# Patient Record
Sex: FEMALE | Race: WHITE | NOT HISPANIC OR LATINO | Employment: PART TIME | URBAN - METROPOLITAN AREA
[De-identification: names, ages, dates, MRNs, and addresses within clinical notes are randomized per-mention and may not be internally consistent; named-entity substitution may affect disease eponyms.]

---

## 2018-04-26 ENCOUNTER — OFFICE VISIT CONVERTED (OUTPATIENT)
Dept: UROLOGY | Facility: CLINIC | Age: 23
End: 2018-04-26
Attending: UROLOGY

## 2019-02-04 ENCOUNTER — HOSPITAL ENCOUNTER (OUTPATIENT)
Dept: OTHER | Facility: HOSPITAL | Age: 24
Discharge: HOME | End: 2019-02-04
Attending: OBSTETRICS & GYNECOLOGY

## 2019-02-04 LAB — HCG INTACT+B SERPL-ACNC: 3835 M[IU]/ML (ref 0–5)

## 2019-02-06 ENCOUNTER — HOSPITAL ENCOUNTER (OUTPATIENT)
Dept: OTHER | Facility: HOSPITAL | Age: 24
Discharge: HOME | End: 2019-02-06
Attending: OBSTETRICS & GYNECOLOGY

## 2019-02-06 LAB — HCG INTACT+B SERPL-ACNC: 4211 M[IU]/ML (ref 0–5)

## 2019-03-01 ENCOUNTER — HOSPITAL ENCOUNTER (OUTPATIENT)
Dept: PERIOP | Facility: HOSPITAL | Age: 24
Setting detail: HOSPITAL OUTPATIENT SURGERY
Discharge: HOME | End: 2019-03-01
Attending: OBSTETRICS & GYNECOLOGY

## 2019-08-28 ENCOUNTER — HOSPITAL ENCOUNTER (OUTPATIENT)
Dept: GENERAL RADIOLOGY | Facility: HOSPITAL | Age: 24
Discharge: HOME | End: 2019-08-28
Attending: OBSTETRICS & GYNECOLOGY

## 2020-02-16 ENCOUNTER — HOSPITAL ENCOUNTER (OUTPATIENT)
Dept: LABOR AND DELIVERY | Facility: HOSPITAL | Age: 25
Discharge: HOME | End: 2020-02-16
Attending: OBSTETRICS & GYNECOLOGY

## 2020-02-16 LAB
ALBUMIN SERPL-MCNC: 3.6 G/DL (ref 3.5–5)
ALBUMIN/GLOB SERPL: 1.3 {RATIO} (ref 1.4–2.6)
ALP SERPL-CCNC: 102 U/L (ref 42–98)
ALT SERPL-CCNC: 12 U/L (ref 10–40)
ANION GAP SERPL CALC-SCNC: 17 MMOL/L (ref 8–19)
AST SERPL-CCNC: 14 U/L (ref 15–50)
BASOPHILS # BLD AUTO: 0.03 10*3/UL (ref 0–0.2)
BASOPHILS NFR BLD AUTO: 0.2 % (ref 0–3)
BILIRUB SERPL-MCNC: <0.15 MG/DL (ref 0.2–1.3)
BUN SERPL-MCNC: 7 MG/DL (ref 5–25)
BUN/CREAT SERPL: 13 {RATIO} (ref 6–20)
CALCIUM SERPL-MCNC: 9.4 MG/DL (ref 8.7–10.4)
CHLORIDE SERPL-SCNC: 106 MMOL/L (ref 99–111)
CONV ABS IMM GRAN: 0.22 10*3/UL (ref 0–0.2)
CONV CO2: 20 MMOL/L (ref 22–32)
CONV CREATININE URINE, RANDOM: 26.4 MG/DL (ref 10–300)
CONV IMMATURE GRAN: 1.7 % (ref 0–1.8)
CONV PROTEIN TO CREATININE RATIO (RANDOM URINE): 0.21 {RATIO} (ref 0–0.1)
CONV TOTAL PROTEIN: 6.4 G/DL (ref 6.3–8.2)
CREAT UR-MCNC: 0.53 MG/DL (ref 0.5–0.9)
DEPRECATED RDW RBC AUTO: 45.6 FL (ref 36.4–46.3)
EOSINOPHIL # BLD AUTO: 0.07 10*3/UL (ref 0–0.7)
EOSINOPHIL # BLD AUTO: 0.5 % (ref 0–7)
ERYTHROCYTE [DISTWIDTH] IN BLOOD BY AUTOMATED COUNT: 13.7 % (ref 11.7–14.4)
GFR SERPLBLD BASED ON 1.73 SQ M-ARVRAT: >60 ML/MIN/{1.73_M2}
GLOBULIN UR ELPH-MCNC: 2.8 G/DL (ref 2–3.5)
GLUCOSE SERPL-MCNC: 75 MG/DL (ref 65–99)
HCT VFR BLD AUTO: 34.4 % (ref 37–47)
HGB BLD-MCNC: 11 G/DL (ref 12–16)
LYMPHOCYTES # BLD AUTO: 2.29 10*3/UL (ref 1–5)
LYMPHOCYTES NFR BLD AUTO: 17.6 % (ref 20–45)
MCH RBC QN AUTO: 29.4 PG (ref 27–31)
MCHC RBC AUTO-ENTMCNC: 32 G/DL (ref 33–37)
MCV RBC AUTO: 92 FL (ref 81–99)
MONOCYTES # BLD AUTO: 0.95 10*3/UL (ref 0.2–1.2)
MONOCYTES NFR BLD AUTO: 7.3 % (ref 3–10)
NEUTROPHILS # BLD AUTO: 9.45 10*3/UL (ref 2–8)
NEUTROPHILS NFR BLD AUTO: 72.7 % (ref 30–85)
NRBC CBCN: 0 % (ref 0–0.7)
OSMOLALITY SERPL CALC.SUM OF ELEC: 285 MOSM/KG (ref 273–304)
PLATELET # BLD AUTO: 286 10*3/UL (ref 130–400)
PMV BLD AUTO: 9.6 FL (ref 9.4–12.3)
POTASSIUM SERPL-SCNC: 3.9 MMOL/L (ref 3.5–5.3)
PROT UR-MCNC: 5.5 MG/DL
RBC # BLD AUTO: 3.74 10*6/UL (ref 4.2–5.4)
SODIUM SERPL-SCNC: 139 MMOL/L (ref 135–147)
WBC # BLD AUTO: 13.01 10*3/UL (ref 4.8–10.8)

## 2020-02-21 ENCOUNTER — HOSPITAL ENCOUNTER (OUTPATIENT)
Dept: LABOR AND DELIVERY | Facility: HOSPITAL | Age: 25
Discharge: HOME | End: 2020-02-21
Attending: OBSTETRICS & GYNECOLOGY

## 2020-02-24 ENCOUNTER — HOSPITAL ENCOUNTER (OUTPATIENT)
Dept: LABOR AND DELIVERY | Facility: HOSPITAL | Age: 25
Discharge: HOME | End: 2020-02-24
Attending: OBSTETRICS & GYNECOLOGY

## 2020-02-24 LAB
ALBUMIN SERPL-MCNC: 3.3 G/DL (ref 3.5–5)
ALBUMIN/GLOB SERPL: 1 {RATIO} (ref 1.4–2.6)
ALP SERPL-CCNC: 110 U/L (ref 42–98)
ALT SERPL-CCNC: 11 U/L (ref 10–40)
ANION GAP SERPL CALC-SCNC: 21 MMOL/L (ref 8–19)
AST SERPL-CCNC: 16 U/L (ref 15–50)
BASOPHILS # BLD AUTO: 0.02 10*3/UL (ref 0–0.2)
BASOPHILS NFR BLD AUTO: 0.2 % (ref 0–3)
BILIRUB SERPL-MCNC: <0.15 MG/DL (ref 0.2–1.3)
BUN SERPL-MCNC: 8 MG/DL (ref 5–25)
BUN/CREAT SERPL: 14 {RATIO} (ref 6–20)
CALCIUM SERPL-MCNC: 10 MG/DL (ref 8.7–10.4)
CHLORIDE SERPL-SCNC: 103 MMOL/L (ref 99–111)
CONV ABS IMM GRAN: 0.1 10*3/UL (ref 0–0.2)
CONV CO2: 20 MMOL/L (ref 22–32)
CONV CREATININE URINE, RANDOM: 32.3 MG/DL (ref 10–300)
CONV IMMATURE GRAN: 0.8 % (ref 0–1.8)
CONV PROTEIN TO CREATININE RATIO (RANDOM URINE): 0.16 {RATIO} (ref 0–0.1)
CONV TOTAL PROTEIN: 6.6 G/DL (ref 6.3–8.2)
CREAT UR-MCNC: 0.58 MG/DL (ref 0.5–0.9)
DEPRECATED RDW RBC AUTO: 46.6 FL (ref 36.4–46.3)
EOSINOPHIL # BLD AUTO: 0.06 10*3/UL (ref 0–0.7)
EOSINOPHIL # BLD AUTO: 0.5 % (ref 0–7)
ERYTHROCYTE [DISTWIDTH] IN BLOOD BY AUTOMATED COUNT: 13.9 % (ref 11.7–14.4)
GFR SERPLBLD BASED ON 1.73 SQ M-ARVRAT: >60 ML/MIN/{1.73_M2}
GLOBULIN UR ELPH-MCNC: 3.3 G/DL (ref 2–3.5)
GLUCOSE SERPL-MCNC: 82 MG/DL (ref 65–99)
HCT VFR BLD AUTO: 35.5 % (ref 37–47)
HGB BLD-MCNC: 11.4 G/DL (ref 12–16)
LYMPHOCYTES # BLD AUTO: 2.01 10*3/UL (ref 1–5)
LYMPHOCYTES NFR BLD AUTO: 16.8 % (ref 20–45)
MCH RBC QN AUTO: 29.7 PG (ref 27–31)
MCHC RBC AUTO-ENTMCNC: 32.1 G/DL (ref 33–37)
MCV RBC AUTO: 92.4 FL (ref 81–99)
MONOCYTES # BLD AUTO: 0.66 10*3/UL (ref 0.2–1.2)
MONOCYTES NFR BLD AUTO: 5.5 % (ref 3–10)
NEUTROPHILS # BLD AUTO: 9.12 10*3/UL (ref 2–8)
NEUTROPHILS NFR BLD AUTO: 76.2 % (ref 30–85)
NRBC CBCN: 0 % (ref 0–0.7)
OSMOLALITY SERPL CALC.SUM OF ELEC: 287 MOSM/KG (ref 273–304)
PLATELET # BLD AUTO: 301 10*3/UL (ref 130–400)
PMV BLD AUTO: 9.9 FL (ref 9.4–12.3)
POTASSIUM SERPL-SCNC: 4 MMOL/L (ref 3.5–5.3)
PROT UR-MCNC: 5.2 MG/DL
RBC # BLD AUTO: 3.84 10*6/UL (ref 4.2–5.4)
SODIUM SERPL-SCNC: 140 MMOL/L (ref 135–147)
WBC # BLD AUTO: 11.97 10*3/UL (ref 4.8–10.8)

## 2020-02-26 ENCOUNTER — HOSPITAL ENCOUNTER (OUTPATIENT)
Dept: LABOR AND DELIVERY | Facility: HOSPITAL | Age: 25
Discharge: HOME | End: 2020-02-26
Attending: OBSTETRICS & GYNECOLOGY

## 2020-03-04 ENCOUNTER — HOSPITAL ENCOUNTER (OUTPATIENT)
Dept: LABOR AND DELIVERY | Facility: HOSPITAL | Age: 25
Discharge: HOME | End: 2020-03-04
Attending: OBSTETRICS & GYNECOLOGY

## 2020-03-14 ENCOUNTER — HOSPITAL ENCOUNTER (OUTPATIENT)
Dept: LABOR AND DELIVERY | Facility: HOSPITAL | Age: 25
Discharge: HOME | End: 2020-03-14
Attending: OBSTETRICS & GYNECOLOGY

## 2020-12-02 ENCOUNTER — OFFICE VISIT CONVERTED (OUTPATIENT)
Dept: FAMILY MEDICINE CLINIC | Facility: CLINIC | Age: 25
End: 2020-12-02
Attending: NURSE PRACTITIONER

## 2020-12-02 ENCOUNTER — HOSPITAL ENCOUNTER (OUTPATIENT)
Dept: FAMILY MEDICINE CLINIC | Facility: CLINIC | Age: 25
Discharge: HOME | End: 2020-12-02
Attending: NURSE PRACTITIONER

## 2020-12-02 LAB
ALBUMIN SERPL-MCNC: 5 G/DL (ref 3.5–5)
ALBUMIN/GLOB SERPL: 1.5 {RATIO} (ref 1.4–2.6)
ALP SERPL-CCNC: 82 U/L (ref 42–98)
ALT SERPL-CCNC: 22 U/L (ref 10–40)
ANION GAP SERPL CALC-SCNC: 20 MMOL/L (ref 8–19)
AST SERPL-CCNC: 25 U/L (ref 15–50)
BASOPHILS # BLD AUTO: 0.04 10*3/UL (ref 0–0.2)
BASOPHILS NFR BLD AUTO: 0.4 % (ref 0–3)
BILIRUB SERPL-MCNC: 0.21 MG/DL (ref 0.2–1.3)
BUN SERPL-MCNC: 14 MG/DL (ref 5–25)
BUN/CREAT SERPL: 14 {RATIO} (ref 6–20)
CALCIUM SERPL-MCNC: 9.9 MG/DL (ref 8.7–10.4)
CHLORIDE SERPL-SCNC: 105 MMOL/L (ref 99–111)
CHOLEST SERPL-MCNC: 198 MG/DL (ref 107–200)
CHOLEST/HDLC SERPL: 4 {RATIO} (ref 3–6)
CONV ABS IMM GRAN: 0.06 10*3/UL (ref 0–0.2)
CONV CO2: 21 MMOL/L (ref 22–32)
CONV IMMATURE GRAN: 0.6 % (ref 0–1.8)
CONV TOTAL PROTEIN: 8.3 G/DL (ref 6.3–8.2)
CREAT UR-MCNC: 0.99 MG/DL (ref 0.5–0.9)
DEPRECATED RDW RBC AUTO: 42.3 FL (ref 36.4–46.3)
EOSINOPHIL # BLD AUTO: 0.1 10*3/UL (ref 0–0.7)
EOSINOPHIL # BLD AUTO: 1 % (ref 0–7)
ERYTHROCYTE [DISTWIDTH] IN BLOOD BY AUTOMATED COUNT: 13 % (ref 11.7–14.4)
GFR SERPLBLD BASED ON 1.73 SQ M-ARVRAT: >60 ML/MIN/{1.73_M2}
GLOBULIN UR ELPH-MCNC: 3.3 G/DL (ref 2–3.5)
GLUCOSE SERPL-MCNC: 93 MG/DL (ref 65–99)
HCT VFR BLD AUTO: 45.4 % (ref 37–47)
HDLC SERPL-MCNC: 49 MG/DL (ref 40–60)
HGB BLD-MCNC: 14.5 G/DL (ref 12–16)
LDLC SERPL CALC-MCNC: 121 MG/DL (ref 70–100)
LYMPHOCYTES # BLD AUTO: 2.79 10*3/UL (ref 1–5)
LYMPHOCYTES NFR BLD AUTO: 28.1 % (ref 20–45)
MCH RBC QN AUTO: 28.5 PG (ref 27–31)
MCHC RBC AUTO-ENTMCNC: 31.9 G/DL (ref 33–37)
MCV RBC AUTO: 89.2 FL (ref 81–99)
MONOCYTES # BLD AUTO: 0.48 10*3/UL (ref 0.2–1.2)
MONOCYTES NFR BLD AUTO: 4.8 % (ref 3–10)
NEUTROPHILS # BLD AUTO: 6.46 10*3/UL (ref 2–8)
NEUTROPHILS NFR BLD AUTO: 65.1 % (ref 30–85)
NRBC CBCN: 0 % (ref 0–0.7)
OSMOLALITY SERPL CALC.SUM OF ELEC: 292 MOSM/KG (ref 273–304)
PLATELET # BLD AUTO: 344 10*3/UL (ref 130–400)
PMV BLD AUTO: 9.3 FL (ref 9.4–12.3)
POTASSIUM SERPL-SCNC: 4.5 MMOL/L (ref 3.5–5.3)
RBC # BLD AUTO: 5.09 10*6/UL (ref 4.2–5.4)
SODIUM SERPL-SCNC: 141 MMOL/L (ref 135–147)
TRIGL SERPL-MCNC: 138 MG/DL (ref 40–150)
TSH SERPL-ACNC: 3.65 M[IU]/L (ref 0.27–4.2)
VLDLC SERPL-MCNC: 28 MG/DL (ref 5–37)
WBC # BLD AUTO: 9.93 10*3/UL (ref 4.8–10.8)

## 2020-12-02 PROCEDURE — 82607 VITAMIN B-12: CPT

## 2020-12-02 PROCEDURE — 82746 ASSAY OF FOLIC ACID SERUM: CPT

## 2020-12-03 LAB
25(OH)D3 SERPL-MCNC: 23.3 NG/ML (ref 30–100)
FERRITIN SERPL-MCNC: 96 NG/ML (ref 10–200)
IRON SATN MFR SERPL: 13 % (ref 20–55)
IRON SERPL-MCNC: 53 UG/DL (ref 60–170)
TIBC SERPL-MCNC: 418 UG/DL (ref 245–450)
TRANSFERRIN SERPL-MCNC: 292 MG/DL (ref 250–380)

## 2020-12-04 ENCOUNTER — LAB REQUISITION (OUTPATIENT)
Dept: LAB | Facility: HOSPITAL | Age: 25
End: 2020-12-04

## 2020-12-04 DIAGNOSIS — Z00.00 ROUTINE GENERAL MEDICAL EXAMINATION AT A HEALTH CARE FACILITY: ICD-10-CM

## 2020-12-04 LAB
FOLATE SERPL-MCNC: >20 NG/ML (ref 4.78–24.2)
VIT B12 BLD-MCNC: 778 PG/ML (ref 211–946)

## 2020-12-18 ENCOUNTER — OFFICE VISIT CONVERTED (OUTPATIENT)
Dept: FAMILY MEDICINE CLINIC | Facility: CLINIC | Age: 25
End: 2020-12-18
Attending: NURSE PRACTITIONER

## 2021-01-06 ENCOUNTER — TELEMEDICINE CONVERTED (OUTPATIENT)
Dept: PSYCHIATRY | Facility: CLINIC | Age: 26
End: 2021-01-06
Attending: STUDENT IN AN ORGANIZED HEALTH CARE EDUCATION/TRAINING PROGRAM

## 2021-01-08 ENCOUNTER — HOSPITAL ENCOUNTER (OUTPATIENT)
Dept: OTHER | Facility: HOSPITAL | Age: 26
Discharge: HOME | End: 2021-01-08
Attending: INTERNAL MEDICINE

## 2021-01-22 ENCOUNTER — HOSPITAL ENCOUNTER (OUTPATIENT)
Dept: OTHER | Facility: HOSPITAL | Age: 26
Discharge: HOME | End: 2021-01-22
Attending: STUDENT IN AN ORGANIZED HEALTH CARE EDUCATION/TRAINING PROGRAM

## 2021-01-23 ENCOUNTER — HOSPITAL ENCOUNTER (OUTPATIENT)
Dept: URGENT CARE | Facility: CLINIC | Age: 26
Discharge: HOME | End: 2021-01-23
Attending: PHYSICIAN ASSISTANT

## 2021-02-02 ENCOUNTER — HOSPITAL ENCOUNTER (OUTPATIENT)
Dept: VACCINE CLINIC | Facility: HOSPITAL | Age: 26
Discharge: HOME | End: 2021-02-02
Attending: INTERNAL MEDICINE

## 2021-02-10 ENCOUNTER — TELEMEDICINE CONVERTED (OUTPATIENT)
Dept: PSYCHIATRY | Facility: CLINIC | Age: 26
End: 2021-02-10
Attending: STUDENT IN AN ORGANIZED HEALTH CARE EDUCATION/TRAINING PROGRAM

## 2021-03-10 ENCOUNTER — TELEMEDICINE CONVERTED (OUTPATIENT)
Dept: PSYCHIATRY | Facility: CLINIC | Age: 26
End: 2021-03-10
Attending: STUDENT IN AN ORGANIZED HEALTH CARE EDUCATION/TRAINING PROGRAM

## 2021-05-05 ENCOUNTER — TELEMEDICINE CONVERTED (OUTPATIENT)
Dept: PSYCHIATRY | Facility: CLINIC | Age: 26
End: 2021-05-05
Attending: STUDENT IN AN ORGANIZED HEALTH CARE EDUCATION/TRAINING PROGRAM

## 2021-05-07 ENCOUNTER — HOSPITAL ENCOUNTER (OUTPATIENT)
Dept: OTHER | Facility: HOSPITAL | Age: 26
Discharge: HOME | End: 2021-05-07
Attending: UROLOGY

## 2021-05-07 LAB
AMPHETAMINES UR QL SCN: NEGATIVE
BARBITURATES UR QL SCN: NEGATIVE
BENZODIAZ UR QL SCN: NEGATIVE
CONV COCAINE, UR: NEGATIVE
METHADONE UR QL SCN: NEGATIVE
OPIATES TESTED UR SCN: NEGATIVE
OXYCODONE UR QL SCN: NEGATIVE
PCP UR QL: NEGATIVE
THC SERPLBLD CFM-MCNC: NEGATIVE NG/ML

## 2021-05-10 NOTE — H&P
History and Physical      Patient Name: Janice Padilla   Patient ID: [id number]   Sex: Female   YOB: 1995    Primary Care Provider: Lázaro Peguero MD   Referring Provider: Lázaro Peguero MD    Visit Date: December 2, 2020    Provider[place] JURGEN Colon   [place] [facility] [place] [address] [place] [address], [address]  [place], [place]  [id number]   Location Phone: [phone]          Chief Complaint  · establish care  · discuss medications      History Of Present Illness  Janice Padilla is a 25 year old /White female who presents for evaluation and treatment of:      Patient is a 25-year-old female who comes in to establish care and annual physical exam.  Her last provider Recently closed his practice.  She has a history of anxiety and depression, she is currently on Zoloft and hydroxyzine.  She also has history of ADD and is currently on Adderall as needed.  She states she has been on multiple medications for anxiety and depression.  She states medications include Prozac, Effexor, Lexapro and BuSpar.  She states she continues to feel fatigue, unmotivated.  She is a new mom to young daughter who is 9 months old.  Patient is unsure if it was post pregnancy that she feels this way or is or something underlying that is causing her to feel more fatigue.    Patient is currently on not out Adderall as needed for ADD.  She states she feels like it wears off when she does need it is not very effective.  She has been taking it for several years she states she has also been on Vyvanse, states that it worked but then for unknown reasons it stopped working.  Depression screening today of 5 points.  She is already had her flu vaccine.  She has never smoked.  Last Pap was February 19, 2020.  Otherwise she is feeling well with minimal complaints.       Past Medical History  Disease Name Date Onset Notes   Acne --  --    ADHD --  --    Anxiety --  --    Depression --  --     PCOS (polycystic ovarian syndrome) --  --    Pre-eclampsia --  --          Past Surgical History  Procedure Name Date Notes   D & C --  --    Tonsilectomy --  --          Medication List  Name Date Started Instructions   Adderall 20 mg oral tablet  take 1 tablet (20 mg) by oral route 3 times per day before breakfast, at noon, and at 4 pm   hydroxyzine HCl 50 mg oral tablet 12/02/2020 take 1 tablet by oral route once a day (at bedtime) for 90 days   minocycline 100 mg oral capsule  take 1 capsule by oral route daily   Prenatal oral  take 1 by oral route daily   spironolactone 100 mg oral tablet  take 1 tablet (100 mg) by oral route once daily   Zoloft 50 mg oral tablet 12/02/2020 take 1 tablet (50 mg) by oral route once daily for 30 days         Allergy List  Allergen Name Date Reaction Notes   NO KNOWN DRUG ALLERGIES --  --  --          Family Medical History  Disease Name Relative/Age Notes   *No Known Family History  --          Social History  Finding Status Start/Stop Quantity Notes   Alcohol Never --/-- --  --    Tobacco Never --/-- --  --          Review of Systems  · Constitutional  o Admits  o : fatigue  o Denies  o : fever, weight loss, weight gain  · Eyes  o Denies  o : double vision, impaired vision, changes in vision  · HENT  o Denies  o : headaches, vertigo, lightheadedness  · Cardiovascular  o Denies  o : lower extremity edema, claudication, chest pressure, palpitations  · Respiratory  o Denies  o : shortness of breath, wheezing, cough, hemoptysis, dyspnea on exertion  · Gastrointestinal  o Denies  o : nausea, vomiting, diarrhea, constipation, abdominal pain  · Integument  o Denies  o : rash, itching, pigmentation changes  · Musculoskeletal  o Denies  o : joint pain, joint swelling, muscle pain  · Psychiatric  o Admits  o : anxiety, depression, inattentiveness  o Denies  o : suicidal ideation, homicidal ideation      Vitals  Date Time BP Position Site L\R Cuff Size HR RR TEMP (F) WT  HT  BMI kg/m2  "BSA m2 O2 Sat FR L/min FiO2 HC       12/02/2020 08:51 /84 Sitting    107 - R   184lbs 6oz 5'  1\" 34.84 1.9 100 %            Physical Examination  · Constitutional  o Appearance  o : well-nourished, well developed, in no acute distress  · Eyes  o Conjunctivae  o : conjunctivae normal, no exudates present  o Sclerae  o : sclerae white  o Pupils and Irises  o : pupils equal and round, and reactive to light and accomodation bilaterally  o Eyelids/Ocular Adnexae  o : extra ocular movements intact  · Respiratory  o Respiratory Effort  o : breathing unlabored, no accessory muscle use  o Inspection of Chest  o : normal appearance, no retractions  o Auscultation of Lungs  o : normal breath sounds bilaterally  · Cardiovascular  o Heart  o :   § Auscultation of Heart  § : regular rate and rhythm, no murmurs, gallops or rubs  · Neurologic  o Mental Status Examination  o :   § Orientation  § : alert and oriented x3  § Speech/Language  § : normal speech pattern  o Gait and Station  o : normal gait, able to stand without difficulty  · Psychiatric  o Judgment and Insight  o : judgment and insight intact, judgement for everyday activities and social situations within normal limits, insight intact  o Thought Processes  o : rate of thoughts normal, thought content logical  o Mood and Affect  o : mood normal, affect appropriate              Assessment  · Screening for depression     V79.0/Z13.89  · Annual physical exam     V70.0/Z00.00  · Anemia     285.9/D64.9  · Anxiety disorder     300.00/F41.9  Will decrease her Zoloft and start on Wellbutrin with a 2-week follow-up. Discussed with patient to follow-up sooner if need be. Patient verbalized understanding.  · Fatigue     780.79/R53.83  Will check labs to rule out any acute underlying issue. Patient is agreeable treatment plan.  · Vitamin D deficiency     268.9/E55.9  · ADD (attention deficit disorder)     314.00/F98.8  Will refer over to " Terri      Plan  · Orders  o Psychiatric Consultation (PSYCH) - V79.0/Z13.89 - 12/02/2020   Dr. Murray  o Physical, Primary Care Panel (CBC, CMP, Lipid, TSH) [facility] (98944, 88713, 78383, 85390) - V70.0/Z00.00 - 12/02/2020  o B12 Folate levels (B12FO) - 285.9/D64.9 - 12/02/2020  o Iron panel (iron, TIBC, transferrin saturation) (98129, 79961, 50434) - 285.9/D64.9 - 12/02/2020  o Ferritin ser/plas (89328) - 285.9/D64.9 - 12/02/2020  o Vitamin D Level (60440) - 268.9/E55.9 - 12/02/2020  o ACO-39: Current medications updated and reviewed (, 1159F) - - 12/02/2020  o ACO-18: Positive screen for clinical depression using a standardized tool and a follow-up plan documented () - - 12/02/2020   5  o ACO-14: Influenza immunization administered or previously received [facility] () - - 12/02/2020   pt states she received at work 10/20  · Medications  o Wellbutrin  mg oral tablet extended release 24 hr   SIG: take 1 tablet (150 mg) by oral route once daily for 30 days   DISP: (30) Tablet with 0 refills  Prescribed on 12/02/2020     o hydroxyzine HCl 50 mg oral tablet   SIG: take 1 tablet by oral route once a day (at bedtime) for 90 days   DISP: (90) Tablet with 1 refills  Prescribed on 12/02/2020     o Zoloft 50 mg oral tablet   SIG: take 1 tablet (50 mg) by oral route once daily for 30 days   DISP: (30) Tablet with 0 refills  Prescribed on 12/02/2020     o Medications have been Reconciled  o Transition of Care or Provider Policy  · Instructions  o Depression Screen completed and scanned into the EMR under the designated folder within the patient's documents.  o Today's PHQ-9 result is _5_  o Reviewed health maintenance flowsheet and updated information. Orders were placed and/or patient's response was documented.  o Patient was given an SSRI/SSNRI medication and warned of possible side effects of the medication including potential for increased risk of suicidal thoughts and feelings. Patient was instructed that if they  begin to exhibit any of these effects they will discontinue the medication immediately and contact our office or the ER ASAP.  o Take all medications as prescribed/directed.  o Patient was educated/instructed on their diagnosis, treatment and medications prior to discharge from the clinic today.  o Call the office with any concerns or questions.  · Disposition  o Call or Return if symptoms worsen or persist.  o follow up as needed  o call the office with any questions or concerns  o Follow-up in 2 weeks            Electronically Signed by: Balbina Todd APRN -Author on December 2, 2020 10:28:19 AM

## 2021-05-10 NOTE — H&P
"   History and Physical      Patient Name: Janice Padilla   Patient ID: [id number]   Sex: Female   YOB: 1995    Primary Care Provider: Balbina SEAMAN   Referring Provider: Balbina SEAMAN    Visit Date: January 6, 2021    Provider: Eil Murray MD   Location: [facility]   Location Address: [address] [place] [address]  [address] [place] [address]  [id number]   Location Phone: [phone]          Chief Complaint     \"Well I had a baby in March of last year.\"       History Of Present Illness  Janice Padilla is a 25 year old /White female who presents to the office today referred by Balbina SEAMAN.   Chart review 1/6: Patient is on Wellbutrin, status post Zoloft. Also on Adderall. Had tachycardia at 130. Started on metoprolol 50 mg extended release daily. Labs in December: BMI 35, LDL is elevated at 121, ALT AST within normal limits, creatinine 0.99, hematocrit 45, electrolytes are essentially normal, TSH is 3.65 normal, iron is 53 low, vitamin D is 23 low, ferritin is normal at 96. No EKG or head imaging.   Virtual visit via Zoom audio and video due to the COVID-19 pandemic. Patient is accepting of and agreeable to appointment. The appointment consisted of the patient and I only. Interview: Patient reports that she had her first child in March and suffered from postpartum depression. The pandemic did not help things. It was a \"hard time,\" and the patient reports she had already had anxiety before. Patient is presently bottlefeeding. She became pregnant through an infertility specialist. Presently not on any contraception.   Endorses muscle tension, fatigue, poor concentration, restlessness, irritability, and some broken sleep, although she is sleeping for about 8 hours a night. She wakes up frequently to check on her baby girl, Alexandra. Also endorses guilt at being a bad mom. Duration has been since March of last year. Patient feels her anxiety is worse than her " "depression.   Denies SI HI AVH. Has access to weapons that are locked in a gun safe. Patient does not know the combination. She also does not know how to work the guns that are in the safe. Psychiatric review of systems is positive for anxiety and depression, negative for psychosis and maribel.   Possible ADHD. Patient was diagnosed by Dr. Joe in 2017. 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 Possible family history as well as her sister may have ADHD.   Past Psychiatric History:  Began Psychiatric Treatment: Since    Dx: Anxiety   Psychiatrist: Has not seen a psychiatrist   Therapist: Saw therapist in 2018x1, unsure if it was helpful.   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   Kids: 1 child, a baby girl, Alexandra   House: Has her own house    Hx: Denies   Family History:  Suicide Attempts: Denies   Suicide Completions: Denies   Substance Use: Likely none   Psychiatric Conditions: Mom has bipolar, sister may have ADHD    depression, psychosis, anxiety: Patient has a history of postpartum depression   Developmental History:  Born: [place]   Siblings: 1 sister   Childhood: no abuse   High School: Completed   College: Has her RN degree       Past Medical History  Disease Name Date Onset Notes   Acne --  --    ADHD --  --    Anxiety --  --    Depression --  --    PCOS " (polycystic ovarian syndrome) --  --    Pre-eclampsia --  --          Past Surgical History  Procedure Name Date Notes   D & C --  --    Tonsilectomy --  --          Medication List  Name Date Started Instructions   Adderall 20 mg oral tablet  take 1 tablet (20 mg) by oral route 3 times per day before breakfast, at noon, and at 4 pm   hydroxyzine HCl 50 mg oral tablet 12/18/2020 take 1 tablet by oral route once a day (at bedtime) for 90 days   iron 325 mg (65 mg iron) oral tablet 12/18/2020 take 1 tablet (325 mg) by oral route once daily for 90 days   metoprolol succinate 50 mg oral tablet extended release 24 hr 12/18/2020 take 1 tablet (50 mg) by oral route once daily for 90 days   minocycline 100 mg oral capsule  take 1 capsule by oral route daily   Prenatal oral  take 1 by oral route daily   spironolactone 100 mg oral tablet  take 1 tablet (100 mg) by oral route once daily   Vitamin D2 1,250 mcg (50,000 unit) oral capsule 12/03/2020 take 1 capsule by oral route weekly   Wellbutrin  mg oral tablet extended release 24 hr 12/18/2020 take 1 tablet (150 mg) by oral route once daily for 90 days   Zoloft 50 mg oral tablet 12/02/2020 take 1 tablet (50 mg) by oral route once daily for 30 days         Allergy List  Allergen Name Date Reaction Notes   NO KNOWN DRUG ALLERGIES --  --  --          Family Medical History  Disease Name Relative/Age Notes   Family history of anxiety disorder Mother/   --    Family history of attention deficit disorder Sister/   --          Social History  Finding Status Start/Stop Quantity Notes   Alcohol Light --/-- --  --    Recreational Drug Use Never --/-- --  --    Tobacco Never --/-- --  --          Immunizations  NameDate Admin Mfg Trade Name Lot Number Route Inj VIS Given VIS Publication   Ahfctmpcu59/01/2020 NE Not Entered  NE NE     Comments: pt states she received 10/20 at work         Review of Systems  · Constitutional  o Denies  o : fatigue, night sweats  · Eyes  o Denies  o :  double vision, blurred vision  · HENT  o Denies  o : vertigo, recent head injury  · Cardiovascular  o Denies  o : chest pain, irregular heart beats  · Respiratory  o Denies  o : shortness of breath, productive cough  · Gastrointestinal  o Denies  o : nausea, vomiting  · Genitourinary  o Denies  o : dysuria, urinary retention  · Integument  o Denies  o : hair growth change, new skin lesions  · Neurologic  o Denies  o : altered mental status, seizures  · Musculoskeletal  o Denies  o : joint swelling, limitation of motion  · Endocrine  o Denies  o : cold intolerance, heat intolerance  · Psychiatric  o Admits  o : anxiety  o Denies  o : depression, post traumatic stress disorder, obsessive compulsive disorder, psychosis, maribel  · Allergic-Immunologic  o Denies  o : frequent illnesses      Physical Examination  · Mental Status Exam  o Appearance  o : good eye contact, normal street clothes, groomed  o Behavior  o : pleasant and cooperative  o Motor  o : no abnormal  o Speech  o : normal rhythm, rate, volume, tone, not hyperverbal, not pressured, normal prosidy  o Mood  o : mood normal  o Affect  o : constricted, depressed, euthymic  o Thought Content  o : negative suicidal ideations, negative homicidal ideations, negative obsessions  o Perceptions  o : negative auditory hallucinations, negative visual hallucinations  o Thought Process  o : linear  o Insight/Judgement  o : fair/fair  o Cognition  o : grossly intact  o Attention  o : intact          Assessment  · Major depressive disorder in partial remission     296.25/F32.4  · Generalized anxiety disorder     300.02/F41.1       Presentation most consistent with major depressive disorder in partial remission, generalized anxiety disorder, possible (likely?)  ADHD.  Patient may have a sister with ADHD, she is distractible and interrupt me frequently during the interview, and also experienced significant benefit on a stimulant while in RN school (was valedictorian), wherein  she barely passed LPN school.  Strong suspicion.    Start escitalopram to work with Wellbutrin.  Order an EKG due to tachycardia.  See back in 1 month.    Patient does not get along well with her mom, and her father passed away.  It may be difficult to perform the diva 2.0 to diagnose ADHD.  At the next visit, I will talk to the patient about her childhood symptoms, as well as adult symptoms.       Plan  · Orders  o ACO-39: Current medications updated and reviewed (1159F, ) - - 2021  o EKG with at least 12 leads, INTERPRETATION AND REPORT ONLY [facility] ([id number]) - 296.25/F32.4, 300.02/F41.1 - 2021  · Medications  o escitalopram oxalate 10 mg oral tablet   SIG: take 1 tablet (10 mg) by oral route once daily for 60 days   DISP: (60) Tablet with 1 refills  Prescribed on 2021     o Zoloft 50 mg oral tablet   SIG: take 1 tablet (50 mg) by oral route once daily for 30 days   DISP: (30) Tablet with 0 refills  Discontinued on 2021     o Medications have been Reconciled  o Transition of Care or Provider Policy  · Instructions  o Risk Assessment: Risk of self-harm acutely is low. Risk factors include anxiety disorder, mood disorder, access to weapons, recent psychosocial stressors. Protective factors include no family history, no present SI, no history of suicide attempts or self-harm in the past, minimal AODA, healthcare seeking, future orientation, willingness to engage in care,  baby. Risk of self-harm chronically is also low, but could be further elevated in the event of treatment noncompliance and/or AODA.  o Safety: No acute safety concerns.  o Medications: CONTINUE Bupropion  mg p.o. daily to target depression and anxiety. Risks, benefits, alternatives discussed with patient including nausea, GI upset, increased energy, exacerbation of irritability, lowering of seizure threshold. After discussion of these risks and benefits, the patient voiced understanding and agreed to proceed.  CONTINUE hydroxyzine 50 mg PO QHS. START Escitalopram 10 mg p.o. daily to target depression, anxiety. Risks, benefits, alternatives discussed with patient including GI upset, nausea vomiting diarrhea, theoretical decrease of seizure threshold predisposing the patient to a slightly higher seizure risk, headaches, sexual dysfunction, serotonin syndrome. After discussion of these risks and benefits, the patient voiced understanding and agreed to proceed.  o Therapy: Consider in the future, presently not interested.  o Labs/Studies: EKG as a baseline given history of tachycardia. Patient is presently on metoprolol ER 50 mg a day.  o Follow Up: 1 month.  · Disposition  o Follow Up in 1 month.  · Correspondence  o Behavioral Health Letter to Referring MD (Balbina SEAMAN) - 01/06/2021            Electronically Signed by: Eli Murray MD -Author on January 6, 2021 10:02:18 AM

## 2021-05-14 VITALS
OXYGEN SATURATION: 100 % | HEART RATE: 107 BPM | HEIGHT: 61 IN | DIASTOLIC BLOOD PRESSURE: 84 MMHG | BODY MASS INDEX: 34.81 KG/M2 | WEIGHT: 184.37 LBS | SYSTOLIC BLOOD PRESSURE: 126 MMHG

## 2021-05-14 VITALS
BODY MASS INDEX: 35.19 KG/M2 | TEMPERATURE: 98.2 F | HEART RATE: 130 BPM | DIASTOLIC BLOOD PRESSURE: 80 MMHG | SYSTOLIC BLOOD PRESSURE: 128 MMHG | WEIGHT: 186.37 LBS | OXYGEN SATURATION: 99 % | HEIGHT: 61 IN

## 2021-05-14 NOTE — PROGRESS NOTES
Progress Note      Patient Name: Janice Padilla   Patient ID: [id number]   Sex: Female   YOB: 1995    Primary Care Provider: Balbina SEAMAN   Referring Provider: Lázaro Peguero MD    Visit Date: December 18, 2020    Provider: JURGEN Colon   [place] [facility] [place]   Location Address: [address] [place] [address], [address] [place] [address]  [id number]   Location Phone: [phone]          Chief Complaint  · 2 week follow-up      History Of Present Illness  Janice Padilla is a 25 year old /White female who presents for evaluation and treatment of:      Patient is here for 2-week follow-up after being placed on Wellbutrin.  She states she feels like the Wellbutrin is working, she just recently stopped the Zoloft.  Patient is currently on Adderall, she is waiting to see Dr. Murray 1st week in January.  Heart rate in the office today was tachycardic at 130, she states she has always had a higher heart rate, she states her baseline typically is in the low 100s.  She denies any chest pain, she states that at times she can feel that her heart rate is too high, it makes her feel more anxious.  She denies any vertigo or syncopal episodes.She is not currently on a beta-blocker.       Past Medical History  Disease Name Date Onset Notes   Acne --  --    ADHD --  --    Anxiety --  --    Depression --  --    PCOS (polycystic ovarian syndrome) --  --    Pre-eclampsia --  --          Past Surgical History  Procedure Name Date Notes   D & C --  --    Tonsilectomy --  --          Medication List  Name Date Started Instructions   Adderall 20 mg oral tablet  take 1 tablet (20 mg) by oral route 3 times per day before breakfast, at noon, and at 4 pm   hydroxyzine HCl 50 mg oral tablet 12/18/2020 take 1 tablet by oral route once a day (at bedtime) for 90 days   minocycline 100 mg oral capsule  take 1 capsule by oral route daily   Prenatal oral  take 1 by oral route daily   spironolactone  "100 mg oral tablet  take 1 tablet (100 mg) by oral route once daily   Vitamin D2 1,250 mcg (50,000 unit) oral capsule 12/03/2020 take 1 capsule by oral route weekly   Wellbutrin  mg oral tablet extended release 24 hr 12/18/2020 take 1 tablet (150 mg) by oral route once daily for 90 days   Zoloft 50 mg oral tablet 12/02/2020 take 1 tablet (50 mg) by oral route once daily for 30 days         Allergy List  Allergen Name Date Reaction Notes   NO KNOWN DRUG ALLERGIES --  --  --        Allergies Reconciled  Family Medical History  Disease Name Relative/Age Notes   *No Known Family History  --          Social History  Finding Status Start/Stop Quantity Notes   Alcohol Never --/-- --  --    Tobacco Never --/-- --  --          Immunizations  NameDate Admin Mfg Trade Name Lot Number Route Inj VIS Given VIS Publication   [id number]/01/2020 NE Not Entered  NE NE     Comments: pt states she received 10/20 at work         Review of Systems  · Constitutional  o Denies  o : fever, fatigue, weight loss, weight gain  · Cardiovascular  o Admits  o : Tachycardia  o Denies  o : lower extremity edema, claudication, chest pressure, palpitations  · Respiratory  o Denies  o : shortness of breath, wheezing, cough, hemoptysis, dyspnea on exertion  · Gastrointestinal  o Denies  o : nausea, vomiting, diarrhea, constipation, abdominal pain  · Psychiatric  o Admits  o : anxiety, depression  o Denies  o : suicidal ideation, homicidal ideation      Vitals  Date Time BP Position Site L\R Cuff Size HR RR TEMP (F) WT  HT  BMI kg/m2 BSA m2 O2 Sat FR L/min FiO2 HC       12/18/2020 02:49 /80 Sitting    130 - R  98.2 186lbs 6oz 5'  1\" 35.21 1.91 99 %            Physical Examination  · Constitutional  o Appearance  o : well-nourished, well developed, in no acute distress  · Eyes  o Conjunctivae  o : conjunctivae normal, no exudates present  o Sclerae  o : sclerae white  o Pupils and Irises  o : pupils equal and round, and reactive to light and " accomodation bilaterally  o Eyelids/Ocular Adnexae  o : extra ocular movements intact  · Respiratory  o Respiratory Effort  o : breathing unlabored, no accessory muscle use  o Inspection of Chest  o : normal appearance, no retractions  o Auscultation of Lungs  o : normal breath sounds bilaterally  · Cardiovascular  o Heart  o :   § Auscultation of Heart  § : Tachycardia, regular rhythm, no murmurs, gallops or rubs  o Peripheral Vascular System  o :   § Extremities  § : no edema  · Neurologic  o Mental Status Examination  o :   § Orientation  § : alert and oriented x3  § Speech/Language  § : normal speech pattern  o Gait and Station  o : normal gait, able to stand without difficulty  · Psychiatric  o Judgment and Insight  o : judgment and insight intact, judgement for everyday activities and social situations within normal limits, insight intact  o Thought Processes  o : rate of thoughts normal, thought content logical  o Mood and Affect  o : mood normal, affect appropriate              Assessment  · Anxiety     300.00/F41.9  Will continue on Wellbutrin, discussed with patient we can go up on dose. Patient verbalized understanding.  · Tachycardia     785.0/R00.0  Started patient on metoprolol 50 mg extended release to take daily to help bring down her heart rate.      Plan  · Orders  o ACO-39: Current medications updated and reviewed (, 1159F) - - 12/18/2020  · Medications  o iron 325 mg (65 mg iron) oral tablet   SIG: take 1 tablet (325 mg) by oral route once daily for 90 days   DISP: (90) Tablet with 1 refills  Prescribed on 12/18/2020     o metoprolol succinate 50 mg oral tablet extended release 24 hr   SIG: take 1 tablet (50 mg) by oral route once daily for 90 days   DISP: (90) Tablet with 1 refills  Prescribed on 12/18/2020     o hydroxyzine HCl 50 mg oral tablet   SIG: take 1 tablet by oral route once a day (at bedtime) for 90 days   DISP: (90) Tablet with 1 refills  Adjusted on 12/18/2020     o Wellbutrin   mg oral tablet extended release 24 hr   SIG: take 1 tablet (150 mg) by oral route once daily for 90 days   DISP: (90) Tablet with 1 refills  Adjusted on 12/18/2020     o Medications have been Reconciled  o Transition of Care or Provider Policy  · Instructions  o Take all medications as prescribed/directed.  o Patient was educated/instructed on their diagnosis, treatment and medications prior to discharge from the clinic today.  o Call the office with any concerns or questions.  · Disposition  o Call or Return if symptoms worsen or persist.  o follow up as needed  o call the office with any questions or concerns            Electronically Signed by: Balbina Todd APRN -Author on December 18, 2020 04:31:43 PM

## 2021-05-14 NOTE — PROGRESS NOTES
"   Progress Note      Patient Name: Janice Padilla   Patient ID: [id number]   Sex: Female   YOB: 1995    Primary Care Provider: Balbina SEAMAN   Referring Provider: Balbina SEAMAN    Visit Date: March 10, 2021    Provider: Eli Murray MD   Location: [facility]   Location Address: [address] [place] [address]  [id number]   Location Phone: [phone]          Chief Complaint     \"Bupropion is my favorite.\"       History Of Present Illness  Janiec Padilla is a 25 year old /White female who presents to the office today referred by Balbina SEAMAN.   Chart review 1/6: Patient is on Wellbutrin, status post Zoloft. Also on Adderall. Had tachycardia at 130. Started on metoprolol 50 mg extended release daily. Labs in December: BMI 35, LDL is elevated at 121, ALT AST within normal limits, creatinine 0.99, hematocrit 45, electrolytes are essentially normal, TSH is 3.65 normal, iron is 53 low, vitamin D is 23 low, ferritin is normal at 96. No EKG or head imaging.   \"Janice.\" Virtual visit via Zoom audio and video due to the COVID-19 pandemic. Patient is accepting of and agreeable to appointment. The appointment consisted of the patient and I only. Interview: Patient is significantly improved. Denies depression, muscle tension, fatigue, restlessness. Continues to endorse poor concentration. No SI HI AVH.   Patient was held back in the third grade. Struggled to complete high school and missed 60 days although she did graduate with good grades. Got in trouble for talking a lot in class. She was not in any special classes. She did not have an IEP.   ...   ...         Past Medical History  Disease Name Date Onset Notes   Acne --  --    ADHD --  --    Anxiety --  --    Depression --  --    Generalized anxiety disorder 01/06/2021 --    Major depressive disorder in partial remission 01/06/2021 --    PCOS (polycystic ovarian syndrome) --  --  "   Pre-eclampsia --  --          Past Surgical History  Procedure Name Date Notes   D & C --  --    Tonsilectomy --  --          Medication List  Name Date Started Instructions   hydroxyzine HCl 50 mg oral tablet 12/18/2020 take 1 tablet by oral route once a day (at bedtime) for 90 days   iron 325 mg (65 mg iron) oral tablet 12/18/2020 take 1 tablet (325 mg) by oral route once daily for 90 days   metoprolol succinate 50 mg oral tablet extended release 24 hr 12/18/2020 take 1 tablet (50 mg) by oral route once daily for 90 days   phentermine 30 mg oral capsule  take 1 capsule (30 mg) by oral route once daily before breakfast   Prenatal oral  take 1 by oral route daily   spironolactone 100 mg oral tablet  take 1 tablet (100 mg) by oral route once daily   topiramate 50 mg oral tablet  take 1 tablet (50 mg) by oral route 2 times per day   Vitamin D2 1,250 mcg (50,000 unit) oral capsule 12/03/2020 take 1 capsule by oral route weekly   Wellbutrin  mg oral tablet extended release 24 hr 02/10/2021 take 1 tablet (300 mg) by oral route once daily for 90 days         Allergy List  Allergen Name Date Reaction Notes   NO KNOWN DRUG ALLERGIES --  --  --          Family Medical History  Disease Name Relative/Age Notes   Family history of anxiety disorder Mother/   --    Family history of attention deficit disorder Sister/   --          Social History  Finding Status Start/Stop Quantity Notes   Alcohol Light --/-- --  02/10/2021 - 01/06/2021 -    Recreational Drug Use Never --/-- --  02/10/2021 - 01/06/2021 -    Tobacco Never --/-- --  02/10/2021 - 01/06/2021 -          Immunizations  NameDate Admin Mfg Trade Name Lot Number Route Inj VIS Given VIS Publication   [id number]/01/2020 NE Not Entered  NE NE     Comments: pt states she received 10/20 at work         Review of Systems  · Constitutional  o Denies  o : fatigue, night sweats  · Eyes  o Denies  o : double vision, blurred vision  · HENT  o Denies  o : vertigo, recent head  "injury  · Breasts  o Denies  o : abnormal changes in breast size, additional breast symptoms except as noted in the HPI  · Cardiovascular  o Denies  o : chest pain, irregular heart beats  · Respiratory  o Denies  o : shortness of breath, productive cough  · Gastrointestinal  o Denies  o : nausea, vomiting  · Genitourinary  o Denies  o : dysuria, urinary retention  · Integument  o Denies  o : hair growth change, new skin lesions  · Neurologic  o Denies  o : altered mental status, seizures  · Musculoskeletal  o Denies  o : joint swelling, limitation of motion  · Endocrine  o Denies  o : cold intolerance, heat intolerance  · Heme-Lymph  o Denies  o : petechiae, lymph node enlargement or tenderness  · Allergic-Immunologic  o Denies  o : frequent illnesses      Physical Examination  · Mental Status Exam  o Appearance  o : good eye contact, normal street clothes, groomed, sitting at a desk in her house  o Behavior  o : pleasant and cooperative  o Motor  o : no abnormal  o Speech  o : normal rhythm, rate, volume, tone, not hyperverbal, not pressured, normal prosidy  o Mood  o : \"Bupropion is my favorite\"  o Affect  o : Almost euthymic, slight constriction  o Thought Content  o : negative suicidal ideations, negative homicidal ideations, negative obsessions  o Perceptions  o : negative auditory hallucinations, negative visual hallucinations  o Thought Process  o : linear  o Insight/Judgement  o : fair/fair  o Cognition  o : grossly intact  o Attention  o : intact          Assessment  · Major depressive disorder in partial remission     296.25/F32.4  · Generalized anxiety disorder     300.02/F41.1       Presentation most consistent with major depressive disorder in partial remission, generalized anxiety disorder, and ADHD.  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  Also endorses a childhood history that is compelling.    Significant improvement.  Increase Wellbutrin to target attentional issues.  EKG is reassuring.  Once the patient is off phentermine we will start a stimulant to tackle ADHD.         Plan  · Orders  o ACO-39: Current medications updated and reviewed (1159F, ) - - 03/10/2021  o EKG with at least 12 leads, INTERPRETATION AND REPORT ONLY [facility] ([id number]) - 296.25/F32.4, 300.02/F41.1 - 03/10/2021  · Medications  o bupropion HCl 450 mg oral tablet extended release 24 hr   SIG: take 1 tablet (450 mg) by oral route once daily swallowing whole. Do not crush, chew and/or divide. for 90 days   DISP: (90) Tablet with 1 refills  Prescribed on 03/10/2021     o Wellbutrin  mg oral tablet extended release 24 hr   SIG: take 1 tablet (300 mg) by oral route once daily for 90 days   DISP: (90) Tablet with 1 refills  Discontinued on 03/10/2021     o Medications have been Reconciled  o Transition of Care or Provider Policy  · Instructions  o Risk Assessment: Risk of self-harm acutely is low. Risk factors include anxiety disorder, mood disorder, access to weapons, recent psychosocial stressors. Protective factors include no family history, no present SI, no history of suicide attempts or self-harm in the past, minimal AODA, healthcare seeking, future orientation, willingness to engage in care,  baby. Risk of self-harm chronically is also low, but could be further elevated in the event of treatment noncompliance and/or AODA.  o Safety: No acute safety concerns.  o Medications: INCREASE Bupropion XL from 300 to 450 mg p.o. daily to target attentional issues. Risks, benefits, alternatives discussed with patient including nausea, GI upset, increased energy, exacerbation of irritability, lowering of seizure threshold. After discussion of these risks and benefits, the patient voiced understanding and agreed to proceed.  CONTINUE hydroxyzine 50 mg PO QHS. *** S/P escitalopram.  o Therapy: Consider in the future, presently not interested.  o Labs/Studies: EKG reassuring.  o Follow Up: 2 month.  · Disposition  o Follow Up in 2 months.            Electronically Signed by: Eli Murray MD -Author on March 10, 2021 09:24:49 AM

## 2021-05-16 VITALS — HEIGHT: 61 IN | WEIGHT: 150 LBS | BODY MASS INDEX: 28.32 KG/M2 | RESPIRATION RATE: 14 BRPM

## 2021-06-05 NOTE — PROGRESS NOTES
"   Progress Note      Patient Name: Janice Padilla   Patient ID: [id number]   Sex: Female   YOB: 1995    Primary Care Provider: Balbina SEAMAN   Referring Provider: Balbina SEAMAN    Visit Date: May 5, 2021    Provider: Eli Murray MD   Location: [facility]   Location Address: [address] [place] [address]  [id number]   Location Phone: [phone]          Chief Complaint     \"I do not know what happened with the Wellbutrin.\"  The increased dose made her more depressed.  Still having attentional issues.       History Of Present Illness  Janice Padilla is a 25 year old /White female who presents to the office today referred by Balbina SEAMAN.   Chart review 1/6: Patient is on Wellbutrin, status post Zoloft. Also on Adderall. Had tachycardia at 130. Started on metoprolol 50 mg extended release daily. Labs in December: BMI 35, LDL is elevated at 121, ALT AST within normal limits, creatinine 0.99, hematocrit 45, electrolytes are essentially normal, TSH is 3.65 normal, iron is 53 low, vitamin D is 23 low, ferritin is normal at 96. No EKG or head imaging.   \"Janice.\" Virtual visit via Zoom audio and video due to the COVID-19 pandemic. Patient is accepting of and agreeable to appointment. The appointment consisted of the patient and I only. Interview: Patient became more depressed on the higher dose of bupropion. Went back down to 300 mg a day. Denies depression, muscle tension, fatigue, restlessness. Continues to endorse poor concentration. Reduced Topamax dose to 25 mg a day. No SI HI AVH.   From previous note, patient was held back in the third grade. Struggled to complete high school and missed 60 days although she did graduate with good grades. Got in trouble for talking a lot in class. She was not in any special classes. She did not have an IEP.   ...   ...         Past Medical History  Disease Name Date Onset Notes   Acne --  --  "   ADHD --  --    Anxiety --  --    Depression --  --    Generalized anxiety disorder 01/06/2021 --    Major depressive disorder in partial remission 01/06/2021 --    PCOS (polycystic ovarian syndrome) --  --    Pre-eclampsia --  --          Past Surgical History  Procedure Name Date Notes   D & C --  --    Tonsilectomy --  --          Medication List  Name Date Started Instructions   bupropion HCl 450 mg oral tablet extended release 24 hr 03/10/2021 take 1 tablet (450 mg) by oral route once daily swallowing whole. Do not crush, chew and/or divide. for 90 days   ergocalciferol (vitamin D2) 1,250 mcg (50,000 unit) oral capsule 04/30/2021 TAKE 1 CAPSULE BY MOUTH ONCE WEEKLY   hydroxyzine HCl 50 mg oral tablet 12/18/2020 take 1 tablet by oral route once a day (at bedtime) for 90 days   metoprolol succinate 50 mg oral tablet extended release 24 hr 12/18/2020 take 1 tablet (50 mg) by oral route once daily for 90 days   phentermine 15 mg oral capsule  take 1 capsule by oral route 2 times a day   Prenatal oral  take 1 by oral route daily   spironolactone 100 mg oral tablet  take 1 tablet (100 mg) by oral route once daily   topiramate 25 mg oral tablet  take 1 tablet (25 mg) by oral route once daily   Xyzal 5 mg oral tablet  take 1 tablet (5 mg) by oral route once daily in the evening         Allergy List  Allergen Name Date Reaction Notes   NO KNOWN DRUG ALLERGIES --  --  --          Family Medical History  Disease Name Relative/Age Notes   Family history of anxiety disorder Mother/   --    Family history of attention deficit disorder Sister/   --          Social History  Finding Status Start/Stop Quantity Notes   Alcohol Light --/-- --  02/10/2021 - 01/06/2021 -    Recreational Drug Use Never --/-- --  02/10/2021 - 01/06/2021 -    Tobacco Never --/-- --  02/10/2021 - 01/06/2021 -          Immunizations  NameDate Admin Mfg Trade Name Lot Number Route Inj VIS Given VIS Publication   [id number]/01/2020 NE Not Entered  NE NE   "   Comments: pt states she received 10/20 at work         Review of Systems  · Constitutional  o Denies  o : fatigue, night sweats  · Eyes  o Denies  o : double vision, blurred vision  · HENT  o Denies  o : vertigo, recent head injury  · Breasts  o Denies  o : abnormal changes in breast size, additional breast symptoms except as noted in the HPI  · Cardiovascular  o Denies  o : chest pain, irregular heart beats  · Respiratory  o Denies  o : shortness of breath, productive cough  · Gastrointestinal  o Denies  o : nausea, vomiting  · Genitourinary  o Denies  o : dysuria, urinary retention  · Integument  o Denies  o : hair growth change, new skin lesions  · Neurologic  o Denies  o : altered mental status, seizures  · Musculoskeletal  o Denies  o : joint swelling, limitation of motion  · Endocrine  o Denies  o : cold intolerance, heat intolerance  · Heme-Lymph  o Denies  o : petechiae, lymph node enlargement or tenderness  · Allergic-Immunologic  o Denies  o : frequent illnesses      Physical Examination  · Mental Status Exam  o Appearance  o : good eye contact, normal street clothes, groomed, sitting at a desk in her house  o Behavior  o : pleasant and cooperative  o Motor  o : no abnormal  o Speech  o : normal rhythm, rate, volume, tone, not hyperverbal, not pressured, normal prosidy  o Mood  o : \"I am doing okay now\"  o Affect  o : Euthymic, but tearful when discussing continued problems with concentration  o Thought Content  o : negative suicidal ideations, negative homicidal ideations, negative obsessions  o Perceptions  o : negative auditory hallucinations, negative visual hallucinations  o Thought Process  o : linear  o Insight/Judgement  o : fair/fair  o Cognition  o : grossly intact  o Attention  o : intact          Assessment  · Major depressive disorder in partial remission     296.25/F32.4  · Generalized anxiety disorder     300.02/F41.1       Presentation most consistent with major depressive disorder in " partial remission, generalized anxiety disorder, and ADHD.  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  Also endorses a childhood history that is compelling.    Significant distress today related to ADHD symptoms.  Patient counseled to talk to Dr. Mortensen at the weight loss center to discontinue phentermine.  Once it is discontinued I will start Adderall 20 mg twice daily.  This is what the patient was on in the past with very good results.  Continue Topamax.      See back in 6 weeks.  If persistent attentional issues, then consider discontinuing Topamax as well.  Patient feels like managing her ADHD is more important now than managing her weight loss; she has lost over 20 pounds.     Follow for callback from patient.  When she confirms she is off the phentermine, start Adderall.       Plan  · Orders  o ACO-39: Current medications updated and reviewed (1159F, ) - - 2021  o EKG with at least 12 leads, INTERPRETATION AND REPORT ONLY [facility] ([id number]) - 296.25/F32.4, 300.02/F41.1 - 2021  · Medications  o bupropion HCl 300 mg oral tablet extended release 24 hr   SIG: take 1 tablet (300 mg) by oral route once daily for 90 days   DISP: (90) Tablet with 1 refills  Adjusted on 2021     o Medications have been Reconciled  o Transition of Care or Provider Policy  · Instructions  o Risk Assessment: Risk of self-harm acutely is low. Risk factors include anxiety disorder, mood disorder, access to weapons, recent psychosocial stressors. Protective factors include no family history, no present SI, no history of suicide attempts or self-harm in the past, minimal AODA, healthcare seeking, future orientation, willingness to engage in care,  baby. Risk of self-harm chronically is also low, but could be further  elevated in the event of treatment noncompliance and/or AODA.  o Safety: No acute safety concerns.  o Medications: DECREASE Bupropion XL from 450 mg to 300 mg p.o. daily to target attentional issues. Risks, benefits, alternatives discussed with patient including nausea, GI upset, increased energy, exacerbation of irritability, lowering of seizure threshold. After discussion of these risks and benefits, the patient voiced understanding and agreed to proceed. CONTINUE hydroxyzine 50 mg PO QHS. *** S/P escitalopram.  o Therapy: Consider in the future, presently not interested.  o Labs/Studies: EKG reassuring.  o Follow Up: 6 wks.  · Disposition  o Follow Up in 2 months.            Electronically Signed by: Eli Murray MD -Author on May 5, 2021 09:01:03 AM

## 2021-06-18 ENCOUNTER — OFFICE VISIT (OUTPATIENT)
Dept: PSYCHIATRY | Facility: CLINIC | Age: 26
End: 2021-06-18

## 2021-06-18 VITALS
BODY MASS INDEX: 30 KG/M2 | DIASTOLIC BLOOD PRESSURE: 63 MMHG | SYSTOLIC BLOOD PRESSURE: 122 MMHG | HEIGHT: 62 IN | WEIGHT: 163 LBS

## 2021-06-18 DIAGNOSIS — F90.0 ADHD (ATTENTION DEFICIT HYPERACTIVITY DISORDER), INATTENTIVE TYPE: Primary | ICD-10-CM

## 2021-06-18 DIAGNOSIS — F32.4 MAJOR DEPRESSIVE DISORDER IN PARTIAL REMISSION, UNSPECIFIED WHETHER RECURRENT (HCC): ICD-10-CM

## 2021-06-18 DIAGNOSIS — F41.1 GENERALIZED ANXIETY DISORDER: ICD-10-CM

## 2021-06-18 PROCEDURE — 99214 OFFICE O/P EST MOD 30 MIN: CPT | Performed by: STUDENT IN AN ORGANIZED HEALTH CARE EDUCATION/TRAINING PROGRAM

## 2021-06-18 RX ORDER — DEXTROAMPHETAMINE SACCHARATE, AMPHETAMINE ASPARTATE MONOHYDRATE, DEXTROAMPHETAMINE SULFATE AND AMPHETAMINE SULFATE 2.5; 2.5; 2.5; 2.5 MG/1; MG/1; MG/1; MG/1
CAPSULE, EXTENDED RELEASE ORAL
COMMUNITY
Start: 2021-05-07 | End: 2021-06-18

## 2021-06-18 RX ORDER — DEXTROAMPHETAMINE SACCHARATE, AMPHETAMINE ASPARTATE, DEXTROAMPHETAMINE SULFATE AND AMPHETAMINE SULFATE 5; 5; 5; 5 MG/1; MG/1; MG/1; MG/1
20 TABLET ORAL 2 TIMES DAILY
Qty: 60 TABLET | Refills: 0 | Status: SHIPPED | OUTPATIENT
Start: 2021-06-18 | End: 2021-07-18

## 2021-06-18 RX ORDER — LEVOCETIRIZINE DIHYDROCHLORIDE 5 MG/1
TABLET, FILM COATED ORAL
COMMUNITY
End: 2021-06-23 | Stop reason: SDUPTHER

## 2021-06-18 RX ORDER — METOPROLOL SUCCINATE 50 MG/1
50 TABLET, EXTENDED RELEASE ORAL DAILY
COMMUNITY
Start: 2021-05-29 | End: 2021-06-23 | Stop reason: SDUPTHER

## 2021-06-18 RX ORDER — IVERMECTIN 10 MG/G
CREAM TOPICAL
COMMUNITY
Start: 2021-04-21 | End: 2021-11-17

## 2021-06-18 RX ORDER — SPIRONOLACTONE 100 MG/1
TABLET, FILM COATED ORAL
COMMUNITY

## 2021-06-18 RX ORDER — BUPROPION HYDROCHLORIDE 300 MG/1
300 TABLET ORAL DAILY
COMMUNITY
Start: 2021-05-12 | End: 2021-06-23 | Stop reason: SDUPTHER

## 2021-06-18 RX ORDER — HYDROXYZINE 50 MG/1
TABLET, FILM COATED ORAL
COMMUNITY
Start: 2021-06-01 | End: 2021-06-23 | Stop reason: SDUPTHER

## 2021-06-18 NOTE — PATIENT INSTRUCTIONS
1.  Please return to clinic at your next scheduled visit.  Contact the clinic ([phone]) at least 24 hours prior in the event you need to cancel.  2.  Do no harm to yourself or others.    3.  Avoid alcohol and drugs.    4.  Take all medications as prescribed.  Please contact the clinic with any concerns. If you are in need of medication refills, please call the clinic at [phone].    5. Should you want to get in touch with your provider, Dr. Eli Murray, please utilize [employer] or contact the office ([phone]), and staff will be able to page Dr. Murray directly.  6.  In the event you have personal crisis, contact the following crisis numbers: Suicide Prevention Hotline 1-910.367.5297; JOSE Helpline 5-584-339-WTTD; [facility] Emergency Room [phone]; text HELLO to 308355; or 128.     SPECIFIC RECOMMENDATIONS:     1.      Medications discussed at this encounter:                   - stop adderall xr and start adderall ir 20 mg twice a day     2.      Psychotherapy recommendations: Continue     3.     Return to clinic: 8 weeks

## 2021-06-18 NOTE — PROGRESS NOTES
"Subjective   Janice Padilla is a 25 y.o. female who presents today for follow up    Referring Provider:  No referring provider defined for this encounter.    Chief Complaint:  \"Well, maybe I'm a little bit better.\"    History of Present Illness:     Janice Padilla is a 25 year old /White female referred by Balbina SEAMAN.     Chart review 1/6: Patient is on Wellbutrin, status post Zoloft. Also on Adderall. Had tachycardia at 130. Started on metoprolol 50 mg extended release daily. Labs in December: BMI 35, LDL is elevated at 121, ALT AST within normal limits, creatinine 0.99, hematocrit 45, electrolytes are essentially normal, TSH is 3.65 normal, iron is 53 low, vitamin D is 23 low, ferritin is normal at 96. No EKG or head imaging.     \"Janice.\"     In-person Interview: Patient reports some improvement on Adderall XR.  Discontinued Topamax.  No longer going to the weight loss clinic.  Notes residual depression and anxiety symptoms, but they are mild.  They are well controlled on the bupropion.  No SI HI AVH.      Denies depression, muscle tension, fatigue, restlessness. Continues to endorse poor concentration. See scores below.    From previous note, patient was held back in the third grade. Struggled to complete high school and missed 60 days although she did graduate with good grades. Got in trouble for talking a lot in class. She was not in any special classes. She did not have an IEP.         PHQ-9 Depression Screening  PHQ-9 Total Score: 6    Little interest or pleasure in doing things? 0   Feeling down, depressed, or hopeless? 1   Trouble falling or staying asleep, or sleeping too much? 1   Feeling tired or having little energy? 1   Poor appetite or overeating? 0   Feeling bad about yourself - or that you are a failure or have let yourself or your family down? 0   Trouble concentrating on things, such as reading the newspaper or watching television? 3   Moving or speaking so slowly that " other people could have noticed? Or the opposite - being so fidgety or restless that you have been moving around a lot more than usual? 0   Thoughts that you would be better off dead, or of hurting yourself in some way? 0   PHQ-9 Total Score 6     MATTHEW-7  Feeling nervous, anxious or on edge: Several days  Not being able to stop or control worrying: More than half the days  Worrying too much about different things: More than half the days  Trouble Relaxing: Several days  Being so restless that it is hard to sit still: Several days  Feeling afraid as if something awful might happen: More than half the days  Becoming easily annoyed or irritable: Not at all  MATTHEW 7 Total Score: 9  If you checked any problems, how difficult have these problems made it for you to do your work, take care of things at home, or get along with other people: Not difficult at all    Past Surgical History:  Past Surgical History:   Procedure Laterality Date   • DILATATION AND CURETTAGE     • TONSILLECTOMY         Problem List:  There is no problem list on file for this patient.      Allergy:   No Known Allergies     Discontinued Medications:  Medications Discontinued During This Encounter   Medication Reason   • amphetamine-dextroamphetamine XR (Adderall XR) 10 MG 24 hr capsule Not Efficacious       Current Medications:   Current Outpatient Medications   Medication Sig Dispense Refill   • hydrOXYzine (ATARAX) 50 MG tablet hydroxyzine HCl 50 mg oral tablet TAKE 1 TABLET BY MOUTH EVERY NIGHT AT BEDTIME 6/1/2021  Active     • amphetamine-dextroamphetamine (Adderall) 20 MG tablet Take 1 tablet by mouth 2 (Two) Times a Day for 30 days. 60 tablet 0   • buPROPion XL (WELLBUTRIN XL) 300 MG 24 hr tablet Take 300 mg by mouth Daily.     • levocetirizine (Xyzal) 5 MG tablet Xyzal 5 mg oral tablet take 1 tablet (5 mg) by oral route once daily in the evening   Active     • metoprolol succinate XL (TOPROL-XL) 50 MG 24 hr tablet Take 50 mg by mouth Daily.     •  Prenatal MV-Min-Fe Fum-FA-DHA (PRENATAL+DHA PO)      • Soolantra 1 % cream      • spironolactone (ALDACTONE) 100 MG tablet spironolactone 100 mg oral tablet take 1 tablet (100 mg) by oral route once daily   Active       No current facility-administered medications for this visit.       Past Medical History:  Past Medical History:   Diagnosis Date   • Acne    • ADHD    • Anxiety    • Depression    • Generalized anxiety disorder 01/06/2021   • Major depressive disorder in partial remission (CMS/HCC) 01/06/2021   • PCOS (polycystic ovarian syndrome)    • Pre-eclampsia        Social History     Socioeconomic History   • Marital status:      Spouse name: Not on file   • Number of children: Not on file   • Years of education: Not on file   • Highest education level: Not on file   Tobacco Use   • Smoking status: Never Smoker   • Smokeless tobacco: Never Used   • Tobacco comment: 2/10/2021- 1/6/2021   Vaping Use   • Vaping Use: Never used   Substance and Sexual Activity   • Alcohol use: Not Currently     Comment: light; 2/10/2021- 1/6/2021   • Drug use: Never     Comment: 2/10/2021- 1/6/2021       Family History   Problem Relation Age of Onset   • Anxiety disorder Mother    • Bipolar disorder Mother    • Depression Mother    • ADD / ADHD Sister        Mental Status Exam:   Hygiene:   good, groomed, scrubs  Cooperation:  Cooperative  Eye Contact:  Good  Psychomotor Behavior:  Appropriate  Affect:  Full range  Mood: pretty good  Hopelessness: Denies  Speech:  Normal  Thought Process:  Goal directed  Thought Content:  Normal  Suicidal:  None  Homicidal:  None  Hallucinations:  None  Delusion:  None  Memory:  Intact  Orientation:  Person, Place, Time and Situation  Reliability:  good  Insight:  Fair  Judgement:  Fair  Impulse Control:  Fair  Physical/Medical Issues:  No      Review of Systems:  Review of Systems   Eyes: Negative for visual disturbance.   Respiratory: Negative for cough and shortness of breath.   "  Cardiovascular: Negative for chest pain, palpitations and leg swelling.   Gastrointestinal: Negative for nausea and vomiting.   Endocrine: Negative for cold intolerance and heat intolerance.   Genitourinary: Negative for difficulty urinating.   Musculoskeletal: Negative for joint swelling.   Allergic/Immunologic: Negative for immunocompromised state.   Neurological: Negative for dizziness, seizures, speech difficulty and numbness.         Physical Exam:  Physical Exam    Vital Signs:   /63   Ht 157.5 cm (62\")   Wt 73.9 kg (163 lb)   BMI 29.81 kg/m²      Lab Results:   Hospital Outpatient Visit on 05/07/2021   Component Date Value Ref Range Status   • Amphetamine, Urine Qual 05/07/2021 NEGATIVE  NA Final    Comment: Amphetamine Minimal Detection level for this method is  500 ng/ml.     • Barbiturates Screen, Urine 05/07/2021 NEGATIVE  NA Final    Comment: Barbiturate Minimal Detection level for this method is  200 ng/ml.     • Benzodiazepine Screen, Urine 05/07/2021 NEGATIVE  NA Final    Comment: Benzodiazepine Minimal Detection level for this method is  100 ng/ml.     • Cocaine Screen, Urine 05/07/2021 NEGATIVE  NA Final    Cocaine Minimal detection level for this method is 300 ng/ml.   • Methadone Screen, Urine 05/07/2021 NEGATIVE  NA Final    Methadone Minimal detection level for this method is 300 ng/ml.   • Opiate Screen, Urine 05/07/2021 NEGATIVE  NA Final    Opiate Minimal detection level for this method is 300 ng/ml.   • Phencyclidine (PCP), Urine 05/07/2021 NEGATIVE  NA Final    PCP Minimal detection level for this method is 25 ng/ml.   • THC, Screen 05/07/2021 NEGATIVE  NA Final    THC Minimal detection level for this method is 50 ng/ml.   • Oxycodone Screen, Urine 05/07/2021 NEGATIVE  NA Final    Comment: Oxycodone Minimal Detection level for this method is  100 ng/ml.         EKG Results:  No orders to display       Imaging Results:  No Images in the past 120 days found..      Assessment/Plan "   Diagnoses and all orders for this visit:    1. ADHD (attention deficit hyperactivity disorder), inattentive type (Primary)  -     amphetamine-dextroamphetamine (Adderall) 20 MG tablet; Take 1 tablet by mouth 2 (Two) Times a Day for 30 days.  Dispense: 60 tablet; Refill: 0    2. Generalized anxiety disorder    3. Major depressive disorder in partial remission, unspecified whether recurrent (CMS/HCC)        Presentation most consistent with major depressive disorder in partial remission, generalized anxiety disorder, and ADHD.  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  Also endorses a childhood history that is compelling.    Doing a little better, however patient was doing much better on Adderall 20 mg twice a day, which was her previous dose.  Discontinue Adderall XR and start Adderall immediate release 20 mg twice a day.  Some residual depressive and anxiety symptoms that are well controlled on bupropion.  Treating ADHD will also treat residual depressive and anxiety symptoms as well.    See back in 2 months.    Declines psychotherapy.    Visit Diagnoses:    ICD-10-CM ICD-9-CM   1. ADHD (attention deficit hyperactivity disorder), inattentive type  F90.0 314.00   2. Generalized anxiety disorder  F41.1 300.02   3. Major depressive disorder in partial remission, unspecified whether recurrent (CMS/HCC)  F32.4 296.25       PLAN:  1. Risk Assessment: Risk of self-harm acutely is low. Risk factors include anxiety disorder, mood disorder, access to weapons, recent psychosocial stressors. Protective factors include no family history, no present SI, no history of suicide attempts or self-harm in the past, minimal AODA, healthcare seeking, future orientation, willingness to engage in care,  baby. Risk of self-harm  chronically is also low, but could be further elevated in the event of treatment noncompliance and/or AODA.  2. Safety: No acute safety concerns.  3. Medications:   a. CONTINUE Bupropion XL from 450 mg to 300 mg p.o. daily to target attentional issues. Risks, benefits, alternatives discussed with patient including nausea, GI upset, increased energy, exacerbation of irritability, lowering of seizure threshold. After discussion of these risks and benefits, the patient voiced understanding and agreed to proceed.   b. CONTINUE hydroxyzine 50 mg PO QHS.   c. S/P escitalopram.  d. DISCONTINUE Adderall XR 10 mg and start Adderall 20 mg PO BID. Risks, benefits, side effects discussed with patient including elevated heart rate, elevated blood pressure, irritability, insomnia, sexual dysfunction, appetite suppressing properties, psychosis.  After discussion of these risks and benefits, the patient voiced understanding and agreed to proceed. Controlled substances consent verbally signed. UDS and Tom ordered.  e.   4. Therapy: Consider in the future, presently not interested.  5. Labs/Studies: EKG reassuring.  6. Follow Up: 6 wks.      TREATMENT PLAN/GOALS: Continue supportive psychotherapy efforts and medications as indicated. Treatment and medication options discussed during today's visit. Patient acknowledged and verbally consented to continue with current treatment plan and was educated on the importance of compliance with treatment and follow-up appointments.    MEDICATION ISSUES:  TOM reviewed as expected.  Discussed medication options and treatment plan of prescribed medication as well as the risks, benefits, and side effects including potential falls, possible impaired driving and metabolic adversities among others. Patient is agreeable to call the office with any worsening of symptoms or onset of side effects. Patient is agreeable to call 911 or go to the nearest ER should he/she begin having SI/HI. No medication  side effects or related complaints today.     MEDS ORDERED DURING VISIT:  New Medications Ordered This Visit   Medications   • amphetamine-dextroamphetamine (Adderall) 20 MG tablet     Sig: Take 1 tablet by mouth 2 (Two) Times a Day for 30 days.     Dispense:  60 tablet     Refill:  0       Return in about 2 months (around 8/18/2021).         This document has been electronically signed by Eli Murray MD  June 18, 2021 15:21 EDT      Part of this note may be an electronic transcription/translation of spoken language to printed text using the Dragon Dictation System.

## 2021-06-23 ENCOUNTER — OFFICE VISIT (OUTPATIENT)
Dept: FAMILY MEDICINE CLINIC | Facility: CLINIC | Age: 26
End: 2021-06-23

## 2021-06-23 VITALS
HEART RATE: 88 BPM | TEMPERATURE: 98.5 F | WEIGHT: 162 LBS | SYSTOLIC BLOOD PRESSURE: 128 MMHG | DIASTOLIC BLOOD PRESSURE: 74 MMHG | BODY MASS INDEX: 30.58 KG/M2 | OXYGEN SATURATION: 100 % | HEIGHT: 61 IN

## 2021-06-23 DIAGNOSIS — Z00.00 ANNUAL PHYSICAL EXAM: Primary | ICD-10-CM

## 2021-06-23 DIAGNOSIS — Z76.0 MEDICATION REFILL: ICD-10-CM

## 2021-06-23 LAB
25(OH)D3 SERPL-MCNC: 40.7 NG/ML
ALBUMIN SERPL-MCNC: 4.9 G/DL (ref 3.5–5.2)
ALBUMIN/GLOB SERPL: 1.8 G/DL
ALP SERPL-CCNC: 71 U/L (ref 39–117)
ALT SERPL W P-5'-P-CCNC: 22 U/L (ref 1–33)
ANION GAP SERPL CALCULATED.3IONS-SCNC: 9.9 MMOL/L (ref 5–15)
AST SERPL-CCNC: 38 U/L (ref 1–32)
BASOPHILS # BLD AUTO: 0.04 10*3/MM3 (ref 0–0.2)
BASOPHILS NFR BLD AUTO: 0.4 % (ref 0–1.5)
BILIRUB SERPL-MCNC: 0.3 MG/DL (ref 0–1.2)
BUN SERPL-MCNC: 10 MG/DL (ref 6–20)
BUN/CREAT SERPL: 11.8 (ref 7–25)
CALCIUM SPEC-SCNC: 9.7 MG/DL (ref 8.6–10.5)
CHLORIDE SERPL-SCNC: 103 MMOL/L (ref 98–107)
CHOLEST SERPL-MCNC: 161 MG/DL (ref 0–200)
CO2 SERPL-SCNC: 25.1 MMOL/L (ref 22–29)
CREAT SERPL-MCNC: 0.85 MG/DL (ref 0.57–1)
DEPRECATED RDW RBC AUTO: 42.9 FL (ref 37–54)
EOSINOPHIL # BLD AUTO: 0.11 10*3/MM3 (ref 0–0.4)
EOSINOPHIL NFR BLD AUTO: 1.2 % (ref 0.3–6.2)
ERYTHROCYTE [DISTWIDTH] IN BLOOD BY AUTOMATED COUNT: 12.8 % (ref 12.3–15.4)
GFR SERPL CREATININE-BSD FRML MDRD: 81 ML/MIN/1.73
GLOBULIN UR ELPH-MCNC: 2.7 GM/DL
GLUCOSE SERPL-MCNC: 80 MG/DL (ref 65–99)
HCT VFR BLD AUTO: 43.6 % (ref 34–46.6)
HDLC SERPL-MCNC: 38 MG/DL (ref 40–60)
HGB BLD-MCNC: 14.5 G/DL (ref 12–15.9)
IMM GRANULOCYTES # BLD AUTO: 0.04 10*3/MM3 (ref 0–0.05)
IMM GRANULOCYTES NFR BLD AUTO: 0.4 % (ref 0–0.5)
LDLC SERPL CALC-MCNC: 111 MG/DL (ref 0–100)
LDLC/HDLC SERPL: 2.91 {RATIO}
LYMPHOCYTES # BLD AUTO: 2.78 10*3/MM3 (ref 0.7–3.1)
LYMPHOCYTES NFR BLD AUTO: 31.1 % (ref 19.6–45.3)
MCH RBC QN AUTO: 30 PG (ref 26.6–33)
MCHC RBC AUTO-ENTMCNC: 33.3 G/DL (ref 31.5–35.7)
MCV RBC AUTO: 90.3 FL (ref 79–97)
MONOCYTES # BLD AUTO: 0.44 10*3/MM3 (ref 0.1–0.9)
MONOCYTES NFR BLD AUTO: 4.9 % (ref 5–12)
NEUTROPHILS NFR BLD AUTO: 5.54 10*3/MM3 (ref 1.7–7)
NEUTROPHILS NFR BLD AUTO: 62 % (ref 42.7–76)
NRBC BLD AUTO-RTO: 0 /100 WBC (ref 0–0.2)
PLATELET # BLD AUTO: 357 10*3/MM3 (ref 140–450)
PMV BLD AUTO: 9.7 FL (ref 6–12)
POTASSIUM SERPL-SCNC: 4.4 MMOL/L (ref 3.5–5.2)
PROT SERPL-MCNC: 7.6 G/DL (ref 6–8.5)
RBC # BLD AUTO: 4.83 10*6/MM3 (ref 3.77–5.28)
SODIUM SERPL-SCNC: 138 MMOL/L (ref 136–145)
TRIGL SERPL-MCNC: 63 MG/DL (ref 0–150)
TSH SERPL DL<=0.05 MIU/L-ACNC: 2.04 UIU/ML (ref 0.27–4.2)
VLDLC SERPL-MCNC: 12 MG/DL (ref 5–40)
WBC # BLD AUTO: 8.95 10*3/MM3 (ref 3.4–10.8)

## 2021-06-23 PROCEDURE — 82306 VITAMIN D 25 HYDROXY: CPT | Performed by: NURSE PRACTITIONER

## 2021-06-23 PROCEDURE — 84443 ASSAY THYROID STIM HORMONE: CPT | Performed by: NURSE PRACTITIONER

## 2021-06-23 PROCEDURE — 99385 PREV VISIT NEW AGE 18-39: CPT | Performed by: NURSE PRACTITIONER

## 2021-06-23 PROCEDURE — 80053 COMPREHEN METABOLIC PANEL: CPT | Performed by: NURSE PRACTITIONER

## 2021-06-23 PROCEDURE — 85025 COMPLETE CBC W/AUTO DIFF WBC: CPT | Performed by: NURSE PRACTITIONER

## 2021-06-23 PROCEDURE — 80061 LIPID PANEL: CPT | Performed by: NURSE PRACTITIONER

## 2021-06-23 PROCEDURE — 36415 COLL VENOUS BLD VENIPUNCTURE: CPT | Performed by: NURSE PRACTITIONER

## 2021-06-23 RX ORDER — METOPROLOL SUCCINATE 50 MG/1
1 TABLET, EXTENDED RELEASE ORAL DAILY
COMMUNITY
Start: 2021-06-01 | End: 2021-06-23 | Stop reason: CLARIF

## 2021-06-23 RX ORDER — ERGOCALCIFEROL 1.25 MG/1
1 CAPSULE ORAL
COMMUNITY
Start: 2021-04-30 | End: 2021-11-08

## 2021-06-23 RX ORDER — HYDROXYZINE HYDROCHLORIDE 25 MG/1
25 TABLET, FILM COATED ORAL EVERY 6 HOURS PRN
Qty: 270 TABLET | Refills: 1 | Status: SHIPPED | OUTPATIENT
Start: 2021-06-23 | End: 2021-12-20

## 2021-06-23 RX ORDER — BUPROPION HYDROCHLORIDE 150 MG/1
TABLET ORAL
COMMUNITY
Start: 2021-03-19 | End: 2021-08-20

## 2021-06-23 RX ORDER — METOPROLOL SUCCINATE 25 MG/1
25 TABLET, EXTENDED RELEASE ORAL DAILY
Qty: 90 TABLET | Refills: 1 | Status: SHIPPED | OUTPATIENT
Start: 2021-06-23 | End: 2022-02-16 | Stop reason: SDUPTHER

## 2021-06-23 NOTE — PROGRESS NOTES
"Chief Complaint  Follow-up (6 month check up)    Subjective          Janice Padilla presents to [facility]  Patient is a 25-year-old female comes in for annual physical exam.  She has never smoked.  She has 1 daughter only child who is 15 months.  She does have a history of anxiety and depression. She sees Dr. Terri magana who is prescribing and managing her symptoms and medication.  She states overall she is feeling well.  Blood pressure is stable at 128/74.  Heart rate is 88.  Her last Pap smear was in September 2020 it was normal at that time.  She is needing medication refills for her metoprolol 25 mg and hydroxyzine as needed for anxiety.  She has a history of vitamin D deficiency we will recheck labs today.      Objective   Vital Signs:   /74   Pulse 88   Temp 98.5 °F (36.9 °C)   Ht 154.9 cm (61\")   Wt 73.5 kg (162 lb)   SpO2 100%   BMI 30.61 kg/m²     Physical Exam  Vitals reviewed.   Constitutional:       General: She is not in acute distress.     Appearance: Normal appearance. She is well-developed. She is not diaphoretic.   HENT:      Head: Normocephalic and atraumatic.      Right Ear: Hearing normal. No decreased hearing noted. No drainage.      Left Ear: Hearing normal. No decreased hearing noted.      Nose: Nose normal. No nasal deformity.      Mouth/Throat:      Mouth: Mucous membranes are moist.   Eyes:      General: Lids are normal.         Right eye: No discharge.         Left eye: No discharge.      Extraocular Movements: Extraocular movements intact.      Conjunctiva/sclera: Conjunctivae normal.      Pupils: Pupils are equal, round, and reactive to light.   Cardiovascular:      Rate and Rhythm: Normal rate and regular rhythm.      Heart sounds: Normal heart sounds. No murmur heard.   No friction rub. No gallop.    Pulmonary:      Effort: Pulmonary effort is normal. No respiratory distress.      Breath sounds: Normal breath sounds. No wheezing or " rales.   Musculoskeletal:         General: No tenderness or deformity. Normal range of motion.      Cervical back: Normal range of motion and neck supple.   Lymphadenopathy:      Cervical: No cervical adenopathy.   Skin:     General: Skin is warm and dry.      Findings: No erythema or rash.   Neurological:      Mental Status: She is alert and oriented to person, place, and time.      Motor: No abnormal muscle tone.      Deep Tendon Reflexes: Reflexes are normal and symmetric.   Psychiatric:         Mood and Affect: Mood normal.         Behavior: Behavior normal.         Thought Content: Thought content normal.         Judgment: Judgment normal.        Result Review :   The following data was reviewed by: JURGEN Faustin on 06/23/2021:  Common labs    Common Labsle 12/2/20 6/23/21 6/23/21 6/23/21     0818 0818 0818   Glucose   80    Glucose 93      BUN 14  10    Creatinine 0.99 (A)  0.85    eGFR Non African Am   81    Sodium 141  138    Potassium 4.5  4.4    Chloride 105  103    Calcium 9.9  9.7    Albumin 5.0  4.90    Total Bilirubin 0.21  0.3    Alkaline Phosphatase 82  71    AST (SGOT) 25  38 (A)    ALT (SGPT) 22  22    WBC 9.93 8.95     Hemoglobin 14.5 14.5     Hematocrit 45.4 43.6     Platelets 344 357     Total Cholesterol    161   Total Cholesterol 198      Triglycerides 138   63   HDL Cholesterol 49   38 (A)   LDL Cholesterol  121 (A)   111 (A)   (A) Abnormal value       Comments are available for some flowsheets but are not being displayed.           Lipid Panel    Lipid Panel 12/2/20 6/23/21   Total Cholesterol  161   Total Cholesterol 198    Triglycerides 138 63   HDL Cholesterol 49 38 (A)   VLDL Cholesterol 28 12   LDL Cholesterol  121 (A) 111 (A)   LDL/HDL Ratio  2.91   (A) Abnormal value       Comments are available for some flowsheets but are not being displayed.                     Assessment and Plan    Diagnoses and all orders for this visit:    1. Annual physical exam (Primary)  -      CBC & Differential  -     Comprehensive Metabolic Panel  -     Lipid Panel  -     TSH  -     Vitamin D 25 Hydroxy    2. Medication refill  -     CBC & Differential  -     Comprehensive Metabolic Panel  -     Lipid Panel  -     TSH  -     Vitamin D 25 Hydroxy    Other orders  -     metoprolol succinate XL (Toprol XL) 25 MG 24 hr tablet; Take 1 tablet by mouth Daily for 90 days.  Dispense: 90 tablet; Refill: 1  -     hydrOXYzine (ATARAX) 25 MG tablet; Take 1 tablet by mouth Every 6 (Six) Hours As Needed for Anxiety.  Dispense: 270 tablet; Refill: 1        Follow Up   Return in about 6 months (around 12/23/2021).  Patient was given instructions and counseling regarding her condition or for health maintenance advice. Please see specific information pulled into the AVS if appropriate.

## 2021-06-23 NOTE — PATIENT INSTRUCTIONS

## 2021-06-25 ENCOUNTER — TELEPHONE (OUTPATIENT)
Dept: FAMILY MEDICINE CLINIC | Facility: CLINIC | Age: 26
End: 2021-06-25

## 2021-07-26 ENCOUNTER — TELEPHONE (OUTPATIENT)
Dept: PSYCHIATRY | Facility: CLINIC | Age: 26
End: 2021-07-26

## 2021-07-26 DIAGNOSIS — F90.0 ADHD (ATTENTION DEFICIT HYPERACTIVITY DISORDER), INATTENTIVE TYPE: Primary | ICD-10-CM

## 2021-07-26 PROBLEM — L70.9 ACNE: Status: ACTIVE | Noted: 2021-07-26

## 2021-07-26 PROBLEM — O13.9 GESTATIONAL HYPERTENSION: Status: ACTIVE | Noted: 2021-07-26

## 2021-07-26 PROBLEM — E28.2 PCOS (POLYCYSTIC OVARIAN SYNDROME): Status: ACTIVE | Noted: 2021-07-26

## 2021-07-26 PROBLEM — F41.9 ANXIETY: Status: ACTIVE | Noted: 2021-07-26

## 2021-07-26 PROBLEM — F90.9 ATTENTION DEFICIT HYPERACTIVITY DISORDER (ADHD): Status: ACTIVE | Noted: 2021-07-26

## 2021-07-26 PROBLEM — F41.1 GENERALIZED ANXIETY DISORDER: Status: ACTIVE | Noted: 2021-01-06

## 2021-07-26 PROBLEM — O14.90 PRE-ECLAMPSIA: Status: ACTIVE | Noted: 2021-07-26

## 2021-07-26 PROBLEM — F32.4 MAJOR DEPRESSIVE DISORDER IN PARTIAL REMISSION: Status: ACTIVE | Noted: 2021-01-06

## 2021-07-26 PROBLEM — F32.A DEPRESSION: Status: ACTIVE | Noted: 2021-07-26

## 2021-07-26 RX ORDER — DEXTROAMPHETAMINE SACCHARATE, AMPHETAMINE ASPARTATE, DEXTROAMPHETAMINE SULFATE AND AMPHETAMINE SULFATE 5; 5; 5; 5 MG/1; MG/1; MG/1; MG/1
20 TABLET ORAL 2 TIMES DAILY
Qty: 60 TABLET | Refills: 0 | Status: SHIPPED | OUTPATIENT
Start: 2021-07-26 | End: 2021-08-26

## 2021-07-26 NOTE — TELEPHONE ENCOUNTER
Patient needs a refill on her 20mg Adderall.  Says You told her to call when she needed a refill, but we don't have it in her chart as of her last appt

## 2021-08-20 ENCOUNTER — TELEMEDICINE (OUTPATIENT)
Dept: PSYCHIATRY | Facility: CLINIC | Age: 26
End: 2021-08-20

## 2021-08-20 DIAGNOSIS — F32.4 MAJOR DEPRESSIVE DISORDER IN PARTIAL REMISSION, UNSPECIFIED WHETHER RECURRENT (HCC): ICD-10-CM

## 2021-08-20 DIAGNOSIS — F90.0 ADHD (ATTENTION DEFICIT HYPERACTIVITY DISORDER), INATTENTIVE TYPE: Primary | ICD-10-CM

## 2021-08-20 DIAGNOSIS — F41.1 GENERALIZED ANXIETY DISORDER: ICD-10-CM

## 2021-08-20 PROCEDURE — 99214 OFFICE O/P EST MOD 30 MIN: CPT | Performed by: STUDENT IN AN ORGANIZED HEALTH CARE EDUCATION/TRAINING PROGRAM

## 2021-08-20 RX ORDER — KETOCONAZOLE 20 MG/ML
SHAMPOO TOPICAL
COMMUNITY
Start: 2021-07-14 | End: 2021-11-17

## 2021-08-20 RX ORDER — BUPROPION HYDROCHLORIDE 300 MG/1
300 TABLET ORAL DAILY
COMMUNITY
Start: 2021-08-11 | End: 2021-10-15

## 2021-08-20 RX ORDER — LEVOCETIRIZINE DIHYDROCHLORIDE 5 MG/1
5 TABLET, FILM COATED ORAL EVERY EVENING
COMMUNITY
End: 2022-12-16

## 2021-08-20 NOTE — PATIENT INSTRUCTIONS
1.  Please return to clinic at your next scheduled visit.  Contact the clinic ([phone]) at least 24 hours prior in the event you need to cancel.  2.  Do no harm to yourself or others.    3.  Avoid alcohol and drugs.    4.  Take all medications as prescribed.  Please contact the clinic with any concerns. If you are in need of medication refills, please call the clinic at [phone].    5. Should you want to get in touch with your provider, Dr. Eli Murray, please utilize [employer] or contact the office ([phone]), and staff will be able to page Dr. Murray directly.  6.  In the event you have personal crisis, contact the following crisis numbers: Suicide Prevention Hotline 1-302.873.9094; JOSE Helpline 8-561-230-DOWJ; [facility] Emergency Room [phone]; text HELLO to 139312; or 188.     SPECIFIC RECOMMENDATIONS:     1.      Medications discussed at this encounter:                   - no changes     2.      Psychotherapy recommendations: Continue     3.     Return to clinic: 8 weeks

## 2021-08-20 NOTE — PROGRESS NOTES
"Subjective   Janice Padilla is a 25 y.o. female who presents today for follow up    Referring Provider:  No referring provider defined for this encounter.    Chief Complaint:  adhd cornel mdd    History of Present Illness:     Janice Padilla is a 25 year old /White female referred by Balbina SEAMAN.     Chart review 1/6: Patient is on Wellbutrin, status post Zoloft. Also on Adderall. Had tachycardia at 130. Started on metoprolol 50 mg extended release daily. Labs in December: BMI 35, LDL is elevated at 121, ALT AST within normal limits, creatinine 0.99, hematocrit 45, electrolytes are essentially normal, TSH is 3.65 normal, iron is 53 low, vitamin D is 23 low, ferritin is normal at 96. No EKG or head imaging.     \"Janice.\"     8/20: Virtual visit via Zoom audio and video due to the COVID-19 pandemic.  Patient is accepting of and agreeable to visit.  The visit consisted of the patient and I.  Interview:  1. Seen by PCP June 23 and was doing well overall.  2. \"I'm good.\"  3. ADHD controlled.  4. Denies depression and anxiety. Some situational anxiety due to COVID-19 and her job.  5. Finally able to get around to doing projects at home.  6. Denies SI HI AVH.      6/18: In-person Interview: Patient reports some improvement on Adderall XR.  Discontinued Topamax.  No longer going to the weight loss clinic.  Notes residual depression and anxiety symptoms, but they are mild.  They are well controlled on the bupropion.  No SI HI AVH.  Denies depression, muscle tension, fatigue, restlessness. Continues to endorse poor concentration. See scores below.      IMPORTANT:  From previous note, patient was held back in the third grade. Struggled to complete high school and missed 60 days although she did graduate with good grades. Got in trouble for talking a lot in class. She was not in any special classes. She did not have an IEP.         PHQ-9 Depression Screening  PHQ-9 Total Score:      Little interest or " pleasure in doing things?     Feeling down, depressed, or hopeless?     Trouble falling or staying asleep, or sleeping too much?     Feeling tired or having little energy?     Poor appetite or overeating?     Feeling bad about yourself - or that you are a failure or have let yourself or your family down?     Trouble concentrating on things, such as reading the newspaper or watching television?     Moving or speaking so slowly that other people could have noticed? Or the opposite - being so fidgety or restless that you have been moving around a lot more than usual?     Thoughts that you would be better off dead, or of hurting yourself in some way?     PHQ-9 Total Score       MATTHEW-7       Past Surgical History:  Past Surgical History:   Procedure Laterality Date   • DILATATION AND CURETTAGE     • TONSILLECTOMY         Problem List:  Patient Active Problem List   Diagnosis   • Acne   • Anxiety   • Attention deficit hyperactivity disorder (ADHD)   • Depression   • Generalized anxiety disorder   • Gestational hypertension   • Major depressive disorder in partial remission (CMS/HCC)   • PCOS (polycystic ovarian syndrome)   • Pre-eclampsia       Allergy:   No Known Allergies     Discontinued Medications:  Medications Discontinued During This Encounter   Medication Reason   • buPROPion XL (WELLBUTRIN XL) 150 MG 24 hr tablet        Current Medications:   Current Outpatient Medications   Medication Sig Dispense Refill   • amphetamine-dextroamphetamine (Adderall) 20 MG tablet Take 1 tablet by mouth 2 (Two) Times a Day for 30 days. 60 tablet 0   • buPROPion XL (WELLBUTRIN XL) 300 MG 24 hr tablet Take 300 mg by mouth Daily.     • ergocalciferol (ERGOCALCIFEROL) 1.25 MG (50681 UT) capsule Take 1 capsule by mouth Every 7 (Seven) Days.     • hydrOXYzine (ATARAX) 25 MG tablet Take 1 tablet by mouth Every 6 (Six) Hours As Needed for Anxiety. 270 tablet 1   • ketoconazole (NIZORAL) 2 % shampoo apply to SCALP THREE TIMES WEEKLY     •  levocetirizine (XYZAL) 5 MG tablet Take 5 mg by mouth Every Evening.     • metoprolol succinate XL (Toprol XL) 25 MG 24 hr tablet Take 1 tablet by mouth Daily for 90 days. 90 tablet 1   • Prenatal MV-Min-Fe Fum-FA-DHA (PRENATAL+DHA PO)      • Soolantra 1 % cream      • spironolactone (ALDACTONE) 100 MG tablet spironolactone 100 mg oral tablet take 1 tablet (100 mg) by oral route once daily   Active       No current facility-administered medications for this visit.       Past Medical History:  Past Medical History:   Diagnosis Date   • Acne    • ADHD    • Anxiety    • Depression    • Generalized anxiety disorder 01/06/2021   • Major depressive disorder in partial remission (CMS/HCC) 01/06/2021   • PCOS (polycystic ovarian syndrome)    • Pre-eclampsia        Social History     Socioeconomic History   • Marital status:      Spouse name: Not on file   • Number of children: Not on file   • Years of education: Not on file   • Highest education level: Not on file   Tobacco Use   • Smoking status: Never Smoker   • Smokeless tobacco: Never Used   • Tobacco comment: 2/10/2021- 1/6/2021   Vaping Use   • Vaping Use: Never used   Substance and Sexual Activity   • Alcohol use: Not Currently     Comment: light; 2/10/2021- 1/6/2021   • Drug use: Never     Comment: 2/10/2021- 1/6/2021   • Sexual activity: Defer       Family History   Problem Relation Age of Onset   • Anxiety disorder Mother    • Bipolar disorder Mother    • Depression Mother    • ADD / ADHD Sister        Mental Status Exam:   Hygiene:   good, groomed, scrubs  Cooperation:  Cooperative  Eye Contact:  Good  Psychomotor Behavior:  Appropriate  Affect:  Full range  Mood: good  Hopelessness: Denies  Speech:  Normal  Thought Process:  Goal directed  Thought Content:  Normal  Suicidal:  None  Homicidal:  None  Hallucinations:  None  Delusion:  None  Memory:  Intact  Orientation:  Person, Place, Time and Situation  Reliability:  good  Insight:  Fair  Judgement:   Fair  Impulse Control:  Fair  Physical/Medical Issues:  No      Review of Systems:  Review of Systems   Constitutional: Negative for diaphoresis and fatigue.   HENT: Negative for drooling.    Eyes: Negative for visual disturbance.   Respiratory: Negative for cough and shortness of breath.    Cardiovascular: Negative for chest pain, palpitations and leg swelling.   Gastrointestinal: Negative for nausea and vomiting.   Endocrine: Negative for cold intolerance and heat intolerance.   Genitourinary: Negative for difficulty urinating.   Musculoskeletal: Negative for joint swelling.   Allergic/Immunologic: Negative for immunocompromised state.   Neurological: Negative for dizziness, seizures, speech difficulty and numbness.         Physical Exam:  Physical Exam    Vital Signs:   There were no vitals taken for this visit.     Lab Results:   Office Visit on 06/23/2021   Component Date Value Ref Range Status   • Glucose 06/23/2021 80  65 - 99 mg/dL Final   • BUN 06/23/2021 10  6 - 20 mg/dL Final   • Creatinine 06/23/2021 0.85  0.57 - 1.00 mg/dL Final   • Sodium 06/23/2021 138  136 - 145 mmol/L Final   • Potassium 06/23/2021 4.4  3.5 - 5.2 mmol/L Final   • Chloride 06/23/2021 103  98 - 107 mmol/L Final   • CO2 06/23/2021 25.1  22.0 - 29.0 mmol/L Final   • Calcium 06/23/2021 9.7  8.6 - 10.5 mg/dL Final   • Total Protein 06/23/2021 7.6  6.0 - 8.5 g/dL Final   • Albumin 06/23/2021 4.90  3.50 - 5.20 g/dL Final   • ALT (SGPT) 06/23/2021 22  1 - 33 U/L Final   • AST (SGOT) 06/23/2021 38* 1 - 32 U/L Final   • Alkaline Phosphatase 06/23/2021 71  39 - 117 U/L Final   • Total Bilirubin 06/23/2021 0.3  0.0 - 1.2 mg/dL Final   • eGFR Non  Amer 06/23/2021 81  >60 mL/min/1.73 Final   • Globulin 06/23/2021 2.7  gm/dL Final   • A/G Ratio 06/23/2021 1.8  g/dL Final   • BUN/Creatinine Ratio 06/23/2021 11.8  7.0 - 25.0 Final   • Anion Gap 06/23/2021 9.9  5.0 - 15.0 mmol/L Final   • Total Cholesterol 06/23/2021 161  0 - 200 mg/dL Final    • Triglycerides 06/23/2021 63  0 - 150 mg/dL Final   • HDL Cholesterol 06/23/2021 38* 40 - 60 mg/dL Final   • LDL Cholesterol  06/23/2021 111* 0 - 100 mg/dL Final   • VLDL Cholesterol 06/23/2021 12  5 - 40 mg/dL Final   • LDL/HDL Ratio 06/23/2021 2.91   Final   • TSH 06/23/2021 2.040  0.270 - 4.200 uIU/mL Final   • 25 Hydroxy, Vitamin D 06/23/2021 40.7  ng/ml Final   • WBC 06/23/2021 8.95  3.40 - 10.80 10*3/mm3 Final   • RBC 06/23/2021 4.83  3.77 - 5.28 10*6/mm3 Final   • Hemoglobin 06/23/2021 14.5  12.0 - 15.9 g/dL Final   • Hematocrit 06/23/2021 43.6  34.0 - 46.6 % Final   • MCV 06/23/2021 90.3  79.0 - 97.0 fL Final   • MCH 06/23/2021 30.0  26.6 - 33.0 pg Final   • MCHC 06/23/2021 33.3  31.5 - 35.7 g/dL Final   • RDW 06/23/2021 12.8  12.3 - 15.4 % Final   • RDW-SD 06/23/2021 42.9  37.0 - 54.0 fl Final   • MPV 06/23/2021 9.7  6.0 - 12.0 fL Final   • Platelets 06/23/2021 357  140 - 450 10*3/mm3 Final   • Neutrophil % 06/23/2021 62.0  42.7 - 76.0 % Final   • Lymphocyte % 06/23/2021 31.1  19.6 - 45.3 % Final   • Monocyte % 06/23/2021 4.9* 5.0 - 12.0 % Final   • Eosinophil % 06/23/2021 1.2  0.3 - 6.2 % Final   • Basophil % 06/23/2021 0.4  0.0 - 1.5 % Final   • Immature Grans % 06/23/2021 0.4  0.0 - 0.5 % Final   • Neutrophils, Absolute 06/23/2021 5.54  1.70 - 7.00 10*3/mm3 Final   • Lymphocytes, Absolute 06/23/2021 2.78  0.70 - 3.10 10*3/mm3 Final   • Monocytes, Absolute 06/23/2021 0.44  0.10 - 0.90 10*3/mm3 Final   • Eosinophils, Absolute 06/23/2021 0.11  0.00 - 0.40 10*3/mm3 Final   • Basophils, Absolute 06/23/2021 0.04  0.00 - 0.20 10*3/mm3 Final   • Immature Grans, Absolute 06/23/2021 0.04  0.00 - 0.05 10*3/mm3 Final   • nRBC 06/23/2021 0.0  0.0 - 0.2 /100 WBC Final   Hospital Outpatient Visit on 05/07/2021   Component Date Value Ref Range Status   • Amphetamine, Urine Qual 05/07/2021 NEGATIVE  NA Final    Comment: Amphetamine Minimal Detection level for this method is  500 ng/ml.     • Barbiturates Screen, Urine  05/07/2021 NEGATIVE  NA Final    Comment: Barbiturate Minimal Detection level for this method is  200 ng/ml.     • Benzodiazepine Screen, Urine 05/07/2021 NEGATIVE  NA Final    Comment: Benzodiazepine Minimal Detection level for this method is  100 ng/ml.     • Cocaine Screen, Urine 05/07/2021 NEGATIVE  NA Final    Cocaine Minimal detection level for this method is 300 ng/ml.   • Methadone Screen, Urine 05/07/2021 NEGATIVE  NA Final    Methadone Minimal detection level for this method is 300 ng/ml.   • Opiate Screen, Urine 05/07/2021 NEGATIVE  NA Final    Opiate Minimal detection level for this method is 300 ng/ml.   • Phencyclidine (PCP), Urine 05/07/2021 NEGATIVE  NA Final    PCP Minimal detection level for this method is 25 ng/ml.   • THC, Screen 05/07/2021 NEGATIVE  NA Final    THC Minimal detection level for this method is 50 ng/ml.   • Oxycodone Screen, Urine 05/07/2021 NEGATIVE  NA Final    Comment: Oxycodone Minimal Detection level for this method is  100 ng/ml.         EKG Results:  No orders to display       Imaging Results:  No Images in the past 120 days found..      Assessment/Plan   Diagnoses and all orders for this visit:    1. ADHD (attention deficit hyperactivity disorder), inattentive type (Primary)    2. Generalized anxiety disorder    3. Major depressive disorder in partial remission, unspecified whether recurrent (CMS/HCC)        Presentation most consistent with major depressive disorder in partial remission, generalized anxiety disorder, and ADHD.  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  Also endorses a childhood history that is compelling.    8/20: Patient is doing extremely well.  No change to medications.  See back in 8 weeks.    6/18: Doing a little better, however patient was doing  much better on Adderall 20 mg twice a day, which was her previous dose.  Discontinue Adderall XR and start Adderall immediate release 20 mg twice a day.  Some residual depressive and anxiety symptoms that are well controlled on bupropion.  Treating ADHD will also treat residual depressive and anxiety symptoms as well.    See back in 2 months.    Declines psychotherapy.    Visit Diagnoses:    ICD-10-CM ICD-9-CM   1. ADHD (attention deficit hyperactivity disorder), inattentive type  F90.0 314.00   2. Generalized anxiety disorder  F41.1 300.02   3. Major depressive disorder in partial remission, unspecified whether recurrent (CMS/[facility])  F32.4 296.25       PLAN:  7. Risk Assessment: Risk of self-harm acutely is low. Risk factors include anxiety disorder, mood disorder, access to weapons, recent psychosocial stressors. Protective factors include no family history, no present SI, no history of suicide attempts or self-harm in the past, minimal AODA, healthcare seeking, future orientation, willingness to engage in care,  baby. Risk of self-harm chronically is also low, but could be further elevated in the event of treatment noncompliance and/or AODA.  8. Safety: No acute safety concerns.  9. Medications:   a. CONTINUE Bupropion  mg p.o. daily to target attentional issues. Risks, benefits, alternatives discussed with patient including nausea, GI upset, increased energy, exacerbation of irritability, lowering of seizure threshold. After discussion of these risks and benefits, the patient voiced understanding and agreed to proceed.   b. CONTINUE hydroxyzine 50 mg PO QHS.   c. CONTINUE Adderall 20 mg PO BID. Risks, benefits, side effects discussed with patient including elevated heart rate, elevated blood pressure, irritability, insomnia, sexual dysfunction, appetite suppressing properties, psychosis.  After discussion of these risks and benefits, the patient voiced understanding and agreed to proceed. Controlled  substances consent verbally signed. UDS and Tom ordered.  d. S/P escitalopram, Adderall XR 10 mg daily.  10. Therapy: Consider in the future, presently not interested.  11. Labs/Studies: EKG reassuring.  12. Follow Up: 8 wks.      TREATMENT PLAN/GOALS: Continue supportive psychotherapy efforts and medications as indicated. Treatment and medication options discussed during today's visit. Patient acknowledged and verbally consented to continue with current treatment plan and was educated on the importance of compliance with treatment and follow-up appointments.    MEDICATION ISSUES:  TOM reviewed as expected.  Discussed medication options and treatment plan of prescribed medication as well as the risks, benefits, and side effects including potential falls, possible impaired driving and metabolic adversities among others. Patient is agreeable to call the office with any worsening of symptoms or onset of side effects. Patient is agreeable to call 911 or go to the nearest ER should he/she begin having SI/HI. No medication side effects or related complaints today.     MEDS ORDERED DURING VISIT:  No orders of the defined types were placed in this encounter.      Return in about 8 weeks (around 10/15/2021).         This document has been electronically signed by Eli Murray MD  August 20, 2021 14:28 EDT      Part of this note may be an electronic transcription/translation of spoken language to printed text using the Dragon Dictation System.  Answers for HPI/ROS submitted by the patient on 8/19/2021  Please describe your symptoms.: Med check  Have you had these symptoms before?: No  How long have you been having these symptoms?: Greater than 2 weeks  What is the primary reason for your visit?: Other

## 2021-08-26 DIAGNOSIS — F90.0 ADHD (ATTENTION DEFICIT HYPERACTIVITY DISORDER), INATTENTIVE TYPE: ICD-10-CM

## 2021-08-26 RX ORDER — DEXTROAMPHETAMINE SACCHARATE, AMPHETAMINE ASPARTATE, DEXTROAMPHETAMINE SULFATE AND AMPHETAMINE SULFATE 5; 5; 5; 5 MG/1; MG/1; MG/1; MG/1
1 TABLET ORAL 2 TIMES DAILY
Qty: 60 TABLET | Refills: 0 | Status: SHIPPED | OUTPATIENT
Start: 2021-08-26 | End: 2021-09-29

## 2021-09-29 DIAGNOSIS — F90.0 ADHD (ATTENTION DEFICIT HYPERACTIVITY DISORDER), INATTENTIVE TYPE: ICD-10-CM

## 2021-09-29 RX ORDER — DEXTROAMPHETAMINE SACCHARATE, AMPHETAMINE ASPARTATE, DEXTROAMPHETAMINE SULFATE AND AMPHETAMINE SULFATE 5; 5; 5; 5 MG/1; MG/1; MG/1; MG/1
20 TABLET ORAL 2 TIMES DAILY
Qty: 60 TABLET | Refills: 0 | Status: SHIPPED | OUTPATIENT
Start: 2021-09-29 | End: 2021-11-04

## 2021-10-11 ENCOUNTER — OFFICE VISIT (OUTPATIENT)
Dept: FAMILY MEDICINE CLINIC | Facility: CLINIC | Age: 26
End: 2021-10-11

## 2021-10-11 VITALS
HEART RATE: 87 BPM | BODY MASS INDEX: 30.81 KG/M2 | TEMPERATURE: 97.5 F | SYSTOLIC BLOOD PRESSURE: 112 MMHG | DIASTOLIC BLOOD PRESSURE: 78 MMHG | HEIGHT: 61 IN | OXYGEN SATURATION: 98 % | WEIGHT: 163.2 LBS

## 2021-10-11 DIAGNOSIS — H60.331 ACUTE SWIMMER'S EAR OF RIGHT SIDE: Primary | ICD-10-CM

## 2021-10-11 PROCEDURE — 99213 OFFICE O/P EST LOW 20 MIN: CPT | Performed by: NURSE PRACTITIONER

## 2021-10-11 RX ORDER — FLUCONAZOLE 150 MG/1
150 TABLET ORAL ONCE
Qty: 1 TABLET | Refills: 0 | Status: SHIPPED | OUTPATIENT
Start: 2021-10-11 | End: 2021-10-11

## 2021-10-11 RX ORDER — AMOXICILLIN 875 MG/1
875 TABLET, COATED ORAL 2 TIMES DAILY
Qty: 20 TABLET | Refills: 1 | Status: SHIPPED | OUTPATIENT
Start: 2021-10-11 | End: 2021-11-17

## 2021-10-11 RX ORDER — METHYLPREDNISOLONE 4 MG/1
TABLET ORAL
Qty: 1 EACH | Refills: 1 | Status: SHIPPED | OUTPATIENT
Start: 2021-10-11 | End: 2021-11-17

## 2021-10-11 RX ORDER — CIPROFLOXACIN AND DEXAMETHASONE 3; 1 MG/ML; MG/ML
4 SUSPENSION/ DROPS AURICULAR (OTIC) 2 TIMES DAILY
Qty: 7.5 ML | Refills: 0 | Status: SHIPPED | OUTPATIENT
Start: 2021-10-11 | End: 2021-11-17

## 2021-10-11 NOTE — PROGRESS NOTES
"Chief Complaint  Earache (right ear, x2 days)    Subjective        Social History     Socioeconomic History   • Marital status:    Tobacco Use   • Smoking status: Never Smoker   • Smokeless tobacco: Never Used   • Tobacco comment: 2/10/2021- 1/6/2021   Vaping Use   • Vaping Use: Never used   Substance and Sexual Activity   • Alcohol use: Not Currently     Comment: light; 2/10/2021- 1/6/2021   • Drug use: Never     Comment: 2/10/2021- 1/6/2021   • Sexual activity: Defer     Past Medical History:   Diagnosis Date   • Acne    • ADHD    • Anxiety    • Depression    • Generalized anxiety disorder 01/06/2021   • Major depressive disorder in partial remission (HCC) 01/06/2021   • PCOS (polycystic ovarian syndrome)    • Pre-eclampsia      .  Past Surgical History:   Procedure Laterality Date   • DILATATION AND CURETTAGE     • TONSILLECTOMY         Janice Padilla presents to [facility]  Earache   There is pain in the right ear. This is a new problem. The current episode started in the past 7 days. The problem occurs constantly. The problem has been waxing and waning. There has been no fever. The pain is at a severity of 5/10. The pain is moderate. Associated symptoms include hearing loss. Pertinent negatives include no abdominal pain. She has tried nothing for the symptoms. The treatment provided no relief.       Objective   Vital Signs:   /78   Pulse 87   Temp 97.5 °F (36.4 °C)   Ht 154.9 cm (61\")   Wt 74 kg (163 lb 3.2 oz)   SpO2 98%   BMI 30.84 kg/m²     Physical Exam  Vitals reviewed.   Constitutional:       Appearance: Normal appearance. She is well-developed.   HENT:      Head: Normocephalic and atraumatic.      Right Ear: Decreased hearing noted. Drainage, swelling and tenderness present. A middle ear effusion is present.      Left Ear: Hearing, tympanic membrane and external ear normal.      Ears:      Comments: Unable to to completely visualize TM due to right " ear canal swelling and drainage  Eyes:      Conjunctiva/sclera: Conjunctivae normal.      Pupils: Pupils are equal, round, and reactive to light.   Cardiovascular:      Rate and Rhythm: Normal rate and regular rhythm.      Heart sounds: No murmur heard.      Pulmonary:      Effort: Pulmonary effort is normal.      Breath sounds: Normal breath sounds. No wheezing or rhonchi.   Skin:     General: Skin is warm and dry.   Neurological:      Mental Status: She is alert and oriented to person, place, and time.   Psychiatric:         Mood and Affect: Mood and affect normal.         Behavior: Behavior normal.         Thought Content: Thought content normal.         Judgment: Judgment normal.        Result Review :   The following data was reviewed by: JURGEN Faustin on 10/11/2021:  Common labs    Common Labsle 12/2/20 6/23/21 6/23/21 6/23/21     0818 0818 0818   Glucose   80    Glucose 93      BUN 14  10    Creatinine 0.99 (A)  0.85    eGFR Non African Am   81    Sodium 141  138    Potassium 4.5  4.4    Chloride 105  103    Calcium 9.9  9.7    Albumin 5.0  4.90    Total Bilirubin 0.21  0.3    Alkaline Phosphatase 82  71    AST (SGOT) 25  38 (A)    ALT (SGPT) 22  22    WBC 9.93 8.95     Hemoglobin 14.5 14.5     Hematocrit 45.4 43.6     Platelets 344 357     Total Cholesterol    161   Total Cholesterol 198      Triglycerides 138   63   HDL Cholesterol 49   38 (A)   LDL Cholesterol  121 (A)   111 (A)   (A) Abnormal value       Comments are available for some flowsheets but are not being displayed.                     Assessment and Plan    Diagnoses and all orders for this visit:    1. Acute swimmer's ear of right side (Primary)  Comments:  Will start on amoxicillin, some Ciprodex and a steroid pack to decrease swelling.  Will give Diflucan if needed for yeast infection discussed return precautions    Other orders  -     methylPREDNISolone (MEDROL) 4 MG dose pack; Take as directed on package instructions.   Dispense: 1 each; Refill: 1  -     amoxicillin (AMOXIL) 875 MG tablet; Take 1 tablet by mouth 2 (Two) Times a Day.  Dispense: 20 tablet; Refill: 1  -     fluconazole (Diflucan) 150 MG tablet; Take 1 tablet by mouth 1 (One) Time for 1 dose.  Dispense: 1 tablet; Refill: 0  -     ciprofloxacin-dexamethasone (CIPRODEX) 0.3-0.1 % otic suspension; Administer 4 drops into the left ear 2 (Two) Times a Day.  Dispense: 7.5 mL; Refill: 0        Follow Up   Return if symptoms worsen or fail to improve.  Patient was given instructions and counseling regarding her condition or for health maintenance advice. Please see specific information pulled into the AVS if appropriate.

## 2021-10-15 ENCOUNTER — TELEMEDICINE (OUTPATIENT)
Dept: PSYCHIATRY | Facility: CLINIC | Age: 26
End: 2021-10-15

## 2021-10-15 DIAGNOSIS — F90.0 ADHD (ATTENTION DEFICIT HYPERACTIVITY DISORDER), INATTENTIVE TYPE: Primary | ICD-10-CM

## 2021-10-15 DIAGNOSIS — F41.1 GENERALIZED ANXIETY DISORDER: ICD-10-CM

## 2021-10-15 DIAGNOSIS — F32.4 MAJOR DEPRESSIVE DISORDER IN PARTIAL REMISSION, UNSPECIFIED WHETHER RECURRENT (HCC): ICD-10-CM

## 2021-10-15 DIAGNOSIS — F32.4 MAJOR DEPRESSIVE DISORDER IN PARTIAL REMISSION, UNSPECIFIED WHETHER RECURRENT (HCC): Primary | ICD-10-CM

## 2021-10-15 PROCEDURE — 99214 OFFICE O/P EST MOD 30 MIN: CPT | Performed by: STUDENT IN AN ORGANIZED HEALTH CARE EDUCATION/TRAINING PROGRAM

## 2021-10-15 RX ORDER — FLUOXETINE 10 MG/1
10 CAPSULE ORAL DAILY
Qty: 30 CAPSULE | Refills: 2 | Status: SHIPPED | OUTPATIENT
Start: 2021-10-15 | End: 2021-12-22

## 2021-10-15 RX ORDER — MINOXIDIL 2.5 MG/1
TABLET ORAL
COMMUNITY
Start: 2021-09-17 | End: 2022-09-09

## 2021-10-15 RX ORDER — BUPROPION HYDROCHLORIDE 300 MG/1
TABLET ORAL
Qty: 90 TABLET | Refills: 1 | Status: SHIPPED | OUTPATIENT
Start: 2021-10-15 | End: 2022-05-04 | Stop reason: SDUPTHER

## 2021-10-15 NOTE — PROGRESS NOTES
"Subjective   Janice Padilla is a 26 y.o. female who presents today for follow up    Referring Provider:  No referring provider defined for this encounter.    Chief Complaint:  adhd cornel mdd    History of Present Illness:     Janice Padilla is a 25 year old /White female referred by Balbina SEAMAN.     Chart review 1/6: Patient is on Wellbutrin, status post Zoloft. Also on Adderall. Had tachycardia at 130. Started on metoprolol 50 mg extended release daily. Labs in December: BMI 35, LDL is elevated at 121, ALT AST within normal limits, creatinine 0.99, hematocrit 45, electrolytes are essentially normal, TSH is 3.65 normal, iron is 53 low, vitamin D is 23 low, ferritin is normal at 96. No EKG or head imaging.     \"Janice.\"     10/15: Virtual visit via Zoom audio and video due to the COVID-19 pandemic.  Patient is accepting of and agreeable to visit.  The visit consisted of the patient and I.  Interview:  1. Chart review: Seen recently by PCP and diagnosed with acute swimmer's ear on the right side.  2. \"I am ok.\"  3. Depression/Mood: denies  4. ADHD: under control  5. Stressors:  a. Sold her house  b. Now building a house; not sure where they are going to live  c. Taking care of her 19 mo old  d. Pandemic cases that were going up  6. Anxiety:  a. Irritable  b. Worrying  c. On edge  d. Obsessing about her daughter as well; constantly worried something bad is going to happen  e. Sometimes has trouble thinking about anything else but her  f. No panic attacks  7. Sleeping: yes  8. Eating: stable  9. Substances: denies  10. Therapy: not interested  11. Medication compliant: yes  12. No SI HI AVH.      8/20: Virtual visit via Zoom audio and video due to the COVID-19 pandemic.  Patient is accepting of and agreeable to visit.  The visit consisted of the patient and I.  Interview:  13. Seen by PCP June 23 and was doing well overall.  14. \"I'm good.\"  15. ADHD controlled.  16. Denies depression and anxiety. " "Some situational anxiety due to COVID-19 and her job.  17. Finally able to get around to doing projects at home.  18. Denies SI HI AVH.      6/18: In-person Interview: Patient reports some improvement on Adderall XR.  Discontinued Topamax.  No longer going to the weight loss clinic.  Notes residual depression and anxiety symptoms, but they are mild.  They are well controlled on the bupropion.  No SI HI AVH.  Denies depression, muscle tension, fatigue, restlessness. Continues to endorse poor concentration. See scores below.      IMPORTANT:  From previous note, patient was held back in the third grade. Struggled to complete high school and missed 60 days although she did graduate with good grades. Got in trouble for talking a lot in class. She was not in any special classes. She did not have an IEP.     1/6 H&P: Virtual visit via Zoom audio and video due to the COVID-19 pandemic. Patient is accepting of and agreeable to appointment. The appointment consisted of the patient and I only. Interview: Patient reports that she had her first child in March and suffered from postpartum depression. The pandemic did not help things. It was a \"hard time,\" and the patient reports she had already had anxiety before. Patient is presently bottlefeeding. She became pregnant through an infertility specialist. Presently not on any contraception.   Endorses muscle tension, fatigue, poor concentration, restlessness, irritability, and some broken sleep, although she is sleeping for about 8 hours a night. She wakes up frequently to check on her baby girl, Alexandra. Also endorses guilt at being a bad mom. Duration has been since March of last year. Patient feels her anxiety is worse than her depression.   Denies SI HI AVH. Has access to weapons that are locked in a gun safe. Patient does not know the combination. She also does not know how to work the guns that are in the safe. Psychiatric review of systems is positive for anxiety and " "depression, negative for psychosis and maribel.   Possible ADHD. Patient was diagnosed by Dr. Joe in 2017. 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 Possible family history as well as her sister may have ADHD.   Past Psychiatric History:  Began Psychiatric Treatment: Since    Dx: Anxiety   Psychiatrist: Has not seen a psychiatrist   Therapist: Saw therapist in 2018x1, unsure if it was helpful.   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     Feeling down, depressed, or hopeless?     Trouble falling or staying asleep, or sleeping too much?     Feeling tired or having little energy?     Poor appetite or overeating?     Feeling bad about yourself - or that you are a failure or have let yourself or your family down?  "    Trouble concentrating on things, such as reading the newspaper or watching television?     Moving or speaking so slowly that other people could have noticed? Or the opposite - being so fidgety or restless that you have been moving around a lot more than usual?     Thoughts that you would be better off dead, or of hurting yourself in some way?     PHQ-9 Total Score       MATTHEW-7       Past Surgical History:  Past Surgical History:   Procedure Laterality Date   • DILATATION AND CURETTAGE     • TONSILLECTOMY         Problem List:  Patient Active Problem List   Diagnosis   • Acne   • Anxiety   • Attention deficit hyperactivity disorder (ADHD)   • Depression   • Generalized anxiety disorder   • Gestational hypertension   • Major depressive disorder in partial remission (HCC)   • PCOS (polycystic ovarian syndrome)   • Pre-eclampsia       Allergy:   No Known Allergies     Discontinued Medications:  Medications Discontinued During This Encounter   Medication Reason   • MINOXIDIL PO        Current Medications:   Current Outpatient Medications   Medication Sig Dispense Refill   • amoxicillin (AMOXIL) 875 MG tablet Take 1 tablet by mouth 2 (Two) Times a Day. 20 tablet 1   • amphetamine-dextroamphetamine (ADDERALL) 20 MG tablet Take 1 tablet by mouth 2 (Two) Times a Day. 60 tablet 0   • buPROPion XL (WELLBUTRIN XL) 300 MG 24 hr tablet Take 300 mg by mouth Daily.     • econazole nitrate (SPECTAZOLE) 1 % cream apply to FEET EVERY DAY UNTIL CLEAR THEN ONCE A WEEK FOR maintenance     • ergocalciferol (ERGOCALCIFEROL) 1.25 MG (26446 UT) capsule Take 1 capsule by mouth Every 7 (Seven) Days.     • hydrOXYzine (ATARAX) 25 MG tablet Take 1 tablet by mouth Every 6 (Six) Hours As Needed for Anxiety. 270 tablet 1   • ketoconazole (NIZORAL) 2 % shampoo apply to SCALP THREE TIMES WEEKLY     • levocetirizine (XYZAL) 5 MG tablet Take 5 mg by mouth Every Evening.     • minoxidil (LONITEN) 2.5 MG tablet TAKE 1/4 TABLET BY MOUTH EVERY DAY     •  Prenatal MV-Min-Fe Fum-FA-DHA (PRENATAL+DHA PO)      • Soolantra 1 % cream      • spironolactone (ALDACTONE) 100 MG tablet spironolactone 100 mg oral tablet take 1 tablet (100 mg) by oral route once daily   Active     • triamcinolone (KENALOG) 0.1 % ointment apply to trunk TWICE DAILY AS NEEDED FOR itching     • ciprofloxacin-dexamethasone (CIPRODEX) 0.3-0.1 % otic suspension Administer 4 drops into the left ear 2 (Two) Times a Day. 7.5 mL 0   • FLUoxetine (PROzac) 10 MG capsule Take 1 capsule by mouth Daily. 30 capsule 2   • methylPREDNISolone (MEDROL) 4 MG dose pack Take as directed on package instructions. 1 each 1   • metoprolol succinate XL (Toprol XL) 25 MG 24 hr tablet Take 1 tablet by mouth Daily for 90 days. 90 tablet 1     No current facility-administered medications for this visit.       Past Medical History:  Past Medical History:   Diagnosis Date   • Acne    • ADHD    • Anxiety    • Depression    • Generalized anxiety disorder 01/06/2021   • Major depressive disorder in partial remission (HCC) 01/06/2021   • PCOS (polycystic ovarian syndrome)    • Pre-eclampsia        Social History     Socioeconomic History   • Marital status:    Tobacco Use   • Smoking status: Never Smoker   • Smokeless tobacco: Never Used   • Tobacco comment: 2/10/2021- 1/6/2021   Vaping Use   • Vaping Use: Never used   Substance and Sexual Activity   • Alcohol use: Not Currently     Comment: light; 2/10/2021- 1/6/2021   • Drug use: Never     Comment: 2/10/2021- 1/6/2021   • Sexual activity: Defer       Family History   Problem Relation Age of Onset   • Anxiety disorder Mother    • Bipolar disorder Mother    • Depression Mother    • ADD / ADHD Sister        Mental Status Exam:   Hygiene:   good, groomed, scrubs  Cooperation:  Cooperative  Eye Contact:  Good  Psychomotor Behavior:  Appropriate  Affect:  Full range  Mood: good  Hopelessness: Denies  Speech:  Normal  Thought Process:  Goal directed  Thought Content:   Normal  Suicidal:  None  Homicidal:  None  Hallucinations:  None  Delusion:  None  Memory:  Intact  Orientation:  Person, Place, Time and Situation  Reliability:  good  Insight:  Fair  Judgement:  Fair  Impulse Control:  Fair  Physical/Medical Issues:  No      Review of Systems:  Review of Systems   Constitutional: Negative for diaphoresis and fatigue.   HENT: Negative for drooling.    Eyes: Negative for visual disturbance.   Respiratory: Negative for cough and shortness of breath.    Cardiovascular: Negative for chest pain, palpitations and leg swelling.   Gastrointestinal: Negative for nausea and vomiting.   Endocrine: Negative for cold intolerance and heat intolerance.   Genitourinary: Negative for difficulty urinating.   Musculoskeletal: Negative for joint swelling.   Allergic/Immunologic: Negative for immunocompromised state.   Neurological: Negative for dizziness, seizures, speech difficulty and numbness.         Physical Exam:  Physical Exam    Vital Signs:   There were no vitals taken for this visit.     Lab Results:   Office Visit on 06/23/2021   Component Date Value Ref Range Status   • Glucose 06/23/2021 80  65 - 99 mg/dL Final   • BUN 06/23/2021 10  6 - 20 mg/dL Final   • Creatinine 06/23/2021 0.85  0.57 - 1.00 mg/dL Final   • Sodium 06/23/2021 138  136 - 145 mmol/L Final   • Potassium 06/23/2021 4.4  3.5 - 5.2 mmol/L Final   • Chloride 06/23/2021 103  98 - 107 mmol/L Final   • CO2 06/23/2021 25.1  22.0 - 29.0 mmol/L Final   • Calcium 06/23/2021 9.7  8.6 - 10.5 mg/dL Final   • Total Protein 06/23/2021 7.6  6.0 - 8.5 g/dL Final   • Albumin 06/23/2021 4.90  3.50 - 5.20 g/dL Final   • ALT (SGPT) 06/23/2021 22  1 - 33 U/L Final   • AST (SGOT) 06/23/2021 38* 1 - 32 U/L Final   • Alkaline Phosphatase 06/23/2021 71  39 - 117 U/L Final   • Total Bilirubin 06/23/2021 0.3  0.0 - 1.2 mg/dL Final   • eGFR Non  Amer 06/23/2021 81  >60 mL/min/1.73 Final   • Globulin 06/23/2021 2.7  gm/dL Final   • A/G Ratio  06/23/2021 1.8  g/dL Final   • BUN/Creatinine Ratio 06/23/2021 11.8  7.0 - 25.0 Final   • Anion Gap 06/23/2021 9.9  5.0 - 15.0 mmol/L Final   • Total Cholesterol 06/23/2021 161  0 - 200 mg/dL Final   • Triglycerides 06/23/2021 63  0 - 150 mg/dL Final   • HDL Cholesterol 06/23/2021 38* 40 - 60 mg/dL Final   • LDL Cholesterol  06/23/2021 111* 0 - 100 mg/dL Final   • VLDL Cholesterol 06/23/2021 12  5 - 40 mg/dL Final   • LDL/HDL Ratio 06/23/2021 2.91   Final   • TSH 06/23/2021 2.040  0.270 - 4.200 uIU/mL Final   • 25 Hydroxy, Vitamin D 06/23/2021 40.7  ng/ml Final   • WBC 06/23/2021 8.95  3.40 - 10.80 10*3/mm3 Final   • RBC 06/23/2021 4.83  3.77 - 5.28 10*6/mm3 Final   • Hemoglobin 06/23/2021 14.5  12.0 - 15.9 g/dL Final   • Hematocrit 06/23/2021 43.6  34.0 - 46.6 % Final   • MCV 06/23/2021 90.3  79.0 - 97.0 fL Final   • MCH 06/23/2021 30.0  26.6 - 33.0 pg Final   • MCHC 06/23/2021 33.3  31.5 - 35.7 g/dL Final   • RDW 06/23/2021 12.8  12.3 - 15.4 % Final   • RDW-SD 06/23/2021 42.9  37.0 - 54.0 fl Final   • MPV 06/23/2021 9.7  6.0 - 12.0 fL Final   • Platelets 06/23/2021 357  140 - 450 10*3/mm3 Final   • Neutrophil % 06/23/2021 62.0  42.7 - 76.0 % Final   • Lymphocyte % 06/23/2021 31.1  19.6 - 45.3 % Final   • Monocyte % 06/23/2021 4.9* 5.0 - 12.0 % Final   • Eosinophil % 06/23/2021 1.2  0.3 - 6.2 % Final   • Basophil % 06/23/2021 0.4  0.0 - 1.5 % Final   • Immature Grans % 06/23/2021 0.4  0.0 - 0.5 % Final   • Neutrophils, Absolute 06/23/2021 5.54  1.70 - 7.00 10*3/mm3 Final   • Lymphocytes, Absolute 06/23/2021 2.78  0.70 - 3.10 10*3/mm3 Final   • Monocytes, Absolute 06/23/2021 0.44  0.10 - 0.90 10*3/mm3 Final   • Eosinophils, Absolute 06/23/2021 0.11  0.00 - 0.40 10*3/mm3 Final   • Basophils, Absolute 06/23/2021 0.04  0.00 - 0.20 10*3/mm3 Final   • Immature Grans, Absolute 06/23/2021 0.04  0.00 - 0.05 10*3/mm3 Final   • nRBC 06/23/2021 0.0  0.0 - 0.2 /100 WBC Final   Hospital Outpatient Visit on 05/07/2021   Component  Date Value Ref Range Status   • Amphetamine, Urine Qual 05/07/2021 NEGATIVE  NA Final    Comment: Amphetamine Minimal Detection level for this method is  500 ng/ml.     • Barbiturates Screen, Urine 05/07/2021 NEGATIVE  NA Final    Comment: Barbiturate Minimal Detection level for this method is  200 ng/ml.     • Benzodiazepine Screen, Urine 05/07/2021 NEGATIVE  NA Final    Comment: Benzodiazepine Minimal Detection level for this method is  100 ng/ml.     • Cocaine Screen, Urine 05/07/2021 NEGATIVE  NA Final    Cocaine Minimal detection level for this method is 300 ng/ml.   • Methadone Screen, Urine 05/07/2021 NEGATIVE  NA Final    Methadone Minimal detection level for this method is 300 ng/ml.   • Opiate Screen, Urine 05/07/2021 NEGATIVE  NA Final    Opiate Minimal detection level for this method is 300 ng/ml.   • Phencyclidine (PCP), Urine 05/07/2021 NEGATIVE  NA Final    PCP Minimal detection level for this method is 25 ng/ml.   • THC, Screen 05/07/2021 NEGATIVE  NA Final    THC Minimal detection level for this method is 50 ng/ml.   • Oxycodone Screen, Urine 05/07/2021 NEGATIVE  NA Final    Comment: Oxycodone Minimal Detection level for this method is  100 ng/ml.         EKG Results:  No orders to display       Imaging Results:  No Images in the past 120 days found..      Assessment/Plan   Diagnoses and all orders for this visit:    1. ADHD (attention deficit hyperactivity disorder), inattentive type (Primary)    2. Generalized anxiety disorder  -     FLUoxetine (PROzac) 10 MG capsule; Take 1 capsule by mouth Daily.  Dispense: 30 capsule; Refill: 2    3. Major depressive disorder in partial remission, unspecified whether recurrent (HCC)  -     FLUoxetine (PROzac) 10 MG capsule; Take 1 capsule by mouth Daily.  Dispense: 30 capsule; Refill: 2        Presentation most consistent with major depressive disorder in partial remission, generalized anxiety disorder, and ADHD.  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  Also endorses a childhood history that is compelling.    10/15: Start prozac. Hydroxyzine at night calms her; consider adding am dose. 4 wks    : Patient is doing extremely well.  No change to medications.  See back in 8 weeks.    : Doing a little better, however patient was doing much better on Adderall 20 mg twice a day, which was her previous dose.  Discontinue Adderall XR and start Adderall immediate release 20 mg twice a day.  Some residual depressive and anxiety symptoms that are well controlled on bupropion.  Treating ADHD will also treat residual depressive and anxiety symptoms as well.  See back in 2 months.  Declines psychotherapy.    Visit Diagnoses:    ICD-10-CM ICD-9-CM   1. ADHD (attention deficit hyperactivity disorder), inattentive type  F90.0 314.00   2. Generalized anxiety disorder  F41.1 300.02   3. Major depressive disorder in partial remission, unspecified whether recurrent (HCC)  F32.4 296.25       PLAN:  19. Risk Assessment: Risk of self-harm acutely is low. Risk factors include anxiety disorder, mood disorder, access to weapons, recent psychosocial stressors. Protective factors include no family history, no present SI, no history of suicide attempts or self-harm in the past, minimal AODA, healthcare seeking, future orientation, willingness to engage in care,  baby. Risk of self-harm chronically is also low, but could be further elevated in the event of treatment noncompliance and/or AODA.  20. Safety: No acute safety concerns.  21. Medications:   a. START prozac 10 mg PO QDAY. Risks, benefits, alternatives discussed with patient including GI upset, nausea vomiting diarrhea, theoretical decrease of seizure threshold predisposing the patient to a slightly higher seizure risk,  headaches, sexual dysfunction, serotonin syndrome, bleeding risk, increased suicidality in patients 24 years and younger.  After discussion of these risks and benefits, the patient voiced understanding and agreed to proceed.  b. CONTINUE Bupropion  mg p.o. daily to target attentional issues. Risks, benefits, alternatives discussed with patient including nausea, GI upset, increased energy, exacerbation of irritability, lowering of seizure threshold. After discussion of these risks and benefits, the patient voiced understanding and agreed to proceed.   c. CONTINUE hydroxyzine 50 mg PO QHS. Risks, benefits, alternatives discussed with patient including sedation, dizziness, fall risk, GI upset.  After discussion of these risks and benefits, the patient voiced understanding and agreed to proceed.  d. CONTINUE Adderall 20 mg PO BID. Risks, benefits, side effects discussed with patient including elevated heart rate, elevated blood pressure, irritability, insomnia, sexual dysfunction, appetite suppressing properties, psychosis.  After discussion of these risks and benefits, the patient voiced understanding and agreed to proceed. Controlled substances consent verbally signed. UDS and Tom ordered.  e. S/P   i. Escitalopram: sexual dysfunction  ii. Adderall XR 10 mg daily: not effective  22. Therapy: Consider in the future, presently not interested.  23. Labs/Studies: EKG reassuring.  24. Follow Up: 4 wks.      TREATMENT PLAN/GOALS: Continue supportive psychotherapy efforts and medications as indicated. Treatment and medication options discussed during today's visit. Patient acknowledged and verbally consented to continue with current treatment plan and was educated on the importance of compliance with treatment and follow-up appointments.    MEDICATION ISSUES:  TOM reviewed as expected.  Discussed medication options and treatment plan of prescribed medication as well as the risks, benefits, and side effects  including potential falls, possible impaired driving and metabolic adversities among others. Patient is agreeable to call the office with any worsening of symptoms or onset of side effects. Patient is agreeable to call 911 or go to the nearest ER should he/she begin having SI/HI. No medication side effects or related complaints today.     MEDS ORDERED DURING VISIT:  New Medications Ordered This Visit   Medications   • FLUoxetine (PROzac) 10 MG capsule     Sig: Take 1 capsule by mouth Daily.     Dispense:  30 capsule     Refill:  2       Return in about 4 weeks (around 11/12/2021).         This document has been electronically signed by Eli Murray MD  October 15, 2021 15:11 EDT      Part of this note may be an electronic transcription/translation of spoken language to printed text using the Dragon Dictation System.  Answers for HPI/ROS submitted by the patient on 8/19/2021  Please describe your symptoms.: Med check  Have you had these symptoms before?: No  How long have you been having these symptoms?: Greater than 2 weeks  What is the primary reason for your visit?: Other

## 2021-10-15 NOTE — PATIENT INSTRUCTIONS
1.  Please return to clinic at your next scheduled visit.  Contact the clinic ([phone]) at least 24 hours prior in the event you need to cancel.  2.  Do no harm to yourself or others.    3.  Avoid alcohol and drugs.    4.  Take all medications as prescribed.  Please contact the clinic with any concerns. If you are in need of medication refills, please call the clinic at [phone].    5. Should you want to get in touch with your provider, Dr. Eli Murray, please utilize [employer] or contact the office ([phone]), and staff will be able to page Dr. Murray directly.  6.  In the event you have personal crisis, contact the following crisis numbers: Suicide Prevention Hotline 1-763.528.9856; JOSE Helpline 0-486-471-BMST; [facility] Emergency Room [phone]; text HELLO to 739218; or 582.     SPECIFIC RECOMMENDATIONS:     1.      Medications discussed at this encounter:                   - start prozac     2.      Psychotherapy recommendations:      3.     Return to clinic: 4 weeks

## 2021-11-04 DIAGNOSIS — F90.0 ADHD (ATTENTION DEFICIT HYPERACTIVITY DISORDER), INATTENTIVE TYPE: ICD-10-CM

## 2021-11-04 RX ORDER — DEXTROAMPHETAMINE SACCHARATE, AMPHETAMINE ASPARTATE, DEXTROAMPHETAMINE SULFATE AND AMPHETAMINE SULFATE 5; 5; 5; 5 MG/1; MG/1; MG/1; MG/1
TABLET ORAL
Qty: 60 TABLET | Refills: 0 | Status: SHIPPED | OUTPATIENT
Start: 2021-11-04 | End: 2021-12-08

## 2021-11-08 RX ORDER — ERGOCALCIFEROL 1.25 MG/1
CAPSULE ORAL
Qty: 13 CAPSULE | Refills: 1 | Status: SHIPPED | OUTPATIENT
Start: 2021-11-08 | End: 2022-02-16 | Stop reason: SDUPTHER

## 2021-11-17 ENCOUNTER — TELEMEDICINE (OUTPATIENT)
Dept: PSYCHIATRY | Facility: CLINIC | Age: 26
End: 2021-11-17

## 2021-11-17 DIAGNOSIS — F32.4 MAJOR DEPRESSIVE DISORDER IN PARTIAL REMISSION, UNSPECIFIED WHETHER RECURRENT (HCC): ICD-10-CM

## 2021-11-17 DIAGNOSIS — F90.0 ADHD (ATTENTION DEFICIT HYPERACTIVITY DISORDER), INATTENTIVE TYPE: Primary | ICD-10-CM

## 2021-11-17 DIAGNOSIS — F41.1 GENERALIZED ANXIETY DISORDER: ICD-10-CM

## 2021-11-17 PROCEDURE — 99214 OFFICE O/P EST MOD 30 MIN: CPT | Performed by: STUDENT IN AN ORGANIZED HEALTH CARE EDUCATION/TRAINING PROGRAM

## 2021-11-17 PROCEDURE — 90833 PSYTX W PT W E/M 30 MIN: CPT | Performed by: STUDENT IN AN ORGANIZED HEALTH CARE EDUCATION/TRAINING PROGRAM

## 2021-11-17 RX ORDER — DEXTROAMPHETAMINE SACCHARATE, AMPHETAMINE ASPARTATE, DEXTROAMPHETAMINE SULFATE AND AMPHETAMINE SULFATE 2.5; 2.5; 2.5; 2.5 MG/1; MG/1; MG/1; MG/1
10 TABLET ORAL DAILY
Qty: 17 TABLET | Refills: 0 | Status: SHIPPED | OUTPATIENT
Start: 2021-11-17 | End: 2021-12-08

## 2021-11-17 NOTE — PROGRESS NOTES
"Subjective   Janice Padilla is a 26 y.o. female who presents today for follow up    Referring Provider:  No referring provider defined for this encounter.    Chief Complaint:  adhd cornel mdd    History of Present Illness:     Janice Padilla is a 25 year old /White female referred by Balbina SEAMAN.     Chart review 1/6: Patient is on Wellbutrin, status post Zoloft. Also on Adderall. Had tachycardia at 130. Started on metoprolol 50 mg extended release daily. Labs in December: BMI 35, LDL is elevated at 121, ALT AST within normal limits, creatinine 0.99, hematocrit 45, electrolytes are essentially normal, TSH is 3.65 normal, iron is 53 low, vitamin D is 23 low, ferritin is normal at 96. No EKG or head imaging.     \"Janice.\"     11/17: Virtual visit via Zoom audio and video due to the COVID-19 pandemic.  Patient is accepting of and agreeable to visit.  The visit consisted of the patient and I.  Interview:  1. Chart review: No new developments.  2. \"Doing okay.\"  a. Prozac has helped so far.  i. Anxiety and mood  b. Also done moving and sold their house.  c. More stuff at work.  3. ADHD:  a. Starting to procrastinate more.  b. Feels like she needs more at 12:30 - 1pm.   c. Takes adderall 7:30 am and 12:30 pm. Wears off around when she leaves work.   d. Feels like she is \"slipping back.\"  e. Weekend breaks  4. Depression/Mood: better mood, more energy  5. Anxiety: less worrying, less irritability, less on edge  6. Sleeping: \"fine\"  7. Eating: lost weight  8. Substances: denies  9. Therapy: interested  a. Feels females might be judging her  b. Thinks that might be due to her relp with her mom; they didn't get along growing up.  c. Mom treated her like she was never good enough.  10. Medication compliant: y  11. No SI HI AVH.      10/15: Virtual visit via Zoom audio and video due to the COVID-19 pandemic.  Patient is accepting of and agreeable to visit.  The visit consisted of the patient and " "I.  Interview:  12. Chart review: Seen recently by PCP and diagnosed with acute swimmer's ear on the right side.  13. \"I am ok.\"  14. Depression/Mood: denies  15. ADHD: under control  16. Stressors:  a. Sold her house  b. Now building a house; not sure where they are going to live  c. Taking care of her 19 mo old  d. Pandemic cases that were going up  17. Anxiety:  a. Irritable  b. Worrying  c. On edge  d. Obsessing about her daughter as well; constantly worried something bad is going to happen  e. Sometimes has trouble thinking about anything else but her  f. No panic attacks  18. Sleeping: yes  19. Eating: stable  20. Substances: denies  21. Therapy: not interested  22. Medication compliant: yes  23. No SI HI AVH.      8/20: Virtual visit via Zoom audio and video due to the COVID-19 pandemic.  Patient is accepting of and agreeable to visit.  The visit consisted of the patient and I.  Interview:  24. Seen by PCP June 23 and was doing well overall.  25. \"I'm good.\"  26. ADHD controlled.  27. Denies depression and anxiety. Some situational anxiety due to COVID-19 and her job.  28. Finally able to get around to doing projects at home.  29. Denies SI HI AVH.      6/18: In-person Interview: Patient reports some improvement on Adderall XR.  Discontinued Topamax.  No longer going to the weight loss clinic.  Notes residual depression and anxiety symptoms, but they are mild.  They are well controlled on the bupropion.  No SI HI AVH.  Denies depression, muscle tension, fatigue, restlessness. Continues to endorse poor concentration. See scores below.      IMPORTANT:  From previous note, patient was held back in the third grade. Struggled to complete high school and missed 60 days although she did graduate with good grades. Got in trouble for talking a lot in class. She was not in any special classes. She did not have an IEP.     1/6 H&P: Virtual visit via Zoom audio and video due to the COVID-19 pandemic. Patient is accepting " "of and agreeable to appointment. The appointment consisted of the patient and I only. Interview: Patient reports that she had her first child in March and suffered from postpartum depression. The pandemic did not help things. It was a \"hard time,\" and the patient reports she had already had anxiety before. Patient is presently bottlefeeding. She became pregnant through an infertility specialist. Presently not on any contraception.   Endorses muscle tension, fatigue, poor concentration, restlessness, irritability, and some broken sleep, although she is sleeping for about 8 hours a night. She wakes up frequently to check on her baby girl, Alexandra. Also endorses guilt at being a bad mom. Duration has been since March of last year. Patient feels her anxiety is worse than her depression.   Denies SI HI AVH. Has access to weapons that are locked in a gun safe. Patient does not know the combination. She also does not know how to work the guns that are in the safe. Psychiatric review of systems is positive for anxiety and depression, negative for psychosis and maribel.   Possible ADHD. Patient was diagnosed by Dr. Joe in 2017. 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 Possible family history as well as her sister may have ADHD.   Past Psychiatric History:  Began Psychiatric Treatment: Since 2011   Dx: Anxiety   Psychiatrist: Has not seen a psychiatrist   Therapist: Saw therapist in 2018x1, unsure if it was helpful.   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     Feeling down, depressed, or hopeless?     Trouble falling or staying asleep, or sleeping too much?     Feeling tired or having little energy?     Poor appetite or overeating?     Feeling bad about yourself - or that you are a failure or have let yourself or your family down?     Trouble concentrating on things, such as reading the newspaper or watching television?     Moving or speaking so slowly that other people could have noticed? Or the opposite - being so fidgety or restless that you have been moving around a lot more than usual?     Thoughts that you would be better off dead, or of hurting yourself in some way?     PHQ-9 Total Score       MATTHEW-7       Past Surgical History:  Past Surgical History:   Procedure Laterality Date   • DILATATION AND CURETTAGE     • TONSILLECTOMY         Problem List:  Patient Active Problem List   Diagnosis   • Acne   • Anxiety   • Attention deficit hyperactivity disorder (ADHD)   • Depression   • Generalized anxiety disorder   • Gestational hypertension   • Major depressive disorder in partial remission (HCC)   • PCOS (polycystic ovarian syndrome)   • Pre-eclampsia       Allergy:   No Known Allergies     Discontinued Medications:  Medications  Discontinued During This Encounter   Medication Reason   • amoxicillin (AMOXIL) 875 MG tablet    • ciprofloxacin-dexamethasone (CIPRODEX) 0.3-0.1 % otic suspension    • econazole nitrate (SPECTAZOLE) 1 % cream    • Soolantra 1 % cream    • ketoconazole (NIZORAL) 2 % shampoo    • methylPREDNISolone (MEDROL) 4 MG dose pack    • triamcinolone (KENALOG) 0.1 % ointment        Current Medications:   Current Outpatient Medications   Medication Sig Dispense Refill   • amphetamine-dextroamphetamine (ADDERALL) 20 MG tablet TAKE 1 TABLET BY MOUTH TWICE DAILY 60 tablet 0   • buPROPion XL (WELLBUTRIN XL) 300 MG 24 hr tablet TAKE 1 TABLET BY MOUTH ONCE DAILY 90 tablet 1   • FLUoxetine (PROzac) 10 MG capsule Take 1 capsule by mouth Daily. 30 capsule 2   • hydrOXYzine (ATARAX) 25 MG tablet Take 1 tablet by mouth Every 6 (Six) Hours As Needed for Anxiety. 270 tablet 1   • levocetirizine (XYZAL) 5 MG tablet Take 5 mg by mouth Every Evening.     • minoxidil (LONITEN) 2.5 MG tablet TAKE 1/4 TABLET BY MOUTH EVERY DAY     • Prenatal MV-Min-Fe Fum-FA-DHA (PRENATAL+DHA PO)      • spironolactone (ALDACTONE) 100 MG tablet spironolactone 100 mg oral tablet take 1 tablet (100 mg) by oral route once daily   Active     • vitamin D (ERGOCALCIFEROL) 1.25 MG (97226 UT) capsule capsule TAKE 1 CAPSULE BY MOUTH ONCE WEEKLY 13 capsule 1   • amphetamine-dextroamphetamine (Adderall) 10 MG tablet Take 1 tablet by mouth Daily for 17 days. 17 tablet 0   • metoprolol succinate XL (Toprol XL) 25 MG 24 hr tablet Take 1 tablet by mouth Daily for 90 days. 90 tablet 1     No current facility-administered medications for this visit.       Past Medical History:  Past Medical History:   Diagnosis Date   • Acne    • ADHD    • Anxiety    • Depression    • Generalized anxiety disorder 01/06/2021   • Major depressive disorder in partial remission (HCC) 01/06/2021   • PCOS (polycystic ovarian syndrome)    • Pre-eclampsia        Social History     Socioeconomic History    • Marital status:    Tobacco Use   • Smoking status: Never Smoker   • Smokeless tobacco: Never Used   • Tobacco comment: 2/10/2021- 1/6/2021   Vaping Use   • Vaping Use: Never used   Substance and Sexual Activity   • Alcohol use: Not Currently     Comment: light; 2/10/2021- 1/6/2021   • Drug use: Never     Comment: 2/10/2021- 1/6/2021   • Sexual activity: Defer       Family History   Problem Relation Age of Onset   • Anxiety disorder Mother    • Bipolar disorder Mother    • Depression Mother    • ADD / ADHD Sister        Mental Status Exam:   Hygiene:   good, groomed, streetclothes at home  Cooperation:  Cooperative  Eye Contact:  Good  Psychomotor Behavior:  Appropriate  Affect:  Full range  Mood: better  Hopelessness: Denies  Speech:  Normal  Thought Process:  Goal directed  Thought Content:  Normal  Suicidal:  None  Homicidal:  None  Hallucinations:  None  Delusion:  None  Memory:  Intact  Orientation:  Person, Place, Time and Situation  Reliability:  good  Insight:  Fair  Judgement:  Fair  Impulse Control:  Fair  Physical/Medical Issues:  No      Review of Systems:  Review of Systems   Constitutional: Negative for diaphoresis and fatigue.   HENT: Negative for drooling.    Eyes: Negative for visual disturbance.   Respiratory: Negative for cough and shortness of breath.    Cardiovascular: Negative for chest pain, palpitations and leg swelling.   Gastrointestinal: Negative for nausea and vomiting.   Endocrine: Negative for cold intolerance and heat intolerance.   Genitourinary: Negative for difficulty urinating.   Musculoskeletal: Negative for joint swelling.   Allergic/Immunologic: Negative for immunocompromised state.   Neurological: Negative for dizziness, seizures, speech difficulty and numbness.         Physical Exam:  Physical Exam    Vital Signs:   There were no vitals taken for this visit.     Lab Results:   Office Visit on 06/23/2021   Component Date Value Ref Range Status   • Glucose 06/23/2021  80  65 - 99 mg/dL Final   • BUN 06/23/2021 10  6 - 20 mg/dL Final   • Creatinine 06/23/2021 0.85  0.57 - 1.00 mg/dL Final   • Sodium 06/23/2021 138  136 - 145 mmol/L Final   • Potassium 06/23/2021 4.4  3.5 - 5.2 mmol/L Final   • Chloride 06/23/2021 103  98 - 107 mmol/L Final   • CO2 06/23/2021 25.1  22.0 - 29.0 mmol/L Final   • Calcium 06/23/2021 9.7  8.6 - 10.5 mg/dL Final   • Total Protein 06/23/2021 7.6  6.0 - 8.5 g/dL Final   • Albumin 06/23/2021 4.90  3.50 - 5.20 g/dL Final   • ALT (SGPT) 06/23/2021 22  1 - 33 U/L Final   • AST (SGOT) 06/23/2021 38* 1 - 32 U/L Final   • Alkaline Phosphatase 06/23/2021 71  39 - 117 U/L Final   • Total Bilirubin 06/23/2021 0.3  0.0 - 1.2 mg/dL Final   • eGFR Non  Amer 06/23/2021 81  >60 mL/min/1.73 Final   • Globulin 06/23/2021 2.7  gm/dL Final   • A/G Ratio 06/23/2021 1.8  g/dL Final   • BUN/Creatinine Ratio 06/23/2021 11.8  7.0 - 25.0 Final   • Anion Gap 06/23/2021 9.9  5.0 - 15.0 mmol/L Final   • Total Cholesterol 06/23/2021 161  0 - 200 mg/dL Final   • Triglycerides 06/23/2021 63  0 - 150 mg/dL Final   • HDL Cholesterol 06/23/2021 38* 40 - 60 mg/dL Final   • LDL Cholesterol  06/23/2021 111* 0 - 100 mg/dL Final   • VLDL Cholesterol 06/23/2021 12  5 - 40 mg/dL Final   • LDL/HDL Ratio 06/23/2021 2.91   Final   • TSH 06/23/2021 2.040  0.270 - 4.200 uIU/mL Final   • 25 Hydroxy, Vitamin D 06/23/2021 40.7  ng/ml Final   • WBC 06/23/2021 8.95  3.40 - 10.80 10*3/mm3 Final   • RBC 06/23/2021 4.83  3.77 - 5.28 10*6/mm3 Final   • Hemoglobin 06/23/2021 14.5  12.0 - 15.9 g/dL Final   • Hematocrit 06/23/2021 43.6  34.0 - 46.6 % Final   • MCV 06/23/2021 90.3  79.0 - 97.0 fL Final   • MCH 06/23/2021 30.0  26.6 - 33.0 pg Final   • MCHC 06/23/2021 33.3  31.5 - 35.7 g/dL Final   • RDW 06/23/2021 12.8  12.3 - 15.4 % Final   • RDW-SD 06/23/2021 42.9  37.0 - 54.0 fl Final   • MPV 06/23/2021 9.7  6.0 - 12.0 fL Final   • Platelets 06/23/2021 357  140 - 450 10*3/mm3 Final   • Neutrophil %  06/23/2021 62.0  42.7 - 76.0 % Final   • Lymphocyte % 06/23/2021 31.1  19.6 - 45.3 % Final   • Monocyte % 06/23/2021 4.9* 5.0 - 12.0 % Final   • Eosinophil % 06/23/2021 1.2  0.3 - 6.2 % Final   • Basophil % 06/23/2021 0.4  0.0 - 1.5 % Final   • Immature Grans % 06/23/2021 0.4  0.0 - 0.5 % Final   • Neutrophils, Absolute 06/23/2021 5.54  1.70 - 7.00 10*3/mm3 Final   • Lymphocytes, Absolute 06/23/2021 2.78  0.70 - 3.10 10*3/mm3 Final   • Monocytes, Absolute 06/23/2021 0.44  0.10 - 0.90 10*3/mm3 Final   • Eosinophils, Absolute 06/23/2021 0.11  0.00 - 0.40 10*3/mm3 Final   • Basophils, Absolute 06/23/2021 0.04  0.00 - 0.20 10*3/mm3 Final   • Immature Grans, Absolute 06/23/2021 0.04  0.00 - 0.05 10*3/mm3 Final   • nRBC 06/23/2021 0.0  0.0 - 0.2 /100 WBC Final       EKG Results:  No orders to display       Imaging Results:  No Images in the past 120 days found..      Assessment/Plan   Diagnoses and all orders for this visit:    1. ADHD (attention deficit hyperactivity disorder), inattentive type (Primary)  -     amphetamine-dextroamphetamine (Adderall) 10 MG tablet; Take 1 tablet by mouth Daily for 17 days.  Dispense: 17 tablet; Refill: 0  -     Ambulatory Referral to Behavioral Health    2. Major depressive disorder in partial remission, unspecified whether recurrent (HCC)  -     Ambulatory Referral to Behavioral Health    3. Generalized anxiety disorder  -     Ambulatory Referral to Behavioral Health        Presentation most consistent with major depressive disorder in partial remission, generalized anxiety disorder, and ADHD.  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  Also endorses a childhood history that is compelling.    11/17: Some ADHD symptoms re-emerging. Add 10 mg adderall at 11 am. Schedule will be 7:30  first dose, 11 second higher dose of 30 mg. Pt also interested in therapy. 17 minutes of supportive psychotherapy with goal to strengthen defenses, promote problems solving, restore adaptive functioning and provide symptom relief. The therapeutic alliance was strengthened to encourage the patient to express their thoughts and feelings. Esteem building was enhanced through praise, reassurance, normalizing and encouragement. Coping skills were enhanced to build distress tolerance skills and emotional regulation. Patient given education on medication side effects, diagnosis/illness and relapse symptoms. Plan to continue supportive psychotherapy in next appointment to provide symptom relief.  4 wks.    10/15: Start prozac. Hydroxyzine at night calms her; consider adding am dose. 4 wks    8/20: Patient is doing extremely well.  No change to medications.  See back in 8 weeks.    6/18: Doing a little better, however patient was doing much better on Adderall 20 mg twice a day, which was her previous dose.  Discontinue Adderall XR and start Adderall immediate release 20 mg twice a day.  Some residual depressive and anxiety symptoms that are well controlled on bupropion.  Treating ADHD will also treat residual depressive and anxiety symptoms as well.  See back in 2 months.  Declines psychotherapy.    Visit Diagnoses:    ICD-10-CM ICD-9-CM   1. ADHD (attention deficit hyperactivity disorder), inattentive type  F90.0 314.00   2. Major depressive disorder in partial remission, unspecified whether recurrent (HCC)  F32.4 296.25   3. Generalized anxiety disorder  F41.1 300.02       PLAN:  30. Risk Assessment: Risk of self-harm acutely is low. Risk factors include anxiety disorder, mood disorder, access to weapons, recent psychosocial stressors. Protective factors include no family history, no present SI, no history of suicide attempts or self-harm in the past, minimal AODA, healthcare seeking, future orientation, willingness to  engage in care,  baby. Risk of self-harm chronically is also low, but could be further elevated in the event of treatment noncompliance and/or AODA.  31. Safety: No acute safety concerns.  32. Medications:   a. CONTINUE prozac 10 mg PO QDAY. Risks, benefits, alternatives discussed with patient including GI upset, nausea vomiting diarrhea, theoretical decrease of seizure threshold predisposing the patient to a slightly higher seizure risk, headaches, sexual dysfunction, serotonin syndrome, bleeding risk, increased suicidality in patients 24 years and younger.  After discussion of these risks and benefits, the patient voiced understanding and agreed to proceed.  b. CONTINUE Bupropion  mg p.o. daily to target attentional issues. Risks, benefits, alternatives discussed with patient including nausea, GI upset, increased energy, exacerbation of irritability, lowering of seizure threshold. After discussion of these risks and benefits, the patient voiced understanding and agreed to proceed.   c. CONTINUE hydroxyzine 50 mg PO QHS. Risks, benefits, alternatives discussed with patient including sedation, dizziness, fall risk, GI upset.  After discussion of these risks and benefits, the patient voiced understanding and agreed to proceed.  d. INCREASE Adderall 20 mg PO BID by adding 10 mg to second dose. Risks, benefits, side effects discussed with patient including elevated heart rate, elevated blood pressure, irritability, insomnia, sexual dysfunction, appetite suppressing properties, psychosis.  After discussion of these risks and benefits, the patient voiced understanding and agreed to proceed. Controlled substances consent verbally signed. UDS and Brian ordered.  e. S/P   i. Escitalopram: sexual dysfunction  ii. Adderall XR 10 mg daily: not effective  33. Therapy: Consider in the future, presently not interested.  34. Labs/Studies: EKG reassuring.  35. Follow Up: 4 wks.      TREATMENT PLAN/GOALS: Continue  supportive psychotherapy efforts and medications as indicated. Treatment and medication options discussed during today's visit. Patient acknowledged and verbally consented to continue with current treatment plan and was educated on the importance of compliance with treatment and follow-up appointments.    MEDICATION ISSUES:  TOM reviewed as expected.  Discussed medication options and treatment plan of prescribed medication as well as the risks, benefits, and side effects including potential falls, possible impaired driving and metabolic adversities among others. Patient is agreeable to call the office with any worsening of symptoms or onset of side effects. Patient is agreeable to call 911 or go to the nearest ER should he/she begin having SI/HI. No medication side effects or related complaints today.     MEDS ORDERED DURING VISIT:  New Medications Ordered This Visit   Medications   • amphetamine-dextroamphetamine (Adderall) 10 MG tablet     Sig: Take 1 tablet by mouth Daily for 17 days.     Dispense:  17 tablet     Refill:  0     To be added to patient's existing script of 20 mg bid. New script is 20 mg qam, 30 mg qpm. Will cover until 12/4.       Return in about 4 weeks (around 12/15/2021).         This document has been electronically signed by Eli Murray MD  November 17, 2021 10:21 EST

## 2021-11-17 NOTE — PATIENT INSTRUCTIONS
1.  Please return to clinic at your next scheduled visit.  Contact the clinic ([phone]) at least 24 hours prior in the event you need to cancel.  2.  Do no harm to yourself or others.    3.  Avoid alcohol and drugs.    4.  Take all medications as prescribed.  Please contact the clinic with any concerns. If you are in need of medication refills, please call the clinic at [phone].    5. Should you want to get in touch with your provider, Dr. Eli Murray, please utilize [facility] or contact the office ([phone]), and staff will be able to page Dr. Murray directly.  6.  In the event you have personal crisis, contact the following crisis numbers: Suicide Prevention Hotline 1-847.471.3407; [facility] Helpline 5-199-489-CFMS; [facility] Emergency Room [phone]; text HELLO to 485562; or 661.     SPECIFIC RECOMMENDATIONS:     1.      Medications discussed at this encounter:                   - increase adderall     2.      Psychotherapy recommendations:      3.     Return to clinic: 4 weeks

## 2021-12-08 DIAGNOSIS — F90.0 ADHD (ATTENTION DEFICIT HYPERACTIVITY DISORDER), INATTENTIVE TYPE: ICD-10-CM

## 2021-12-08 RX ORDER — DEXTROAMPHETAMINE SACCHARATE, AMPHETAMINE ASPARTATE, DEXTROAMPHETAMINE SULFATE AND AMPHETAMINE SULFATE 5; 5; 5; 5 MG/1; MG/1; MG/1; MG/1
TABLET ORAL
Qty: 60 TABLET | Refills: 0 | Status: SHIPPED | OUTPATIENT
Start: 2021-12-08 | End: 2022-01-11

## 2021-12-08 RX ORDER — DEXTROAMPHETAMINE SACCHARATE, AMPHETAMINE ASPARTATE, DEXTROAMPHETAMINE SULFATE AND AMPHETAMINE SULFATE 2.5; 2.5; 2.5; 2.5 MG/1; MG/1; MG/1; MG/1
TABLET ORAL
Qty: 17 TABLET | Refills: 0 | Status: SHIPPED | OUTPATIENT
Start: 2021-12-08 | End: 2021-12-22 | Stop reason: SDUPTHER

## 2021-12-09 DIAGNOSIS — F32.4 MAJOR DEPRESSIVE DISORDER IN PARTIAL REMISSION, UNSPECIFIED WHETHER RECURRENT (HCC): ICD-10-CM

## 2021-12-09 DIAGNOSIS — F41.1 GENERALIZED ANXIETY DISORDER: ICD-10-CM

## 2021-12-20 RX ORDER — HYDROXYZINE HYDROCHLORIDE 25 MG/1
TABLET, FILM COATED ORAL
Qty: 120 TABLET | Refills: 1 | Status: SHIPPED | OUTPATIENT
Start: 2021-12-20 | End: 2022-02-02 | Stop reason: SDUPTHER

## 2021-12-22 ENCOUNTER — TELEMEDICINE (OUTPATIENT)
Dept: PSYCHIATRY | Facility: CLINIC | Age: 26
End: 2021-12-22

## 2021-12-22 DIAGNOSIS — F32.4 MAJOR DEPRESSIVE DISORDER IN PARTIAL REMISSION, UNSPECIFIED WHETHER RECURRENT (HCC): ICD-10-CM

## 2021-12-22 DIAGNOSIS — F90.0 ADHD (ATTENTION DEFICIT HYPERACTIVITY DISORDER), INATTENTIVE TYPE: ICD-10-CM

## 2021-12-22 DIAGNOSIS — F41.1 GENERALIZED ANXIETY DISORDER: Primary | ICD-10-CM

## 2021-12-22 PROCEDURE — 99214 OFFICE O/P EST MOD 30 MIN: CPT | Performed by: STUDENT IN AN ORGANIZED HEALTH CARE EDUCATION/TRAINING PROGRAM

## 2021-12-22 PROCEDURE — 90833 PSYTX W PT W E/M 30 MIN: CPT | Performed by: STUDENT IN AN ORGANIZED HEALTH CARE EDUCATION/TRAINING PROGRAM

## 2021-12-22 RX ORDER — FLUOXETINE HYDROCHLORIDE 20 MG/1
20 CAPSULE ORAL DAILY
Qty: 30 CAPSULE | Refills: 2 | Status: SHIPPED | OUTPATIENT
Start: 2021-12-22 | End: 2022-02-23

## 2021-12-22 RX ORDER — DEXTROAMPHETAMINE SACCHARATE, AMPHETAMINE ASPARTATE, DEXTROAMPHETAMINE SULFATE AND AMPHETAMINE SULFATE 2.5; 2.5; 2.5; 2.5 MG/1; MG/1; MG/1; MG/1
10 TABLET ORAL DAILY
Qty: 30 TABLET | Refills: 0 | Status: SHIPPED | OUTPATIENT
Start: 2021-12-25 | End: 2022-01-11 | Stop reason: SDUPTHER

## 2021-12-22 RX ORDER — KETOCONAZOLE 20 MG/ML
SHAMPOO TOPICAL
COMMUNITY
Start: 2021-11-30 | End: 2023-03-17

## 2021-12-22 RX ORDER — AZITHROMYCIN 250 MG/1
TABLET, FILM COATED ORAL
COMMUNITY
Start: 2021-11-09 | End: 2021-12-22

## 2021-12-22 RX ORDER — TRIFAROTENE 50 UG/G
CREAM TOPICAL
COMMUNITY
Start: 2021-12-20

## 2021-12-22 NOTE — PROGRESS NOTES
"Subjective   Janice Padilla is a 26 y.o. female who presents today for follow up    Referring Provider:  No referring provider defined for this encounter.    Chief Complaint:  adhd cornel mdd    History of Present Illness:     Janice Padilla is a 25 year old /White female referred by Balbina SEAMAN.     Chart review 1/6: Patient is on Wellbutrin, status post Zoloft. Also on Adderall. Had tachycardia at 130. Started on metoprolol 50 mg extended release daily. Labs in December: BMI 35, LDL is elevated at 121, ALT AST within normal limits, creatinine 0.99, hematocrit 45, electrolytes are essentially normal, TSH is 3.65 normal, iron is 53 low, vitamin D is 23 low, ferritin is normal at 96. No EKG or head imaging.     \"Janice.\"     12/22: Virtual visit via Zoom audio and video due to the COVID-19 pandemic.  Patient is accepting of and agreeable to visit.  The visit consisted of the patient and I.  Interview:  1. Chart review: No new chart developments since November 17.  2. \"Good.\"  a. Going thru some life challenges.   i.  closed due to COVID.  ii. Daughter is not sick.  iii. Switched adderall so that higher dose is in the morning. Sometimes doesn't even need the afternoon dose.  b. \"I'm handling things better than I used to.\"  3. Depression/Mood: denies  4. Anxiety: \"I feel like it's better\" \"I feel like it's under control\"  1. Uncontrolled worrying: much better  2. Severity: mild to moderate situational anxiety  3. Muscle tension  4. Fatigue has improved  5. Concentration better  6. Restlessness/feeling on edge: sometimes, situational  7. Irritability: denies  8. Insomnia:  a. Initial insomnia some nights, chronic \"my whole life\"  b. Started melatonin 10 mg nightly  9. Duration: months  10. Panic attacks: denies  5. Refills: y  6. Sleeping: n  7. Eating: stable  8. Substances: n  9. Therapy: started therapy as well.  10. Medication compliant: y  11. No SI HI AVH.      11/17: Virtual visit via " "Zoom audio and video due to the COVID-19 pandemic.  Patient is accepting of and agreeable to visit.  The visit consisted of the patient and I.  Interview:  12. Chart review: No new developments.  13. \"Doing okay.\"  a. Prozac has helped so far.  i. Anxiety and mood  b. Also done moving and sold their house.  c. More stuff at work.  14. ADHD:  a. Starting to procrastinate more.  b. Feels like she needs more at 12:30 - 1pm.   c. Takes adderall 7:30 am and 12:30 pm. Wears off around when she leaves work.   d. Feels like she is \"slipping back.\"  e. Weekend breaks  15. Depression/Mood: better mood, more energy  16. Anxiety: less worrying, less irritability, less on edge  17. Sleeping: \"fine\"  18. Eating: lost weight  19. Substances: denies  20. Therapy: interested  a. Feels females might be judging her  b. Thinks that might be due to her relp with her mom; they didn't get along growing up.  c. Mom treated her like she was never good enough.  21. Medication compliant: y  22. No SI HI AVH.      10/15: Virtual visit via Zoom audio and video due to the COVID-19 pandemic.  Patient is accepting of and agreeable to visit.  The visit consisted of the patient and I.  Interview:  23. Chart review: Seen recently by PCP and diagnosed with acute swimmer's ear on the right side.  24. \"I am ok.\"  25. Depression/Mood: denies  26. ADHD: under control  27. Stressors:  a. Sold her house  b. Now building a house; not sure where they are going to live  c. Taking care of her 19 mo old  d. Pandemic cases that were going up  28. Anxiety:  a. Irritable  b. Worrying  c. On edge  d. Obsessing about her daughter as well; constantly worried something bad is going to happen  e. Sometimes has trouble thinking about anything else but her  f. No panic attacks  29. Sleeping: yes  30. Eating: stable  31. Substances: denies  32. Therapy: not interested  33. Medication compliant: yes  34. No SI HI AVH.      8/20: Virtual visit via Zoom audio and video due to " "the COVID-19 pandemic.  Patient is accepting of and agreeable to visit.  The visit consisted of the patient and I.  Interview:  35. Seen by PCP June 23 and was doing well overall.  36. \"I'm good.\"  37. ADHD controlled.  38. Denies depression and anxiety. Some situational anxiety due to COVID-19 and her job.  39. Finally able to get around to doing projects at home.  40. Denies SI HI AVH.      6/18: In-person Interview: Patient reports some improvement on Adderall XR.  Discontinued Topamax.  No longer going to the weight loss clinic.  Notes residual depression and anxiety symptoms, but they are mild.  They are well controlled on the bupropion.  No SI HI AVH.  Denies depression, muscle tension, fatigue, restlessness. Continues to endorse poor concentration. See scores below.      IMPORTANT:  From previous note, patient was held back in the third grade. Struggled to complete high school and missed 60 days although she did graduate with good grades. Got in trouble for talking a lot in class. She was not in any special classes. She did not have an IEP.     1/6 H&P: Virtual visit via Zoom audio and video due to the COVID-19 pandemic. Patient is accepting of and agreeable to appointment. The appointment consisted of the patient and I only. Interview: Patient reports that she had her first child in March and suffered from postpartum depression. The pandemic did not help things. It was a \"hard time,\" and the patient reports she had already had anxiety before. Patient is presently bottlefeeding. She became pregnant through an infertility specialist. Presently not on any contraception.   Endorses muscle tension, fatigue, poor concentration, restlessness, irritability, and some broken sleep, although she is sleeping for about 8 hours a night. She wakes up frequently to check on her baby girl, Alexandra. Also endorses guilt at being a bad mom. Duration has been since March of last year. Patient feels her anxiety is worse than her " "depression.   Denies SI HI AVH. Has access to weapons that are locked in a gun safe. Patient does not know the combination. She also does not know how to work the guns that are in the safe. Psychiatric review of systems is positive for anxiety and depression, negative for psychosis and maribel.   Possible ADHD. Patient was diagnosed by Dr. Joe in 2017. 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 Possible family history as well as her sister may have ADHD.   Past Psychiatric History:  Began Psychiatric Treatment: Since    Dx: Anxiety   Psychiatrist: Has not seen a psychiatrist   Therapist: Saw therapist in 2018x1, unsure if it was helpful.   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     Feeling down, depressed, or " hopeless?     Trouble falling or staying asleep, or sleeping too much?     Feeling tired or having little energy?     Poor appetite or overeating?     Feeling bad about yourself - or that you are a failure or have let yourself or your family down?     Trouble concentrating on things, such as reading the newspaper or watching television?     Moving or speaking so slowly that other people could have noticed? Or the opposite - being so fidgety or restless that you have been moving around a lot more than usual?     Thoughts that you would be better off dead, or of hurting yourself in some way?     PHQ-9 Total Score       MATTHEW-7       Past Surgical History:  Past Surgical History:   Procedure Laterality Date   • DILATATION AND CURETTAGE     • TONSILLECTOMY         Problem List:  Patient Active Problem List   Diagnosis   • Acne   • Anxiety   • Attention deficit hyperactivity disorder (ADHD)   • Depression   • Generalized anxiety disorder   • Gestational hypertension   • Major depressive disorder in partial remission (HCC)   • PCOS (polycystic ovarian syndrome)   • Pre-eclampsia       Allergy:   No Known Allergies     Discontinued Medications:  Medications Discontinued During This Encounter   Medication Reason   • azithromycin (ZITHROMAX) 250 MG tablet *Therapy completed   • amphetamine-dextroamphetamine (ADDERALL) 10 MG tablet Reorder       Current Medications:   Current Outpatient Medications   Medication Sig Dispense Refill   • Aklief 0.005 % cream      • [START ON 12/25/2021] amphetamine-dextroamphetamine (ADDERALL) 10 MG tablet Take 1 tablet by mouth Daily. 30 tablet 0   • amphetamine-dextroamphetamine (ADDERALL) 20 MG tablet TAKE 1 TABLET BY MOUTH TWICE DAILY 60 tablet 0   • buPROPion XL (WELLBUTRIN XL) 300 MG 24 hr tablet TAKE 1 TABLET BY MOUTH ONCE DAILY 90 tablet 1   • hydrOXYzine (ATARAX) 25 MG tablet TAKE 1 TABLET BY MOUTH EVERY 6 HOURS AS NEEDED FOR ANXIETY 120 tablet 1   • ketoconazole (NIZORAL) 2 % shampoo  apply to SCALP THREE TIMES WEEKLY     • levocetirizine (XYZAL) 5 MG tablet Take 5 mg by mouth Every Evening.     • metoprolol succinate XL (Toprol XL) 25 MG 24 hr tablet Take 1 tablet by mouth Daily for 90 days. 90 tablet 1   • minoxidil (LONITEN) 2.5 MG tablet TAKE 1/4 TABLET BY MOUTH EVERY DAY     • Prenatal MV-Min-Fe Fum-FA-DHA (PRENATAL+DHA PO)      • spironolactone (ALDACTONE) 100 MG tablet spironolactone 100 mg oral tablet take 1 tablet (100 mg) by oral route once daily   Active     • vitamin D (ERGOCALCIFEROL) 1.25 MG (49077 UT) capsule capsule TAKE 1 CAPSULE BY MOUTH ONCE WEEKLY 13 capsule 1   • FLUoxetine (PROzac) 20 MG capsule Take 1 capsule by mouth Daily. 30 capsule 2     No current facility-administered medications for this visit.       Past Medical History:  Past Medical History:   Diagnosis Date   • Acne    • ADHD    • Anxiety    • Depression    • Generalized anxiety disorder 01/06/2021   • Major depressive disorder in partial remission (HCC) 01/06/2021   • PCOS (polycystic ovarian syndrome)    • Pre-eclampsia        Social History     Socioeconomic History   • Marital status:    Tobacco Use   • Smoking status: Never Smoker   • Smokeless tobacco: Never Used   • Tobacco comment: 2/10/2021- 1/6/2021   Vaping Use   • Vaping Use: Never used   Substance and Sexual Activity   • Alcohol use: Not Currently     Comment: light; 2/10/2021- 1/6/2021   • Drug use: Never     Comment: 2/10/2021- 1/6/2021   • Sexual activity: Defer       Family History   Problem Relation Age of Onset   • Anxiety disorder Mother    • Bipolar disorder Mother    • Depression Mother    • ADD / ADHD Sister        Mental Status Exam:   Hygiene:   good, groomed, workclothes at work  Cooperation:  Cooperative  Eye Contact:  Good  Psychomotor Behavior:  Appropriate  Affect:  Slightly anxious, congruent  Mood: some anxiety  Hopelessness: Denies  Speech:  Normal  Thought Process:  Goal directed  Thought Content:  Normal  Suicidal:   None  Homicidal:  None  Hallucinations:  None  Delusion:  None  Memory:  Intact  Orientation:  Person, Place, Time and Situation  Reliability:  good  Insight:  Fair  Judgement:  Fair  Impulse Control:  Fair  Physical/Medical Issues:  No      Review of Systems:  Review of Systems   Constitutional: Negative for diaphoresis and fatigue.   HENT: Negative for drooling.    Eyes: Negative for visual disturbance.   Respiratory: Negative for cough and shortness of breath.    Cardiovascular: Negative for chest pain, palpitations and leg swelling.   Gastrointestinal: Negative for nausea and vomiting.   Endocrine: Negative for cold intolerance and heat intolerance.   Genitourinary: Negative for difficulty urinating.   Musculoskeletal: Negative for joint swelling.   Allergic/Immunologic: Negative for immunocompromised state.   Neurological: Negative for dizziness, seizures, speech difficulty and numbness.         Physical Exam:  Physical Exam    Vital Signs:   There were no vitals taken for this visit.     Lab Results:   Office Visit on 06/23/2021   Component Date Value Ref Range Status   • Glucose 06/23/2021 80  65 - 99 mg/dL Final   • BUN 06/23/2021 10  6 - 20 mg/dL Final   • Creatinine 06/23/2021 0.85  0.57 - 1.00 mg/dL Final   • Sodium 06/23/2021 138  136 - 145 mmol/L Final   • Potassium 06/23/2021 4.4  3.5 - 5.2 mmol/L Final   • Chloride 06/23/2021 103  98 - 107 mmol/L Final   • CO2 06/23/2021 25.1  22.0 - 29.0 mmol/L Final   • Calcium 06/23/2021 9.7  8.6 - 10.5 mg/dL Final   • Total Protein 06/23/2021 7.6  6.0 - 8.5 g/dL Final   • Albumin 06/23/2021 4.90  3.50 - 5.20 g/dL Final   • ALT (SGPT) 06/23/2021 22  1 - 33 U/L Final   • AST (SGOT) 06/23/2021 38* 1 - 32 U/L Final   • Alkaline Phosphatase 06/23/2021 71  39 - 117 U/L Final   • Total Bilirubin 06/23/2021 0.3  0.0 - 1.2 mg/dL Final   • eGFR Non  Amer 06/23/2021 81  >60 mL/min/1.73 Final   • Globulin 06/23/2021 2.7  gm/dL Final   • A/G Ratio 06/23/2021 1.8  g/dL  Final   • BUN/Creatinine Ratio 06/23/2021 11.8  7.0 - 25.0 Final   • Anion Gap 06/23/2021 9.9  5.0 - 15.0 mmol/L Final   • Total Cholesterol 06/23/2021 161  0 - 200 mg/dL Final   • Triglycerides 06/23/2021 63  0 - 150 mg/dL Final   • HDL Cholesterol 06/23/2021 38* 40 - 60 mg/dL Final   • LDL Cholesterol  06/23/2021 111* 0 - 100 mg/dL Final   • VLDL Cholesterol 06/23/2021 12  5 - 40 mg/dL Final   • LDL/HDL Ratio 06/23/2021 2.91   Final   • TSH 06/23/2021 2.040  0.270 - 4.200 uIU/mL Final   • 25 Hydroxy, Vitamin D 06/23/2021 40.7  ng/ml Final   • WBC 06/23/2021 8.95  3.40 - 10.80 10*3/mm3 Final   • RBC 06/23/2021 4.83  3.77 - 5.28 10*6/mm3 Final   • Hemoglobin 06/23/2021 14.5  12.0 - 15.9 g/dL Final   • Hematocrit 06/23/2021 43.6  34.0 - 46.6 % Final   • MCV 06/23/2021 90.3  79.0 - 97.0 fL Final   • MCH 06/23/2021 30.0  26.6 - 33.0 pg Final   • MCHC 06/23/2021 33.3  31.5 - 35.7 g/dL Final   • RDW 06/23/2021 12.8  12.3 - 15.4 % Final   • RDW-SD 06/23/2021 42.9  37.0 - 54.0 fl Final   • MPV 06/23/2021 9.7  6.0 - 12.0 fL Final   • Platelets 06/23/2021 357  140 - 450 10*3/mm3 Final   • Neutrophil % 06/23/2021 62.0  42.7 - 76.0 % Final   • Lymphocyte % 06/23/2021 31.1  19.6 - 45.3 % Final   • Monocyte % 06/23/2021 4.9* 5.0 - 12.0 % Final   • Eosinophil % 06/23/2021 1.2  0.3 - 6.2 % Final   • Basophil % 06/23/2021 0.4  0.0 - 1.5 % Final   • Immature Grans % 06/23/2021 0.4  0.0 - 0.5 % Final   • Neutrophils, Absolute 06/23/2021 5.54  1.70 - 7.00 10*3/mm3 Final   • Lymphocytes, Absolute 06/23/2021 2.78  0.70 - 3.10 10*3/mm3 Final   • Monocytes, Absolute 06/23/2021 0.44  0.10 - 0.90 10*3/mm3 Final   • Eosinophils, Absolute 06/23/2021 0.11  0.00 - 0.40 10*3/mm3 Final   • Basophils, Absolute 06/23/2021 0.04  0.00 - 0.20 10*3/mm3 Final   • Immature Grans, Absolute 06/23/2021 0.04  0.00 - 0.05 10*3/mm3 Final   • nRBC 06/23/2021 0.0  0.0 - 0.2 /100 WBC Final       EKG Results:  No orders to display       Imaging Results:  No Images  in the past 120 days found..      Assessment/Plan   Diagnoses and all orders for this visit:    1. Generalized anxiety disorder (Primary)    2. Major depressive disorder in partial remission, unspecified whether recurrent (HCC)    3. ADHD (attention deficit hyperactivity disorder), inattentive type  -     amphetamine-dextroamphetamine (ADDERALL) 10 MG tablet; Take 1 tablet by mouth Daily.  Dispense: 30 tablet; Refill: 0        Presentation most consistent with major depressive disorder in partial remission, generalized anxiety disorder, and ADHD.  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  Also endorses a childhood history that is compelling.    12/22: Increase Prozac to target anxiety, increase hydroxyzine to target insomnia. 17 minutes of supportive psychotherapy with goal to strengthen defenses, promote problems solving, restore adaptive functioning and provide symptom relief. The therapeutic alliance was strengthened to encourage the patient to express their thoughts and feelings. Esteem building was enhanced through praise, reassurance, normalizing and encouragement. Coping skills were enhanced to build distress tolerance skills and emotional regulation. Patient given education on medication side effects, diagnosis/illness and relapse symptoms. Plan to continue supportive psychotherapy in next appointment to provide symptom relief.  6 weeks    11/17: Some ADHD symptoms re-emerging. Add 10 mg adderall at 11 am. Schedule will be 7:30 first dose, 11 second higher dose of 30 mg. Pt also interested in therapy. 17 minutes of supportive psychotherapy with goal to strengthen defenses, promote problems solving, restore adaptive functioning and provide symptom relief. The therapeutic alliance was strengthened to  encourage the patient to express their thoughts and feelings. Esteem building was enhanced through praise, reassurance, normalizing and encouragement. Coping skills were enhanced to build distress tolerance skills and emotional regulation. Patient given education on medication side effects, diagnosis/illness and relapse symptoms. Plan to continue supportive psychotherapy in next appointment to provide symptom relief.  4 wks.    10/15: Start prozac. Hydroxyzine at night calms her; consider adding am dose. 4 wks    : Patient is doing extremely well.  No change to medications.  See back in 8 weeks.    : Doing a little better, however patient was doing much better on Adderall 20 mg twice a day, which was her previous dose.  Discontinue Adderall XR and start Adderall immediate release 20 mg twice a day.  Some residual depressive and anxiety symptoms that are well controlled on bupropion.  Treating ADHD will also treat residual depressive and anxiety symptoms as well.  See back in 2 months.  Declines psychotherapy.    Visit Diagnoses:    ICD-10-CM ICD-9-CM   1. Generalized anxiety disorder  F41.1 300.02   2. Major depressive disorder in partial remission, unspecified whether recurrent (HCC)  F32.4 296.25   3. ADHD (attention deficit hyperactivity disorder), inattentive type  F90.0 314.00       PLAN:  41. Risk Assessment: Risk of self-harm acutely is low. Risk factors include anxiety disorder, mood disorder, access to weapons, recent psychosocial stressors. Protective factors include no family history, no present SI, no history of suicide attempts or self-harm in the past, minimal AODA, healthcare seeking, future orientation, willingness to engage in care,  baby. Risk of self-harm chronically is also low, but could be further elevated in the event of treatment noncompliance and/or AODA.  42. Safety: No acute safety concerns.  43. Medications:   a. INCREASE prozac 10 to 20 mg PO QDAY. Risks, benefits,  alternatives discussed with patient including GI upset, nausea vomiting diarrhea, theoretical decrease of seizure threshold predisposing the patient to a slightly higher seizure risk, headaches, sexual dysfunction, serotonin syndrome, bleeding risk, increased suicidality in patients 24 years and younger.  After discussion of these risks and benefits, the patient voiced understanding and agreed to proceed.  b. CONTINUE Bupropion  mg p.o. daily to target attentional issues. Risks, benefits, alternatives discussed with patient including nausea, GI upset, increased energy, exacerbation of irritability, lowering of seizure threshold. After discussion of these risks and benefits, the patient voiced understanding and agreed to proceed.   c. INCREASE hydroxyzine 50 to 100 mg PO QHS. Risks, benefits, alternatives discussed with patient including sedation, dizziness, fall risk, GI upset.  After discussion of these risks and benefits, the patient voiced understanding and agreed to proceed.  d. CONTINUE Adderall 30 mg PO QAM, 20 mg PO QPM. Risks, benefits, side effects discussed with patient including elevated heart rate, elevated blood pressure, irritability, insomnia, sexual dysfunction, appetite suppressing properties, psychosis.  After discussion of these risks and benefits, the patient voiced understanding and agreed to proceed. Controlled substances consent verbally signed. UDS and Brian ordered.  e. S/P   i. Escitalopram: sexual dysfunction  ii. Adderall XR 10 mg daily: not effective  44. Therapy: Consider in the future, presently not interested.  45. Labs/Studies: EKG reassuring.  46. Follow Up: 6 wks.      TREATMENT PLAN/GOALS: Continue supportive psychotherapy efforts and medications as indicated. Treatment and medication options discussed during today's visit. Patient acknowledged and verbally consented to continue with current treatment plan and was educated on the importance of compliance with treatment and  follow-up appointments.    MEDICATION ISSUES:  TOM reviewed as expected.  Discussed medication options and treatment plan of prescribed medication as well as the risks, benefits, and side effects including potential falls, possible impaired driving and metabolic adversities among others. Patient is agreeable to call the office with any worsening of symptoms or onset of side effects. Patient is agreeable to call 911 or go to the nearest ER should he/she begin having SI/HI. No medication side effects or related complaints today.     MEDS ORDERED DURING VISIT:  New Medications Ordered This Visit   Medications   • amphetamine-dextroamphetamine (ADDERALL) 10 MG tablet     Sig: Take 1 tablet by mouth Daily.     Dispense:  30 tablet     Refill:  0       Return in about 6 weeks (around 2/2/2022).         This document has been electronically signed by Eli Murray MD  December 22, 2021 11:43 EST

## 2021-12-22 NOTE — PATIENT INSTRUCTIONS
1.  Please return to clinic at your next scheduled visit.  Contact the clinic ([phone]) at least 24 hours prior in the event you need to cancel.  2.  Do no harm to yourself or others.    3.  Avoid alcohol and drugs.    4.  Take all medications as prescribed.  Please contact the clinic with any concerns. If you are in need of medication refills, please call the clinic at [phone].    5. Should you want to get in touch with your provider, Dr. Eli Murray, please utilize [facility] or contact the office ([phone]), and staff will be able to page Dr. Murray directly.  6.  In the event you have personal crisis, contact the following crisis numbers: Suicide Prevention Hotline 1-938.387.3479; JOSE Helpline 4-931-078-OFDT; [facility] Emergency Room [phone]; text HELLO to 428040; or 436.     SPECIFIC RECOMMENDATIONS:     1.      Medications discussed at this encounter:                   - increase prozac and hydroxyzine     2.      Psychotherapy recommendations:      3.     Return to clinic: 6 weeks

## 2022-01-11 DIAGNOSIS — F90.0 ADHD (ATTENTION DEFICIT HYPERACTIVITY DISORDER), INATTENTIVE TYPE: ICD-10-CM

## 2022-01-11 RX ORDER — DEXTROAMPHETAMINE SACCHARATE, AMPHETAMINE ASPARTATE, DEXTROAMPHETAMINE SULFATE AND AMPHETAMINE SULFATE 5; 5; 5; 5 MG/1; MG/1; MG/1; MG/1
TABLET ORAL
Qty: 60 TABLET | Refills: 0 | Status: SHIPPED | OUTPATIENT
Start: 2022-01-11 | End: 2022-02-18

## 2022-01-11 RX ORDER — DEXTROAMPHETAMINE SACCHARATE, AMPHETAMINE ASPARTATE, DEXTROAMPHETAMINE SULFATE AND AMPHETAMINE SULFATE 2.5; 2.5; 2.5; 2.5 MG/1; MG/1; MG/1; MG/1
10 TABLET ORAL DAILY
Qty: 30 TABLET | Refills: 0 | Status: SHIPPED | OUTPATIENT
Start: 2022-01-23 | End: 2022-02-23

## 2022-01-25 ENCOUNTER — TELEPHONE (OUTPATIENT)
Dept: FAMILY MEDICINE CLINIC | Facility: CLINIC | Age: 27
End: 2022-01-25

## 2022-01-25 NOTE — TELEPHONE ENCOUNTER
Attempted to reach out to pt to reschedule appt with aminta christensen because she will not be in the office after 11 am 1/26/2021 left pt a detailed voicemail explaining situation.

## 2022-02-02 ENCOUNTER — TELEMEDICINE (OUTPATIENT)
Dept: PSYCHIATRY | Facility: CLINIC | Age: 27
End: 2022-02-02

## 2022-02-02 DIAGNOSIS — F32.4 MAJOR DEPRESSIVE DISORDER IN PARTIAL REMISSION, UNSPECIFIED WHETHER RECURRENT: ICD-10-CM

## 2022-02-02 DIAGNOSIS — F51.05 INSOMNIA DUE TO MENTAL CONDITION: ICD-10-CM

## 2022-02-02 DIAGNOSIS — F41.1 GENERALIZED ANXIETY DISORDER: Primary | ICD-10-CM

## 2022-02-02 DIAGNOSIS — F90.0 ADHD (ATTENTION DEFICIT HYPERACTIVITY DISORDER), INATTENTIVE TYPE: ICD-10-CM

## 2022-02-02 PROCEDURE — 99214 OFFICE O/P EST MOD 30 MIN: CPT | Performed by: STUDENT IN AN ORGANIZED HEALTH CARE EDUCATION/TRAINING PROGRAM

## 2022-02-02 PROCEDURE — 90833 PSYTX W PT W E/M 30 MIN: CPT | Performed by: STUDENT IN AN ORGANIZED HEALTH CARE EDUCATION/TRAINING PROGRAM

## 2022-02-02 RX ORDER — HYDROXYZINE 50 MG/1
100 TABLET, FILM COATED ORAL
Qty: 120 TABLET | Refills: 1 | Status: SHIPPED | OUTPATIENT
Start: 2022-02-02 | End: 2022-04-11

## 2022-02-02 NOTE — PROGRESS NOTES
"Subjective   Janice Padilla is a 26 y.o. female who presents today for follow up    Referring Provider:  No referring provider defined for this encounter.    Chief Complaint:  adhd cornel mdd    History of Present Illness:     Janice Padilla is a 25 year old /White female referred by Balbina SEAMAN.     Chart review 1/6: Patient is on Wellbutrin, status post Zoloft. Also on Adderall. Had tachycardia at 130. Started on metoprolol 50 mg extended release daily. Labs in December: BMI 35, LDL is elevated at 121, ALT AST within normal limits, creatinine 0.99, hematocrit 45, electrolytes are essentially normal, TSH is 3.65 normal, iron is 53 low, vitamin D is 23 low, ferritin is normal at 96. No EKG or head imaging.     \"Janice.\"     2/2: Virtual visit via Zoom audio and video due to the COVID-19 pandemic.  Patient is accepting of and agreeable to visit.  The visit consisted of the patient and I. Patient is at home, I'm at work.  1. Chart review: No new developments in chart review since December 22.  2. \"I'm good mentally.\"  a. Well, depression and anxiety are well controlled. So is ADHD.  b. Sore throat 2 wks ago, now cough.  3. P0, G7.  4. Energy: stable  5. Concentration: stable  6. Sleeping: well  7. Therapy: continuing, doing some couples therapy as well.  8. Medication compliant: y  9. No SI HI AVH.      12/22: Virtual visit via Zoom audio and video due to the COVID-19 pandemic.  Patient is accepting of and agreeable to visit.  The visit consisted of the patient and I.  Interview:  10. Chart review: No new chart developments since November 17.  11. \"Good.\"  a. Going thru some life challenges.   i.  closed due to COVID.  ii. Daughter is not sick.  iii. Switched adderall so that higher dose is in the morning. Sometimes doesn't even need the afternoon dose.  b. \"I'm handling things better than I used to.\"  12. Depression/Mood: denies  13. Anxiety: \"I feel like it's better\" \"I feel like it's " "under control\"  1. Uncontrolled worrying: much better  2. Severity: mild to moderate situational anxiety  3. Muscle tension  4. Fatigue has improved  5. Concentration better  6. Restlessness/feeling on edge: sometimes, situational  7. Irritability: denies  8. Insomnia:  a. Initial insomnia some nights, chronic \"my whole life\"  b. Started melatonin 10 mg nightly  9. Duration: months  10. Panic attacks: denies  14. Refills: y  15. Sleeping: n  16. Eating: stable  17. Substances: n  18. Therapy: started therapy as well.  19. Medication compliant: y  20. No SI HI AVH.      11/17: Virtual visit via Zoom audio and video due to the COVID-19 pandemic.  Patient is accepting of and agreeable to visit.  The visit consisted of the patient and I.  Interview:  21. Chart review: No new developments.  22. \"Doing okay.\"  a. Prozac has helped so far.  i. Anxiety and mood  b. Also done moving and sold their house.  c. More stuff at work.  23. ADHD:  a. Starting to procrastinate more.  b. Feels like she needs more at 12:30 - 1pm.   c. Takes adderall 7:30 am and 12:30 pm. Wears off around when she leaves work.   d. Feels like she is \"slipping back.\"  e. Weekend breaks  24. Depression/Mood: better mood, more energy  25. Anxiety: less worrying, less irritability, less on edge  26. Sleeping: \"fine\"  27. Eating: lost weight  28. Substances: denies  29. Therapy: interested  a. Feels females might be judging her  b. Thinks that might be due to her relp with her mom; they didn't get along growing up.  c. Mom treated her like she was never good enough.  30. Medication compliant: y  31. No SI HI AVH.      10/15: Virtual visit via Zoom audio and video due to the COVID-19 pandemic.  Patient is accepting of and agreeable to visit.  The visit consisted of the patient and I.  Interview:  32. Chart review: Seen recently by PCP and diagnosed with acute swimmer's ear on the right side.  33. \"I am ok.\"  34. Depression/Mood: denies  35. ADHD: under " "control  36. Stressors:  a. Sold her house  b. Now building a house; not sure where they are going to live  c. Taking care of her 19 mo old  d. Pandemic cases that were going up  37. Anxiety:  a. Irritable  b. Worrying  c. On edge  d. Obsessing about her daughter as well; constantly worried something bad is going to happen  e. Sometimes has trouble thinking about anything else but her  f. No panic attacks  38. Sleeping: yes  39. Eating: stable  40. Substances: denies  41. Therapy: not interested  42. Medication compliant: yes  43. No SI HI AVH.      8/20: Virtual visit via Zoom audio and video due to the COVID-19 pandemic.  Patient is accepting of and agreeable to visit.  The visit consisted of the patient and I.  Interview:  44. Seen by PCP June 23 and was doing well overall.  45. \"I'm good.\"  46. ADHD controlled.  47. Denies depression and anxiety. Some situational anxiety due to COVID-19 and her job.  48. Finally able to get around to doing projects at home.  49. Denies SI HI AVH.      6/18: In-person Interview: Patient reports some improvement on Adderall XR.  Discontinued Topamax.  No longer going to the weight loss clinic.  Notes residual depression and anxiety symptoms, but they are mild.  They are well controlled on the bupropion.  No SI HI AVH.  Denies depression, muscle tension, fatigue, restlessness. Continues to endorse poor concentration. See scores below.      IMPORTANT:  From previous note, patient was held back in the third grade. Struggled to complete high school and missed 60 days although she did graduate with good grades. Got in trouble for talking a lot in class. She was not in any special classes. She did not have an IEP.     1/6 H&P: Virtual visit via Zoom audio and video due to the COVID-19 pandemic. Patient is accepting of and agreeable to appointment. The appointment consisted of the patient and I only. Interview: Patient reports that she had her first child in March and suffered from " "postpartum depression. The pandemic did not help things. It was a \"hard time,\" and the patient reports she had already had anxiety before. Patient is presently bottlefeeding. She became pregnant through an infertility specialist. Presently not on any contraception.   Endorses muscle tension, fatigue, poor concentration, restlessness, irritability, and some broken sleep, although she is sleeping for about 8 hours a night. She wakes up frequently to check on her baby girl, Alexandra. Also endorses guilt at being a bad mom. Duration has been since March of last year. Patient feels her anxiety is worse than her depression.   Denies SI HI AVH. Has access to weapons that are locked in a gun safe. Patient does not know the combination. She also does not know how to work the guns that are in the safe. Psychiatric review of systems is positive for anxiety and depression, negative for psychosis and maribel.   Possible ADHD. Patient was diagnosed by Dr. Joe in 2017. 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 Possible family history as well as her sister may have ADHD.   Past Psychiatric History:  Began Psychiatric Treatment: Since 2011   Dx: Anxiety   Psychiatrist: Has not seen a psychiatrist   Therapist: Saw therapist in 2018x1, unsure if it was helpful.   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 0   Feeling down, depressed, or hopeless? 0   Trouble falling or staying asleep, or sleeping too much?     Feeling tired or having little energy?     Poor appetite or overeating?     Feeling bad about yourself - or that you are a failure or have let yourself or your family down?     Trouble concentrating on things, such as reading the newspaper or watching television?     Moving or speaking so slowly that other people could have noticed? Or the opposite - being so fidgety or restless that you have been moving around a lot more than usual?     Thoughts that you would be better off dead, or of hurting yourself in some way?     PHQ-9 Total Score 0     MATTHEW-7  Feeling nervous, anxious or on edge: More than half the days  Not being able to stop or control worrying: Several days  Worrying too much about different things: Several days  Trouble Relaxing: More than half the days  Being so restless that it is hard to sit still: Not at all  Feeling afraid as if something awful might happen: Several days  Becoming easily annoyed or irritable: Not at all  MATTHEW 7 Total Score: 7  If you checked any problems, how difficult have these problems made it for you to do your work, take care of things at home, or get along with other people: Not difficult at all    Past Surgical History:  Past Surgical History:   Procedure Laterality Date   • DILATATION AND CURETTAGE     •  TONSILLECTOMY         Problem List:  Patient Active Problem List   Diagnosis   • Acne   • Anxiety   • Attention deficit hyperactivity disorder (ADHD)   • Depression   • Generalized anxiety disorder   • Gestational hypertension   • Major depressive disorder in partial remission (HCC)   • PCOS (polycystic ovarian syndrome)   • Pre-eclampsia       Allergy:   No Known Allergies     Discontinued Medications:  Medications Discontinued During This Encounter   Medication Reason   • hydrOXYzine (ATARAX) 25 MG tablet Reorder       Current Medications:   Current Outpatient Medications   Medication Sig Dispense Refill   • Aklief 0.005 % cream      • amphetamine-dextroamphetamine (ADDERALL) 10 MG tablet Take 1 tablet by mouth Daily. 30 tablet 0   • amphetamine-dextroamphetamine (ADDERALL) 20 MG tablet TAKE 1 TABLET BY MOUTH TWICE DAILY 60 tablet 0   • buPROPion XL (WELLBUTRIN XL) 300 MG 24 hr tablet TAKE 1 TABLET BY MOUTH ONCE DAILY 90 tablet 1   • FLUoxetine (PROzac) 20 MG capsule Take 1 capsule by mouth Daily. 30 capsule 2   • hydrOXYzine (ATARAX) 50 MG tablet Take 2 tablets by mouth every night at bedtime. for anxiety 120 tablet 1   • ketoconazole (NIZORAL) 2 % shampoo apply to SCALP THREE TIMES WEEKLY     • levocetirizine (XYZAL) 5 MG tablet Take 5 mg by mouth Every Evening.     • metoprolol succinate XL (Toprol XL) 25 MG 24 hr tablet Take 1 tablet by mouth Daily for 90 days. 90 tablet 1   • minoxidil (LONITEN) 2.5 MG tablet TAKE 1/4 TABLET BY MOUTH EVERY DAY     • Prenatal MV-Min-Fe Fum-FA-DHA (PRENATAL+DHA PO)      • spironolactone (ALDACTONE) 100 MG tablet spironolactone 100 mg oral tablet take 1 tablet (100 mg) by oral route once daily   Active     • vitamin D (ERGOCALCIFEROL) 1.25 MG (58204 UT) capsule capsule TAKE 1 CAPSULE BY MOUTH ONCE WEEKLY 13 capsule 1     No current facility-administered medications for this visit.       Past Medical History:  Past Medical History:   Diagnosis Date   • Acne    • ADHD    • Anxiety     • Depression    • Generalized anxiety disorder 01/06/2021   • Major depressive disorder in partial remission (HCC) 01/06/2021   • PCOS (polycystic ovarian syndrome)    • Pre-eclampsia        Social History     Socioeconomic History   • Marital status:    Tobacco Use   • Smoking status: Never Smoker   • Smokeless tobacco: Never Used   • Tobacco comment: 2/10/2021- 1/6/2021   Vaping Use   • Vaping Use: Never used   Substance and Sexual Activity   • Alcohol use: Not Currently     Comment: light; 2/10/2021- 1/6/2021   • Drug use: Never     Comment: 2/10/2021- 1/6/2021   • Sexual activity: Defer       Family History   Problem Relation Age of Onset   • Anxiety disorder Mother    • Bipolar disorder Mother    • Depression Mother    • ADD / ADHD Sister        Mental Status Exam:   Hygiene:   good, groomed, at home  Cooperation:  Cooperative  Eye Contact:  Good  Psychomotor Behavior:  Appropriate  Affect:  euthymic, congruent  Mood: some anxiety, mild  Hopelessness: Denies  Speech:  Normal  Thought Process:  Goal directed  Thought Content:  Normal  Suicidal:  None  Homicidal:  None  Hallucinations:  None  Delusion:  None  Memory:  Intact  Orientation:  Person, Place, Time and Situation  Reliability:  good  Insight:  Fair  Judgement:  Fair  Impulse Control:  Fair  Physical/Medical Issues:  No      Review of Systems:  Review of Systems   Constitutional: Negative for diaphoresis and fatigue.   HENT: Negative for drooling.    Eyes: Negative for visual disturbance.   Respiratory: Positive for cough. Negative for shortness of breath.    Cardiovascular: Negative for chest pain, palpitations and leg swelling.   Gastrointestinal: Negative for nausea and vomiting.   Endocrine: Negative for cold intolerance and heat intolerance.   Genitourinary: Negative for difficulty urinating.   Musculoskeletal: Negative for joint swelling.   Allergic/Immunologic: Negative for immunocompromised state.   Neurological: Negative for dizziness,  seizures, speech difficulty and numbness.         Physical Exam:  Physical Exam    Vital Signs:   There were no vitals taken for this visit.     Lab Results:   No visits with results within 6 Month(s) from this visit.   Latest known visit with results is:   Office Visit on 06/23/2021   Component Date Value Ref Range Status   • Glucose 06/23/2021 80  65 - 99 mg/dL Final   • BUN 06/23/2021 10  6 - 20 mg/dL Final   • Creatinine 06/23/2021 0.85  0.57 - 1.00 mg/dL Final   • Sodium 06/23/2021 138  136 - 145 mmol/L Final   • Potassium 06/23/2021 4.4  3.5 - 5.2 mmol/L Final   • Chloride 06/23/2021 103  98 - 107 mmol/L Final   • CO2 06/23/2021 25.1  22.0 - 29.0 mmol/L Final   • Calcium 06/23/2021 9.7  8.6 - 10.5 mg/dL Final   • Total Protein 06/23/2021 7.6  6.0 - 8.5 g/dL Final   • Albumin 06/23/2021 4.90  3.50 - 5.20 g/dL Final   • ALT (SGPT) 06/23/2021 22  1 - 33 U/L Final   • AST (SGOT) 06/23/2021 38* 1 - 32 U/L Final   • Alkaline Phosphatase 06/23/2021 71  39 - 117 U/L Final   • Total Bilirubin 06/23/2021 0.3  0.0 - 1.2 mg/dL Final   • eGFR Non  Amer 06/23/2021 81  >60 mL/min/1.73 Final   • Globulin 06/23/2021 2.7  gm/dL Final   • A/G Ratio 06/23/2021 1.8  g/dL Final   • BUN/Creatinine Ratio 06/23/2021 11.8  7.0 - 25.0 Final   • Anion Gap 06/23/2021 9.9  5.0 - 15.0 mmol/L Final   • Total Cholesterol 06/23/2021 161  0 - 200 mg/dL Final   • Triglycerides 06/23/2021 63  0 - 150 mg/dL Final   • HDL Cholesterol 06/23/2021 38* 40 - 60 mg/dL Final   • LDL Cholesterol  06/23/2021 111* 0 - 100 mg/dL Final   • VLDL Cholesterol 06/23/2021 12  5 - 40 mg/dL Final   • LDL/HDL Ratio 06/23/2021 2.91   Final   • TSH 06/23/2021 2.040  0.270 - 4.200 uIU/mL Final   • 25 Hydroxy, Vitamin D 06/23/2021 40.7  ng/ml Final   • WBC 06/23/2021 8.95  3.40 - 10.80 10*3/mm3 Final   • RBC 06/23/2021 4.83  3.77 - 5.28 10*6/mm3 Final   • Hemoglobin 06/23/2021 14.5  12.0 - 15.9 g/dL Final   • Hematocrit 06/23/2021 43.6  34.0 - 46.6 % Final   • MCV  06/23/2021 90.3  79.0 - 97.0 fL Final   • MCH 06/23/2021 30.0  26.6 - 33.0 pg Final   • MCHC 06/23/2021 33.3  31.5 - 35.7 g/dL Final   • RDW 06/23/2021 12.8  12.3 - 15.4 % Final   • RDW-SD 06/23/2021 42.9  37.0 - 54.0 fl Final   • MPV 06/23/2021 9.7  6.0 - 12.0 fL Final   • Platelets 06/23/2021 357  140 - 450 10*3/mm3 Final   • Neutrophil % 06/23/2021 62.0  42.7 - 76.0 % Final   • Lymphocyte % 06/23/2021 31.1  19.6 - 45.3 % Final   • Monocyte % 06/23/2021 4.9* 5.0 - 12.0 % Final   • Eosinophil % 06/23/2021 1.2  0.3 - 6.2 % Final   • Basophil % 06/23/2021 0.4  0.0 - 1.5 % Final   • Immature Grans % 06/23/2021 0.4  0.0 - 0.5 % Final   • Neutrophils, Absolute 06/23/2021 5.54  1.70 - 7.00 10*3/mm3 Final   • Lymphocytes, Absolute 06/23/2021 2.78  0.70 - 3.10 10*3/mm3 Final   • Monocytes, Absolute 06/23/2021 0.44  0.10 - 0.90 10*3/mm3 Final   • Eosinophils, Absolute 06/23/2021 0.11  0.00 - 0.40 10*3/mm3 Final   • Basophils, Absolute 06/23/2021 0.04  0.00 - 0.20 10*3/mm3 Final   • Immature Grans, Absolute 06/23/2021 0.04  0.00 - 0.05 10*3/mm3 Final   • nRBC 06/23/2021 0.0  0.0 - 0.2 /100 WBC Final       EKG Results:  No orders to display       Imaging Results:  No Images in the past 120 days found..      Assessment/Plan   Diagnoses and all orders for this visit:    1. Generalized anxiety disorder (Primary)  -     hydrOXYzine (ATARAX) 50 MG tablet; Take 2 tablets by mouth every night at bedtime. for anxiety  Dispense: 120 tablet; Refill: 1    2. ADHD (attention deficit hyperactivity disorder), inattentive type  -     hydrOXYzine (ATARAX) 50 MG tablet; Take 2 tablets by mouth every night at bedtime. for anxiety  Dispense: 120 tablet; Refill: 1    3. Major depressive disorder in partial remission, unspecified whether recurrent (HCC)    4. Insomnia due to mental condition        Presentation most consistent with major depressive disorder in partial remission, generalized anxiety disorder, and ADHD.  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  Also endorses a childhood history that is compelling.    2/2: Anxious about the weather, and its consequences (closed daycares, etc.). 18 minutes of supportive psychotherapy with goal to strengthen defenses, promote problems solving, restore adaptive functioning and provide symptom relief. The therapeutic alliance was strengthened to encourage the patient to express their thoughts and feelings. Esteem building was enhanced through praise, reassurance, normalizing and encouragement. Coping skills were enhanced to build distress tolerance skills and emotional regulation. Patient given education on medication side effects, diagnosis/illness and relapse symptoms. Plan to continue supportive psychotherapy in next appointment to provide symptom relief.  6 wks    12/22: Increase Prozac to target anxiety, increase hydroxyzine to target insomnia. 17 minutes of supportive psychotherapy with goal to strengthen defenses, promote problems solving, restore adaptive functioning and provide symptom relief. The therapeutic alliance was strengthened to encourage the patient to express their thoughts and feelings. Esteem building was enhanced through praise, reassurance, normalizing and encouragement. Coping skills were enhanced to build distress tolerance skills and emotional regulation. Patient given education on medication side effects, diagnosis/illness and relapse symptoms. Plan to continue supportive psychotherapy in next appointment to provide symptom relief.  6 weeks    11/17: Some ADHD symptoms re-emerging. Add 10 mg adderall at 11 am. Schedule will be 7:30 first dose, 11 second higher dose of 30 mg. Pt also interested in therapy. 17 minutes of supportive psychotherapy with goal to strengthen defenses,  promote problems solving, restore adaptive functioning and provide symptom relief. The therapeutic alliance was strengthened to encourage the patient to express their thoughts and feelings. Esteem building was enhanced through praise, reassurance, normalizing and encouragement. Coping skills were enhanced to build distress tolerance skills and emotional regulation. Patient given education on medication side effects, diagnosis/illness and relapse symptoms. Plan to continue supportive psychotherapy in next appointment to provide symptom relief.  4 wks.    10/15: Start prozac. Hydroxyzine at night calms her; consider adding am dose. 4 wks    : Patient is doing extremely well.  No change to medications.  See back in 8 weeks.    : Doing a little better, however patient was doing much better on Adderall 20 mg twice a day, which was her previous dose.  Discontinue Adderall XR and start Adderall immediate release 20 mg twice a day.  Some residual depressive and anxiety symptoms that are well controlled on bupropion.  Treating ADHD will also treat residual depressive and anxiety symptoms as well.  See back in 2 months.  Declines psychotherapy.    Visit Diagnoses:    ICD-10-CM ICD-9-CM   1. Generalized anxiety disorder  F41.1 300.02   2. ADHD (attention deficit hyperactivity disorder), inattentive type  F90.0 314.00   3. Major depressive disorder in partial remission, unspecified whether recurrent (HCC)  F32.4 296.25   4. Insomnia due to mental condition  F51.05 300.9     327.02       PLAN:  50. Risk Assessment: Risk of self-harm acutely is low. Risk factors include anxiety disorder, mood disorder, access to weapons, recent psychosocial stressors. Protective factors include no family history, no present SI, no history of suicide attempts or self-harm in the past, minimal AODA, healthcare seeking, future orientation, willingness to engage in care,  baby. Risk of self-harm chronically is also low, but could be  further elevated in the event of treatment noncompliance and/or AODA.  51. Safety: No acute safety concerns.  52. Medications:   a. CONTINUE prozac 10 to 20 mg PO QDAY. Risks, benefits, alternatives discussed with patient including GI upset, nausea vomiting diarrhea, theoretical decrease of seizure threshold predisposing the patient to a slightly higher seizure risk, headaches, sexual dysfunction, serotonin syndrome, bleeding risk, increased suicidality in patients 24 years and younger.  After discussion of these risks and benefits, the patient voiced understanding and agreed to proceed.  b. CONTINUE Bupropion  mg p.o. daily to target attentional issues. Risks, benefits, alternatives discussed with patient including nausea, GI upset, increased energy, exacerbation of irritability, lowering of seizure threshold. After discussion of these risks and benefits, the patient voiced understanding and agreed to proceed.   c. CONTINUE hydroxyzine 100 mg PO QHS. Risks, benefits, alternatives discussed with patient including sedation, dizziness, fall risk, GI upset.  After discussion of these risks and benefits, the patient voiced understanding and agreed to proceed.  d. CONTINUE Adderall 30 mg PO QAM, 20 mg PO QPM. Risks, benefits, side effects discussed with patient including elevated heart rate, elevated blood pressure, irritability, insomnia, sexual dysfunction, appetite suppressing properties, psychosis.  After discussion of these risks and benefits, the patient voiced understanding and agreed to proceed. Controlled substances consent verbally signed. UDS and Brian ordered.  e. S/P   i. Escitalopram: sexual dysfunction  ii. Adderall XR 10 mg daily: not effective  53. Therapy: Consider in the future, presently not interested.  54. Labs/Studies: EKG reassuring.  55. Follow Up: 6 wks.      TREATMENT PLAN/GOALS: Continue supportive psychotherapy efforts and medications as indicated. Treatment and medication options  discussed during today's visit. Patient acknowledged and verbally consented to continue with current treatment plan and was educated on the importance of compliance with treatment and follow-up appointments.    MEDICATION ISSUES:  TOM reviewed as expected.  Discussed medication options and treatment plan of prescribed medication as well as the risks, benefits, and side effects including potential falls, possible impaired driving and metabolic adversities among others. Patient is agreeable to call the office with any worsening of symptoms or onset of side effects. Patient is agreeable to call 911 or go to the nearest ER should he/she begin having SI/HI. No medication side effects or related complaints today.     MEDS ORDERED DURING VISIT:  New Medications Ordered This Visit   Medications   • hydrOXYzine (ATARAX) 50 MG tablet     Sig: Take 2 tablets by mouth every night at bedtime. for anxiety     Dispense:  120 tablet     Refill:  1     This prescription was filled on 12/17/2021. Any refills authorized will be placed on file.       Return in about 6 weeks (around 3/16/2022).         This document has been electronically signed by Eli Murray MD  February 2, 2022 11:19 EST

## 2022-02-02 NOTE — PATIENT INSTRUCTIONS
1.  Please return to clinic at your next scheduled visit.  Contact the clinic ([phone]) at least 24 hours prior in the event you need to cancel.  2.  Do no harm to yourself or others.    3.  Avoid alcohol and drugs.    4.  Take all medications as prescribed.  Please contact the clinic with any concerns. If you are in need of medication refills, please call the clinic at [phone].    5. Should you want to get in touch with your provider, Dr. Eli Murray, please utilize [employer] or contact the office ([phone]), and staff will be able to page Dr. Murray directly.  6.  In the event you have personal crisis, contact the following crisis numbers: Suicide Prevention Hotline 1-958.974.1950; [facility] Helpline 9-090-987-LEGC; [facility] Emergency Room [phone]; text HELLO to 452589; or 482.     SPECIFIC RECOMMENDATIONS:     1.      Medications discussed at this encounter:                   - no changes     2.      Psychotherapy recommendations:      3.     Return to clinic: 6 weeks

## 2022-02-09 DIAGNOSIS — F32.4 MAJOR DEPRESSIVE DISORDER IN PARTIAL REMISSION, UNSPECIFIED WHETHER RECURRENT: ICD-10-CM

## 2022-02-09 DIAGNOSIS — F41.1 GENERALIZED ANXIETY DISORDER: ICD-10-CM

## 2022-02-09 RX ORDER — BUPROPION HYDROCHLORIDE 300 MG/1
TABLET ORAL
Qty: 90 TABLET | Refills: 1 | OUTPATIENT
Start: 2022-02-09

## 2022-02-16 ENCOUNTER — OFFICE VISIT (OUTPATIENT)
Dept: FAMILY MEDICINE CLINIC | Facility: CLINIC | Age: 27
End: 2022-02-16

## 2022-02-16 VITALS
TEMPERATURE: 97.8 F | OXYGEN SATURATION: 98 % | DIASTOLIC BLOOD PRESSURE: 74 MMHG | BODY MASS INDEX: 32.47 KG/M2 | WEIGHT: 172 LBS | HEIGHT: 61 IN | SYSTOLIC BLOOD PRESSURE: 118 MMHG | HEART RATE: 101 BPM

## 2022-02-16 DIAGNOSIS — E55.9 VITAMIN D DEFICIENCY: ICD-10-CM

## 2022-02-16 DIAGNOSIS — M79.642 BILATERAL HAND PAIN: ICD-10-CM

## 2022-02-16 DIAGNOSIS — Z09 FOLLOW-UP EXAM, 7 MONTHS TO 1 YEAR SINCE PREVIOUS EXAM: Primary | ICD-10-CM

## 2022-02-16 DIAGNOSIS — D50.9 IRON DEFICIENCY ANEMIA, UNSPECIFIED IRON DEFICIENCY ANEMIA TYPE: ICD-10-CM

## 2022-02-16 DIAGNOSIS — R25.2 HAND CRAMP: ICD-10-CM

## 2022-02-16 DIAGNOSIS — M79.641 BILATERAL HAND PAIN: ICD-10-CM

## 2022-02-16 LAB
25(OH)D3 SERPL-MCNC: 35.9 NG/ML
BASOPHILS # BLD AUTO: 0.03 10*3/MM3 (ref 0–0.2)
BASOPHILS NFR BLD AUTO: 0.3 % (ref 0–1.5)
DEPRECATED RDW RBC AUTO: 37.3 FL (ref 37–54)
EOSINOPHIL # BLD AUTO: 0.1 10*3/MM3 (ref 0–0.4)
EOSINOPHIL NFR BLD AUTO: 1.1 % (ref 0.3–6.2)
ERYTHROCYTE [DISTWIDTH] IN BLOOD BY AUTOMATED COUNT: 12.1 % (ref 12.3–15.4)
FOLATE SERPL-MCNC: >20 NG/ML (ref 4.78–24.2)
HCT VFR BLD AUTO: 45.1 % (ref 34–46.6)
HGB BLD-MCNC: 15.4 G/DL (ref 12–15.9)
IMM GRANULOCYTES # BLD AUTO: 0.04 10*3/MM3 (ref 0–0.05)
IMM GRANULOCYTES NFR BLD AUTO: 0.4 % (ref 0–0.5)
LYMPHOCYTES # BLD AUTO: 3.41 10*3/MM3 (ref 0.7–3.1)
LYMPHOCYTES NFR BLD AUTO: 36.2 % (ref 19.6–45.3)
MCH RBC QN AUTO: 29.5 PG (ref 26.6–33)
MCHC RBC AUTO-ENTMCNC: 34.1 G/DL (ref 31.5–35.7)
MCV RBC AUTO: 86.4 FL (ref 79–97)
MONOCYTES # BLD AUTO: 0.49 10*3/MM3 (ref 0.1–0.9)
MONOCYTES NFR BLD AUTO: 5.2 % (ref 5–12)
NEUTROPHILS NFR BLD AUTO: 5.36 10*3/MM3 (ref 1.7–7)
NEUTROPHILS NFR BLD AUTO: 56.8 % (ref 42.7–76)
NRBC BLD AUTO-RTO: 0 /100 WBC (ref 0–0.2)
PLATELET # BLD AUTO: 329 10*3/MM3 (ref 140–450)
PMV BLD AUTO: 8.8 FL (ref 6–12)
RBC # BLD AUTO: 5.22 10*6/MM3 (ref 3.77–5.28)
VIT B12 BLD-MCNC: 1225 PG/ML (ref 211–946)
WBC NRBC COR # BLD: 9.43 10*3/MM3 (ref 3.4–10.8)

## 2022-02-16 PROCEDURE — 80050 GENERAL HEALTH PANEL: CPT | Performed by: NURSE PRACTITIONER

## 2022-02-16 PROCEDURE — 82607 VITAMIN B-12: CPT | Performed by: NURSE PRACTITIONER

## 2022-02-16 PROCEDURE — 82306 VITAMIN D 25 HYDROXY: CPT | Performed by: NURSE PRACTITIONER

## 2022-02-16 PROCEDURE — 83735 ASSAY OF MAGNESIUM: CPT | Performed by: NURSE PRACTITIONER

## 2022-02-16 PROCEDURE — 82746 ASSAY OF FOLIC ACID SERUM: CPT | Performed by: NURSE PRACTITIONER

## 2022-02-16 PROCEDURE — 80061 LIPID PANEL: CPT | Performed by: NURSE PRACTITIONER

## 2022-02-16 PROCEDURE — 82728 ASSAY OF FERRITIN: CPT | Performed by: NURSE PRACTITIONER

## 2022-02-16 PROCEDURE — 83540 ASSAY OF IRON: CPT | Performed by: NURSE PRACTITIONER

## 2022-02-16 PROCEDURE — 99214 OFFICE O/P EST MOD 30 MIN: CPT | Performed by: NURSE PRACTITIONER

## 2022-02-16 PROCEDURE — 84466 ASSAY OF TRANSFERRIN: CPT | Performed by: NURSE PRACTITIONER

## 2022-02-16 RX ORDER — ERGOCALCIFEROL 1.25 MG/1
50000 CAPSULE ORAL WEEKLY
Qty: 13 CAPSULE | Refills: 1 | Status: SHIPPED | OUTPATIENT
Start: 2022-02-16 | End: 2022-09-19

## 2022-02-16 RX ORDER — METOPROLOL SUCCINATE 25 MG/1
25 TABLET, EXTENDED RELEASE ORAL DAILY
Qty: 90 TABLET | Refills: 1 | Status: SHIPPED | OUTPATIENT
Start: 2022-02-16 | End: 2023-03-17

## 2022-02-16 NOTE — PROGRESS NOTES
"Chief Complaint  Follow-up and Vitamin D Deficiency    Subjective          Medical History: has a past medical history of Acne, ADHD, Anxiety, Depression, Generalized anxiety disorder (01/06/2021), History of medical problems (02/28/2019), Major depressive disorder in partial remission (HCC) (01/06/2021), PCOS (polycystic ovarian syndrome), and Pre-eclampsia.     Surgical History: has a past surgical history that includes Tonsillectomy; Dilation and curettage of uterus; and Adenoidectomy.     Family History: family history includes ADD / ADHD in her sister; Anxiety disorder in her mother; Bipolar disorder in her mother; Depression in her mother and sister.     Social History: reports that she has never smoked. She has never used smokeless tobacco. She reports current alcohol use. She reports that she does not use drugs.    Janice Padilla presents to [facility]  This is a chronic problem.  Current episode has been for longer than 6 months.  Problem has gradually been improving since she has been on vitamin D supplements.  She denies any excessive fatigue, hair loss, skin changes due to the vitamin D deficiency.  She tries to eat a well-balanced diet.  She has been on the vitamin D weekly, no compliance problems.  Condition is stable.      Hand Pain   There was no injury mechanism. The pain is present in the left hand and right hand. The quality of the pain is described as shooting and stabbing. The pain is severe. The pain has been intermittent since the incident. Associated symptoms include muscle weakness. Pertinent negatives include no numbness or tingling. Exacerbated by: Hand washing dishes. She has tried nothing for the symptoms. The treatment provided no relief.       Objective   Vital Signs:   /74   Pulse 101   Temp 97.8 °F (36.6 °C)   Ht 154.9 cm (61\")   Wt 78 kg (172 lb)   SpO2 98%   BMI 32.50 kg/m²     Physical Exam  Vitals reviewed.   Constitutional:       " Appearance: Normal appearance. She is well-developed.   HENT:      Head: Normocephalic and atraumatic.   Eyes:      Conjunctiva/sclera: Conjunctivae normal.      Pupils: Pupils are equal, round, and reactive to light.   Cardiovascular:      Rate and Rhythm: Normal rate and regular rhythm.      Heart sounds: No murmur heard.  No friction rub.   Pulmonary:      Effort: Pulmonary effort is normal.      Breath sounds: Normal breath sounds. No wheezing or rhonchi.   Musculoskeletal:      Right hand: Tenderness present. No bony tenderness. Normal range of motion.      Left hand: Tenderness present. No bony tenderness. Normal range of motion.   Skin:     General: Skin is warm and dry.   Neurological:      Mental Status: She is alert and oriented to person, place, and time.   Psychiatric:         Mood and Affect: Mood and affect normal.         Behavior: Behavior normal.         Thought Content: Thought content normal.         Judgment: Judgment normal.        Result Review :   The following data was reviewed by: JURGEN Faustin on 02/16/2022:  Common labs    Common Labsle 6/23/21 6/23/21 6/23/21    0818 0818 0818   Glucose  80    BUN  10    Creatinine  0.85    eGFR Non African Am  81    Sodium  138    Potassium  4.4    Chloride  103    Calcium  9.7    Albumin  4.90    Total Bilirubin  0.3    Alkaline Phosphatase  71    AST (SGOT)  38 (A)    ALT (SGPT)  22    WBC 8.95     Hemoglobin 14.5     Hematocrit 43.6     Platelets 357     Total Cholesterol   161   Triglycerides   63   HDL Cholesterol   38 (A)   LDL Cholesterol    111 (A)   (A) Abnormal value            Data reviewed: previous labs            Current Outpatient Medications on File Prior to Visit   Medication Sig Dispense Refill   • Aklief 0.005 % cream      • amphetamine-dextroamphetamine (ADDERALL) 10 MG tablet Take 1 tablet by mouth Daily. 30 tablet 0   • amphetamine-dextroamphetamine (ADDERALL) 20 MG tablet TAKE 1 TABLET BY MOUTH TWICE DAILY 60 tablet 0    • buPROPion XL (WELLBUTRIN XL) 300 MG 24 hr tablet TAKE 1 TABLET BY MOUTH ONCE DAILY 90 tablet 1   • FLUoxetine (PROzac) 20 MG capsule Take 1 capsule by mouth Daily. 30 capsule 2   • hydrOXYzine (ATARAX) 50 MG tablet Take 2 tablets by mouth every night at bedtime. for anxiety 120 tablet 1   • ketoconazole (NIZORAL) 2 % shampoo apply to SCALP THREE TIMES WEEKLY     • levocetirizine (XYZAL) 5 MG tablet Take 5 mg by mouth Every Evening.     • minoxidil (LONITEN) 2.5 MG tablet TAKE 1/4 TABLET BY MOUTH EVERY DAY     • Prenatal MV-Min-Fe Fum-FA-DHA (PRENATAL+DHA PO)      • spironolactone (ALDACTONE) 100 MG tablet spironolactone 100 mg oral tablet take 1 tablet (100 mg) by oral route once daily   Active     • [DISCONTINUED] vitamin D (ERGOCALCIFEROL) 1.25 MG (78530 UT) capsule capsule TAKE 1 CAPSULE BY MOUTH ONCE WEEKLY 13 capsule 1   • [DISCONTINUED] metoprolol succinate XL (Toprol XL) 25 MG 24 hr tablet Take 1 tablet by mouth Daily for 90 days. 90 tablet 1     No current facility-administered medications on file prior to visit.        Assessment and Plan    Diagnoses and all orders for this visit:    1. Follow-up exam, 7 months to 1 year since previous exam (Primary)  -     CBC & Differential  -     Comprehensive Metabolic Panel  -     Lipid Panel  -     TSH    2. Vitamin D deficiency    3. Iron deficiency anemia, unspecified iron deficiency anemia type  Comments:  We will recheck labs, adjust medication if needed  Orders:  -     Vitamin D 25 Hydroxy  -     Iron Profile  -     Ferritin  -     Vitamin B12 & Folate    4. Bilateral hand pain  Comments:  Could be related to cramps discussed NSAIDs prior to washing dishes, magnesium supplement at bedtime and will check labs.  Patient is agreeable.    5. Hand cramp  -     Magnesium    Other orders  -     vitamin D (ERGOCALCIFEROL) 1.25 MG (53150 UT) capsule capsule; Take 1 capsule by mouth 1 (One) Time Per Week.  Dispense: 13 capsule; Refill: 1  -     metoprolol succinate XL  (Toprol XL) 25 MG 24 hr tablet; Take 1 tablet by mouth Daily for 90 days.  Dispense: 90 tablet; Refill: 1        Follow Up   Return in about 6 months (around 8/16/2022) for Recheck.  Patient was given instructions and counseling regarding her condition or for health maintenance advice. Please see specific information pulled into the AVS if appropriate.

## 2022-02-17 LAB
ALBUMIN SERPL-MCNC: 5.3 G/DL (ref 3.5–5.2)
ALBUMIN/GLOB SERPL: 1.8 G/DL
ALP SERPL-CCNC: 60 U/L (ref 39–117)
ALT SERPL W P-5'-P-CCNC: 26 U/L (ref 1–33)
ANION GAP SERPL CALCULATED.3IONS-SCNC: 11 MMOL/L (ref 5–15)
AST SERPL-CCNC: 25 U/L (ref 1–32)
BILIRUB SERPL-MCNC: 0.5 MG/DL (ref 0–1.2)
BUN SERPL-MCNC: 12 MG/DL (ref 6–20)
BUN/CREAT SERPL: 13 (ref 7–25)
CALCIUM SPEC-SCNC: 9.9 MG/DL (ref 8.6–10.5)
CHLORIDE SERPL-SCNC: 100 MMOL/L (ref 98–107)
CHOLEST SERPL-MCNC: 207 MG/DL (ref 0–200)
CO2 SERPL-SCNC: 25 MMOL/L (ref 22–29)
CREAT SERPL-MCNC: 0.92 MG/DL (ref 0.57–1)
FERRITIN SERPL-MCNC: 169 NG/ML (ref 13–150)
GFR SERPL CREATININE-BSD FRML MDRD: 73 ML/MIN/1.73
GLOBULIN UR ELPH-MCNC: 2.9 GM/DL
GLUCOSE SERPL-MCNC: 85 MG/DL (ref 65–99)
HDLC SERPL-MCNC: 55 MG/DL (ref 40–60)
IRON 24H UR-MRATE: 167 MCG/DL (ref 37–145)
IRON SATN MFR SERPL: 39 % (ref 20–50)
LDLC SERPL CALC-MCNC: 123 MG/DL (ref 0–100)
LDLC/HDLC SERPL: 2.17 {RATIO}
MAGNESIUM SERPL-MCNC: 2.3 MG/DL (ref 1.6–2.6)
POTASSIUM SERPL-SCNC: 4 MMOL/L (ref 3.5–5.2)
PROT SERPL-MCNC: 8.2 G/DL (ref 6–8.5)
SODIUM SERPL-SCNC: 136 MMOL/L (ref 136–145)
TIBC SERPL-MCNC: 426 MCG/DL (ref 298–536)
TRANSFERRIN SERPL-MCNC: 286 MG/DL (ref 200–360)
TRIGL SERPL-MCNC: 163 MG/DL (ref 0–150)
TSH SERPL DL<=0.05 MIU/L-ACNC: 2.52 UIU/ML (ref 0.27–4.2)
VLDLC SERPL-MCNC: 29 MG/DL (ref 5–40)

## 2022-02-18 DIAGNOSIS — F90.0 ADHD (ATTENTION DEFICIT HYPERACTIVITY DISORDER), INATTENTIVE TYPE: ICD-10-CM

## 2022-02-18 RX ORDER — DEXTROAMPHETAMINE SACCHARATE, AMPHETAMINE ASPARTATE, DEXTROAMPHETAMINE SULFATE AND AMPHETAMINE SULFATE 5; 5; 5; 5 MG/1; MG/1; MG/1; MG/1
1 TABLET ORAL 2 TIMES DAILY
Qty: 60 TABLET | Refills: 0 | Status: SHIPPED | OUTPATIENT
Start: 2022-02-23 | End: 2022-03-16 | Stop reason: SDUPTHER

## 2022-02-18 RX ORDER — DEXTROAMPHETAMINE SACCHARATE, AMPHETAMINE ASPARTATE, DEXTROAMPHETAMINE SULFATE AND AMPHETAMINE SULFATE 5; 5; 5; 5 MG/1; MG/1; MG/1; MG/1
1 TABLET ORAL 2 TIMES DAILY
Qty: 60 TABLET | Refills: 0 | Status: SHIPPED | OUTPATIENT
Start: 2022-02-23 | End: 2022-02-18 | Stop reason: SDUPTHER

## 2022-02-23 DIAGNOSIS — F90.0 ADHD (ATTENTION DEFICIT HYPERACTIVITY DISORDER), INATTENTIVE TYPE: ICD-10-CM

## 2022-02-23 DIAGNOSIS — F41.1 GENERALIZED ANXIETY DISORDER: ICD-10-CM

## 2022-02-23 DIAGNOSIS — F32.4 MAJOR DEPRESSIVE DISORDER IN PARTIAL REMISSION, UNSPECIFIED WHETHER RECURRENT: ICD-10-CM

## 2022-02-23 RX ORDER — FLUOXETINE HYDROCHLORIDE 20 MG/1
CAPSULE ORAL
Qty: 30 CAPSULE | Refills: 2 | Status: SHIPPED | OUTPATIENT
Start: 2022-02-23 | End: 2022-05-04 | Stop reason: SDUPTHER

## 2022-02-23 RX ORDER — DEXTROAMPHETAMINE SACCHARATE, AMPHETAMINE ASPARTATE, DEXTROAMPHETAMINE SULFATE AND AMPHETAMINE SULFATE 2.5; 2.5; 2.5; 2.5 MG/1; MG/1; MG/1; MG/1
TABLET ORAL
Qty: 30 TABLET | Refills: 0 | Status: SHIPPED | OUTPATIENT
Start: 2022-02-23 | End: 2022-03-16 | Stop reason: SDUPTHER

## 2022-03-16 ENCOUNTER — TELEMEDICINE (OUTPATIENT)
Dept: PSYCHIATRY | Facility: CLINIC | Age: 27
End: 2022-03-16

## 2022-03-16 DIAGNOSIS — F32.4 MAJOR DEPRESSIVE DISORDER IN PARTIAL REMISSION, UNSPECIFIED WHETHER RECURRENT: ICD-10-CM

## 2022-03-16 DIAGNOSIS — F41.1 GENERALIZED ANXIETY DISORDER: ICD-10-CM

## 2022-03-16 DIAGNOSIS — F51.05 INSOMNIA DUE TO MENTAL CONDITION: ICD-10-CM

## 2022-03-16 DIAGNOSIS — F90.0 ADHD (ATTENTION DEFICIT HYPERACTIVITY DISORDER), INATTENTIVE TYPE: Primary | ICD-10-CM

## 2022-03-16 PROCEDURE — 99214 OFFICE O/P EST MOD 30 MIN: CPT | Performed by: STUDENT IN AN ORGANIZED HEALTH CARE EDUCATION/TRAINING PROGRAM

## 2022-03-16 PROCEDURE — 90833 PSYTX W PT W E/M 30 MIN: CPT | Performed by: STUDENT IN AN ORGANIZED HEALTH CARE EDUCATION/TRAINING PROGRAM

## 2022-03-16 RX ORDER — IVERMECTIN 10 MG/G
CREAM TOPICAL
COMMUNITY
Start: 2022-03-04 | End: 2023-03-17

## 2022-03-16 RX ORDER — DEXTROAMPHETAMINE SACCHARATE, AMPHETAMINE ASPARTATE, DEXTROAMPHETAMINE SULFATE AND AMPHETAMINE SULFATE 5; 5; 5; 5 MG/1; MG/1; MG/1; MG/1
1 TABLET ORAL 2 TIMES DAILY
Qty: 60 TABLET | Refills: 0 | Status: SHIPPED | OUTPATIENT
Start: 2022-03-25 | End: 2022-04-27

## 2022-03-16 RX ORDER — DEXTROAMPHETAMINE SACCHARATE, AMPHETAMINE ASPARTATE, DEXTROAMPHETAMINE SULFATE AND AMPHETAMINE SULFATE 2.5; 2.5; 2.5; 2.5 MG/1; MG/1; MG/1; MG/1
1 TABLET ORAL DAILY
Qty: 30 TABLET | Refills: 0 | Status: SHIPPED | OUTPATIENT
Start: 2022-03-25 | End: 2022-04-27

## 2022-03-16 NOTE — PATIENT INSTRUCTIONS
1.  Please return to clinic at your next scheduled visit.  Contact the clinic ([phone]) at least 24 hours prior in the event you need to cancel.  2.  Do no harm to yourself or others.    3.  Avoid alcohol and drugs.    4.  Take all medications as prescribed.  Please contact the clinic with any concerns. If you are in need of medication refills, please call the clinic at [phone].    5. Should you want to get in touch with your provider, Dr. Eli Murray, please utilize [employer] or contact the office ([phone]), and staff will be able to page Dr. Murray directly.  6.  In the event you have personal crisis, contact the following crisis numbers: Suicide Prevention Hotline 1-186.864.1921; [facility] Helpline 2-358-968-WMZU; [facility] Emergency Room [phone]; text HELLO to 001244; or 324.     SPECIFIC RECOMMENDATIONS:     1.      Medications discussed at this encounter:                   -No changes     2.      Psychotherapy recommendations:      3.     Return to clinic: 6  weeks       
No

## 2022-03-16 NOTE — PROGRESS NOTES
"Subjective   Janice Padilla is a 26 y.o. female who presents today for follow up    Referring Provider:  No referring provider defined for this encounter.    Chief Complaint:  adhd cornel mdd    History of Present Illness:     Janice Padilla is a 25 year old /White female referred by Balbina SEAMAN.     Chart review 1/6: Patient is on Wellbutrin, status post Zoloft. Also on Adderall. Had tachycardia at 130. Started on metoprolol 50 mg extended release daily. Labs in December: BMI 35, LDL is elevated at 121, ALT AST within normal limits, creatinine 0.99, hematocrit 45, electrolytes are essentially normal, TSH is 3.65 normal, iron is 53 low, vitamin D is 23 low, ferritin is normal at 96. No EKG or head imaging.     \"Janice.\"     3/16: Virtual visit via Zoom audio and video due to the COVID-19 pandemic.  Patient is accepting of and agreeable to visit.  The visit consisted of the patient and I. The patient is at home, and I am at the office.  Interview:  1. Chart review: Seen by primary care February 16 for vitamin D deficiency.  Vitamin D is normal now at 35.9.  CMP just shows elevated albumin 5.3, iron studies show  iron is high at 167 and ferritin is high at 169.  Lipids are abnormal, CBC is reassuring.  UDS is negative in May of last year.  2. \" I think I am doing well.\"  a. Some issues with anxiety related to living with her in-laws.  b. Still waiting on a bed to be accepted to build to their house.  c. Overprotective of her daughter.  Has a checkup today.  d. Anxiety, depression, ADHD are essentially under control, however.  3. Medication compliant: Yes  4. No SI HI AVH.      2/2: Virtual visit via Zoom audio and video due to the COVID-19 pandemic.  Patient is accepting of and agreeable to visit.  The visit consisted of the patient and I. Patient is at home, I'm at work.  5. Chart review: No new developments in chart review since December 22.  6. \"I'm good mentally.\"  a. Well, depression and " "anxiety are well controlled. So is ADHD.  b. Sore throat 2 wks ago, now cough.  7. P0, G7.  8. Energy: stable  9. Concentration: stable  10. Sleeping: well  11. Therapy: continuing, doing some couples therapy as well.  12. Medication compliant: y  13. No SI HI AVH.      12/22: Virtual visit via Zoom audio and video due to the COVID-19 pandemic.  Patient is accepting of and agreeable to visit.  The visit consisted of the patient and I.  Interview:  14. Chart review: No new chart developments since November 17.  15. \"Good.\"  a. Going thru some life challenges.   i.  closed due to COVID.  ii. Daughter is not sick.  iii. Switched adderall so that higher dose is in the morning. Sometimes doesn't even need the afternoon dose.  b. \"I'm handling things better than I used to.\"  16. Depression/Mood: denies  17. Anxiety: \"I feel like it's better\" \"I feel like it's under control\"  1. Uncontrolled worrying: much better  2. Severity: mild to moderate situational anxiety  3. Muscle tension  4. Fatigue has improved  5. Concentration better  6. Restlessness/feeling on edge: sometimes, situational  7. Irritability: denies  8. Insomnia:  a. Initial insomnia some nights, chronic \"my whole life\"  b. Started melatonin 10 mg nightly  9. Duration: months  10. Panic attacks: denies  18. Refills: y  19. Sleeping: n  20. Eating: stable  21. Substances: n  22. Therapy: started therapy as well.  23. Medication compliant: y  24. No SI HI AVH.      11/17: Virtual visit via Zoom audio and video due to the COVID-19 pandemic.  Patient is accepting of and agreeable to visit.  The visit consisted of the patient and I.  Interview:  25. Chart review: No new developments.  26. \"Doing okay.\"  a. Prozac has helped so far.  i. Anxiety and mood  b. Also done moving and sold their house.  c. More stuff at work.  27. ADHD:  a. Starting to procrastinate more.  b. Feels like she needs more at 12:30 - 1pm.   c. Takes adderall 7:30 am and 12:30 pm. Wears " "off around when she leaves work.   d. Feels like she is \"slipping back.\"  e. Weekend breaks  28. Depression/Mood: better mood, more energy  29. Anxiety: less worrying, less irritability, less on edge  30. Sleeping: \"fine\"  31. Eating: lost weight  32. Substances: denies  33. Therapy: interested  a. Feels females might be judging her  b. Thinks that might be due to her relp with her mom; they didn't get along growing up.  c. Mom treated her like she was never good enough.  34. Medication compliant: y  35. No SI HI AVH.      10/15: Virtual visit via Zoom audio and video due to the COVID-19 pandemic.  Patient is accepting of and agreeable to visit.  The visit consisted of the patient and I.  Interview:  36. Chart review: Seen recently by PCP and diagnosed with acute swimmer's ear on the right side.  37. \"I am ok.\"  38. Depression/Mood: denies  39. ADHD: under control  40. Stressors:  a. Sold her house  b. Now building a house; not sure where they are going to live  c. Taking care of her 19 mo old  d. Pandemic cases that were going up  41. Anxiety:  a. Irritable  b. Worrying  c. On edge  d. Obsessing about her daughter as well; constantly worried something bad is going to happen  e. Sometimes has trouble thinking about anything else but her  f. No panic attacks  42. Sleeping: yes  43. Eating: stable  44. Substances: denies  45. Therapy: not interested  46. Medication compliant: yes  47. No SI HI AVH.      8/20: Virtual visit via Zoom audio and video due to the COVID-19 pandemic.  Patient is accepting of and agreeable to visit.  The visit consisted of the patient and I.  Interview:  48. Seen by PCP June 23 and was doing well overall.  49. \"I'm good.\"  50. ADHD controlled.  51. Denies depression and anxiety. Some situational anxiety due to COVID-19 and her job.  52. Finally able to get around to doing projects at home.  53. Denies SI HI AVH.      6/18: In-person Interview: Patient reports some improvement on Adderall XR.  " "Discontinued Topamax.  No longer going to the weight loss clinic.  Notes residual depression and anxiety symptoms, but they are mild.  They are well controlled on the bupropion.  No SI HI AVH.  Denies depression, muscle tension, fatigue, restlessness. Continues to endorse poor concentration. See scores below.      IMPORTANT:  From previous note, patient was held back in the third grade. Struggled to complete high school and missed 60 days although she did graduate with good grades. Got in trouble for talking a lot in class. She was not in any special classes. She did not have an IEP.     1/6 H&P: Virtual visit via Zoom audio and video due to the COVID-19 pandemic. Patient is accepting of and agreeable to appointment. The appointment consisted of the patient and I only. Interview: Patient reports that she had her first child in March and suffered from postpartum depression. The pandemic did not help things. It was a \"hard time,\" and the patient reports she had already had anxiety before. Patient is presently bottlefeeding. She became pregnant through an infertility specialist. Presently not on any contraception.   Endorses muscle tension, fatigue, poor concentration, restlessness, irritability, and some broken sleep, although she is sleeping for about 8 hours a night. She wakes up frequently to check on her baby girl, Alexandra. Also endorses guilt at being a bad mom. Duration has been since March of last year. Patient feels her anxiety is worse than her depression.   Denies SI HI AVH. Has access to weapons that are locked in a gun safe. Patient does not know the combination. She also does not know how to work the guns that are in the safe. Psychiatric review of systems is positive for anxiety and depression, negative for psychosis and maribel.   Possible ADHD. Patient was diagnosed by Dr. Joe in 2017. 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 Possible family history as well as her sister may have ADHD.   Past Psychiatric History:  Began Psychiatric Treatment: Since    Dx: Anxiety   Psychiatrist: Has not seen a psychiatrist   Therapist: Saw therapist in 2018x1, unsure if it was helpful.   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     Feeling down, depressed, or hopeless?     Trouble falling or staying asleep, or sleeping too much?     Feeling tired or having little energy?     Poor appetite or overeating?     Feeling bad about yourself - or that you are a failure or have let yourself or your family down?     Trouble concentrating on things, such as reading the newspaper or watching television?     Moving or speaking so slowly that other people could have noticed? Or the opposite - being so fidgety or restless that you have been " moving around a lot more than usual?     Thoughts that you would be better off dead, or of hurting yourself in some way?     PHQ-9 Total Score       MATTHEW-7       Past Surgical History:  Past Surgical History:   Procedure Laterality Date   • ADENOIDECTOMY     • DILATATION AND CURETTAGE     • TONSILLECTOMY         Problem List:  Patient Active Problem List   Diagnosis   • Acne   • Anxiety   • Attention deficit hyperactivity disorder (ADHD)   • Depression   • Generalized anxiety disorder   • Gestational hypertension   • Major depressive disorder in partial remission (HCC)   • PCOS (polycystic ovarian syndrome)   • Pre-eclampsia       Allergy:   No Known Allergies     Discontinued Medications:  There are no discontinued medications.    Current Medications:   Current Outpatient Medications   Medication Sig Dispense Refill   • Aklief 0.005 % cream      • amphetamine-dextroamphetamine (ADDERALL) 10 MG tablet TAKE 1 TABLET BY MOUTH ONCE DAILY 30 tablet 0   • amphetamine-dextroamphetamine (ADDERALL) 20 MG tablet Take 1 tablet by mouth 2 (Two) Times a Day. 60 tablet 0   • buPROPion XL (WELLBUTRIN XL) 300 MG 24 hr tablet TAKE 1 TABLET BY MOUTH ONCE DAILY 90 tablet 1   • FLUoxetine (PROzac) 20 MG capsule TAKE 1 CAPSULE BY MOUTH ONCE DAILY 30 capsule 2   • hydrOXYzine (ATARAX) 50 MG tablet Take 2 tablets by mouth every night at bedtime. for anxiety 120 tablet 1   • ketoconazole (NIZORAL) 2 % shampoo apply to SCALP THREE TIMES WEEKLY     • levocetirizine (XYZAL) 5 MG tablet Take 5 mg by mouth Every Evening.     • metoprolol succinate XL (Toprol XL) 25 MG 24 hr tablet Take 1 tablet by mouth Daily for 90 days. 90 tablet 1   • minoxidil (LONITEN) 2.5 MG tablet TAKE 1/4 TABLET BY MOUTH EVERY DAY     • Prenatal MV-Min-Fe Fum-FA-DHA (PRENATAL+DHA PO)      • Soolantra 1 % cream      • spironolactone (ALDACTONE) 100 MG tablet spironolactone 100 mg oral tablet take 1 tablet (100 mg) by oral route once daily   Active     • vitamin D  (ERGOCALCIFEROL) 1.25 MG (39678 UT) capsule capsule Take 1 capsule by mouth 1 (One) Time Per Week. 13 capsule 1     No current facility-administered medications for this visit.       Past Medical History:  Past Medical History:   Diagnosis Date   • Acne    • ADHD    • Anxiety    • Depression    • Generalized anxiety disorder 01/06/2021   • History of medical problems 02/28/2019    Miscarriage   • Major depressive disorder in partial remission (HCC) 01/06/2021   • PCOS (polycystic ovarian syndrome)    • Pre-eclampsia        Social History     Socioeconomic History   • Marital status:    Tobacco Use   • Smoking status: Never Smoker   • Smokeless tobacco: Never Used   • Tobacco comment: 2/10/2021- 1/6/2021   Vaping Use   • Vaping Use: Never used   Substance and Sexual Activity   • Alcohol use: Yes     Alcohol/week: 0.0 standard drinks     Comment: light; 2/10/2021- 1/6/2021   • Drug use: Never     Comment: 2/10/2021- 1/6/2021   • Sexual activity: Yes     Partners: Male     Birth control/protection: None       Family History   Problem Relation Age of Onset   • Anxiety disorder Mother    • Bipolar disorder Mother    • Depression Mother    • ADD / ADHD Sister    • Depression Sister        Mental Status Exam:   Hygiene:   good, groomed, at home, pacing today  Cooperation:  Cooperative  Eye Contact:  Good  Psychomotor Behavior:  Appropriate  Affect:  euthymic, congruent  Mood: some anxiety, mild  Hopelessness: Denies  Speech:  Normal  Thought Process:  Goal directed  Thought Content:  Normal  Suicidal:  None  Homicidal:  None  Hallucinations:  None  Delusion:  None  Memory:  Intact  Orientation:  Person, Place, Time and Situation  Reliability:  good  Insight:  Fair  Judgement:  Fair  Impulse Control:  Fair  Physical/Medical Issues:  No      Review of Systems:  Review of Systems   Constitutional: Negative for diaphoresis and fatigue.   HENT: Negative for drooling.    Eyes: Negative for visual disturbance.    Respiratory: Negative for cough and shortness of breath.    Cardiovascular: Negative for chest pain, palpitations and leg swelling.   Gastrointestinal: Negative for nausea and vomiting.   Endocrine: Negative for cold intolerance and heat intolerance.   Genitourinary: Negative for difficulty urinating.   Musculoskeletal: Negative for joint swelling.   Allergic/Immunologic: Negative for immunocompromised state.   Neurological: Negative for dizziness, seizures, speech difficulty and numbness.         Physical Exam:  Physical Exam    Vital Signs:   There were no vitals taken for this visit.     Lab Results:   Office Visit on 02/16/2022   Component Date Value Ref Range Status   • Glucose 02/16/2022 85  65 - 99 mg/dL Final   • BUN 02/16/2022 12  6 - 20 mg/dL Final   • Creatinine 02/16/2022 0.92  0.57 - 1.00 mg/dL Final   • Sodium 02/16/2022 136  136 - 145 mmol/L Final   • Potassium 02/16/2022 4.0  3.5 - 5.2 mmol/L Final   • Chloride 02/16/2022 100  98 - 107 mmol/L Final   • CO2 02/16/2022 25.0  22.0 - 29.0 mmol/L Final   • Calcium 02/16/2022 9.9  8.6 - 10.5 mg/dL Final   • Total Protein 02/16/2022 8.2  6.0 - 8.5 g/dL Final   • Albumin 02/16/2022 5.30 (A) 3.50 - 5.20 g/dL Final   • ALT (SGPT) 02/16/2022 26  1 - 33 U/L Final   • AST (SGOT) 02/16/2022 25  1 - 32 U/L Final   • Alkaline Phosphatase 02/16/2022 60  39 - 117 U/L Final   • Total Bilirubin 02/16/2022 0.5  0.0 - 1.2 mg/dL Final   • eGFR Non  Amer 02/16/2022 73  >60 mL/min/1.73 Final   • Globulin 02/16/2022 2.9  gm/dL Final   • A/G Ratio 02/16/2022 1.8  g/dL Final   • BUN/Creatinine Ratio 02/16/2022 13.0  7.0 - 25.0 Final   • Anion Gap 02/16/2022 11.0  5.0 - 15.0 mmol/L Final   • Total Cholesterol 02/16/2022 207 (A) 0 - 200 mg/dL Final   • Triglycerides 02/16/2022 163 (A) 0 - 150 mg/dL Final   • HDL Cholesterol 02/16/2022 55  40 - 60 mg/dL Final   • LDL Cholesterol  02/16/2022 123 (A) 0 - 100 mg/dL Final   • VLDL Cholesterol 02/16/2022 29  5 - 40 mg/dL Final    • LDL/HDL Ratio 02/16/2022 2.17   Final   • TSH 02/16/2022 2.520  0.270 - 4.200 uIU/mL Final   • 25 Hydroxy, Vitamin D 02/16/2022 35.9  ng/ml Final   • Iron 02/16/2022 167 (A) 37 - 145 mcg/dL Final   • Iron Saturation 02/16/2022 39  20 - 50 % Final   • Transferrin 02/16/2022 286  200 - 360 mg/dL Final   • TIBC 02/16/2022 426  298 - 536 mcg/dL Final   • Ferritin 02/16/2022 169.00 (A) 13.00 - 150.00 ng/mL Final   • Folate 02/16/2022 >20.00  4.78 - 24.20 ng/mL Final   • Vitamin B-12 02/16/2022 1,225 (A) 211 - 946 pg/mL Final   • Magnesium 02/16/2022 2.3  1.6 - 2.6 mg/dL Final   • WBC 02/16/2022 9.43  3.40 - 10.80 10*3/mm3 Final   • RBC 02/16/2022 5.22  3.77 - 5.28 10*6/mm3 Final   • Hemoglobin 02/16/2022 15.4  12.0 - 15.9 g/dL Final   • Hematocrit 02/16/2022 45.1  34.0 - 46.6 % Final   • MCV 02/16/2022 86.4  79.0 - 97.0 fL Final   • MCH 02/16/2022 29.5  26.6 - 33.0 pg Final   • MCHC 02/16/2022 34.1  31.5 - 35.7 g/dL Final   • RDW 02/16/2022 12.1 (A) 12.3 - 15.4 % Final   • RDW-SD 02/16/2022 37.3  37.0 - 54.0 fl Final   • MPV 02/16/2022 8.8  6.0 - 12.0 fL Final   • Platelets 02/16/2022 329  140 - 450 10*3/mm3 Final   • Neutrophil % 02/16/2022 56.8  42.7 - 76.0 % Final   • Lymphocyte % 02/16/2022 36.2  19.6 - 45.3 % Final   • Monocyte % 02/16/2022 5.2  5.0 - 12.0 % Final   • Eosinophil % 02/16/2022 1.1  0.3 - 6.2 % Final   • Basophil % 02/16/2022 0.3  0.0 - 1.5 % Final   • Immature Grans % 02/16/2022 0.4  0.0 - 0.5 % Final   • Neutrophils, Absolute 02/16/2022 5.36  1.70 - 7.00 10*3/mm3 Final   • Lymphocytes, Absolute 02/16/2022 3.41 (A) 0.70 - 3.10 10*3/mm3 Final   • Monocytes, Absolute 02/16/2022 0.49  0.10 - 0.90 10*3/mm3 Final   • Eosinophils, Absolute 02/16/2022 0.10  0.00 - 0.40 10*3/mm3 Final   • Basophils, Absolute 02/16/2022 0.03  0.00 - 0.20 10*3/mm3 Final   • Immature Grans, Absolute 02/16/2022 0.04  0.00 - 0.05 10*3/mm3 Final   • nRBC 02/16/2022 0.0  0.0 - 0.2 /100 WBC Final       EKG Results:  No orders to  display       Imaging Results:  No Images in the past 120 days found..      Assessment/Plan   Diagnoses and all orders for this visit:    1. ADHD (attention deficit hyperactivity disorder), inattentive type (Primary)    2. Generalized anxiety disorder    3. Major depressive disorder in partial remission, unspecified whether recurrent (HCC)    4. Insomnia due to mental condition        Presentation most consistent with major depressive disorder in partial remission, generalized anxiety disorder, and ADHD.  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  Also endorses a childhood history that is compelling.    3/16: No change to medications, no side effects.  Living with in-laws at their house has been a source of anxiety for the patient.  Also taking care of her daughter, who she is very protective of.  Some issues with keeping restraint during therapy sessions as well.  17 minutes of supportive psychotherapy with goal to strengthen defenses, promote problems solving, restore adaptive functioning and provide symptom relief. The therapeutic alliance was strengthened to encourage the patient to express their thoughts and feelings. Esteem building was enhanced through praise, reassurance, normalizing and encouragement. Coping skills were enhanced to build distress tolerance skills and emotional regulation. Patient given education on medication side effects, diagnosis/illness and relapse symptoms. Plan to continue supportive psychotherapy in next appointment to provide symptom relief.  6 weeks    2/2: Anxious about the weather, and its consequences (closed daycares, etc.). 18 minutes of supportive psychotherapy with goal to strengthen defenses, promote problems solving, restore adaptive functioning and provide symptom  relief. The therapeutic alliance was strengthened to encourage the patient to express their thoughts and feelings. Esteem building was enhanced through praise, reassurance, normalizing and encouragement. Coping skills were enhanced to build distress tolerance skills and emotional regulation. Patient given education on medication side effects, diagnosis/illness and relapse symptoms. Plan to continue supportive psychotherapy in next appointment to provide symptom relief.  6 wks    12/22: Increase Prozac to target anxiety, increase hydroxyzine to target insomnia. 17 minutes of supportive psychotherapy with goal to strengthen defenses, promote problems solving, restore adaptive functioning and provide symptom relief. The therapeutic alliance was strengthened to encourage the patient to express their thoughts and feelings. Esteem building was enhanced through praise, reassurance, normalizing and encouragement. Coping skills were enhanced to build distress tolerance skills and emotional regulation. Patient given education on medication side effects, diagnosis/illness and relapse symptoms. Plan to continue supportive psychotherapy in next appointment to provide symptom relief.  6 weeks    11/17: Some ADHD symptoms re-emerging. Add 10 mg adderall at 11 am. Schedule will be 7:30 first dose, 11 second higher dose of 30 mg. Pt also interested in therapy. 17 minutes of supportive psychotherapy with goal to strengthen defenses, promote problems solving, restore adaptive functioning and provide symptom relief. The therapeutic alliance was strengthened to encourage the patient to express their thoughts and feelings. Esteem building was enhanced through praise, reassurance, normalizing and encouragement. Coping skills were enhanced to build distress tolerance skills and emotional regulation. Patient given education on medication side effects, diagnosis/illness and relapse symptoms. Plan to continue supportive psychotherapy in next  appointment to provide symptom relief.  4 wks.    10/15: Start prozac. Hydroxyzine at night calms her; consider adding am dose. 4 wks    : Patient is doing extremely well.  No change to medications.  See back in 8 weeks.    : Doing a little better, however patient was doing much better on Adderall 20 mg twice a day, which was her previous dose.  Discontinue Adderall XR and start Adderall immediate release 20 mg twice a day.  Some residual depressive and anxiety symptoms that are well controlled on bupropion.  Treating ADHD will also treat residual depressive and anxiety symptoms as well.  See back in 2 months.  Declines psychotherapy.    Visit Diagnoses:    ICD-10-CM ICD-9-CM   1. ADHD (attention deficit hyperactivity disorder), inattentive type  F90.0 314.00   2. Generalized anxiety disorder  F41.1 300.02   3. Major depressive disorder in partial remission, unspecified whether recurrent (HCC)  F32.4 296.25   4. Insomnia due to mental condition  F51.05 300.9     327.02       PLAN:  54. Risk Assessment: Risk of self-harm acutely is low. Risk factors include anxiety disorder, mood disorder, access to weapons, recent psychosocial stressors. Protective factors include no family history, no present SI, no history of suicide attempts or self-harm in the past, minimal AODA, healthcare seeking, future orientation, willingness to engage in care,  baby. Risk of self-harm chronically is also low, but could be further elevated in the event of treatment noncompliance and/or AODA.  55. Safety: No acute safety concerns.  56. Medications:   a. CONTINUE prozac 20 mg PO QDAY. Risks, benefits, alternatives discussed with patient including GI upset, nausea vomiting diarrhea, theoretical decrease of seizure threshold predisposing the patient to a slightly higher seizure risk, headaches, sexual dysfunction, serotonin syndrome, bleeding risk, increased suicidality in patients 24 years and younger.  After discussion of these  risks and benefits, the patient voiced understanding and agreed to proceed.  b. CONTINUE Bupropion  mg p.o. daily to target attentional issues. Risks, benefits, alternatives discussed with patient including nausea, GI upset, increased energy, exacerbation of irritability, lowering of seizure threshold. After discussion of these risks and benefits, the patient voiced understanding and agreed to proceed.   c. CONTINUE hydroxyzine 100 mg PO QHS. Risks, benefits, alternatives discussed with patient including sedation, dizziness, fall risk, GI upset.  After discussion of these risks and benefits, the patient voiced understanding and agreed to proceed.  d. CONTINUE Adderall 30 mg PO QAM, 20 mg PO QPM. Risks, benefits, side effects discussed with patient including elevated heart rate, elevated blood pressure, irritability, insomnia, sexual dysfunction, appetite suppressing properties, psychosis.  After discussion of these risks and benefits, the patient voiced understanding and agreed to proceed. Controlled substances consent verbally signed. UDS and Tom ordered.  e. S/P   i. Escitalopram: sexual dysfunction  ii. Adderall XR 10 mg daily: not effective  57. Therapy: Consider in the future, presently not interested.  58. Labs/Studies: EKG reassuring.  59. Follow Up: 6 wks.      TREATMENT PLAN/GOALS: Continue supportive psychotherapy efforts and medications as indicated. Treatment and medication options discussed during today's visit. Patient acknowledged and verbally consented to continue with current treatment plan and was educated on the importance of compliance with treatment and follow-up appointments.    MEDICATION ISSUES:  TOM reviewed as expected.  Discussed medication options and treatment plan of prescribed medication as well as the risks, benefits, and side effects including potential falls, possible impaired driving and metabolic adversities among others. Patient is agreeable to call the office with  any worsening of symptoms or onset of side effects. Patient is agreeable to call 911 or go to the nearest ER should he/she begin having SI/HI. No medication side effects or related complaints today.     MEDS ORDERED DURING VISIT:  No orders of the defined types were placed in this encounter.      Return in about 6 weeks (around 4/27/2022).         This document has been electronically signed by Eli Murray MD  March 16, 2022 11:03 EDT

## 2022-03-17 NOTE — PROGRESS NOTES
"   Progress Note      Patient Name: Janice Padilla   Patient ID: [id number]   Sex: Female   YOB: 1995    Primary Care Provider: Balbina SEAMAN   Referring Provider: Balbina SEAMAN    Visit Date: February 10, 2021    Provider: Eli Murray MD   Location: [facility]   Location Address: [address] [place] [address]  [id number]   Location Phone: [phone]          Chief Complaint     \"I feel a little better.\"       History Of Present Illness  Janice Padilla is a 25 year old /White female who presents to the office today referred by Balbina SEAMAN.   Chart review 1/6: Patient is on Wellbutrin, status post Zoloft. Also on Adderall. Had tachycardia at 130. Started on metoprolol 50 mg extended release daily. Labs in December: BMI 35, LDL is elevated at 121, ALT AST within normal limits, creatinine 0.99, hematocrit 45, electrolytes are essentially normal, TSH is 3.65 normal, iron is 53 low, vitamin D is 23 low, ferritin is normal at 96. No EKG or head imaging.   \"Janice.\" Virtual visit via Zoom audio and video due to the COVID-19 pandemic. Patient is accepting of and agreeable to appointment. The appointment consisted of the patient and I only. Interview: Patient reports she is not as angry on the Escitalopram but she also feels \"blah.\" Continues to endorse many of the same symptoms, such as muscle tension, fatigue, poor concentration, restlessness. Would like to minimize the amount of medication she is on. She recently started a weight loss program that involves taking phentermine and Topamax. No SI HI AVH.   ...   ...   ...         Past Medical History  Disease Name Date Onset Notes   Acne --  --    ADHD --  --    Anxiety --  --    Depression --  --    Generalized anxiety disorder 01/06/2021 --    Major depressive disorder in partial remission 01/06/2021 --    PCOS (polycystic ovarian syndrome) --  --    Pre-eclampsia --  --  "         Past Surgical History  Procedure Name Date Notes   D & C --  --    Tonsilectomy --  --          Medication List  Name Date Started Instructions   escitalopram oxalate 10 mg oral tablet 01/06/2021 take 1 tablet (10 mg) by oral route once daily for 60 days   hydroxyzine HCl 50 mg oral tablet 12/18/2020 take 1 tablet by oral route once a day (at bedtime) for 90 days   iron 325 mg (65 mg iron) oral tablet 12/18/2020 take 1 tablet (325 mg) by oral route once daily for 90 days   metoprolol succinate 50 mg oral tablet extended release 24 hr 12/18/2020 take 1 tablet (50 mg) by oral route once daily for 90 days   minocycline 100 mg oral capsule  take 1 capsule by oral route daily   phentermine 15 mg oral capsule  --    Prenatal oral  take 1 by oral route daily   spironolactone 100 mg oral tablet  take 1 tablet (100 mg) by oral route once daily   topiramate 25 mg oral tablet  take 1 tablet (25 mg) by oral route once daily   Vitamin D2 1,250 mcg (50,000 unit) oral capsule 12/03/2020 take 1 capsule by oral route weekly   Wellbutrin  mg oral tablet extended release 24 hr 12/18/2020 take 1 tablet (150 mg) by oral route once daily for 90 days         Allergy List  Allergen Name Date Reaction Notes   NO KNOWN DRUG ALLERGIES --  --  --          Family Medical History  Disease Name Relative/Age Notes   Family history of anxiety disorder Mother/   --    Family history of attention deficit disorder Sister/   --          Social History  Finding Status Start/Stop Quantity Notes   Alcohol Light --/-- --  02/10/2021 - 01/06/2021 -    Recreational Drug Use Never --/-- --  02/10/2021 - 01/06/2021 -    Tobacco Never --/-- --  02/10/2021 - 01/06/2021 -          Immunizations  NameDate Admin Mfg Trade Name Lot Number Route Inj VIS Given VIS Publication   [id number]/2020 NE Not Entered  NE NE     Comments: pt states she received 10/20 at work         Review of Systems  · Constitutional  o Denies  o : fatigue, night  "sweats  · Eyes  o Denies  o : double vision, blurred vision  · HENT  o Denies  o : vertigo, recent head injury  · Breasts  o Denies  o : abnormal changes in breast size, additional breast symptoms except as noted in the HPI  · Cardiovascular  o Denies  o : chest pain, irregular heart beats  · Respiratory  o Admits  o : productive cough  o Denies  o : shortness of breath  · Gastrointestinal  o Denies  o : nausea, vomiting  · Genitourinary  o Denies  o : dysuria, urinary retention  · Integument  o Denies  o : hair growth change, new skin lesions  · Neurologic  o Denies  o : altered mental status, seizures  · Musculoskeletal  o Denies  o : joint swelling, limitation of motion  · Endocrine  o Denies  o : cold intolerance, heat intolerance  · Heme-Lymph  o Denies  o : petechiae, lymph node enlargement or tenderness  · Allergic-Immunologic  o Denies  o : frequent illnesses      Physical Examination  · Mental Status Exam  o Appearance  o : good eye contact, normal street clothes, groomed, sitting at a desk in her house  o Behavior  o : pleasant and cooperative  o Motor  o : no abnormal  o Speech  o : normal rhythm, rate, volume, tone, not hyperverbal, not pressured, normal prosidy  o Mood  o : \"Blah\"  o Affect  o : Almost euthymic slight constriction  o Thought Content  o : negative suicidal ideations, negative homicidal ideations, negative obsessions  o Perceptions  o : negative auditory hallucinations, negative visual hallucinations  o Thought Process  o : linear  o Insight/Judgement  o : fair/fair  o Cognition  o : grossly intact  o Attention  o : intact          Assessment  · Major depressive disorder in partial remission     296.25/F32.4  · Generalized anxiety disorder     300.02/F41.1       Presentation most consistent with major depressive disorder in partial remission, generalized anxiety disorder, possible (likely?)  ADHD.  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  Strong suspicion.    Minimal improvement.  Taper off escitalopram.  Increase Wellbutrin.  EKG is reassuring.  See back in 1 month.  Patient would like to tackle ADHD after she loses some weight.    Patient does not get along well with her mom, and her father passed away.  It may be difficult to perform the diva 2.0 to diagnose ADHD.  At the next visit, I will talk to the patient about her childhood symptoms, as well as adult symptoms.       Plan  · Orders  o ACO-39: Current medications updated and reviewed (1159F, ) - - 02/10/2021  o EKG with at least 12 leads, INTERPRETATION AND REPORT ONLY [facility] ([id number]) - 296.25/F32.4, 300.02/F41.1 - 02/10/2021  · Medications  o Wellbutrin  mg oral tablet extended release 24 hr   SIG: take 1 tablet (300 mg) by oral route once daily for 90 days   DISP: (90) Tablet with 1 refills  Adjusted on 02/10/2021     o escitalopram oxalate 10 mg oral tablet   SIG: take 1 tablet (10 mg) by oral route once daily for 60 days   DISP: (60) Tablet with 1 refills  Discontinued on 02/10/2021     o Medications have been Reconciled  o Transition of Care or Provider Policy  · Instructions  o Risk Assessment: Risk of self-harm acutely is low. Risk factors include anxiety disorder, mood disorder, access to weapons, recent psychosocial stressors. Protective factors include no family history, no present SI, no history of suicide attempts or self-harm in the past, minimal AODA, healthcare seeking, future orientation, willingness to engage in care,  baby. Risk of self-harm chronically is also low, but could be further elevated in the event of treatment noncompliance and/or AODA.  o Safety: No acute safety concerns.  o Medications: INCREASE Bupropion XL from 150 mg to 300 mg p.o. daily to target depression and anxiety. Risks, benefits,  alternatives discussed with patient including nausea, GI upset, increased energy, exacerbation of irritability, lowering of seizure threshold. After discussion of these risks and benefits, the patient voiced understanding and agreed to proceed. CONTINUE hydroxyzine 50 mg PO QHS. REDUCE Escitalopram from 10 mg to 5 mg p.o. daily for 5 days, then DISCONTINUE. Risks, benefits, alternatives discussed with patient including GI upset, nausea vomiting diarrhea, theoretical decrease of seizure threshold predisposing the patient to a slightly higher seizure risk, headaches, sexual dysfunction, serotonin syndrome. After discussion of these risks and benefits, the patient voiced understanding and agreed to proceed.  o Therapy: Consider in the future, presently not interested.  o Labs/Studies: EKG as a baseline given history of tachycardia. Patient is presently on metoprolol ER 50 mg a day.  o Follow Up: 1 month.  · Disposition  o Follow Up in 1 month.            Electronically Signed by: Eli Murray MD -Author on February 10, 2021 08:57:49 AM   Detail Level: Detailed Detail Level: Zone Patient Specific Counseling (Will Not Stick From Patient To Patient): Scalp - removed via Mohs > 2 years ago by Damian Agee MD

## 2022-03-22 PROCEDURE — 87086 URINE CULTURE/COLONY COUNT: CPT | Performed by: FAMILY MEDICINE

## 2022-04-09 DIAGNOSIS — F90.0 ADHD (ATTENTION DEFICIT HYPERACTIVITY DISORDER), INATTENTIVE TYPE: ICD-10-CM

## 2022-04-09 DIAGNOSIS — F41.1 GENERALIZED ANXIETY DISORDER: ICD-10-CM

## 2022-04-11 RX ORDER — HYDROXYZINE 50 MG/1
100 TABLET, FILM COATED ORAL
Qty: 120 TABLET | Refills: 1 | Status: SHIPPED | OUTPATIENT
Start: 2022-04-11 | End: 2022-06-15 | Stop reason: SDUPTHER

## 2022-04-26 ENCOUNTER — LAB (OUTPATIENT)
Dept: LAB | Facility: HOSPITAL | Age: 27
End: 2022-04-26

## 2022-04-26 ENCOUNTER — TRANSCRIBE ORDERS (OUTPATIENT)
Dept: LAB | Facility: HOSPITAL | Age: 27
End: 2022-04-26

## 2022-04-26 DIAGNOSIS — Z11.59 SCREENING EXAMINATION FOR POLIOMYELITIS: ICD-10-CM

## 2022-04-26 DIAGNOSIS — F90.0 ADHD (ATTENTION DEFICIT HYPERACTIVITY DISORDER), INATTENTIVE TYPE: ICD-10-CM

## 2022-04-26 DIAGNOSIS — Z11.59 SCREENING EXAMINATION FOR POLIOMYELITIS: Primary | ICD-10-CM

## 2022-04-26 PROCEDURE — 86706 HEP B SURFACE ANTIBODY: CPT

## 2022-04-26 PROCEDURE — 86705 HEP B CORE ANTIBODY IGM: CPT

## 2022-04-26 PROCEDURE — 86704 HEP B CORE ANTIBODY TOTAL: CPT

## 2022-04-26 PROCEDURE — 86707 HEPATITIS BE ANTIBODY: CPT

## 2022-04-26 PROCEDURE — 87340 HEPATITIS B SURFACE AG IA: CPT

## 2022-04-26 PROCEDURE — 87350 HEPATITIS BE AG IA: CPT

## 2022-04-26 PROCEDURE — 36415 COLL VENOUS BLD VENIPUNCTURE: CPT

## 2022-04-27 RX ORDER — DEXTROAMPHETAMINE SACCHARATE, AMPHETAMINE ASPARTATE, DEXTROAMPHETAMINE SULFATE AND AMPHETAMINE SULFATE 5; 5; 5; 5 MG/1; MG/1; MG/1; MG/1
TABLET ORAL
Qty: 60 TABLET | Refills: 0 | Status: SHIPPED | OUTPATIENT
Start: 2022-04-27 | End: 2022-05-04 | Stop reason: SDUPTHER

## 2022-04-27 RX ORDER — DEXTROAMPHETAMINE SACCHARATE, AMPHETAMINE ASPARTATE, DEXTROAMPHETAMINE SULFATE AND AMPHETAMINE SULFATE 2.5; 2.5; 2.5; 2.5 MG/1; MG/1; MG/1; MG/1
TABLET ORAL
Qty: 30 TABLET | Refills: 0 | Status: SHIPPED | OUTPATIENT
Start: 2022-04-27 | End: 2022-05-04 | Stop reason: SDUPTHER

## 2022-04-28 LAB
HBV CORE AB SERPL QL IA: NEGATIVE
HBV CORE IGM SERPL QL IA: NEGATIVE
HBV E AB SERPL QL IA: NEGATIVE
HBV E AG SERPL QL IA: NEGATIVE
HBV SURFACE AB SER QL: NON REACTIVE
HBV SURFACE AG SERPL QL IA: NEGATIVE

## 2022-05-04 ENCOUNTER — OFFICE VISIT (OUTPATIENT)
Dept: PSYCHIATRY | Facility: CLINIC | Age: 27
End: 2022-05-04

## 2022-05-04 VITALS
WEIGHT: 174.6 LBS | BODY MASS INDEX: 32.97 KG/M2 | SYSTOLIC BLOOD PRESSURE: 124 MMHG | DIASTOLIC BLOOD PRESSURE: 83 MMHG | HEIGHT: 61 IN

## 2022-05-04 DIAGNOSIS — F90.0 ADHD (ATTENTION DEFICIT HYPERACTIVITY DISORDER), INATTENTIVE TYPE: Primary | ICD-10-CM

## 2022-05-04 DIAGNOSIS — F32.4 MAJOR DEPRESSIVE DISORDER IN PARTIAL REMISSION, UNSPECIFIED WHETHER RECURRENT: ICD-10-CM

## 2022-05-04 DIAGNOSIS — F41.1 GENERALIZED ANXIETY DISORDER: ICD-10-CM

## 2022-05-04 DIAGNOSIS — F51.05 INSOMNIA DUE TO MENTAL CONDITION: ICD-10-CM

## 2022-05-04 PROCEDURE — 99214 OFFICE O/P EST MOD 30 MIN: CPT | Performed by: STUDENT IN AN ORGANIZED HEALTH CARE EDUCATION/TRAINING PROGRAM

## 2022-05-04 PROCEDURE — 90833 PSYTX W PT W E/M 30 MIN: CPT | Performed by: STUDENT IN AN ORGANIZED HEALTH CARE EDUCATION/TRAINING PROGRAM

## 2022-05-04 RX ORDER — FLUCONAZOLE 150 MG/1
TABLET ORAL
COMMUNITY
Start: 2022-03-23 | End: 2022-05-04

## 2022-05-04 RX ORDER — BUPROPION HYDROCHLORIDE 300 MG/1
300 TABLET ORAL DAILY
Qty: 90 TABLET | Refills: 1 | Status: SHIPPED | OUTPATIENT
Start: 2022-06-15 | End: 2022-08-19

## 2022-05-04 RX ORDER — DEXTROAMPHETAMINE SACCHARATE, AMPHETAMINE ASPARTATE, DEXTROAMPHETAMINE SULFATE AND AMPHETAMINE SULFATE 5; 5; 5; 5 MG/1; MG/1; MG/1; MG/1
1 TABLET ORAL 2 TIMES DAILY
Qty: 60 TABLET | Refills: 0 | Status: SHIPPED | OUTPATIENT
Start: 2022-05-27 | End: 2022-06-15 | Stop reason: SDUPTHER

## 2022-05-04 RX ORDER — DEXTROAMPHETAMINE SACCHARATE, AMPHETAMINE ASPARTATE, DEXTROAMPHETAMINE SULFATE AND AMPHETAMINE SULFATE 2.5; 2.5; 2.5; 2.5 MG/1; MG/1; MG/1; MG/1
1 TABLET ORAL DAILY
Qty: 30 TABLET | Refills: 0 | Status: SHIPPED | OUTPATIENT
Start: 2022-05-27 | End: 2022-06-15 | Stop reason: SDUPTHER

## 2022-05-04 RX ORDER — FLUOXETINE HYDROCHLORIDE 20 MG/1
40 CAPSULE ORAL DAILY
Qty: 60 CAPSULE | Refills: 2 | Status: SHIPPED | OUTPATIENT
Start: 2022-05-04 | End: 2022-06-15 | Stop reason: SDUPTHER

## 2022-05-04 NOTE — PROGRESS NOTES
"Subjective   Janice Padilla is a 26 y.o. female who presents today for follow up    Referring Provider:  No referring provider defined for this encounter.    Chief Complaint:  adhd cornel mdd    History of Present Illness:     Janice Padilla is a 25 year old /White female referred by Balbina SEAMAN.     Chart review 1/6: Patient is on Wellbutrin, status post Zoloft. Also on Adderall. Had tachycardia at 130. Started on metoprolol 50 mg extended release daily. Labs in December: BMI 35, LDL is elevated at 121, ALT AST within normal limits, creatinine 0.99, hematocrit 45, electrolytes are essentially normal, TSH is 3.65 normal, iron is 53 low, vitamin D is 23 low, ferritin is normal at 96. No EKG or head imaging.     \"Janice.\"     5/4: in person.  Interview:  1. Chart review: Seen by urgent care in March for nausea vomiting diarrhea.  CMP is reassuring in February with the exception of albumin 5.3.  2. \"I need a new therapist.\"  a. Interested in our office.  b. Worsening depression: poor energy, depressed mood, anhedonia developing.  c. ADHD under control  d. Some stress re: inlaws. Looking to move soon by building a house.  3. Refills: Yes  4. Substances: No  5. Therapy: Referral to our office  6. Medication compliant: Yes  7. No SI HI AVH.      3/16: Virtual visit via Zoom audio and video due to the COVID-19 pandemic.  Patient is accepting of and agreeable to visit.  The visit consisted of the patient and I. The patient is at home, and I am at the office.  Interview:  8. Chart review: Seen by primary care February 16 for vitamin D deficiency.  Vitamin D is normal now at 35.9.  CMP just shows elevated albumin 5.3, iron studies show  iron is high at 167 and ferritin is high at 169.  Lipids are abnormal, CBC is reassuring.  UDS is negative in May of last year.  9. \" I think I am doing well.\"  a. Some issues with anxiety related to living with her in-laws.  b. Still waiting on a bed to be accepted to " "build to their house.  c. Overprotective of her daughter.  Has a checkup today.  d. Anxiety, depression, ADHD are essentially under control, however.  10. Medication compliant: Yes  11. No SI HI AVH.      2/2: Virtual visit via Zoom audio and video due to the COVID-19 pandemic.  Patient is accepting of and agreeable to visit.  The visit consisted of the patient and I. Patient is at home, I'm at work.  12. Chart review: No new developments in chart review since December 22.  13. \"I'm good mentally.\"  a. Well, depression and anxiety are well controlled. So is ADHD.  b. Sore throat 2 wks ago, now cough.  14. P0, G7.  15. Energy: stable  16. Concentration: stable  17. Sleeping: well  18. Therapy: continuing, doing some couples therapy as well.  19. Medication compliant: y  20. No SI HI AVH.      12/22: Virtual visit via Zoom audio and video due to the COVID-19 pandemic.  Patient is accepting of and agreeable to visit.  The visit consisted of the patient and I.  Interview:  21. Chart review: No new chart developments since November 17. 22. \"Good.\"  a. Going thru some life challenges.   i.  closed due to COVID.  ii. Daughter is not sick.  iii. Switched adderall so that higher dose is in the morning. Sometimes doesn't even need the afternoon dose.  b. \"I'm handling things better than I used to.\"  23. Depression/Mood: denies  24. Anxiety: \"I feel like it's better\" \"I feel like it's under control\"  1. Uncontrolled worrying: much better  2. Severity: mild to moderate situational anxiety  3. Muscle tension  4. Fatigue has improved  5. Concentration better  6. Restlessness/feeling on edge: sometimes, situational  7. Irritability: denies  8. Insomnia:  a. Initial insomnia some nights, chronic \"my whole life\"  b. Started melatonin 10 mg nightly  9. Duration: months  10. Panic attacks: denies  25. Refills: y  26. Sleeping: n  27. Eating: stable  28. Substances: n  29. Therapy: started therapy as well.  30. Medication " "compliant: y  31. No SI HI AVH.      11/17: Virtual visit via Zoom audio and video due to the COVID-19 pandemic.  Patient is accepting of and agreeable to visit.  The visit consisted of the patient and I.  Interview:  32. Chart review: No new developments.  33. \"Doing okay.\"  a. Prozac has helped so far.  i. Anxiety and mood  b. Also done moving and sold their house.  c. More stuff at work.  34. ADHD:  a. Starting to procrastinate more.  b. Feels like she needs more at 12:30 - 1pm.   c. Takes adderall 7:30 am and 12:30 pm. Wears off around when she leaves work.   d. Feels like she is \"slipping back.\"  e. Weekend breaks  35. Depression/Mood: better mood, more energy  36. Anxiety: less worrying, less irritability, less on edge  37. Sleeping: \"fine\"  38. Eating: lost weight  39. Substances: denies  40. Therapy: interested  a. Feels females might be judging her  b. Thinks that might be due to her relp with her mom; they didn't get along growing up.  c. Mom treated her like she was never good enough.  41. Medication compliant: y  42. No SI HI AVH.      10/15: Virtual visit via Zoom audio and video due to the COVID-19 pandemic.  Patient is accepting of and agreeable to visit.  The visit consisted of the patient and I.  Interview:  43. Chart review: Seen recently by PCP and diagnosed with acute swimmer's ear on the right side.  44. \"I am ok.\"  45. Depression/Mood: denies  46. ADHD: under control  47. Stressors:  a. Sold her house  b. Now building a house; not sure where they are going to live  c. Taking care of her 19 mo old  d. Pandemic cases that were going up  48. Anxiety:  a. Irritable  b. Worrying  c. On edge  d. Obsessing about her daughter as well; constantly worried something bad is going to happen  e. Sometimes has trouble thinking about anything else but her  f. No panic attacks  49. Sleeping: yes  50. Eating: stable  51. Substances: denies  52. Therapy: not interested  53. Medication compliant: yes  54. No SI HI " "AVH.      8/20: Virtual visit via Zoom audio and video due to the COVID-19 pandemic.  Patient is accepting of and agreeable to visit.  The visit consisted of the patient and I.  Interview:  55. Seen by PCP June 23 and was doing well overall.  56. \"I'm good.\"  57. ADHD controlled.  58. Denies depression and anxiety. Some situational anxiety due to COVID-19 and her job.  59. Finally able to get around to doing projects at home.  60. Denies SI HI AVH.      6/18: In-person Interview: Patient reports some improvement on Adderall XR.  Discontinued Topamax.  No longer going to the weight loss clinic.  Notes residual depression and anxiety symptoms, but they are mild.  They are well controlled on the bupropion.  No SI HI AVH.  Denies depression, muscle tension, fatigue, restlessness. Continues to endorse poor concentration. See scores below.      IMPORTANT:  From previous note, patient was held back in the third grade. Struggled to complete high school and missed 60 days although she did graduate with good grades. Got in trouble for talking a lot in class. She was not in any special classes. She did not have an IEP.     1/6 H&P: Virtual visit via Zoom audio and video due to the COVID-19 pandemic. Patient is accepting of and agreeable to appointment. The appointment consisted of the patient and I only. Interview: Patient reports that she had her first child in March and suffered from postpartum depression. The pandemic did not help things. It was a \"hard time,\" and the patient reports she had already had anxiety before. Patient is presently bottlefeeding. She became pregnant through an infertility specialist. Presently not on any contraception.   Endorses muscle tension, fatigue, poor concentration, restlessness, irritability, and some broken sleep, although she is sleeping for about 8 hours a night. She wakes up frequently to check on her baby girl, Alexandra. Also endorses guilt at being a bad mom. Duration has been since March " "of last year. Patient feels her anxiety is worse than her depression.   Denies SI HI AVH. Has access to weapons that are locked in a gun safe. Patient does not know the combination. She also does not know how to work the guns that are in the safe. Psychiatric review of systems is positive for anxiety and depression, negative for psychosis and maribel.   Possible ADHD. Patient was diagnosed by Dr. Joe in 2017. 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 Possible family history as well as her sister may have ADHD.   Past Psychiatric History:  Began Psychiatric Treatment: Since    Dx: Anxiety   Psychiatrist: Has not seen a psychiatrist   Therapist: Saw therapist in 2018x1, unsure if it was helpful.   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     Feeling down, depressed, or hopeless?     Trouble falling or staying asleep, or sleeping too much?     Feeling tired or having little energy?     Poor appetite or overeating?     Feeling bad about yourself - or that you are a failure or have let yourself or your family down?     Trouble concentrating on things, such as reading the newspaper or watching television?     Moving or speaking so slowly that other people could have noticed? Or the opposite - being so fidgety or restless that you have been moving around a lot more than usual?     Thoughts that you would be better off dead, or of hurting yourself in some way?     PHQ-9 Total Score       MATTHEW-7       Past Surgical History:  Past Surgical History:   Procedure Laterality Date   • ADENOIDECTOMY     • DILATATION AND CURETTAGE     • SKIN BIOPSY     • TONSILLECTOMY         Problem List:  Patient Active Problem List   Diagnosis   • Acne   • Anxiety   • Attention deficit hyperactivity disorder (ADHD)   • Depression   • Generalized anxiety disorder   • Gestational hypertension   • Major depressive disorder in partial remission (HCC)   • PCOS (polycystic ovarian syndrome)   • Pre-eclampsia       Allergy:   No Known Allergies     Discontinued Medications:  Medications Discontinued During This Encounter   Medication Reason   • fluconazole (DIFLUCAN) 150 MG tablet *Therapy completed   • buPROPion XL (WELLBUTRIN XL) 300 MG 24 hr tablet Reorder   • FLUoxetine (PROzac) 20 MG capsule Reorder   • amphetamine-dextroamphetamine (ADDERALL) 10 MG tablet Reorder   • amphetamine-dextroamphetamine (ADDERALL) 20 MG tablet Reorder       Current Medications:   Current Outpatient Medications   Medication Sig Dispense Refill   • Aklief 0.005 % cream      • [START ON 5/27/2022] amphetamine-dextroamphetamine (ADDERALL) 10 MG tablet Take 1 tablet by mouth Daily. 30 tablet 0   • [START ON 5/27/2022] amphetamine-dextroamphetamine (ADDERALL) 20 MG tablet Take 1  tablet by mouth 2 (Two) Times a Day. 60 tablet 0   • [START ON 6/15/2022] buPROPion XL (WELLBUTRIN XL) 300 MG 24 hr tablet Take 1 tablet by mouth Daily. 90 tablet 1   • FLUoxetine (PROzac) 20 MG capsule Take 2 capsules by mouth Daily. 60 capsule 2   • hydrOXYzine (ATARAX) 50 MG tablet TAKE 2 TABLETS BY MOUTH EVERY NIGHT AT BEDTIME FOR ANXIETY 120 tablet 1   • ketoconazole (NIZORAL) 2 % shampoo apply to SCALP THREE TIMES WEEKLY     • levocetirizine (XYZAL) 5 MG tablet Take 5 mg by mouth Every Evening.     • metoprolol succinate XL (Toprol XL) 25 MG 24 hr tablet Take 1 tablet by mouth Daily for 90 days. 90 tablet 1   • minoxidil (LONITEN) 2.5 MG tablet TAKE 1/4 TABLET BY MOUTH EVERY DAY     • Prenatal MV-Min-Fe Fum-FA-DHA (PRENATAL+DHA PO)      • Soolantra 1 % cream      • spironolactone (ALDACTONE) 100 MG tablet spironolactone 100 mg oral tablet take 1 tablet (100 mg) by oral route once daily   Active     • vitamin D (ERGOCALCIFEROL) 1.25 MG (41308 UT) capsule capsule Take 1 capsule by mouth 1 (One) Time Per Week. 13 capsule 1     No current facility-administered medications for this visit.       Past Medical History:  Past Medical History:   Diagnosis Date   • Acne    • ADHD    • Anxiety    • Depression    • Generalized anxiety disorder 01/06/2021   • History of medical problems 02/28/2019    Miscarriage   • Major depressive disorder in partial remission (HCC) 01/06/2021   • PCOS (polycystic ovarian syndrome)    • Pre-eclampsia        Social History     Socioeconomic History   • Marital status:    Tobacco Use   • Smoking status: Never Smoker   • Smokeless tobacco: Never Used   • Tobacco comment: 2/10/2021- 1/6/2021   Vaping Use   • Vaping Use: Never used   Substance and Sexual Activity   • Alcohol use: Yes     Comment: light; 2/10/2021- 1/6/2021   • Drug use: Never     Comment: 2/10/2021- 1/6/2021   • Sexual activity: Yes     Partners: Male     Birth control/protection: None       Family History   Problem  "Relation Age of Onset   • Anxiety disorder Mother    • Bipolar disorder Mother    • Depression Mother    • ADD / ADHD Sister    • Depression Sister    • ADD / ADHD Sister        Mental Status Exam:   Hygiene:   good, groomed  Cooperation:  Cooperative  Eye Contact:  Good  Psychomotor Behavior:  Appropriate  Affect:  euthymic, congruent  Mood: some anxiety, some mild depression, congruent  Hopelessness: Denies  Speech:  Normal  Thought Process:  Goal directed  Thought Content:  Normal  Suicidal:  None  Homicidal:  None  Hallucinations:  None  Delusion:  None  Memory:  Intact  Orientation:  Person, Place, Time and Situation  Reliability:  good  Insight:  Fair  Judgement:  Fair  Impulse Control:  Fair  Physical/Medical Issues:  No      Review of Systems:  Review of Systems   Constitutional: Negative for diaphoresis and fatigue.   HENT: Negative for drooling.    Eyes: Negative for visual disturbance.   Respiratory: Negative for cough and shortness of breath.    Cardiovascular: Negative for chest pain, palpitations and leg swelling.   Gastrointestinal: Negative for nausea and vomiting.   Endocrine: Negative for cold intolerance and heat intolerance.   Genitourinary: Negative for difficulty urinating.   Musculoskeletal: Negative for joint swelling.   Allergic/Immunologic: Negative for immunocompromised state.   Neurological: Negative for dizziness, seizures, speech difficulty and numbness.         Physical Exam:  Physical Exam    Vital Signs:   /83   Ht 154.9 cm (61\")   Wt 79.2 kg (174 lb 9.6 oz)   BMI 32.99 kg/m²      Lab Results:   Lab on 04/26/2022   Component Date Value Ref Range Status   • Hepatitis B Surface Ag 04/26/2022 Negative  Negative Final   • Hep B E Ag 04/26/2022 Negative  Negative Final   • Hep B Core IgM 04/26/2022 Negative  Negative Final   • Hep B Core Total Ab 04/26/2022 Negative  Negative Final   • Hep B E Ab 04/26/2022 Negative  Negative Final   • Hep B S Ab 04/26/2022 Non Reactive   Final "                  Non Reactive: Inconsistent with immunity,                              less than 10 mIU/mL                Reactive:     Consistent with immunity,                              greater than 9.9 mIU/mL  **Effective May 2, 2022 Hepatitis B Virus (Profile VI) will  **    be made non-orderable.    [facility] offers order code [id number] HBV Screening and Diagnosis.   Admission on 03/22/2022, Discharged on 03/22/2022   Component Date Value Ref Range Status   • Color 03/22/2022 Yellow  Yellow, Straw, Dark Yellow, Roxana Final   • Clarity, UA 03/22/2022 Clear  Clear Final   • Glucose, UA 03/22/2022 Negative  Negative, 1000 mg/dL (3+) mg/dL Final   • Bilirubin 03/22/2022 Negative  Negative Final   • Ketones, UA 03/22/2022 Trace (A) Negative Final   • Specific Gravity  03/22/2022 1.020  1.005 - 1.030 Final   • Blood, UA 03/22/2022 Moderate (A) Negative Final   • pH, Urine 03/22/2022 5.5  5.0 - 8.0 Final   • Protein, POC 03/22/2022 30 mg/dL (A) Negative mg/dL Final   • Urobilinogen, UA 03/22/2022 Normal  Normal Final   • Nitrite, UA 03/22/2022 Positive (A) Negative Final   • Leukocytes 03/22/2022 Trace (A) Negative Final   • Rapid Strep A Screen 03/22/2022 Negative  Negative, VALID, INVALID, Not Performed Final   • Internal Control 03/22/2022 Passed  Passed Final   • Lot Number 03/22/2022 [id number]   Final   • Expiration Date 03/22/2022 05/31/2023   Final   • Rapid Influenza A Ag 03/22/2022 Negative  Negative Final   • Rapid Influenza B Ag 03/22/2022 Negative  Negative Final   • Internal Control 03/22/2022 Passed  Passed Final   • Lot Number 03/22/2022 [id number]   Final   • Expiration Date 03/22/2022 [id number]   Final   • Urine Culture 03/22/2022 Yeast isolated (A)  Final    No further workup   Office Visit on 02/16/2022   Component Date Value Ref Range Status   • Glucose 02/16/2022 85  65 - 99 mg/dL Final   • BUN 02/16/2022 12  6 - 20 mg/dL Final   • Creatinine 02/16/2022 0.92  0.57 - 1.00 mg/dL Final   • Sodium  02/16/2022 136  136 - 145 mmol/L Final   • Potassium 02/16/2022 4.0  3.5 - 5.2 mmol/L Final   • Chloride 02/16/2022 100  98 - 107 mmol/L Final   • CO2 02/16/2022 25.0  22.0 - 29.0 mmol/L Final   • Calcium 02/16/2022 9.9  8.6 - 10.5 mg/dL Final   • Total Protein 02/16/2022 8.2  6.0 - 8.5 g/dL Final   • Albumin 02/16/2022 5.30 (A) 3.50 - 5.20 g/dL Final   • ALT (SGPT) 02/16/2022 26  1 - 33 U/L Final   • AST (SGOT) 02/16/2022 25  1 - 32 U/L Final   • Alkaline Phosphatase 02/16/2022 60  39 - 117 U/L Final   • Total Bilirubin 02/16/2022 0.5  0.0 - 1.2 mg/dL Final   • eGFR Non  Amer 02/16/2022 73  >60 mL/min/1.73 Final   • Globulin 02/16/2022 2.9  gm/dL Final   • A/G Ratio 02/16/2022 1.8  g/dL Final   • BUN/Creatinine Ratio 02/16/2022 13.0  7.0 - 25.0 Final   • Anion Gap 02/16/2022 11.0  5.0 - 15.0 mmol/L Final   • Total Cholesterol 02/16/2022 207 (A) 0 - 200 mg/dL Final   • Triglycerides 02/16/2022 163 (A) 0 - 150 mg/dL Final   • HDL Cholesterol 02/16/2022 55  40 - 60 mg/dL Final   • LDL Cholesterol  02/16/2022 123 (A) 0 - 100 mg/dL Final   • VLDL Cholesterol 02/16/2022 29  5 - 40 mg/dL Final   • LDL/HDL Ratio 02/16/2022 2.17   Final   • TSH 02/16/2022 2.520  0.270 - 4.200 uIU/mL Final   • 25 Hydroxy, Vitamin D 02/16/2022 35.9  ng/ml Final   • Iron 02/16/2022 167 (A) 37 - 145 mcg/dL Final   • Iron Saturation 02/16/2022 39  20 - 50 % Final   • Transferrin 02/16/2022 286  200 - 360 mg/dL Final   • TIBC 02/16/2022 426  298 - 536 mcg/dL Final   • Ferritin 02/16/2022 169.00 (A) 13.00 - 150.00 ng/mL Final   • Folate 02/16/2022 >20.00  4.78 - 24.20 ng/mL Final   • Vitamin B-12 02/16/2022 1,225 (A) 211 - 946 pg/mL Final   • Magnesium 02/16/2022 2.3  1.6 - 2.6 mg/dL Final   • WBC 02/16/2022 9.43  3.40 - 10.80 10*3/mm3 Final   • RBC 02/16/2022 5.22  3.77 - 5.28 10*6/mm3 Final   • Hemoglobin 02/16/2022 15.4  12.0 - 15.9 g/dL Final   • Hematocrit 02/16/2022 45.1  34.0 - 46.6 % Final   • MCV 02/16/2022 86.4  79.0 - 97.0 fL  Final   • MCH 02/16/2022 29.5  26.6 - 33.0 pg Final   • MCHC 02/16/2022 34.1  31.5 - 35.7 g/dL Final   • RDW 02/16/2022 12.1 (A) 12.3 - 15.4 % Final   • RDW-SD 02/16/2022 37.3  37.0 - 54.0 fl Final   • MPV 02/16/2022 8.8  6.0 - 12.0 fL Final   • Platelets 02/16/2022 329  140 - 450 10*3/mm3 Final   • Neutrophil % 02/16/2022 56.8  42.7 - 76.0 % Final   • Lymphocyte % 02/16/2022 36.2  19.6 - 45.3 % Final   • Monocyte % 02/16/2022 5.2  5.0 - 12.0 % Final   • Eosinophil % 02/16/2022 1.1  0.3 - 6.2 % Final   • Basophil % 02/16/2022 0.3  0.0 - 1.5 % Final   • Immature Grans % 02/16/2022 0.4  0.0 - 0.5 % Final   • Neutrophils, Absolute 02/16/2022 5.36  1.70 - 7.00 10*3/mm3 Final   • Lymphocytes, Absolute 02/16/2022 3.41 (A) 0.70 - 3.10 10*3/mm3 Final   • Monocytes, Absolute 02/16/2022 0.49  0.10 - 0.90 10*3/mm3 Final   • Eosinophils, Absolute 02/16/2022 0.10  0.00 - 0.40 10*3/mm3 Final   • Basophils, Absolute 02/16/2022 0.03  0.00 - 0.20 10*3/mm3 Final   • Immature Grans, Absolute 02/16/2022 0.04  0.00 - 0.05 10*3/mm3 Final   • nRBC 02/16/2022 0.0  0.0 - 0.2 /100 WBC Final       EKG Results:  No orders to display       Imaging Results:  No Images in the past 120 days found..      Assessment/Plan   Diagnoses and all orders for this visit:    1. ADHD (attention deficit hyperactivity disorder), inattentive type (Primary)  -     amphetamine-dextroamphetamine (ADDERALL) 10 MG tablet; Take 1 tablet by mouth Daily.  Dispense: 30 tablet; Refill: 0  -     amphetamine-dextroamphetamine (ADDERALL) 20 MG tablet; Take 1 tablet by mouth 2 (Two) Times a Day.  Dispense: 60 tablet; Refill: 0    2. Generalized anxiety disorder  -     buPROPion XL (WELLBUTRIN XL) 300 MG 24 hr tablet; Take 1 tablet by mouth Daily.  Dispense: 90 tablet; Refill: 1  -     FLUoxetine (PROzac) 20 MG capsule; Take 2 capsules by mouth Daily.  Dispense: 60 capsule; Refill: 2    3. Major depressive disorder in partial remission, unspecified whether recurrent (HCC)  -      buPROPion XL (WELLBUTRIN XL) 300 MG 24 hr tablet; Take 1 tablet by mouth Daily.  Dispense: 90 tablet; Refill: 1  -     FLUoxetine (PROzac) 20 MG capsule; Take 2 capsules by mouth Daily.  Dispense: 60 capsule; Refill: 2    4. Insomnia due to mental condition        Presentation most consistent with major depressive disorder in partial remission, generalized anxiety disorder, and ADHD.  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  Also endorses a childhood history that is compelling.    5/4: Therapy referral.  Increase Prozac.  May consider switching from bupropion to Abilify in the future.  Patient was not able to tolerate bupropion 450 mg daily.  17 minutes of supportive psychotherapy with goal to strengthen defenses, promote problems solving, restore adaptive functioning and provide symptom relief. The therapeutic alliance was strengthened to encourage the patient to express their thoughts and feelings. Esteem building was enhanced through praise, reassurance, normalizing and encouragement. Coping skills were enhanced to build distress tolerance skills and emotional regulation. Patient given education on medication side effects, diagnosis/illness and relapse symptoms. Plan to continue supportive psychotherapy in next appointment to provide symptom relief.  Diagnoses: as above  Symptoms: as above  Functional status: Excellent  Mental Status Exam: as above    Treatment plan: Medication management and supportive psychotherapy  Prognosis: Very good  Progress: Significant since our first visit.  Now developing reemerging depression  6 weeks    3/16: No change to medications, no side effects.  Living with in-laws at their house has been a source of anxiety for the patient.  Also taking care of her daughter, who she is  very protective of.  Some issues with keeping restraint during therapy sessions as well.  17 minutes of supportive psychotherapy with goal to strengthen defenses, promote problems solving, restore adaptive functioning and provide symptom relief. The therapeutic alliance was strengthened to encourage the patient to express their thoughts and feelings. Esteem building was enhanced through praise, reassurance, normalizing and encouragement. Coping skills were enhanced to build distress tolerance skills and emotional regulation. Patient given education on medication side effects, diagnosis/illness and relapse symptoms. Plan to continue supportive psychotherapy in next appointment to provide symptom relief.  6 weeks    2/2: Anxious about the weather, and its consequences (closed daycares, etc.). 18 minutes of supportive psychotherapy with goal to strengthen defenses, promote problems solving, restore adaptive functioning and provide symptom relief. The therapeutic alliance was strengthened to encourage the patient to express their thoughts and feelings. Esteem building was enhanced through praise, reassurance, normalizing and encouragement. Coping skills were enhanced to build distress tolerance skills and emotional regulation. Patient given education on medication side effects, diagnosis/illness and relapse symptoms. Plan to continue supportive psychotherapy in next appointment to provide symptom relief.  6 wks    12/22: Increase Prozac to target anxiety, increase hydroxyzine to target insomnia. 17 minutes of supportive psychotherapy with goal to strengthen defenses, promote problems solving, restore adaptive functioning and provide symptom relief. The therapeutic alliance was strengthened to encourage the patient to express their thoughts and feelings. Esteem building was enhanced through praise, reassurance, normalizing and encouragement. Coping skills were enhanced to build distress tolerance skills and emotional  regulation. Patient given education on medication side effects, diagnosis/illness and relapse symptoms. Plan to continue supportive psychotherapy in next appointment to provide symptom relief.  6 weeks    11/17: Some ADHD symptoms re-emerging. Add 10 mg adderall at 11 am. Schedule will be 7:30 first dose, 11 second higher dose of 30 mg. Pt also interested in therapy. 17 minutes of supportive psychotherapy with goal to strengthen defenses, promote problems solving, restore adaptive functioning and provide symptom relief. The therapeutic alliance was strengthened to encourage the patient to express their thoughts and feelings. Esteem building was enhanced through praise, reassurance, normalizing and encouragement. Coping skills were enhanced to build distress tolerance skills and emotional regulation. Patient given education on medication side effects, diagnosis/illness and relapse symptoms. Plan to continue supportive psychotherapy in next appointment to provide symptom relief.  4 wks.    10/15: Start prozac. Hydroxyzine at night calms her; consider adding am dose. 4 wks    8/20: Patient is doing extremely well.  No change to medications.  See back in 8 weeks.    6/18: Doing a little better, however patient was doing much better on Adderall 20 mg twice a day, which was her previous dose.  Discontinue Adderall XR and start Adderall immediate release 20 mg twice a day.  Some residual depressive and anxiety symptoms that are well controlled on bupropion.  Treating ADHD will also treat residual depressive and anxiety symptoms as well.  See back in 2 months.  Declines psychotherapy.    Visit Diagnoses:    ICD-10-CM ICD-9-CM   1. ADHD (attention deficit hyperactivity disorder), inattentive type  F90.0 314.00   2. Generalized anxiety disorder  F41.1 300.02   3. Major depressive disorder in partial remission, unspecified whether recurrent (HCC)  F32.4 296.25   4. Insomnia due to mental condition  F51.05 300.9     327.02        PLAN:  61. Risk Assessment: Risk of self-harm acutely is low. Risk factors include anxiety disorder, mood disorder, access to weapons, recent psychosocial stressors. Protective factors include no family history, no present SI, no history of suicide attempts or self-harm in the past, minimal AODA, healthcare seeking, future orientation, willingness to engage in care,  baby. Risk of self-harm chronically is also low, but could be further elevated in the event of treatment noncompliance and/or AODA.  62. Safety: No acute safety concerns.  63. Medications:   a. INCREASE prozac 20 to 40 mg PO QDAY. Risks, benefits, alternatives discussed with patient including GI upset, nausea vomiting diarrhea, theoretical decrease of seizure threshold predisposing the patient to a slightly higher seizure risk, headaches, sexual dysfunction, serotonin syndrome, bleeding risk, increased suicidality in patients 24 years and younger.  After discussion of these risks and benefits, the patient voiced understanding and agreed to proceed.  b. CONTINUE Bupropion  mg p.o. daily to target attentional issues. Risks, benefits, alternatives discussed with patient including nausea, GI upset, increased energy, exacerbation of irritability, lowering of seizure threshold. After discussion of these risks and benefits, the patient voiced understanding and agreed to proceed.   c. CONTINUE hydroxyzine 100 mg PO QHS. Risks, benefits, alternatives discussed with patient including sedation, dizziness, fall risk, GI upset.  After discussion of these risks and benefits, the patient voiced understanding and agreed to proceed.  d. CONTINUE Adderall 30 mg PO QAM, 20 mg PO QPM. Risks, benefits, side effects discussed with patient including elevated heart rate, elevated blood pressure, irritability, insomnia, sexual dysfunction, appetite suppressing properties, psychosis.  After discussion of these risks and benefits, the patient voiced  understanding and agreed to proceed. Controlled substances consent verbally signed. UDS and Tom ordered.  e. S/P   i. Escitalopram: sexual dysfunction  ii. Adderall XR 10 mg daily: not effective  64. Therapy: Consider in the future, presently not interested.  65. Labs/Studies: EKG reassuring.  66. Follow Up: 6 wks.      TREATMENT PLAN/GOALS: Continue supportive psychotherapy efforts and medications as indicated. Treatment and medication options discussed during today's visit. Patient acknowledged and verbally consented to continue with current treatment plan and was educated on the importance of compliance with treatment and follow-up appointments.    MEDICATION ISSUES:  TOM reviewed as expected.  Discussed medication options and treatment plan of prescribed medication as well as the risks, benefits, and side effects including potential falls, possible impaired driving and metabolic adversities among others. Patient is agreeable to call the office with any worsening of symptoms or onset of side effects. Patient is agreeable to call 911 or go to the nearest ER should he/she begin having SI/HI. No medication side effects or related complaints today.     MEDS ORDERED DURING VISIT:  New Medications Ordered This Visit   Medications   • amphetamine-dextroamphetamine (ADDERALL) 10 MG tablet     Sig: Take 1 tablet by mouth Daily.     Dispense:  30 tablet     Refill:  0   • amphetamine-dextroamphetamine (ADDERALL) 20 MG tablet     Sig: Take 1 tablet by mouth 2 (Two) Times a Day.     Dispense:  60 tablet     Refill:  0   • buPROPion XL (WELLBUTRIN XL) 300 MG 24 hr tablet     Sig: Take 1 tablet by mouth Daily.     Dispense:  90 tablet     Refill:  1     This prescription was filled on 10/15/2021. Any refills authorized will be placed on file.   • FLUoxetine (PROzac) 20 MG capsule     Sig: Take 2 capsules by mouth Daily.     Dispense:  60 capsule     Refill:  2     This prescription was filled on 2/23/2022. Any refills  authorized will be placed on file.       Return in about 6 weeks (around 6/15/2022).         This document has been electronically signed by Eli Murray MD  May 4, 2022 10:19 EDT

## 2022-05-04 NOTE — PATIENT INSTRUCTIONS
1.  Please return to clinic at your next scheduled visit.  Contact the clinic ([phone]) at least 24 hours prior in the event you need to cancel.  2.  Do no harm to yourself or others.    3.  Avoid alcohol and drugs.    4.  Take all medications as prescribed.  Please contact the clinic with any concerns. If you are in need of medication refills, please call the clinic at [phone].    5. Should you want to get in touch with your provider, Dr. Eli Murray, please utilize [employer] or contact the office ([phone]), and staff will be able to page Dr. Murray directly.  6.  In the event you have personal crisis, contact the following crisis numbers: Suicide Prevention Hotline 1-524.419.8291; [facility] Helpline 3-149-154-UYIG; [facility] [phone]; text HELLO to 254050; or 372.     SPECIFIC RECOMMENDATIONS:     1.      Medications discussed at this encounter:                   - increase prozac     2.      Psychotherapy recommendations:      3.     Return to clinic: 6 weeks

## 2022-05-24 DIAGNOSIS — F90.0 ADHD (ATTENTION DEFICIT HYPERACTIVITY DISORDER), INATTENTIVE TYPE: ICD-10-CM

## 2022-05-24 DIAGNOSIS — F32.4 MAJOR DEPRESSIVE DISORDER IN PARTIAL REMISSION, UNSPECIFIED WHETHER RECURRENT: Primary | ICD-10-CM

## 2022-05-24 DIAGNOSIS — F51.05 INSOMNIA DUE TO MENTAL CONDITION: ICD-10-CM

## 2022-05-24 DIAGNOSIS — F41.1 GENERALIZED ANXIETY DISORDER: ICD-10-CM

## 2022-05-25 ENCOUNTER — OFFICE VISIT (OUTPATIENT)
Dept: OBSTETRICS AND GYNECOLOGY | Facility: CLINIC | Age: 27
End: 2022-05-25

## 2022-05-25 VITALS
WEIGHT: 176 LBS | SYSTOLIC BLOOD PRESSURE: 135 MMHG | BODY MASS INDEX: 33.25 KG/M2 | HEART RATE: 93 BPM | DIASTOLIC BLOOD PRESSURE: 88 MMHG

## 2022-05-25 DIAGNOSIS — Z01.419 ENCOUNTER FOR GYNECOLOGICAL EXAMINATION WITHOUT ABNORMAL FINDING: Primary | ICD-10-CM

## 2022-05-25 PROCEDURE — G0123 SCREEN CERV/VAG THIN LAYER: HCPCS | Performed by: OBSTETRICS & GYNECOLOGY

## 2022-05-25 PROCEDURE — 99395 PREV VISIT EST AGE 18-39: CPT | Performed by: OBSTETRICS & GYNECOLOGY

## 2022-05-25 NOTE — PROGRESS NOTES
Well Woman Visit    CC: Annual well woman exam       HPI:   26 y.o.No obstetric history on file. Contraception or HRT: Contraception:  None  Menses:   q mon, lasts 5 days, no heavy days  Pain:  Mild, OTC meds control discomfort  Incontinence concerns: No  Hx of abnormal pap:  No  Pt has no complaints today.      History: PMHx, Meds, Allergies, PSHx, Social Hx, and POBHx all reviewed and updated.      PHYSICAL EXAM:  /88   Pulse 93   Wt 79.8 kg (176 lb)   LMP 04/25/2022 (Approximate)   BMI 33.25 kg/m²   General- NAD, alert and oriented, appropriate  Psych- Normal mood, good memory  Neck- No masses, no thyroid enlargement  CV- Regular rhythm, no murnurs  Resp- CTA to bases, no wheezes  Abdomen- Soft, non distended, non tender, no masses    Breast left-  Bilaterally symmetrical, no masses, non tender, no nipple discharge  Breast right- Bilaterally symmetrical, no masses, non tender, no nipple discharge    External genitalia- Normal female, no lesions  Urethra/meatus- Normal, no masses, non tender, no prolapse  Bladder- Normal, no masses, non tender, no prolapse  Vagina- Normal, no atrophy, no lesions, no discharge, no prolapse  Cvx- Normal, no lesions, no discharge, No cervical motion tenderness  Uterus- Normal size, shape & consistency.  Non tender, mobile, & no prolapse  Adnexa- No mass, non tender  Anus/Rectum/Perineum- Not performed    Lymphatic- No palpable neck, axillary, or groin nodes  Ext- No edema, no cyanosis    Skin- No lesions, no rashes, no acanthosis nigricans        ASSESSMENT and PLAN:  WWE    Diagnoses and all orders for this visit:    1. Encounter for gynecological examination without abnormal finding (Primary)  -     IGP,rfx Aptima HPV All Pth    declines bc.    Counseling:     Track menses, RTO IF <q21d, >7d long, or heavy    Domestic violence/abuse screen: negative    Depression screen: no SI    Preventative:   BREAST HEALTH- Monthly self breast exam importance and how to reviewed. MMG  and/or MRI (prn) reviewed per society guidelines and her individual history. Mammo/MRI screen: Not medically needed.  CERVICAL CANCER Screening- Reviewed current ASCCP guidelines for screening w and wo cotest HPV, age specific.  Screen: Updated today.  COLON CANCER Screening- Reviewed current medical society guidelines and options.  Colonoscopy screen:  Not medically needed.  SEXUAL HEALTH: Declines STD screening.  VACCINATIONS Recommended: Flu annually, Gardisil/HPV vaccine (up to 44yo).  Importance discussed, risk being unvaccinated reviewed.  Questions answered  Smoking status- NON SMOKER.  Importance of avoiding second hand smoke.  Myriad: Does not qualify.  Gardasil status: completed.      She understands the importance of having any ordered tests to be performed in a timely fashion.  She is encouraged to review her results online and/or contact or office if she has questions.     Follow Up:  Return if symptoms worsen or fail to improve.      Ana Damian, APRN  05/25/2022

## 2022-05-31 LAB
CONV .: NORMAL
CYTOLOGIST CVX/VAG CYTO: NORMAL
CYTOLOGY CVX/VAG DOC CYTO: NORMAL
CYTOLOGY CVX/VAG DOC THIN PREP: NORMAL
DX ICD CODE: NORMAL
HIV 1 & 2 AB SER-IMP: NORMAL
Lab: NORMAL
OTHER STN SPEC: NORMAL
STAT OF ADQ CVX/VAG CYTO-IMP: NORMAL

## 2022-06-15 ENCOUNTER — OFFICE VISIT (OUTPATIENT)
Dept: PSYCHIATRY | Facility: CLINIC | Age: 27
End: 2022-06-15

## 2022-06-15 VITALS
HEIGHT: 61 IN | DIASTOLIC BLOOD PRESSURE: 75 MMHG | BODY MASS INDEX: 33.27 KG/M2 | WEIGHT: 176.2 LBS | SYSTOLIC BLOOD PRESSURE: 117 MMHG

## 2022-06-15 DIAGNOSIS — F32.4 MAJOR DEPRESSIVE DISORDER IN PARTIAL REMISSION, UNSPECIFIED WHETHER RECURRENT: ICD-10-CM

## 2022-06-15 DIAGNOSIS — F51.05 INSOMNIA DUE TO MENTAL CONDITION: ICD-10-CM

## 2022-06-15 DIAGNOSIS — F41.1 GENERALIZED ANXIETY DISORDER: ICD-10-CM

## 2022-06-15 DIAGNOSIS — F90.0 ADHD (ATTENTION DEFICIT HYPERACTIVITY DISORDER), INATTENTIVE TYPE: Primary | ICD-10-CM

## 2022-06-15 PROCEDURE — 99214 OFFICE O/P EST MOD 30 MIN: CPT | Performed by: STUDENT IN AN ORGANIZED HEALTH CARE EDUCATION/TRAINING PROGRAM

## 2022-06-15 PROCEDURE — 90833 PSYTX W PT W E/M 30 MIN: CPT | Performed by: STUDENT IN AN ORGANIZED HEALTH CARE EDUCATION/TRAINING PROGRAM

## 2022-06-15 RX ORDER — DEXTROAMPHETAMINE SACCHARATE, AMPHETAMINE ASPARTATE, DEXTROAMPHETAMINE SULFATE AND AMPHETAMINE SULFATE 2.5; 2.5; 2.5; 2.5 MG/1; MG/1; MG/1; MG/1
1 TABLET ORAL DAILY
Qty: 30 TABLET | Refills: 0 | Status: SHIPPED | OUTPATIENT
Start: 2022-06-26 | End: 2022-07-27 | Stop reason: SDUPTHER

## 2022-06-15 RX ORDER — FLUOXETINE HYDROCHLORIDE 20 MG/1
60 CAPSULE ORAL DAILY
Qty: 90 CAPSULE | Refills: 2 | Status: SHIPPED | OUTPATIENT
Start: 2022-06-15 | End: 2022-07-27

## 2022-06-15 RX ORDER — HYDROXYZINE 50 MG/1
100 TABLET, FILM COATED ORAL
Qty: 60 TABLET | Refills: 2 | Status: SHIPPED | OUTPATIENT
Start: 2022-06-15 | End: 2022-08-19

## 2022-06-15 RX ORDER — TRETINOIN AND BENZOYL PEROXIDE 30; 1 MG/G; MG/G
CREAM TOPICAL
COMMUNITY
Start: 2022-05-24 | End: 2022-07-27

## 2022-06-15 RX ORDER — DEXTROAMPHETAMINE SACCHARATE, AMPHETAMINE ASPARTATE, DEXTROAMPHETAMINE SULFATE AND AMPHETAMINE SULFATE 5; 5; 5; 5 MG/1; MG/1; MG/1; MG/1
1 TABLET ORAL 2 TIMES DAILY
Qty: 60 TABLET | Refills: 0 | Status: SHIPPED | OUTPATIENT
Start: 2022-06-26 | End: 2022-07-27 | Stop reason: SDUPTHER

## 2022-06-15 NOTE — PATIENT INSTRUCTIONS
1.  Please return to clinic at your next scheduled visit.  Contact the clinic ([phone]) at least 24 hours prior in the event you need to cancel.  2.  Do no harm to yourself or others.    3.  Avoid alcohol and drugs.    4.  Take all medications as prescribed.  Please contact the clinic with any concerns. If you are in need of medication refills, please call the clinic at [phone].    5. Should you want to get in touch with your provider, Dr. Eli Murray, please utilize [employer] or contact the office ([phone]), and staff will be able to page Dr. Murray directly.  6.  In the event you have personal crisis, contact the following crisis numbers: Suicide Prevention Hotline 1-745.918.5489; [facility] Helpline 7-637-077-WEON; [facility] [phone]; text HELLO to 216113; or [phone].     SPECIFIC RECOMMENDATIONS:     1.      Medications discussed at this encounter:                   - increase prozac     2.      Psychotherapy recommendations:      3.     Return to clinic: 6 weeks

## 2022-06-15 NOTE — PROGRESS NOTES
"Subjective   Janice Padilla is a 26 y.o. female who presents today for follow up    Referring Provider:  No referring provider defined for this encounter.    Chief Complaint:  adhd cornel mdd    History of Present Illness:     Janice Padilla is a 25 year old /White female referred by Balbina SEAMAN.     Chart review 1/6: Patient is on Wellbutrin, status post Zoloft. Also on Adderall. Had tachycardia at 130. Started on metoprolol 50 mg extended release daily. Labs in December: BMI 35, LDL is elevated at 121, ALT AST within normal limits, creatinine 0.99, hematocrit 45, electrolytes are essentially normal, TSH is 3.65 normal, iron is 53 low, vitamin D is 23 low, ferritin is normal at 96. No EKG or head imaging.     \"Janice.\"     6/15: In person interview:  1. Chart review: Seen by OB/GYN May 25 for well woman exam.  2. Planning: Increased Prozac at last visit.  Has she set up therapy with our office?  3. \"I'm better, but still depressed.\"  a. They got a camper so she will have some space. Also broke ground on her new home.  b. Anxiety: uncontrolled worrying, on edge, irritable a little, muscle tension  c. Still have days where she's just blah. \"What does tomorrow hold.\"  d. Dread \"every now and then.\"  e. Not really having depressive symptoms: Energy better, no hopelessness (\"I feel aleksey\"), no anhedonia  f. ADHD: fairly stable  4. Panic attacks: n  5. Energy: good, no psychomotor retardation  6. Concentration: stable  7. Sleeping: yes  8. Eating: less, stable weight however  9. Refills: y  10. Substances: n  11. Therapy: 6/22  12. Medication compliant: y  13. No SI HI AVH.      5/4: in person.  Interview:  14. Chart review: Seen by urgent care in March for nausea vomiting diarrhea.  CMP is reassuring in February with the exception of albumin 5.3.  15. \"I need a new therapist.\"  a. Interested in our office.  b. Worsening depression: poor energy, depressed mood, anhedonia developing.  c. ADHD under " "control  d. Some stress re: inlaws. Looking to move soon by building a house.  16. Refills: Yes  17. Substances: No  18. Therapy: Referral to our office  19. Medication compliant: Yes  20. No SI HI AVH.      3/16: Virtual visit via Zoom audio and video due to the COVID-19 pandemic.  Patient is accepting of and agreeable to visit.  The visit consisted of the patient and I. The patient is at home, and I am at the office.  Interview:  21. Chart review: Seen by primary care February 16 for vitamin D deficiency.  Vitamin D is normal now at 35.9.  CMP just shows elevated albumin 5.3, iron studies show  iron is high at 167 and ferritin is high at 169.  Lipids are abnormal, CBC is reassuring.  UDS is negative in May of last year.  22. \" I think I am doing well.\"  a. Some issues with anxiety related to living with her in-laws.  b. Still waiting on a bed to be accepted to build to their house.  c. Overprotective of her daughter.  Has a checkup today.  d. Anxiety, depression, ADHD are essentially under control, however.  23. Medication compliant: Yes  24. No SI HI AVH.      2/2: Virtual visit via Zoom audio and video due to the COVID-19 pandemic.  Patient is accepting of and agreeable to visit.  The visit consisted of the patient and I. Patient is at home, I'm at work.  25. Chart review: No new developments in chart review since December 22.  26. \"I'm good mentally.\"  a. Well, depression and anxiety are well controlled. So is ADHD.  b. Sore throat 2 wks ago, now cough.  27. P0, G7.  28. Energy: stable  29. Concentration: stable  30. Sleeping: well  31. Therapy: continuing, doing some couples therapy as well.  32. Medication compliant: y  33. No SI HI AVH.      12/22: Virtual visit via Zoom audio and video due to the COVID-19 pandemic.  Patient is accepting of and agreeable to visit.  The visit consisted of the patient and I.  Interview:  34. Chart review: No new chart developments since November 17.  35. \"Good.\"  a. Going thru " "some life challenges.   i.  closed due to COVID.  ii. Daughter is not sick.  iii. Switched adderall so that higher dose is in the morning. Sometimes doesn't even need the afternoon dose.  b. \"I'm handling things better than I used to.\"  36. Depression/Mood: denies  37. Anxiety: \"I feel like it's better\" \"I feel like it's under control\"  1. Uncontrolled worrying: much better  2. Severity: mild to moderate situational anxiety  3. Muscle tension  4. Fatigue has improved  5. Concentration better  6. Restlessness/feeling on edge: sometimes, situational  7. Irritability: denies  8. Insomnia:  a. Initial insomnia some nights, chronic \"my whole life\"  b. Started melatonin 10 mg nightly  9. Duration: months  10. Panic attacks: denies  38. Refills: y  39. Sleeping: n  40. Eating: stable  41. Substances: n  42. Therapy: started therapy as well.  43. Medication compliant: y  44. No SI HI AVH.      11/17: Virtual visit via Zoom audio and video due to the COVID-19 pandemic.  Patient is accepting of and agreeable to visit.  The visit consisted of the patient and I.  Interview:  45. Chart review: No new developments.  46. \"Doing okay.\"  a. Prozac has helped so far.  i. Anxiety and mood  b. Also done moving and sold their house.  c. More stuff at work.  47. ADHD:  a. Starting to procrastinate more.  b. Feels like she needs more at 12:30 - 1pm.   c. Takes adderall 7:30 am and 12:30 pm. Wears off around when she leaves work.   d. Feels like she is \"slipping back.\"  e. Weekend breaks  48. Depression/Mood: better mood, more energy  49. Anxiety: less worrying, less irritability, less on edge  50. Sleeping: \"fine\"  51. Eating: lost weight  52. Substances: denies  53. Therapy: interested  a. Feels females might be judging her  b. Thinks that might be due to her relp with her mom; they didn't get along growing up.  c. Mom treated her like she was never good enough.  54. Medication compliant: y  55. No SI HI AVH.      10/15: Virtual " "visit via Zoom audio and video due to the COVID-19 pandemic.  Patient is accepting of and agreeable to visit.  The visit consisted of the patient and I.  Interview:  56. Chart review: Seen recently by PCP and diagnosed with acute swimmer's ear on the right side.  57. \"I am ok.\"  58. Depression/Mood: denies  59. ADHD: under control  60. Stressors:  a. Sold her house  b. Now building a house; not sure where they are going to live  c. Taking care of her 19 mo old  d. Pandemic cases that were going up  61. Anxiety:  a. Irritable  b. Worrying  c. On edge  d. Obsessing about her daughter as well; constantly worried something bad is going to happen  e. Sometimes has trouble thinking about anything else but her  f. No panic attacks  62. Sleeping: yes  63. Eating: stable  64. Substances: denies  65. Therapy: not interested  66. Medication compliant: yes  67. No SI HI AVH.      8/20: Virtual visit via Zoom audio and video due to the COVID-19 pandemic.  Patient is accepting of and agreeable to visit.  The visit consisted of the patient and I.  Interview:  68. Seen by PCP June 23 and was doing well overall.  69. \"I'm good.\"  70. ADHD controlled.  71. Denies depression and anxiety. Some situational anxiety due to COVID-19 and her job.  72. Finally able to get around to doing projects at home.  73. Denies SI HI AVH.      6/18: In-person Interview: Patient reports some improvement on Adderall XR.  Discontinued Topamax.  No longer going to the weight loss clinic.  Notes residual depression and anxiety symptoms, but they are mild.  They are well controlled on the bupropion.  No SI HI AVH.  Denies depression, muscle tension, fatigue, restlessness. Continues to endorse poor concentration. See scores below.      IMPORTANT:  From previous note, patient was held back in the third grade. Struggled to complete high school and missed 60 days although she did graduate with good grades. Got in trouble for talking a lot in class. She was not " "in any special classes. She did not have an IEP.     1/6 H&P: Virtual visit via Zoom audio and video due to the COVID-19 pandemic. Patient is accepting of and agreeable to appointment. The appointment consisted of the patient and I only. Interview: Patient reports that she had her first child in March and suffered from postpartum depression. The pandemic did not help things. It was a \"hard time,\" and the patient reports she had already had anxiety before. Patient is presently bottlefeeding. She became pregnant through an infertility specialist. Presently not on any contraception.   Endorses muscle tension, fatigue, poor concentration, restlessness, irritability, and some broken sleep, although she is sleeping for about 8 hours a night. She wakes up frequently to check on her baby girl, Alexandra. Also endorses guilt at being a bad mom. Duration has been since March of last year. Patient feels her anxiety is worse than her depression.   Denies SI HI AVH. Has access to weapons that are locked in a gun safe. Patient does not know the combination. She also does not know how to work the guns that are in the safe. Psychiatric review of systems is positive for anxiety and depression, negative for psychosis and maribel.   Possible ADHD. Patient was diagnosed by Dr. Joe in 2017. 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 Possible family history as well as her sister may have ADHD.   Past Psychiatric History:  Began Psychiatric Treatment: Since 2011   Dx: Anxiety   Psychiatrist: Has not seen a psychiatrist   Therapist: Saw therapist in 2018x1, unsure if it was helpful.   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     Feeling down, depressed, or hopeless?     Trouble falling or staying asleep, or sleeping too much?     Feeling tired or having little energy?     Poor appetite or overeating?     Feeling bad about yourself - or that you are a failure or have let yourself or your family down?     Trouble concentrating on things, such as reading the newspaper or watching television?     Moving or speaking so slowly that other people could have noticed? Or the opposite - being so fidgety or restless that you have been moving around a lot more than usual?     Thoughts that you would be better off dead, or of hurting yourself in some way?     PHQ-9 Total Score       MATTHEW-7       Past Surgical History:  Past Surgical History:   Procedure Laterality Date   • ADENOIDECTOMY     • DILATATION AND CURETTAGE     • SKIN BIOPSY     • TONSILLECTOMY         Problem List:  Patient Active Problem List   Diagnosis   • Acne   • Anxiety   • Attention deficit hyperactivity disorder (ADHD)   • Depression   • Generalized anxiety disorder   • Gestational hypertension   •  Major depressive disorder in partial remission (HCC)   • PCOS (polycystic ovarian syndrome)   • Pre-eclampsia       Allergy:   No Known Allergies     Discontinued Medications:  Medications Discontinued During This Encounter   Medication Reason   • hydrOXYzine (ATARAX) 50 MG tablet Reorder   • amphetamine-dextroamphetamine (ADDERALL) 10 MG tablet Reorder   • amphetamine-dextroamphetamine (ADDERALL) 20 MG tablet Reorder   • FLUoxetine (PROzac) 20 MG capsule Reorder       Current Medications:   Current Outpatient Medications   Medication Sig Dispense Refill   • Aklief 0.005 % cream      • buPROPion XL (WELLBUTRIN XL) 300 MG 24 hr tablet Take 1 tablet by mouth Daily. 90 tablet 1   • ketoconazole (NIZORAL) 2 % shampoo apply to SCALP THREE TIMES WEEKLY     • levocetirizine (XYZAL) 5 MG tablet Take 5 mg by mouth Every Evening.     • minoxidil (LONITEN) 2.5 MG tablet TAKE 1/4 TABLET BY MOUTH EVERY DAY     • Prenatal MV-Min-Fe Fum-FA-DHA (PRENATAL+DHA PO)      • Soolantra 1 % cream      • spironolactone (ALDACTONE) 100 MG tablet spironolactone 100 mg oral tablet take 1 tablet (100 mg) by oral route once daily   Active     • Twyneo 0.1-3 % cream      • vitamin D (ERGOCALCIFEROL) 1.25 MG (36679 UT) capsule capsule Take 1 capsule by mouth 1 (One) Time Per Week. 13 capsule 1   • [START ON 6/26/2022] amphetamine-dextroamphetamine (ADDERALL) 10 MG tablet Take 1 tablet by mouth Daily. 30 tablet 0   • [START ON 6/26/2022] amphetamine-dextroamphetamine (ADDERALL) 20 MG tablet Take 1 tablet by mouth 2 (Two) Times a Day. 60 tablet 0   • FLUoxetine (PROzac) 20 MG capsule Take 3 capsules by mouth Daily. 90 capsule 2   • hydrOXYzine (ATARAX) 50 MG tablet Take 2 tablets by mouth every night at bedtime. for anxiety 60 tablet 2   • metoprolol succinate XL (Toprol XL) 25 MG 24 hr tablet Take 1 tablet by mouth Daily for 90 days. 90 tablet 1     No current facility-administered medications for this visit.       Past Medical History:  Past  Medical History:   Diagnosis Date   • Acne    • ADHD    • Anxiety    • Depression    • Generalized anxiety disorder 01/06/2021   • History of medical problems 02/28/2019    Miscarriage   • Major depressive disorder in partial remission (HCC) 01/06/2021   • PCOS (polycystic ovarian syndrome)    • Pre-eclampsia        Social History     Socioeconomic History   • Marital status:    Tobacco Use   • Smoking status: Never Smoker   • Smokeless tobacco: Never Used   • Tobacco comment: 2/10/2021- 1/6/2021   Vaping Use   • Vaping Use: Never used   Substance and Sexual Activity   • Alcohol use: Yes     Comment: light; 2/10/2021- 1/6/2021   • Drug use: Never     Comment: 2/10/2021- 1/6/2021   • Sexual activity: Yes     Partners: Male     Birth control/protection: None       Family History   Problem Relation Age of Onset   • Anxiety disorder Mother    • Bipolar disorder Mother    • Depression Mother    • ADD / ADHD Sister    • Depression Sister    • ADD / ADHD Sister        Mental Status Exam:   Hygiene:   good, groomed  Cooperation:  Cooperative  Eye Contact:  Good  Psychomotor Behavior:  Appropriate  Affect:  A little anxiety, congruent  Mood: some anxiety, congruent  Hopelessness: Denies  Speech:  Normal  Thought Process:  Goal directed  Thought Content:  Normal  Suicidal:  None  Homicidal:  None  Hallucinations:  None  Delusion:  None  Memory:  Intact  Orientation:  Person, Place, Time and Situation  Reliability:  good  Insight:  Fair  Judgement:  Fair  Impulse Control:  Fair  Physical/Medical Issues:  No      Review of Systems:  Review of Systems   Constitutional: Positive for diaphoresis. Negative for fatigue.   HENT: Negative for drooling.    Eyes: Negative for visual disturbance.   Respiratory: Negative for cough and shortness of breath.    Cardiovascular: Negative for chest pain, palpitations and leg swelling.   Gastrointestinal: Negative for nausea and vomiting.   Endocrine: Negative for cold intolerance and  "heat intolerance.   Genitourinary: Negative for difficulty urinating.   Musculoskeletal: Negative for joint swelling.   Allergic/Immunologic: Negative for immunocompromised state.   Neurological: Negative for dizziness, seizures, speech difficulty and numbness.         Physical Exam:  Physical Exam    Vital Signs:   /75   Ht 154.9 cm (61\")   Wt 79.9 kg (176 lb 3.2 oz)   BMI 33.29 kg/m²      Lab Results:   Office Visit on 05/25/2022   Component Date Value Ref Range Status   • Diagnosis 05/25/2022 Comment   Final    NEGATIVE FOR INTRAEPITHELIAL LESION OR MALIGNANCY.  THIS SPECIMEN WAS RESCREENED AS PART OF OUR  PROGRAM.   • Specimen adequacy: 05/25/2022 Comment   Final    Satisfactory for evaluation.  Endocervical and/or squamous metaplastic  cells (endocervical component) are present.   • Clinician Provided ICD-10: 05/25/2022 Comment   Final    Z01.419   • Performed by: 05/25/2022 Comment   Final    Aby Moya, Cytotechnologist (ASCP)   • QC reviewed by: 05/25/2022 Comment   Final    She Burt, Supervisory Cytotechnologist (ASCP)   • . 05/25/2022 .   Final   • Note: 05/25/2022 Comment   Final    The Pap smear is a screening test designed to aid in the detection of  premalignant and malignant conditions of the uterine cervix.  It is not a  diagnostic procedure and should not be used as the sole means of detecting  cervical cancer.  Both false-positive and false-negative reports do occur.   • Method: 05/25/2022 Comment   Final    This liquid based ThinPrep(R) pap test was screened with the  use of an image guided system.   • Conv .conv 05/25/2022 Comment   Final    The HPV DNA reflex criteria were not met with this specimen result  therefore, no HPV testing was performed.   Lab on 04/26/2022   Component Date Value Ref Range Status   • Hepatitis B Surface Ag 04/26/2022 Negative  Negative Final   • Hep B E Ag 04/26/2022 Negative  Negative Final   • Hep B Core IgM 04/26/2022 Negative  " Negative Final   • Hep B Core Total Ab 04/26/2022 Negative  Negative Final   • Hep B E Ab 04/26/2022 Negative  Negative Final   • Hep B S Ab 04/26/2022 Non Reactive   Final                  Non Reactive: Inconsistent with immunity,                              less than 10 mIU/mL                Reactive:     Consistent with immunity,                              greater than 9.9 mIU/mL  **Effective May 2, 2022 Hepatitis B Virus (Profile VI) will  **    be made non-orderable.    [facility] offers order code [id number] HBV Screening and Diagnosis.   Admission on 03/22/2022, Discharged on 03/22/2022   Component Date Value Ref Range Status   • Color 03/22/2022 Yellow  Yellow, Straw, Dark Yellow, Roxana Final   • Clarity, UA 03/22/2022 Clear  Clear Final   • Glucose, UA 03/22/2022 Negative  Negative, 1000 mg/dL (3+) mg/dL Final   • Bilirubin 03/22/2022 Negative  Negative Final   • Ketones, UA 03/22/2022 Trace (A) Negative Final   • Specific Gravity  03/22/2022 1.020  1.005 - 1.030 Final   • Blood, UA 03/22/2022 Moderate (A) Negative Final   • pH, Urine 03/22/2022 5.5  5.0 - 8.0 Final   • Protein, POC 03/22/2022 30 mg/dL (A) Negative mg/dL Final   • Urobilinogen, UA 03/22/2022 Normal  Normal Final   • Nitrite, UA 03/22/2022 Positive (A) Negative Final   • Leukocytes 03/22/2022 Trace (A) Negative Final   • Rapid Strep A Screen 03/22/2022 Negative  Negative, VALID, INVALID, Not Performed Final   • Internal Control 03/22/2022 Passed  Passed Final   • Lot Number 03/22/2022 [id number]   Final   • Expiration Date 03/22/2022 05/31/2023   Final   • Rapid Influenza A Ag 03/22/2022 Negative  Negative Final   • Rapid Influenza B Ag 03/22/2022 Negative  Negative Final   • Internal Control 03/22/2022 Passed  Passed Final   • Lot Number 03/22/2022 [id number]   Final   • Expiration Date 03/22/2022 [id number]   Final   • Urine Culture 03/22/2022 Yeast isolated (A)  Final    No further workup   Office Visit on 02/16/2022   Component Date Value Ref Range  Status   • Glucose 02/16/2022 85  65 - 99 mg/dL Final   • BUN 02/16/2022 12  6 - 20 mg/dL Final   • Creatinine 02/16/2022 0.92  0.57 - 1.00 mg/dL Final   • Sodium 02/16/2022 136  136 - 145 mmol/L Final   • Potassium 02/16/2022 4.0  3.5 - 5.2 mmol/L Final   • Chloride 02/16/2022 100  98 - 107 mmol/L Final   • CO2 02/16/2022 25.0  22.0 - 29.0 mmol/L Final   • Calcium 02/16/2022 9.9  8.6 - 10.5 mg/dL Final   • Total Protein 02/16/2022 8.2  6.0 - 8.5 g/dL Final   • Albumin 02/16/2022 5.30 (A) 3.50 - 5.20 g/dL Final   • ALT (SGPT) 02/16/2022 26  1 - 33 U/L Final   • AST (SGOT) 02/16/2022 25  1 - 32 U/L Final   • Alkaline Phosphatase 02/16/2022 60  39 - 117 U/L Final   • Total Bilirubin 02/16/2022 0.5  0.0 - 1.2 mg/dL Final   • eGFR Non  Amer 02/16/2022 73  >60 mL/min/1.73 Final   • Globulin 02/16/2022 2.9  gm/dL Final   • A/G Ratio 02/16/2022 1.8  g/dL Final   • BUN/Creatinine Ratio 02/16/2022 13.0  7.0 - 25.0 Final   • Anion Gap 02/16/2022 11.0  5.0 - 15.0 mmol/L Final   • Total Cholesterol 02/16/2022 207 (A) 0 - 200 mg/dL Final   • Triglycerides 02/16/2022 163 (A) 0 - 150 mg/dL Final   • HDL Cholesterol 02/16/2022 55  40 - 60 mg/dL Final   • LDL Cholesterol  02/16/2022 123 (A) 0 - 100 mg/dL Final   • VLDL Cholesterol 02/16/2022 29  5 - 40 mg/dL Final   • LDL/HDL Ratio 02/16/2022 2.17   Final   • TSH 02/16/2022 2.520  0.270 - 4.200 uIU/mL Final   • 25 Hydroxy, Vitamin D 02/16/2022 35.9  ng/ml Final   • Iron 02/16/2022 167 (A) 37 - 145 mcg/dL Final   • Iron Saturation 02/16/2022 39  20 - 50 % Final   • Transferrin 02/16/2022 286  200 - 360 mg/dL Final   • TIBC 02/16/2022 426  298 - 536 mcg/dL Final   • Ferritin 02/16/2022 169.00 (A) 13.00 - 150.00 ng/mL Final   • Folate 02/16/2022 >20.00  4.78 - 24.20 ng/mL Final   • Vitamin B-12 02/16/2022 1,225 (A) 211 - 946 pg/mL Final   • Magnesium 02/16/2022 2.3  1.6 - 2.6 mg/dL Final   • WBC 02/16/2022 9.43  3.40 - 10.80 10*3/mm3 Final   • RBC 02/16/2022 5.22  3.77 - 5.28  10*6/mm3 Final   • Hemoglobin 02/16/2022 15.4  12.0 - 15.9 g/dL Final   • Hematocrit 02/16/2022 45.1  34.0 - 46.6 % Final   • MCV 02/16/2022 86.4  79.0 - 97.0 fL Final   • MCH 02/16/2022 29.5  26.6 - 33.0 pg Final   • MCHC 02/16/2022 34.1  31.5 - 35.7 g/dL Final   • RDW 02/16/2022 12.1 (A) 12.3 - 15.4 % Final   • RDW-SD 02/16/2022 37.3  37.0 - 54.0 fl Final   • MPV 02/16/2022 8.8  6.0 - 12.0 fL Final   • Platelets 02/16/2022 329  140 - 450 10*3/mm3 Final   • Neutrophil % 02/16/2022 56.8  42.7 - 76.0 % Final   • Lymphocyte % 02/16/2022 36.2  19.6 - 45.3 % Final   • Monocyte % 02/16/2022 5.2  5.0 - 12.0 % Final   • Eosinophil % 02/16/2022 1.1  0.3 - 6.2 % Final   • Basophil % 02/16/2022 0.3  0.0 - 1.5 % Final   • Immature Grans % 02/16/2022 0.4  0.0 - 0.5 % Final   • Neutrophils, Absolute 02/16/2022 5.36  1.70 - 7.00 10*3/mm3 Final   • Lymphocytes, Absolute 02/16/2022 3.41 (A) 0.70 - 3.10 10*3/mm3 Final   • Monocytes, Absolute 02/16/2022 0.49  0.10 - 0.90 10*3/mm3 Final   • Eosinophils, Absolute 02/16/2022 0.10  0.00 - 0.40 10*3/mm3 Final   • Basophils, Absolute 02/16/2022 0.03  0.00 - 0.20 10*3/mm3 Final   • Immature Grans, Absolute 02/16/2022 0.04  0.00 - 0.05 10*3/mm3 Final   • nRBC 02/16/2022 0.0  0.0 - 0.2 /100 WBC Final       EKG Results:  No orders to display       Imaging Results:  No Images in the past 120 days found..      Assessment & Plan   Diagnoses and all orders for this visit:    1. ADHD (attention deficit hyperactivity disorder), inattentive type (Primary)  -     amphetamine-dextroamphetamine (ADDERALL) 10 MG tablet; Take 1 tablet by mouth Daily.  Dispense: 30 tablet; Refill: 0  -     amphetamine-dextroamphetamine (ADDERALL) 20 MG tablet; Take 1 tablet by mouth 2 (Two) Times a Day.  Dispense: 60 tablet; Refill: 0  -     hydrOXYzine (ATARAX) 50 MG tablet; Take 2 tablets by mouth every night at bedtime. for anxiety  Dispense: 60 tablet; Refill: 2    2. Generalized anxiety disorder  -     FLUoxetine  (PROzac) 20 MG capsule; Take 3 capsules by mouth Daily.  Dispense: 90 capsule; Refill: 2  -     hydrOXYzine (ATARAX) 50 MG tablet; Take 2 tablets by mouth every night at bedtime. for anxiety  Dispense: 60 tablet; Refill: 2    3. Major depressive disorder in partial remission, unspecified whether recurrent (HCC)  -     FLUoxetine (PROzac) 20 MG capsule; Take 3 capsules by mouth Daily.  Dispense: 90 capsule; Refill: 2    4. Insomnia due to mental condition        Presentation most consistent with major depressive disorder in partial remission, generalized anxiety disorder, and ADHD.  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  Also endorses a childhood history that is compelling.    6/15: Residual anxiety, increase prozac. Goal is to increase until maxed out; then possible switch. Depression has resolved. 18 minutes of supportive psychotherapy with goal to strengthen defenses, promote problems solving, restore adaptive functioning and provide symptom relief. The therapeutic alliance was strengthened to encourage the patient to express their thoughts and feelings. Esteem building was enhanced through praise, reassurance, normalizing and encouragement. Coping skills were enhanced to build distress tolerance skills and emotional regulation. Allowed patient to freely discuss issues without interruption or judgement with unconditional positive regard, active listening skills, and empathy. Provided a safe, confidential environment to facilitate the development of a positive therapeutic relationship and encourage open, honest communication. Assisted patient in identifying risk factors which would indicate the need for higher level of care including thoughts to harm self or others and/or self-harming behavior and encouraged  patient to contact this office, call 911, or present to the nearest emergency room should any of these events occur. Assisted patient in processing session content; acknowledged and normalized patient’s thoughts, feelings, and concerns by utilizing a person-centered approach in efforts to build appropriate rapport and a positive therapeutic relationship with open and honest communication. Patient given education on medication side effects, diagnosis/illness and relapse symptoms. Plan to continue supportive psychotherapy in next appointment to provide symptom relief.  Diagnoses: as above  Symptoms: as above  Functional status: good  Mental Status Exam: as above    Treatment plan: Medication management and supportive psychotherapy  Prognosis: good  Progress: better  6 wks    5/4: Therapy referral.  Increase Prozac.  May consider switching from bupropion to Abilify in the future.  Patient was not able to tolerate bupropion 450 mg daily.  17 minutes of supportive psychotherapy with goal to strengthen defenses, promote problems solving, restore adaptive functioning and provide symptom relief. The therapeutic alliance was strengthened to encourage the patient to express their thoughts and feelings. Esteem building was enhanced through praise, reassurance, normalizing and encouragement. Coping skills were enhanced to build distress tolerance skills and emotional regulation. Patient given education on medication side effects, diagnosis/illness and relapse symptoms. Plan to continue supportive psychotherapy in next appointment to provide symptom relief.  Diagnoses: as above  Symptoms: as above  Functional status: Excellent  Mental Status Exam: as above    Treatment plan: Medication management and supportive psychotherapy  Prognosis: Very good  Progress: Significant since our first visit.  Now developing reemerging depression  6 weeks    3/16: No change to medications, no side effects.  Living with in-laws at their house has  been a source of anxiety for the patient.  Also taking care of her daughter, who she is very protective of.  Some issues with keeping restraint during therapy sessions as well.  17 minutes of supportive psychotherapy with goal to strengthen defenses, promote problems solving, restore adaptive functioning and provide symptom relief. The therapeutic alliance was strengthened to encourage the patient to express their thoughts and feelings. Esteem building was enhanced through praise, reassurance, normalizing and encouragement. Coping skills were enhanced to build distress tolerance skills and emotional regulation. Patient given education on medication side effects, diagnosis/illness and relapse symptoms. Plan to continue supportive psychotherapy in next appointment to provide symptom relief.  6 weeks    2/2: Anxious about the weather, and its consequences (closed daycares, etc.). 18 minutes of supportive psychotherapy with goal to strengthen defenses, promote problems solving, restore adaptive functioning and provide symptom relief. The therapeutic alliance was strengthened to encourage the patient to express their thoughts and feelings. Esteem building was enhanced through praise, reassurance, normalizing and encouragement. Coping skills were enhanced to build distress tolerance skills and emotional regulation. Patient given education on medication side effects, diagnosis/illness and relapse symptoms. Plan to continue supportive psychotherapy in next appointment to provide symptom relief.  6 wks    12/22: Increase Prozac to target anxiety, increase hydroxyzine to target insomnia. 17 minutes of supportive psychotherapy with goal to strengthen defenses, promote problems solving, restore adaptive functioning and provide symptom relief. The therapeutic alliance was strengthened to encourage the patient to express their thoughts and feelings. Esteem building was enhanced through praise, reassurance, normalizing and  encouragement. Coping skills were enhanced to build distress tolerance skills and emotional regulation. Patient given education on medication side effects, diagnosis/illness and relapse symptoms. Plan to continue supportive psychotherapy in next appointment to provide symptom relief.  6 weeks    11/17: Some ADHD symptoms re-emerging. Add 10 mg adderall at 11 am. Schedule will be 7:30 first dose, 11 second higher dose of 30 mg. Pt also interested in therapy. 17 minutes of supportive psychotherapy with goal to strengthen defenses, promote problems solving, restore adaptive functioning and provide symptom relief. The therapeutic alliance was strengthened to encourage the patient to express their thoughts and feelings. Esteem building was enhanced through praise, reassurance, normalizing and encouragement. Coping skills were enhanced to build distress tolerance skills and emotional regulation. Patient given education on medication side effects, diagnosis/illness and relapse symptoms. Plan to continue supportive psychotherapy in next appointment to provide symptom relief.  4 wks.    10/15: Start prozac. Hydroxyzine at night calms her; consider adding am dose. 4 wks    8/20: Patient is doing extremely well.  No change to medications.  See back in 8 weeks.    6/18: Doing a little better, however patient was doing much better on Adderall 20 mg twice a day, which was her previous dose.  Discontinue Adderall XR and start Adderall immediate release 20 mg twice a day.  Some residual depressive and anxiety symptoms that are well controlled on bupropion.  Treating ADHD will also treat residual depressive and anxiety symptoms as well.  See back in 2 months.  Declines psychotherapy.    Visit Diagnoses:    ICD-10-CM ICD-9-CM   1. ADHD (attention deficit hyperactivity disorder), inattentive type  F90.0 314.00   2. Generalized anxiety disorder  F41.1 300.02   3. Major depressive disorder in partial remission, unspecified whether  recurrent (HCC)  F32.4 296.25   4. Insomnia due to mental condition  F51.05 300.9     327.02       PLAN:  74. Risk Assessment: Risk of self-harm acutely is low. Risk factors include anxiety disorder, mood disorder, access to weapons, recent psychosocial stressors. Protective factors include no family history, no present SI, no history of suicide attempts or self-harm in the past, minimal AODA, healthcare seeking, future orientation, willingness to engage in care,  baby. Risk of self-harm chronically is also low, but could be further elevated in the event of treatment noncompliance and/or AODA.  75. Safety: No acute safety concerns.  76. Medications:   a. INCREASE prozac 40 to 60 mg PO QDAY. Risks, benefits, alternatives discussed with patient including GI upset, nausea vomiting diarrhea, theoretical decrease of seizure threshold predisposing the patient to a slightly higher seizure risk, headaches, sexual dysfunction, serotonin syndrome, bleeding risk, increased suicidality in patients 24 years and younger.  After discussion of these risks and benefits, the patient voiced understanding and agreed to proceed.  b. CONTINUE Bupropion  mg p.o. daily to target attentional issues. Risks, benefits, alternatives discussed with patient including nausea, GI upset, increased energy, exacerbation of irritability, lowering of seizure threshold. After discussion of these risks and benefits, the patient voiced understanding and agreed to proceed.   c. CONTINUE hydroxyzine 100 mg PO QHS. Risks, benefits, alternatives discussed with patient including sedation, dizziness, fall risk, GI upset.  After discussion of these risks and benefits, the patient voiced understanding and agreed to proceed.  d. CONTINUE Adderall 30 mg PO QAM, 20 mg PO QPM. Risks, benefits, side effects discussed with patient including elevated heart rate, elevated blood pressure, irritability, insomnia, sexual dysfunction, appetite suppressing  properties, psychosis.  After discussion of these risks and benefits, the patient voiced understanding and agreed to proceed. Controlled substances consent verbally signed. UDS and Tom ordered.  e. S/P   i. Escitalopram: sexual dysfunction  ii. Adderall XR 10 mg daily: not effective  iii. Bupropion  made her nauseated  77. Therapy: Consider in the future, presently not interested.  78. Labs/Studies: EKG reassuring.  79. Follow Up: 6 wks.      TREATMENT PLAN/GOALS: Continue supportive psychotherapy efforts and medications as indicated. Treatment and medication options discussed during today's visit. Patient acknowledged and verbally consented to continue with current treatment plan and was educated on the importance of compliance with treatment and follow-up appointments.    MEDICATION ISSUES:  TOM reviewed as expected.  Discussed medication options and treatment plan of prescribed medication as well as the risks, benefits, and side effects including potential falls, possible impaired driving and metabolic adversities among others. Patient is agreeable to call the office with any worsening of symptoms or onset of side effects. Patient is agreeable to call 911 or go to the nearest ER should he/she begin having SI/HI. No medication side effects or related complaints today.     MEDS ORDERED DURING VISIT:  New Medications Ordered This Visit   Medications   • FLUoxetine (PROzac) 20 MG capsule     Sig: Take 3 capsules by mouth Daily.     Dispense:  90 capsule     Refill:  2   • amphetamine-dextroamphetamine (ADDERALL) 10 MG tablet     Sig: Take 1 tablet by mouth Daily.     Dispense:  30 tablet     Refill:  0   • amphetamine-dextroamphetamine (ADDERALL) 20 MG tablet     Sig: Take 1 tablet by mouth 2 (Two) Times a Day.     Dispense:  60 tablet     Refill:  0   • hydrOXYzine (ATARAX) 50 MG tablet     Sig: Take 2 tablets by mouth every night at bedtime. for anxiety     Dispense:  60 tablet     Refill:  2     This  prescription was filled on 4/9/2022. Any refills authorized will be placed on file.       Return in about 6 weeks (around 7/27/2022).         This document has been electronically signed by Eli Murray MD  Melanie 15, 2022 08:57 EDT

## 2022-06-15 NOTE — PROGRESS NOTES
"Progress Note    Date: June 22, 2022  Time In: 0815  Time Out: 0910    Patient Name: Janice Padilla  Patient Age: 26 y.o.  Referring Provider:   Eli Murray Md  [address]   [address]  [place] [address]       Subjective   History of Present Illness     Janice Padilla is a 26 y.o. female with a self-reported history of depression, anxiety, and ADHD.  Patient states that her depression and ADHD have been managed effectively with medication, but she continues to have increased worry and anxiety throughout her day.  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  She currently lives with her father-in-law and sister-in-law in [facility] with her  ( for since 2015, together for 11 years) and 2-year-old daughter.  Patient reports that they are building a home on the property, and have bought a camper to live and in the meantime (planning to move in within the next week).  Patient is voluntarily requesting to participate in outpatient therapy at [facility].      History obtained from referring provider's note on 6/15/22:  Past Psychiatric History:  Began Psychiatric Treatment: Since 2011   Dx: Anxiety   Psychiatrist: Has not seen a psychiatrist   Therapist: Saw therapist in 2018x1, unsure if it was helpful.   : Denies   Admissions History: Denies   Medication Trials: Zoloft caused weight gain, she cannot remember how Lexapro affected her, Prozac made her \"numb\", also tried BuSpar   Self-Harm: Denies   Suicide Attempts: Denies     Substance Abuse History:  Types: Rare social alcohol, denies all else, including tobacco and illicit   Withdrawl Symptoms: Denies   Longest period sober: Not applicable   AA: N/A   Admissions History: Denies   Residential History: Denies   Legal: N/A     Social History:  Marital Status:    Employed: Yes   Employer: Nurse at a " "dermatology clinic   Kids: 1 child, a baby girl, Alexandra   House: Has her own house    Hx: Denies     Family History:  Suicide Attempts: Denies   Suicide Completions: Denies   Substance Use: Likely none   Psychiatric Conditions: Mom has bipolar, sister may have ADHD    depression, psychosis, anxiety: Patient has a history of postpartum depression     Developmental History:  Born: [place]   Siblings: 1 sister   Childhood: no abuse   High School: Completed   College: Has her RN degree       Assessment    Mental Status Exam     Appearance: good hygiene and dressed appropriately for the weather  Behavior: restless  Cooperation:  engaged, cooperative, attentive, and friendly  Eye Contact:  good  Affect:  congruent and somewhat tearful  Mood: expressive and anxious  Speech: responsive  Thought Process:  linear and organized  Thought Content: appropriate and abstract  Suicidal: denies  Homicidal:  denies  Hallucinations:  denies  Memory:  intact  Orientation:  person, place, time, and situation  Reliability:  reliable  Insight:  good  Judgement:  good    Clinical Intervention       ICD-10-CM ICD-9-CM   1. Generalized anxiety disorder  F41.1 300.02        Individual psychotherapy was provided utilizing CBT to build rapport, encourage expression of thoughts and feelings, identify goals for treatment, develop healthy communication skills, assess symptoms and gather history.  Patient reports that she typically will \"stay quiet\" when people ask how she is feeling.  She states that sometimes it is difficult to identify her emotions.  Patient was given basic emotions to review and was encouraged to create a daily mood log in order to start recognizing and managing her emotions. Patient was educated on I-statements and how to incorporate feeling words to promote healthy communication.  We will continue to discuss healthy communication styles in the next session.    Plan   Plan & Goals     Patient acknowledged " and verbally consented to continue with current treatment plan and was educated on the importance of participation in the therapeutic process.     1. Utilize coping strategies as discussed in session.   2. Remain compliant with medication regimen as prescribed. Discuss any medication side effects, questions or concerns with prescribing provider.  3. Call 911 or present to the nearest emergency room in an emergency situation. National Suicide Prevention Lifeline: 1-916.466.2020.    Return in about 2 weeks (around 7/6/2022) for Next scheduled follow up.    ____________________  This document has been electronically signed by Yareli Yeung LCSW  June 22, 2022 09:28 EDT    Part of this note may be an electronic transcription/translation of spoken language to printed text using the Dragon Dictation System.

## 2022-06-22 ENCOUNTER — OFFICE VISIT (OUTPATIENT)
Dept: PSYCHIATRY | Facility: CLINIC | Age: 27
End: 2022-06-22

## 2022-06-22 DIAGNOSIS — F41.1 GENERALIZED ANXIETY DISORDER: Primary | ICD-10-CM

## 2022-06-22 PROCEDURE — 90837 PSYTX W PT 60 MINUTES: CPT | Performed by: SOCIAL WORKER

## 2022-06-22 NOTE — PSYCHOTHERAPY NOTE
"Psychotherapy Note - June 22, 2022    Has had a therapist recently - \"wasn't getting anywhere\" - telehealth only (6-7 sessions)  Had some family sessions with  as well  I felt like I was leading the sessions - it wasn't working    Dad passed away right before I went to   I was seeing a counselor with 2 other kids with my sister because their parents passed away too    Early 20's, late teens    Diagnoses:  Depression  Anxiety  ADHD    Anxiety is most prominent - makes ADHD worse  \"I'm a worrier\" - stomach gets upset - feels sick - stomach drops  Chews on cheeks and lip, picks at finger nails  Stays quiet - used to google a lot (mostly stuff about daughter)  It's gotten a lot better     Daughter (age 2) -  stayed - during the COVID lockdown so no one else  Fertility issues - had everything planned out how perfect it was going   Post partum depression - was put on medication    Works as a nurse in a dermatology  2018 - been there since  Multiple rashes - trying to keep everything straight  Makes a list to help -- same provider     works too    Sold house in Nov 2021 - going to build a house because we have land  Moved in father-in-law & sister-in-law[place] - [place] (daughter-age 2)  We bought a camper (32ft) and we are waiting for utilities to be connected  Daughter has own bedroom and we will have our own bedroom    Living on 's family's land - gets along well with his dad  DARINEL is  - frugal - difficult to live with    Mom lives in [place] (5 min from us) - DARINEL in [facility]  Older sister in [place] - her and my niece live in [place]  Was close to niece - super close to her and they just moved so quickly (2.5 hours away)    Wasn't really close to dad's side of the family while growing up    Mom has depression and anxiety (possible bipolar - not on medicine)   Sister has anxiety, depression, adhd, borderline personality  Mom's side of the family - I know " "there's mental illness there but no push for psychiatry - not medicated    Stigma from maternal side of the family - against mental illness    Mom will make passive aggressive statements like \"go talk to your psychiatrist about that\"  I would never tell my mom what medicines I'm on because she     Best friend - she starts talking about herself - but she is somewhat supportive     since 2015 - together for 11 years (high school sweethearts)  's parents     My mom has been  4 times    Wants to communicate better with   Sharing feelings  Recognizing feelings    Bad about boundaries -- self-care, working on boundaries    Alexandra is my priority - I can't drop everything for my mom anymore  I used to just leave -- she would threaten suicide    **TO TALK ABOUT CHILDHOOD**  Root cause of a lot of these feelings     Worry about things on my calendar -- other things to worry about   "

## 2022-07-06 ENCOUNTER — TELEMEDICINE (OUTPATIENT)
Dept: PSYCHIATRY | Facility: CLINIC | Age: 27
End: 2022-07-06

## 2022-07-06 DIAGNOSIS — F41.1 GENERALIZED ANXIETY DISORDER: Primary | ICD-10-CM

## 2022-07-06 PROCEDURE — 90837 PSYTX W PT 60 MINUTES: CPT | Performed by: SOCIAL WORKER

## 2022-07-06 NOTE — PSYCHOTHERAPY NOTE
"Psychotherapy Note - July 6, 2022    Brain going 90 miles per minute  Stomach hurts bad  Feeling nauseous  It happens when I'm worried about something with my daughter    Doesn't google problems anymore, so that's improving    Sometimes I like to believe that everything else is going well    Mom was unpredictable  I have a sister - we had each other  I never had a stable adult  Mom had a lot of boyfriends      bottles a lot of stuff up  Together for 11 years (has only cried twice)  I ask him \"why are you frustrated with me\"    I don't want Alexandra to feel like she has to walk on eggshells around me  I don't want her to worry about me getting mad  I want her to know that I'm here to give her adult advice  (when it comes to relationship and filling out taxes)    When I talk to my mom, it all comes back to her  I can never talk about     Passive communication style  Conflict resolution - to do in front of daughter so she sees it  "

## 2022-07-06 NOTE — PROGRESS NOTES
"Progress Note    Date: July 6, 2022  Time In: 0859  Time Out: 0952    Patient Name: Janice Padilla  Patient Age: 26 y.o.    Mode of visit: Video  Location of provider: [address], [place] [address].  Location of patient: Home    Patient is seen remotely using [employer]. Patient is being seen via telehealth and patient confirms that they are in a secure environment for this session. The patient's condition being diagnosed/treated is appropriate for telemedicine. The provider identified herself as well as her credentials. The patient gave consent to be seen remotely, and when consent is given they understand that the consent allows for patient identifiable information to be sent to a third party as needed. They may refuse to be seen remotely at any time. The electronic data is encrypted and password protected, and the patient has been advised of the potential risks to privacy not withstanding such measures. Patient consented to the use of video for the purpose of a telehealth session today. The visit included audio and video interaction. No technical issues occurred during this visit.      Subjective   History of Present Illness     From previous progress note on 6/22/22:   Patient was given basic emotions to review and was encouraged to create a daily mood log in order to start recognizing and managing her emotions. Patient was educated on I-statements and how to incorporate feeling words to promote healthy communication.  We will continue to discuss healthy communication styles in the next session.      Janice Padilla is a 26 y.o. female who presents today as a follow-up for continued psychotherapy.  Patient reports that she has been trying to express her feelings, but states that there is still conflict with her  and the understanding \"mental health issues.\"  Patient states that anytime she expresses herself, other people tend to think that she has been a \"bad mood.\"  Patient " "reports that she has been keeping a mood log, and she reports writing \"anxious\" a lot.  She describes her symptoms of anxiety as nausea, thoughts racing, and stomach hurting.      Assessment    Mental Status Exam     Appearance: appeared to have good hygiene  Appearance: good hygiene and dressed appropriately for the weather  Behavior: calm  Cooperation:  engaged, cooperative, attentive, and friendly  Eye Contact:  good  Affect:  congruent and guarded  Mood: expressive  Speech: responsive  Thought Process:  linear and organized  Thought Content: appropriate, abstract, ruminating thoughts  Suicidal: denies  Homicidal:  denies  Hallucinations:  denies  Memory:  intact  Orientation:  person, place, time, and situation  Reliability:  reliable  Insight:  good  Judgement:  good      Clinical Intervention       ICD-10-CM ICD-9-CM   1. Generalized anxiety disorder  F41.1 300.02        Individual psychotherapy was provided utilizing IPT & CBT to provide symptom relief, establish new coping skills, develop healthy communication skills and identify communication style.  Patient was educated on the 4 types of communication styles, and was given information regarding how to recognize the styles and to work towards assertive communication.  Patient states that she identifies more with a passive style of communication and she recognizes different styles in her family.  She was educated on conflict resolution and healthy ways of expressing herself.  Patient was encouraged to shift her way of thinking (perfectionistic) from \"good or bad\" instead to \"making progress.\"      Plan   Plan & Goals     Patient reports that she is motivated to work more towards assertive communication, as discussed in session.  We will need to continue to address assertion and we will discuss this further next session. Patient had good insight into building her self-esteem by feeling successful, and states that she will try recognizing even small tasks that " were completed at the end of her day as making progress.    1. Patient acknowledged and verbally consented to continue working toward resolving current treatment plan goals and was educated on the importance of participation in the therapeutic process.  2. Patient will remain compliant with medication regimen as prescribed. Discuss any medication side effects, questions or concerns with prescribing provider.  3. Call 911 or present to the nearest emergency room in an emergency situation. National Suicide Prevention Lifeline: 1-478.902.8974.    Return in about 2 weeks (around 7/20/2022) for Next scheduled follow up.    ____________________  This document has been electronically signed by Yareli Yeung LCSW  July 6, 2022 10:03 EDT    Part of this note may be an electronic transcription/translation of spoken language to printed text using the Dragon Dictation System.

## 2022-07-27 ENCOUNTER — OFFICE VISIT (OUTPATIENT)
Dept: PSYCHIATRY | Facility: CLINIC | Age: 27
End: 2022-07-27

## 2022-07-27 VITALS
BODY MASS INDEX: 33.57 KG/M2 | HEIGHT: 61 IN | SYSTOLIC BLOOD PRESSURE: 120 MMHG | DIASTOLIC BLOOD PRESSURE: 80 MMHG | WEIGHT: 177.8 LBS

## 2022-07-27 DIAGNOSIS — F41.1 GENERALIZED ANXIETY DISORDER: Primary | ICD-10-CM

## 2022-07-27 DIAGNOSIS — F90.0 ADHD (ATTENTION DEFICIT HYPERACTIVITY DISORDER), INATTENTIVE TYPE: ICD-10-CM

## 2022-07-27 DIAGNOSIS — F51.05 INSOMNIA DUE TO MENTAL CONDITION: ICD-10-CM

## 2022-07-27 DIAGNOSIS — F32.4 MAJOR DEPRESSIVE DISORDER IN PARTIAL REMISSION, UNSPECIFIED WHETHER RECURRENT: ICD-10-CM

## 2022-07-27 PROCEDURE — 99214 OFFICE O/P EST MOD 30 MIN: CPT | Performed by: STUDENT IN AN ORGANIZED HEALTH CARE EDUCATION/TRAINING PROGRAM

## 2022-07-27 PROCEDURE — 90833 PSYTX W PT W E/M 30 MIN: CPT | Performed by: STUDENT IN AN ORGANIZED HEALTH CARE EDUCATION/TRAINING PROGRAM

## 2022-07-27 RX ORDER — DULOXETIN HYDROCHLORIDE 30 MG/1
30 CAPSULE, DELAYED RELEASE ORAL DAILY
Qty: 30 CAPSULE | Refills: 2 | Status: SHIPPED | OUTPATIENT
Start: 2022-07-27 | End: 2022-10-03

## 2022-07-27 RX ORDER — DEXTROAMPHETAMINE SACCHARATE, AMPHETAMINE ASPARTATE, DEXTROAMPHETAMINE SULFATE AND AMPHETAMINE SULFATE 5; 5; 5; 5 MG/1; MG/1; MG/1; MG/1
1 TABLET ORAL 2 TIMES DAILY
Qty: 60 TABLET | Refills: 0 | Status: SHIPPED | OUTPATIENT
Start: 2022-07-27 | End: 2022-09-06

## 2022-07-27 RX ORDER — DEXTROAMPHETAMINE SACCHARATE, AMPHETAMINE ASPARTATE, DEXTROAMPHETAMINE SULFATE AND AMPHETAMINE SULFATE 2.5; 2.5; 2.5; 2.5 MG/1; MG/1; MG/1; MG/1
1 TABLET ORAL DAILY
Qty: 30 TABLET | Refills: 0 | Status: SHIPPED | OUTPATIENT
Start: 2022-07-27 | End: 2022-09-06

## 2022-07-27 RX ORDER — MINOCYCLINE HYDROCHLORIDE 100 MG/1
CAPSULE ORAL
COMMUNITY
End: 2022-12-16

## 2022-07-27 NOTE — PATIENT INSTRUCTIONS
1.  Please return to clinic at your next scheduled visit.  Contact the clinic ([phone]) at least 24 hours prior in the event you need to cancel.  2.  Do no harm to yourself or others.    3.  Avoid alcohol and drugs.    4.  Take all medications as prescribed.  Please contact the clinic with any concerns. If you are in need of medication refills, please call the clinic at [phone].    5. Should you want to get in touch with your provider, Dr. Eli Murray, please utilize [employer] or contact the office ([phone]), and staff will be able to page Dr. Murray directly.  6.  In the event you have personal crisis, contact the following crisis numbers: Suicide Prevention Hotline 1-866.742.2699; [facility] Helpline 7-202-161-OFZM; [facility] Emergency Room [phone]; text HELLO to 208438; or 065.     SPECIFIC RECOMMENDATIONS:     1.      Medications discussed at this encounter:                   - stop prozac, wait 2 weeks, start cymbalta     2.      Psychotherapy recommendations:      3.     Return to clinic: 6 weeks

## 2022-07-27 NOTE — TREATMENT PLAN
Multi-Disciplinary Problems (from Behavioral Health Treatment Plan)    Active Problems     Problem: Anxiety  Start Date: 07/27/22    Problem Details: The patient self-scales this problem as a 4 with 10 being the worst.        Goal Priority Start Date Expected End Date End Date    Patient will develop and implement behavioral and cognitive strategies to reduce anxiety and irrational fears. -- 07/27/22 -- --    Goal Details: Progress toward goal:  Not appropriate to rate progress toward goal since this is the initial treatment plan.        Goal Intervention Frequency Start Date End Date    Help patient explore past emotional issues in relation to present anxiety. Q Month 07/27/22 --    Intervention Details: Duration of treatment until until remission of symptoms.        Goal Intervention Frequency Start Date End Date    Help patient develop an awareness of their cognitive and physical responses to anxiety. Q Month 07/27/22 --    Intervention Details: Duration of treatment until until remission of symptoms.              Problem: Depression  Start Date: 07/27/22    Problem Details: The patient self-scales this problem as a 8 with 10 being the worst.        Goal Priority Start Date Expected End Date End Date    Patient will demonstrate the ability to initiate new constructive life skills outside of sessions on a consistent basis. -- 07/27/22 -- --    Goal Details: Progress toward goal:  Not appropriate to rate progress toward goal since this is the initial treatment plan.        Goal Intervention Frequency Start Date End Date    Assist patient in setting attainable activities of daily living goals. PRN 07/27/22 --    Goal Intervention Frequency Start Date End Date    Provide education about depression Q Month 07/27/22 --    Intervention Details: Duration of treatment until until remission of symptoms.        Goal Intervention Frequency Start Date End Date    Assist patient in developing healthy coping strategies. Q Month  07/27/22 --    Intervention Details: Duration of treatment until until remission of symptoms.                    Reviewed By     Eli Murray MD 07/27/22 4682                 I have discussed and reviewed this treatment plan with the patient.

## 2022-07-27 NOTE — PROGRESS NOTES
"Subjective   Janice Padilla is a 26 y.o. female who presents today for follow up    Referring Provider:  No referring provider defined for this encounter.    Chief Complaint:  adhd cornel mdd    History of Present Illness:     Janice Padilla is a 25 year old /White female referred by Balbina SEAMAN.     Chart review : Patient is on Wellbutrin, status post Zoloft. Also on Adderall. Had tachycardia at 130. Started on metoprolol 50 mg extended release daily. Labs in December: BMI 35, LDL is elevated at 121, ALT AST within normal limits, creatinine 0.99, hematocrit 45, electrolytes are essentially normal, TSH is 3.65 normal, iron is 53 low, vitamin D is 23 low, ferritin is normal at 96. No EKG or head imaging.     \"Janice\"  Father passed away from drunk driving at 6 yo     : In person interview:  1. Chart review: Now seeing psychotherapy. Lots of anxiety in her mood log.  2. Planning: Increased Prozac 40 to 60 at last visit to target anxiety.  Depression has resolved.    3. \"I'm depressed.\"  a. Crying today during appnt  b. Relp with mom is \"not stable.\" drinking heavily, and when she does, patient cannot deal with it.  c. Father  from drunk driving.  4. Mood/Depression: 8/10, crying spells (2 so far, one in the office), depressed mood  5. Anxiety: some worrying, 4/10, essentially controlled  6. Panic attacks: n  7. Energy: down  8. Concentration: down  9. Sleeping: well  10. Eating: weight gain  11. Refills: y  12. Substances: n  13. Therapy: continuing with [facility]  14. Medication compliant: y  15. No SI HI AVH.      6/15: In person interview:  16. Chart review: Seen by OB/GYN May 25 for well woman exam.  17. Planning: Increased Prozac at last visit.  Has she set up therapy with our office?  18. \"I'm better, but still depressed.\"  a. They got a camper so she will have some space. Also broke ground on her new home.  b. Anxiety: uncontrolled worrying, on edge, irritable a little, muscle " "tension  c. Still have days where she's just blah. \"What does tomorrow hold.\"  d. Dread \"every now and then.\"  e. Not really having depressive symptoms: Energy better, no hopelessness (\"I feel aleksey\"), no anhedonia  f. ADHD: fairly stable  19. Panic attacks: n  20. Energy: good, no psychomotor retardation  21. Concentration: stable  22. Sleeping: yes  23. Eating: less, stable weight however  24. Refills: y  25. Substances: n  26. Therapy: 6/22  27. Medication compliant: y  28. No SI HI AVH.      5/4: in person.  Interview:  29. Chart review: Seen by urgent care in March for nausea vomiting diarrhea.  CMP is reassuring in February with the exception of albumin 5.3.  30. \"I need a new therapist.\"  a. Interested in our office.  b. Worsening depression: poor energy, depressed mood, anhedonia developing.  c. ADHD under control  d. Some stress re: inlaws. Looking to move soon by building a house.  31. Refills: Yes  32. Substances: No  33. Therapy: Referral to our office  34. Medication compliant: Yes  35. No SI HI AVH.      3/16: Virtual visit via Zoom audio and video due to the COVID-19 pandemic.  Patient is accepting of and agreeable to visit.  The visit consisted of the patient and I. The patient is at home, and I am at the office.  Interview:  36. Chart review: Seen by primary care February 16 for vitamin D deficiency.  Vitamin D is normal now at 35.9.  CMP just shows elevated albumin 5.3, iron studies show  iron is high at 167 and ferritin is high at 169.  Lipids are abnormal, CBC is reassuring.  UDS is negative in May of last year.  37. \" I think I am doing well.\"  a. Some issues with anxiety related to living with her in-laws.  b. Still waiting on a bed to be accepted to build to their house.  c. Overprotective of her daughter.  Has a checkup today.  d. Anxiety, depression, ADHD are essentially under control, however.  38. Medication compliant: Yes  39. No SI HI AVH.      2/2: Virtual visit via Zoom audio and video " "due to the COVID-19 pandemic.  Patient is accepting of and agreeable to visit.  The visit consisted of the patient and I. Patient is at home, I'm at work.  40. Chart review: No new developments in chart review since December 22.  41. \"I'm good mentally.\"  a. Well, depression and anxiety are well controlled. So is ADHD.  b. Sore throat 2 wks ago, now cough.  42. P0, G7.  43. Energy: stable  44. Concentration: stable  45. Sleeping: well  46. Therapy: continuing, doing some couples therapy as well.  47. Medication compliant: y  48. No SI HI AVH.      12/22: Virtual visit via Zoom audio and video due to the COVID-19 pandemic.  Patient is accepting of and agreeable to visit.  The visit consisted of the patient and I.  Interview:  49. Chart review: No new chart developments since November 17.  50. \"Good.\"  a. Going thru some life challenges.   i.  closed due to COVID.  ii. Daughter is not sick.  iii. Switched adderall so that higher dose is in the morning. Sometimes doesn't even need the afternoon dose.  b. \"I'm handling things better than I used to.\"  51. Depression/Mood: denies  52. Anxiety: \"I feel like it's better\" \"I feel like it's under control\"  1. Uncontrolled worrying: much better  2. Severity: mild to moderate situational anxiety  3. Muscle tension  4. Fatigue has improved  5. Concentration better  6. Restlessness/feeling on edge: sometimes, situational  7. Irritability: denies  8. Insomnia:  a. Initial insomnia some nights, chronic \"my whole life\"  b. Started melatonin 10 mg nightly  9. Duration: months  10. Panic attacks: denies  53. Refills: y  54. Sleeping: n  55. Eating: stable  56. Substances: n  57. Therapy: started therapy as well.  58. Medication compliant: y  59. No SI HI AVH.      11/17: Virtual visit via Zoom audio and video due to the COVID-19 pandemic.  Patient is accepting of and agreeable to visit.  The visit consisted of the patient and I.  Interview:  60. Chart review: No new " "developments.  61. \"Doing okay.\"  a. Prozac has helped so far.  i. Anxiety and mood  b. Also done moving and sold their house.  c. More stuff at work.  62. ADHD:  a. Starting to procrastinate more.  b. Feels like she needs more at 12:30 - 1pm.   c. Takes adderall 7:30 am and 12:30 pm. Wears off around when she leaves work.   d. Feels like she is \"slipping back.\"  e. Weekend breaks  63. Depression/Mood: better mood, more energy  64. Anxiety: less worrying, less irritability, less on edge  65. Sleeping: \"fine\"  66. Eating: lost weight  67. Substances: denies  68. Therapy: interested  a. Feels females might be judging her  b. Thinks that might be due to her relp with her mom; they didn't get along growing up.  c. Mom treated her like she was never good enough.  69. Medication compliant: y  70. No SI HI AVH.      10/15: Virtual visit via Zoom audio and video due to the COVID-19 pandemic.  Patient is accepting of and agreeable to visit.  The visit consisted of the patient and I.  Interview:  71. Chart review: Seen recently by PCP and diagnosed with acute swimmer's ear on the right side.  72. \"I am ok.\"  73. Depression/Mood: denies  74. ADHD: under control  75. Stressors:  a. Sold her house  b. Now building a house; not sure where they are going to live  c. Taking care of her 19 mo old  d. Pandemic cases that were going up  76. Anxiety:  a. Irritable  b. Worrying  c. On edge  d. Obsessing about her daughter as well; constantly worried something bad is going to happen  e. Sometimes has trouble thinking about anything else but her  f. No panic attacks  77. Sleeping: yes  78. Eating: stable  79. Substances: denies  80. Therapy: not interested  81. Medication compliant: yes  82. No SI HI AVH.      8/20: Virtual visit via Zoom audio and video due to the COVID-19 pandemic.  Patient is accepting of and agreeable to visit.  The visit consisted of the patient and I.  Interview:  83. Seen by PCP June 23 and was doing well " "overall.  84. \"I'm good.\"  85. ADHD controlled.  86. Denies depression and anxiety. Some situational anxiety due to COVID-19 and her job.  87. Finally able to get around to doing projects at home.  88. Denies SI HI AVH.      6/18: In-person Interview: Patient reports some improvement on Adderall XR.  Discontinued Topamax.  No longer going to the weight loss clinic.  Notes residual depression and anxiety symptoms, but they are mild.  They are well controlled on the bupropion.  No SI HI AVH.  Denies depression, muscle tension, fatigue, restlessness. Continues to endorse poor concentration. See scores below.      IMPORTANT:  From previous note, patient was held back in the third grade. Struggled to complete high school and missed 60 days although she did graduate with good grades. Got in trouble for talking a lot in class. She was not in any special classes. She did not have an IEP.     1/6 H&P: Virtual visit via Zoom audio and video due to the COVID-19 pandemic. Patient is accepting of and agreeable to appointment. The appointment consisted of the patient and I only. Interview: Patient reports that she had her first child in March and suffered from postpartum depression. The pandemic did not help things. It was a \"hard time,\" and the patient reports she had already had anxiety before. Patient is presently bottlefeeding. She became pregnant through an infertility specialist. Presently not on any contraception.   Endorses muscle tension, fatigue, poor concentration, restlessness, irritability, and some broken sleep, although she is sleeping for about 8 hours a night. She wakes up frequently to check on her baby girl, Alexandra. Also endorses guilt at being a bad mom. Duration has been since March of last year. Patient feels her anxiety is worse than her depression.   Denies SI HI AVH. Has access to weapons that are locked in a gun safe. Patient does not know the combination. She also does not know how to work the guns that " "are in the safe. Psychiatric review of systems is positive for anxiety and depression, negative for psychosis and maribel.   Possible ADHD. Patient was diagnosed by Dr. Joe in 2017. 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 Possible family history as well as her sister may have ADHD.   Past Psychiatric History:  Began Psychiatric Treatment: Since    Dx: Anxiety   Psychiatrist: Has not seen a psychiatrist   Therapist: Saw therapist in 2018x1, unsure if it was helpful.   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     Feeling down, depressed, or hopeless?     Trouble falling or staying asleep, or sleeping too much?     Feeling tired or having little energy?     Poor appetite or overeating?     Feeling bad about " yourself - or that you are a failure or have let yourself or your family down?     Trouble concentrating on things, such as reading the newspaper or watching television?     Moving or speaking so slowly that other people could have noticed? Or the opposite - being so fidgety or restless that you have been moving around a lot more than usual?     Thoughts that you would be better off dead, or of hurting yourself in some way?     PHQ-9 Total Score       MATTHEW-7       Past Surgical History:  Past Surgical History:   Procedure Laterality Date   • ADENOIDECTOMY     • DILATATION AND CURETTAGE     • SKIN BIOPSY     • TONSILLECTOMY         Problem List:  Patient Active Problem List   Diagnosis   • Acne   • Anxiety   • Attention deficit hyperactivity disorder (ADHD)   • Depression   • Generalized anxiety disorder   • Gestational hypertension   • Major depressive disorder in partial remission (HCC)   • PCOS (polycystic ovarian syndrome)   • Pre-eclampsia       Allergy:   No Known Allergies     Discontinued Medications:  Medications Discontinued During This Encounter   Medication Reason   • Twyneo 0.1-3 % cream Other- See Medication Note   • FLUoxetine (PROzac) 20 MG capsule Not Efficacious   • amphetamine-dextroamphetamine (ADDERALL) 10 MG tablet Reorder   • amphetamine-dextroamphetamine (ADDERALL) 20 MG tablet Reorder       Current Medications:   Current Outpatient Medications   Medication Sig Dispense Refill   • Aklief 0.005 % cream      • amphetamine-dextroamphetamine (ADDERALL) 10 MG tablet Take 1 tablet by mouth Daily. 30 tablet 0   • amphetamine-dextroamphetamine (ADDERALL) 20 MG tablet Take 1 tablet by mouth 2 (Two) Times a Day. 60 tablet 0   • buPROPion XL (WELLBUTRIN XL) 300 MG 24 hr tablet Take 1 tablet by mouth Daily. 90 tablet 1   • hydrOXYzine (ATARAX) 50 MG tablet Take 2 tablets by mouth every night at bedtime. for anxiety 60 tablet 2   • ketoconazole (NIZORAL) 2 % shampoo apply to SCALP THREE TIMES WEEKLY     •  levocetirizine (XYZAL) 5 MG tablet Take 5 mg by mouth Every Evening.     • minocycline (MINOCIN,DYNACIN) 100 MG capsule      • minoxidil (LONITEN) 2.5 MG tablet TAKE 1/4 TABLET BY MOUTH EVERY DAY     • Prenatal MV-Min-Fe Fum-FA-DHA (PRENATAL+DHA PO)      • Soolantra 1 % cream      • spironolactone (ALDACTONE) 100 MG tablet spironolactone 100 mg oral tablet take 1 tablet (100 mg) by oral route once daily   Active     • triamcinolone (KENALOG) 0.1 % ointment apply to trunk TWICE DAILY AS NEEDED FOR itching     • vitamin D (ERGOCALCIFEROL) 1.25 MG (38125 UT) capsule capsule Take 1 capsule by mouth 1 (One) Time Per Week. 13 capsule 1   • DULoxetine (CYMBALTA) 30 MG capsule Take 1 capsule by mouth Daily. 30 capsule 2   • metoprolol succinate XL (Toprol XL) 25 MG 24 hr tablet Take 1 tablet by mouth Daily for 90 days. 90 tablet 1     No current facility-administered medications for this visit.       Past Medical History:  Past Medical History:   Diagnosis Date   • Acne    • ADHD    • Anxiety    • Depression    • Generalized anxiety disorder 01/06/2021   • History of medical problems 02/28/2019    Miscarriage   • Major depressive disorder in partial remission (HCC) 01/06/2021   • PCOS (polycystic ovarian syndrome)    • Pre-eclampsia        Social History     Socioeconomic History   • Marital status:    Tobacco Use   • Smoking status: Never Smoker   • Smokeless tobacco: Never Used   • Tobacco comment: 2/10/2021- 1/6/2021   Vaping Use   • Vaping Use: Never used   Substance and Sexual Activity   • Alcohol use: Yes     Comment: light; 2/10/2021- 1/6/2021   • Drug use: Never     Comment: 2/10/2021- 1/6/2021   • Sexual activity: Yes     Partners: Male     Birth control/protection: None       Family History   Problem Relation Age of Onset   • Anxiety disorder Mother    • Bipolar disorder Mother    • Depression Mother    • ADD / ADHD Sister    • Depression Sister    • ADD / ADHD Sister        Mental Status Exam:   Hygiene:    "good, groomed  Cooperation:  Cooperative  Eye Contact:  Good  Psychomotor Behavior:  Appropriate  Affect:  Depressed and tearful, congruent  Mood: depressed  Hopelessness: Denies  Speech:  Normal  Thought Process:  Goal directed  Thought Content:  Normal  Suicidal:  None  Homicidal:  None  Hallucinations:  None  Delusion:  None  Memory:  Intact  Orientation:  Person, Place, Time and Situation  Reliability:  good  Insight:  Fair  Judgement:  Fair  Impulse Control:  Fair  Physical/Medical Issues:  No      Review of Systems:  Review of Systems   Constitutional: Negative for diaphoresis and fatigue.   HENT: Negative for drooling.    Eyes: Negative for visual disturbance.   Respiratory: Negative for cough and shortness of breath.    Cardiovascular: Negative for chest pain, palpitations and leg swelling.   Gastrointestinal: Negative for nausea and vomiting.   Endocrine: Negative for cold intolerance and heat intolerance.   Genitourinary: Negative for difficulty urinating.   Musculoskeletal: Negative for joint swelling.   Allergic/Immunologic: Negative for immunocompromised state.   Neurological: Negative for dizziness, seizures, speech difficulty and numbness.         Physical Exam:  Physical Exam    Vital Signs:   /80   Ht 154.9 cm (61\")   Wt 80.6 kg (177 lb 12.8 oz)   BMI 33.60 kg/m²      Lab Results:   Office Visit on 05/25/2022   Component Date Value Ref Range Status   • Diagnosis 05/25/2022 Comment   Final    NEGATIVE FOR INTRAEPITHELIAL LESION OR MALIGNANCY.  THIS SPECIMEN WAS RESCREENED AS PART OF OUR  PROGRAM.   • Specimen adequacy: 05/25/2022 Comment   Final    Satisfactory for evaluation.  Endocervical and/or squamous metaplastic  cells (endocervical component) are present.   • Clinician Provided ICD-10: 05/25/2022 Comment   Final    Z01.419   • Performed by: 05/25/2022 Comment   Final    Aby Moya, Cytotechnologist ([facility])   • QC reviewed by: 05/25/2022 Comment   Final    She " Javed, Supervisory Cytotechnologist (ASCP)   • . 05/25/2022 .   Final   • Note: 05/25/2022 Comment   Final    The Pap smear is a screening test designed to aid in the detection of  premalignant and malignant conditions of the uterine cervix.  It is not a  diagnostic procedure and should not be used as the sole means of detecting  cervical cancer.  Both false-positive and false-negative reports do occur.   • Method: 05/25/2022 Comment   Final    This liquid based ThinPrep(R) pap test was screened with the  use of an image guided system.   • Conv .conv 05/25/2022 Comment   Final    The HPV DNA reflex criteria were not met with this specimen result  therefore, no HPV testing was performed.   Lab on 04/26/2022   Component Date Value Ref Range Status   • Hepatitis B Surface Ag 04/26/2022 Negative  Negative Final   • Hep B E Ag 04/26/2022 Negative  Negative Final   • Hep B Core IgM 04/26/2022 Negative  Negative Final   • Hep B Core Total Ab 04/26/2022 Negative  Negative Final   • Hep B E Ab 04/26/2022 Negative  Negative Final   • Hep B S Ab 04/26/2022 Non Reactive   Final                  Non Reactive: Inconsistent with immunity,                              less than 10 mIU/mL                Reactive:     Consistent with immunity,                              greater than 9.9 mIU/mL  **Effective May 2, 2022 Hepatitis B Virus (Profile VI) will  **    be made non-orderable.    [facility] offers order code [id number] HBV Screening and Diagnosis.   Admission on 03/22/2022, Discharged on 03/22/2022   Component Date Value Ref Range Status   • Color 03/22/2022 Yellow  Yellow, Straw, Dark Yellow, Roxana Final   • Clarity, UA 03/22/2022 Clear  Clear Final   • Glucose, UA 03/22/2022 Negative  Negative, 1000 mg/dL (3+) mg/dL Final   • Bilirubin 03/22/2022 Negative  Negative Final   • Ketones, UA 03/22/2022 Trace (A) Negative Final   • Specific Gravity  03/22/2022 1.020  1.005 - 1.030 Final   • Blood, UA 03/22/2022 Moderate (A) Negative  Final   • pH, Urine 03/22/2022 5.5  5.0 - 8.0 Final   • Protein, POC 03/22/2022 30 mg/dL (A) Negative mg/dL Final   • Urobilinogen, UA 03/22/2022 Normal  Normal Final   • Nitrite, UA 03/22/2022 Positive (A) Negative Final   • Leukocytes 03/22/2022 Trace (A) Negative Final   • Rapid Strep A Screen 03/22/2022 Negative  Negative, VALID, INVALID, Not Performed Final   • Internal Control 03/22/2022 Passed  Passed Final   • Lot Number 03/22/2022 [id number]   Final   • Expiration Date 03/22/2022 05/31/2023   Final   • Rapid Influenza A Ag 03/22/2022 Negative  Negative Final   • Rapid Influenza B Ag 03/22/2022 Negative  Negative Final   • Internal Control 03/22/2022 Passed  Passed Final   • Lot Number 03/22/2022 [id number]   Final   • Expiration Date 03/22/2022 [id number]   Final   • Urine Culture 03/22/2022 Yeast isolated (A)  Final    No further workup   Office Visit on 02/16/2022   Component Date Value Ref Range Status   • Glucose 02/16/2022 85  65 - 99 mg/dL Final   • BUN 02/16/2022 12  6 - 20 mg/dL Final   • Creatinine 02/16/2022 0.92  0.57 - 1.00 mg/dL Final   • Sodium 02/16/2022 136  136 - 145 mmol/L Final   • Potassium 02/16/2022 4.0  3.5 - 5.2 mmol/L Final   • Chloride 02/16/2022 100  98 - 107 mmol/L Final   • CO2 02/16/2022 25.0  22.0 - 29.0 mmol/L Final   • Calcium 02/16/2022 9.9  8.6 - 10.5 mg/dL Final   • Total Protein 02/16/2022 8.2  6.0 - 8.5 g/dL Final   • Albumin 02/16/2022 5.30 (A) 3.50 - 5.20 g/dL Final   • ALT (SGPT) 02/16/2022 26  1 - 33 U/L Final   • AST (SGOT) 02/16/2022 25  1 - 32 U/L Final   • Alkaline Phosphatase 02/16/2022 60  39 - 117 U/L Final   • Total Bilirubin 02/16/2022 0.5  0.0 - 1.2 mg/dL Final   • eGFR Non  Amer 02/16/2022 73  >60 mL/min/1.73 Final   • Globulin 02/16/2022 2.9  gm/dL Final   • A/G Ratio 02/16/2022 1.8  g/dL Final   • BUN/Creatinine Ratio 02/16/2022 13.0  7.0 - 25.0 Final   • Anion Gap 02/16/2022 11.0  5.0 - 15.0 mmol/L Final   • Total Cholesterol 02/16/2022 207 (A) 0 - 200  mg/dL Final   • Triglycerides 02/16/2022 163 (A) 0 - 150 mg/dL Final   • HDL Cholesterol 02/16/2022 55  40 - 60 mg/dL Final   • LDL Cholesterol  02/16/2022 123 (A) 0 - 100 mg/dL Final   • VLDL Cholesterol 02/16/2022 29  5 - 40 mg/dL Final   • LDL/HDL Ratio 02/16/2022 2.17   Final   • TSH 02/16/2022 2.520  0.270 - 4.200 uIU/mL Final   • 25 Hydroxy, Vitamin D 02/16/2022 35.9  ng/ml Final   • Iron 02/16/2022 167 (A) 37 - 145 mcg/dL Final   • Iron Saturation 02/16/2022 39  20 - 50 % Final   • Transferrin 02/16/2022 286  200 - 360 mg/dL Final   • TIBC 02/16/2022 426  298 - 536 mcg/dL Final   • Ferritin 02/16/2022 169.00 (A) 13.00 - 150.00 ng/mL Final   • Folate 02/16/2022 >20.00  4.78 - 24.20 ng/mL Final   • Vitamin B-12 02/16/2022 1,225 (A) 211 - 946 pg/mL Final   • Magnesium 02/16/2022 2.3  1.6 - 2.6 mg/dL Final   • WBC 02/16/2022 9.43  3.40 - 10.80 10*3/mm3 Final   • RBC 02/16/2022 5.22  3.77 - 5.28 10*6/mm3 Final   • Hemoglobin 02/16/2022 15.4  12.0 - 15.9 g/dL Final   • Hematocrit 02/16/2022 45.1  34.0 - 46.6 % Final   • MCV 02/16/2022 86.4  79.0 - 97.0 fL Final   • MCH 02/16/2022 29.5  26.6 - 33.0 pg Final   • MCHC 02/16/2022 34.1  31.5 - 35.7 g/dL Final   • RDW 02/16/2022 12.1 (A) 12.3 - 15.4 % Final   • RDW-SD 02/16/2022 37.3  37.0 - 54.0 fl Final   • MPV 02/16/2022 8.8  6.0 - 12.0 fL Final   • Platelets 02/16/2022 329  140 - 450 10*3/mm3 Final   • Neutrophil % 02/16/2022 56.8  42.7 - 76.0 % Final   • Lymphocyte % 02/16/2022 36.2  19.6 - 45.3 % Final   • Monocyte % 02/16/2022 5.2  5.0 - 12.0 % Final   • Eosinophil % 02/16/2022 1.1  0.3 - 6.2 % Final   • Basophil % 02/16/2022 0.3  0.0 - 1.5 % Final   • Immature Grans % 02/16/2022 0.4  0.0 - 0.5 % Final   • Neutrophils, Absolute 02/16/2022 5.36  1.70 - 7.00 10*3/mm3 Final   • Lymphocytes, Absolute 02/16/2022 3.41 (A) 0.70 - 3.10 10*3/mm3 Final   • Monocytes, Absolute 02/16/2022 0.49  0.10 - 0.90 10*3/mm3 Final   • Eosinophils, Absolute 02/16/2022 0.10  0.00 - 0.40  10*3/mm3 Final   • Basophils, Absolute 02/16/2022 0.03  0.00 - 0.20 10*3/mm3 Final   • Immature Grans, Absolute 02/16/2022 0.04  0.00 - 0.05 10*3/mm3 Final   • nRBC 02/16/2022 0.0  0.0 - 0.2 /100 WBC Final       EKG Results:  No orders to display       Imaging Results:  No Images in the past 120 days found..      Assessment & Plan   Diagnoses and all orders for this visit:    1. Generalized anxiety disorder (Primary)  -     DULoxetine (CYMBALTA) 30 MG capsule; Take 1 capsule by mouth Daily.  Dispense: 30 capsule; Refill: 2    2. Major depressive disorder in partial remission, unspecified whether recurrent (HCC)  -     DULoxetine (CYMBALTA) 30 MG capsule; Take 1 capsule by mouth Daily.  Dispense: 30 capsule; Refill: 2    3. ADHD (attention deficit hyperactivity disorder), inattentive type  -     amphetamine-dextroamphetamine (ADDERALL) 10 MG tablet; Take 1 tablet by mouth Daily.  Dispense: 30 tablet; Refill: 0  -     amphetamine-dextroamphetamine (ADDERALL) 20 MG tablet; Take 1 tablet by mouth 2 (Two) Times a Day.  Dispense: 60 tablet; Refill: 0    4. Insomnia due to mental condition  -     DULoxetine (CYMBALTA) 30 MG capsule; Take 1 capsule by mouth Daily.  Dispense: 30 capsule; Refill: 2        Presentation most consistent with major depressive disorder in partial remission, generalized anxiety disorder, and ADHD.  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  Also endorses a childhood history that is compelling.    7/27: Stop prozac, 2 week washout, start cymbalta. Consider abilify on top of bupropion. 17 minutes of supportive psychotherapy with goal to strengthen defenses, promote problems solving, restore adaptive functioning and provide symptom relief. The therapeutic alliance was strengthened to encourage the  patient to express their thoughts and feelings. Esteem building was enhanced through praise, reassurance, normalizing and encouragement. Coping skills were enhanced to build distress tolerance skills and emotional regulation. Allowed patient to freely discuss issues without interruption or judgement with unconditional positive regard, active listening skills, and empathy. Provided a safe, confidential environment to facilitate the development of a positive therapeutic relationship and encourage open, honest communication. Assisted patient in identifying risk factors which would indicate the need for higher level of care including thoughts to harm self or others and/or self-harming behavior and encouraged patient to contact this office, call 911, or present to the nearest emergency room should any of these events occur. Assisted patient in processing session content; acknowledged and normalized patient’s thoughts, feelings, and concerns by utilizing a person-centered approach in efforts to build appropriate rapport and a positive therapeutic relationship with open and honest communication. Patient given education on medication side effects, diagnosis/illness and relapse symptoms. Plan to continue supportive psychotherapy in next appointment to provide symptom relief.  Diagnoses: as above  Symptoms: as above  Functional status: good  Mental Status Exam: as above    Treatment plan: Medication management and supportive psychotherapy  Prognosis: good  Progress: more depressed  6 wks      6/15: Residual anxiety, increase prozac. Goal is to increase until maxed out; then possible switch. Depression has resolved. 18 minutes of supportive psychotherapy with goal to strengthen defenses, promote problems solving, restore adaptive functioning and provide symptom relief. The therapeutic alliance was strengthened to encourage the patient to express their thoughts and feelings. Esteem building was enhanced through praise,  reassurance, normalizing and encouragement. Coping skills were enhanced to build distress tolerance skills and emotional regulation. Allowed patient to freely discuss issues without interruption or judgement with unconditional positive regard, active listening skills, and empathy. Provided a safe, confidential environment to facilitate the development of a positive therapeutic relationship and encourage open, honest communication. Assisted patient in identifying risk factors which would indicate the need for higher level of care including thoughts to harm self or others and/or self-harming behavior and encouraged patient to contact this office, call 911, or present to the nearest emergency room should any of these events occur. Assisted patient in processing session content; acknowledged and normalized patient’s thoughts, feelings, and concerns by utilizing a person-centered approach in efforts to build appropriate rapport and a positive therapeutic relationship with open and honest communication. Patient given education on medication side effects, diagnosis/illness and relapse symptoms. Plan to continue supportive psychotherapy in next appointment to provide symptom relief.  Diagnoses: as above  Symptoms: as above  Functional status: good  Mental Status Exam: as above    Treatment plan: Medication management and supportive psychotherapy  Prognosis: good  Progress: better  6 wks    5/4: Therapy referral.  Increase Prozac.  May consider switching from bupropion to Abilify in the future.  Patient was not able to tolerate bupropion 450 mg daily.  17 minutes of supportive psychotherapy with goal to strengthen defenses, promote problems solving, restore adaptive functioning and provide symptom relief. The therapeutic alliance was strengthened to encourage the patient to express their thoughts and feelings. Esteem building was enhanced through praise, reassurance, normalizing and encouragement. Coping skills were enhanced  to build distress tolerance skills and emotional regulation. Patient given education on medication side effects, diagnosis/illness and relapse symptoms. Plan to continue supportive psychotherapy in next appointment to provide symptom relief.  Diagnoses: as above  Symptoms: as above  Functional status: Excellent  Mental Status Exam: as above    Treatment plan: Medication management and supportive psychotherapy  Prognosis: Very good  Progress: Significant since our first visit.  Now developing reemerging depression  6 weeks    3/16: No change to medications, no side effects.  Living with in-laws at their house has been a source of anxiety for the patient.  Also taking care of her daughter, who she is very protective of.  Some issues with keeping restraint during therapy sessions as well.  17 minutes of supportive psychotherapy with goal to strengthen defenses, promote problems solving, restore adaptive functioning and provide symptom relief. The therapeutic alliance was strengthened to encourage the patient to express their thoughts and feelings. Esteem building was enhanced through praise, reassurance, normalizing and encouragement. Coping skills were enhanced to build distress tolerance skills and emotional regulation. Patient given education on medication side effects, diagnosis/illness and relapse symptoms. Plan to continue supportive psychotherapy in next appointment to provide symptom relief.  6 weeks    2/2: Anxious about the weather, and its consequences (closed daycares, etc.). 18 minutes of supportive psychotherapy with goal to strengthen defenses, promote problems solving, restore adaptive functioning and provide symptom relief. The therapeutic alliance was strengthened to encourage the patient to express their thoughts and feelings. Esteem building was enhanced through praise, reassurance, normalizing and encouragement. Coping skills were enhanced to build distress tolerance skills and emotional  regulation. Patient given education on medication side effects, diagnosis/illness and relapse symptoms. Plan to continue supportive psychotherapy in next appointment to provide symptom relief.  6 wks    12/22: Increase Prozac to target anxiety, increase hydroxyzine to target insomnia. 17 minutes of supportive psychotherapy with goal to strengthen defenses, promote problems solving, restore adaptive functioning and provide symptom relief. The therapeutic alliance was strengthened to encourage the patient to express their thoughts and feelings. Esteem building was enhanced through praise, reassurance, normalizing and encouragement. Coping skills were enhanced to build distress tolerance skills and emotional regulation. Patient given education on medication side effects, diagnosis/illness and relapse symptoms. Plan to continue supportive psychotherapy in next appointment to provide symptom relief.  6 weeks    11/17: Some ADHD symptoms re-emerging. Add 10 mg adderall at 11 am. Schedule will be 7:30 first dose, 11 second higher dose of 30 mg. Pt also interested in therapy. 17 minutes of supportive psychotherapy with goal to strengthen defenses, promote problems solving, restore adaptive functioning and provide symptom relief. The therapeutic alliance was strengthened to encourage the patient to express their thoughts and feelings. Esteem building was enhanced through praise, reassurance, normalizing and encouragement. Coping skills were enhanced to build distress tolerance skills and emotional regulation. Patient given education on medication side effects, diagnosis/illness and relapse symptoms. Plan to continue supportive psychotherapy in next appointment to provide symptom relief.  4 wks.    10/15: Start prozac. Hydroxyzine at night calms her; consider adding am dose. 4 wks    8/20: Patient is doing extremely well.  No change to medications.  See back in 8 weeks.    6/18: Doing a little better, however patient was  doing much better on Adderall 20 mg twice a day, which was her previous dose.  Discontinue Adderall XR and start Adderall immediate release 20 mg twice a day.  Some residual depressive and anxiety symptoms that are well controlled on bupropion.  Treating ADHD will also treat residual depressive and anxiety symptoms as well.  See back in 2 months.  Declines psychotherapy.    Visit Diagnoses:    ICD-10-CM ICD-9-CM   1. Generalized anxiety disorder  F41.1 300.02   2. Major depressive disorder in partial remission, unspecified whether recurrent (HCC)  F32.4 296.25   3. ADHD (attention deficit hyperactivity disorder), inattentive type  F90.0 314.00   4. Insomnia due to mental condition  F51.05 300.9     327.02       PLAN:  89. Risk Assessment: Risk of self-harm acutely is low. Risk factors include anxiety disorder, mood disorder, access to weapons, recent psychosocial stressors. Protective factors include no family history, no present SI, no history of suicide attempts or self-harm in the past, minimal AODA, healthcare seeking, future orientation, willingness to engage in care,  baby. Risk of self-harm chronically is also low, but could be further elevated in the event of treatment noncompliance and/or AODA.  90. Safety: No acute safety concerns.  91. Medications:   a. STOP prozac 40 to 60 mg PO QDAY. Risks, benefits, alternatives discussed with patient including GI upset, nausea vomiting diarrhea, theoretical decrease of seizure threshold predisposing the patient to a slightly higher seizure risk, headaches, sexual dysfunction, serotonin syndrome, bleeding risk, increased suicidality in patients 24 years and younger.  After discussion of these risks and benefits, the patient voiced understanding and agreed to proceed.  b. START cymbalta after 2 weeks, 30 mg daily. Risks, benefits, alternatives discussed with patient including GI upset, nausea vomiting diarrhea, theoretical decrease of seizure threshold  predisposing the patient to a slightly higher seizure risk, headaches, sexual dysfunction, serotonin syndrome, bleeding risk, increased suicidality in patients 24 years and younger.  Also constipation and urinary retention.  After discussion of these risks and benefits, the patient voiced understanding and agreed to proceed.  c. CONTINUE Bupropion  mg p.o. daily to target attentional issues. Risks, benefits, alternatives discussed with patient including nausea, GI upset, increased energy, exacerbation of irritability, lowering of seizure threshold. After discussion of these risks and benefits, the patient voiced understanding and agreed to proceed.   d. CONTINUE hydroxyzine 100 mg PO QHS. Risks, benefits, alternatives discussed with patient including sedation, dizziness, fall risk, GI upset.  After discussion of these risks and benefits, the patient voiced understanding and agreed to proceed.  e. CONTINUE Adderall 30 mg PO QAM, 20 mg PO QPM. Risks, benefits, side effects discussed with patient including elevated heart rate, elevated blood pressure, irritability, insomnia, sexual dysfunction, appetite suppressing properties, psychosis.  After discussion of these risks and benefits, the patient voiced understanding and agreed to proceed. Controlled substances consent verbally signed. UDS and Brian ordered.  f. S/P   i. Escitalopram: sexual dysfunction  ii. Adderall XR 10 mg daily: not effective  iii. Bupropion  made her nauseated  92. Therapy: Consider in the future, presently not interested.  93. Labs/Studies: EKG reassuring.  94. Follow Up: 6 wks.      TREATMENT PLAN/GOALS: Continue supportive psychotherapy efforts and medications as indicated. Treatment and medication options discussed during today's visit. Patient acknowledged and verbally consented to continue with current treatment plan and was educated on the importance of compliance with treatment and follow-up appointments.    MEDICATION  ISSUES:  TOM reviewed as expected.  Discussed medication options and treatment plan of prescribed medication as well as the risks, benefits, and side effects including potential falls, possible impaired driving and metabolic adversities among others. Patient is agreeable to call the office with any worsening of symptoms or onset of side effects. Patient is agreeable to call 911 or go to the nearest ER should he/she begin having SI/HI. No medication side effects or related complaints today.     MEDS ORDERED DURING VISIT:  New Medications Ordered This Visit   Medications   • DULoxetine (CYMBALTA) 30 MG capsule     Sig: Take 1 capsule by mouth Daily.     Dispense:  30 capsule     Refill:  2   • amphetamine-dextroamphetamine (ADDERALL) 10 MG tablet     Sig: Take 1 tablet by mouth Daily.     Dispense:  30 tablet     Refill:  0   • amphetamine-dextroamphetamine (ADDERALL) 20 MG tablet     Sig: Take 1 tablet by mouth 2 (Two) Times a Day.     Dispense:  60 tablet     Refill:  0       Return in about 6 weeks (around 9/7/2022).         This document has been electronically signed by Eli Murray MD  July 27, 2022 10:45 EDT

## 2022-08-10 ENCOUNTER — OFFICE VISIT (OUTPATIENT)
Dept: PSYCHIATRY | Facility: CLINIC | Age: 27
End: 2022-08-10

## 2022-08-10 DIAGNOSIS — F41.1 GENERALIZED ANXIETY DISORDER: ICD-10-CM

## 2022-08-10 DIAGNOSIS — F90.0 ADHD (ATTENTION DEFICIT HYPERACTIVITY DISORDER), INATTENTIVE TYPE: Primary | ICD-10-CM

## 2022-08-10 PROCEDURE — 90837 PSYTX W PT 60 MINUTES: CPT | Performed by: SOCIAL WORKER

## 2022-08-10 NOTE — PSYCHOTHERAPY NOTE
Psychotherapy Note - August 10, 2022    Daughter (3 y/o)  Trying to share feelings more  Trying to be more assertive (not passive)    She's the type of person that tries to make you feel bad about everyone    Noticed patterns more    When I see her name on my phone, I know it's going to make me feel bad  She will call me on her way home from work    Me and my sister have told her that if she doesn't get help, then we won't talk  I don't want to talk my sister into talking to her    Works in dermatology  I have more knowledge that I'm bringing to my job    I don't want her to feel like I did    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 Murray's office  Feeling a lot better after    Went swimming last night - it was nice    Love the ID channel - at peace    Mindfulness    Time with Alexandra - very valuable      Sold house in Nov 2021 - going to build a house because we have land  Moved in father-in-law & sister-in-law, Toño () - Alexandra (daughter-age 2)    We don't communicate well    Together for 11 years (high school sweethearts)   does not understand mental health    DARINEL is very similar to my mom and it's triggering    Dad passed in MVA    Step-dad went to penitentiary first day of 7th grade (he was my dad longer than my dad was)    I feel like I have to hide my medicine in the house  I don't understand why you can't take your medication - just get outside  I don't think he understands mental health in general     could benefit from psychoeducation

## 2022-08-19 DIAGNOSIS — F32.4 MAJOR DEPRESSIVE DISORDER IN PARTIAL REMISSION, UNSPECIFIED WHETHER RECURRENT: ICD-10-CM

## 2022-08-19 DIAGNOSIS — F90.0 ADHD (ATTENTION DEFICIT HYPERACTIVITY DISORDER), INATTENTIVE TYPE: ICD-10-CM

## 2022-08-19 DIAGNOSIS — F41.1 GENERALIZED ANXIETY DISORDER: ICD-10-CM

## 2022-08-19 RX ORDER — HYDROXYZINE 50 MG/1
TABLET, FILM COATED ORAL
Qty: 60 TABLET | Refills: 2 | Status: SHIPPED | OUTPATIENT
Start: 2022-08-19 | End: 2022-10-19 | Stop reason: SDUPTHER

## 2022-08-19 RX ORDER — BUPROPION HYDROCHLORIDE 300 MG/1
TABLET ORAL
Qty: 90 TABLET | Refills: 1 | Status: SHIPPED | OUTPATIENT
Start: 2022-08-19 | End: 2022-09-09 | Stop reason: SDUPTHER

## 2022-08-19 NOTE — TELEPHONE ENCOUNTER
Medication refill requests for Wellbutrin XL 300mg and Atarax 50mg. Pt has upcoming appt on 09/09/2022. Medication orders pended.

## 2022-08-24 ENCOUNTER — TELEMEDICINE (OUTPATIENT)
Dept: PSYCHIATRY | Facility: CLINIC | Age: 27
End: 2022-08-24

## 2022-08-24 DIAGNOSIS — F41.1 GENERALIZED ANXIETY DISORDER: Primary | ICD-10-CM

## 2022-08-24 PROCEDURE — 90837 PSYTX W PT 60 MINUTES: CPT | Performed by: SOCIAL WORKER

## 2022-08-24 NOTE — PSYCHOTHERAPY NOTE
"Psychotherapy Note - August 24, 2022    Usually doesn't work on Wednesday    Mom came over for a little bit - in-law families  She didn't text me all day and it seemed intentional    Mainly when I'm driving - or before I go to sleep    I feel like I brush a lot of things off  I've gotten way better at that - with my mom, I haven't been giving her the attention I'm used it  I told her I don't have time to fight with you and     She's very histrionic   Everything is played up and it's exhausting  I truly think she doesn't realize or she's oblivious  It's gotten worse since I had Alexandra  It's very childish  She said \"I feel like we've grown up together\"    Sold house in Nov 2021 - going to build a house because we have land  Moved in father-in-law & sister-in-law, Toño () - Alexandra (daughter-age 2)    I'm trying to watch my patience  Trying to do everything I can to make sure she's loved  Emotionally loved  Growing up - a lot of our love was shown through gifts (then thrown in our faces)    Every time we go to the store, she knows we aren't getting a toy every time    His dad was very frugal (still is)  I'll be talking about something like Spongebob    If Alexandra is really good, we will reward her    Boss gave me a gift for my birthday  I was super careful about saying \"thank you\" enough    I'm not being considerate if I don't give her time  Anything I say, she takes offense to it  No matter how I say it or what I say    I'm a lot more confident about setting boundaires    It starts to get unhealthy when she verbally attacks me  She raises her voice  She minimizes anything that I'm going through - because her stuff is more important  I wonder if I'm over-exaggerating    That's what I do for my friends - that's loving and caring    The past couple of weeks - coming out more  smoker grill - family over and cooking  I talk all day long and I'm nice    Feeling like I'm still doing things wrong for my kid  I have a lot of " self-doubt about myself  I put a lot of my worth into how I perform at work  How much weight I can lose and how good I look  I don't want my daughter to feel like she has to be on a diet all the time    STRENGTHS  Humor - I'm usually the one that's making everyone laugh at work; I use a lot of humor to hide my trauma; I joke about it to not be upset - lightens the mood  Empathy - I feel like I have good empathy and sympathy skills - I'm a good listener and offer feedback if appropriate (I like to have a success story told to me to give me hope and make it seem like things could turn out well)  Open-mindedness - a lot of people are judgmental (LGBT); love to learn  (googling interior design for the new house) - I'm very indecisive (I'm open to anybody's opinion)  [place] -   Discipline - arriving to work early, sticking to my schedule, bedtime routine  **intelligence -      might say:  Creativity -   Kindness -   Ambition -       I've felt like a single parent lately - Toño has been so busy with the house  Toño is stressed about keeping everything organized  He hasn't sat down and relaxed in a long time

## 2022-09-03 DIAGNOSIS — F90.0 ADHD (ATTENTION DEFICIT HYPERACTIVITY DISORDER), INATTENTIVE TYPE: ICD-10-CM

## 2022-09-06 RX ORDER — DEXTROAMPHETAMINE SACCHARATE, AMPHETAMINE ASPARTATE, DEXTROAMPHETAMINE SULFATE AND AMPHETAMINE SULFATE 5; 5; 5; 5 MG/1; MG/1; MG/1; MG/1
TABLET ORAL
Qty: 60 TABLET | Refills: 0 | Status: SHIPPED | OUTPATIENT
Start: 2022-09-06 | End: 2022-09-09 | Stop reason: SDUPTHER

## 2022-09-06 RX ORDER — DEXTROAMPHETAMINE SACCHARATE, AMPHETAMINE ASPARTATE, DEXTROAMPHETAMINE SULFATE AND AMPHETAMINE SULFATE 2.5; 2.5; 2.5; 2.5 MG/1; MG/1; MG/1; MG/1
TABLET ORAL
Qty: 30 TABLET | Refills: 0 | Status: SHIPPED | OUTPATIENT
Start: 2022-09-06 | End: 2022-09-09 | Stop reason: SDUPTHER

## 2022-09-06 NOTE — TELEPHONE ENCOUNTER
CONTROLLED MEDICATION REFILL REQUEST    STATE REGULATION APPT EVERY 3 MONTHS     UDS(URINE DRUG SCREEN) EVERY 6 MONTHS     NEW NARC CONSENT EVERY YEAR      URINE DRUG SCREEN: Urine Drug Screen - (05/07/2021 15:20)     LAST OFFICE VISIT: Office Visit with Eli Murray MD (07/27/2022)      NARC CONSENT: NONE IN [facility]     NEXT OFFICE VISIT: Appointment with Eli Murray MD (09/09/2022)      MEDICATION: amphetamine-dextroamphetamine (ADDERALL) 10 MG tablet (07/27/2022)     amphetamine-dextroamphetamine (ADDERALL) 20 MG tablet (07/27/2022)     OVER 6 MONTHS SINCE LAST UDS(URINE DRUG SCREEN)     PT(PATIENT) DOES NOT HAVE A NARC CONSENT ON FILE WITH PROVIDER    PLEASE ADVISE

## 2022-09-06 NOTE — PROGRESS NOTES
"Progress Note    Date: September 14, 2022  Time In: 0831  Time Out: 0926    Patient Name: Janice Padilla  Patient Age: 27 y.o.      Subjective   History of Present Illness     From previous progress note on 8/24/22:  Patient was encouraged to begin building positive affirmations in order to promote self confidence.  We will continue to discuss strengths and positive affirmations in more detail in the next session.    Janice Padilla is a 27 y.o. female who presents today as a follow-up for continued psychotherapy.  Patient reports that she continues to set appropriate boundaries with her mother in order to prevent increased anxiety.  Patient states that she feels she is managing her stress in a healthy way.  However, recent circumstances (in the process of building a home) have caused her to make several decisions, and patient tends to be \"indecisive.\"      Assessment    Mental Status Exam     Appearance: good hygiene and dressed appropriately for the weather  Behavior: restless  Cooperation:  engaged, cooperative, attentive, and friendly  Eye Contact:  good  Affect:  congruent  Mood: expressive, anxious  Speech: responsive  Thought Process:  linear and organized  Thought Content: appropriate and abstract  Suicidal: denies  Homicidal:  denies  Hallucinations:  denies  Memory:  intact  Orientation:  person, place, time, and situation  Reliability:  reliable  Insight:  good  Judgement:  good     Clinical Intervention       ICD-10-CM ICD-9-CM   1. Generalized anxiety disorder  F41.1 300.02        Individual psychotherapy was provided utilizing IPT and CBT techniques to challenge negative thinking patterns, discuss interpersonal conflicts and identify healthy vs unhealthy relationships.  Patient was encouraged to reflect on her childhood experience and relationship with her mother while developing vocabulary for healthy expression of emotions.  She was able to describe her current relationship with her daughter in " relation to her own childhood experience.  Patient was encouraged to recognize specific triggers which increase her anxiety and cause her to worry more frequently.    Plan   Plan & Goals     Patient is to begin expressing her emotions more clearly with her daughter, as discussed in session, in order to build their bond and decrease patient's anxiety regarding parenting.  Patient is also to practice accepting appreciation and gratitude when others express it to her.  We will discuss this further, along with reviewing patient's strengths, in the next session.    1. Patient acknowledged and verbally consented to continue working toward resolving current treatment plan goals and was educated on the importance of participation in the therapeutic process.  2. Patient will remain compliant with medication regimen as prescribed. Discuss any medication side effects, questions or concerns with prescribing provider.  3. Call 911 or present to the nearest emergency room in an emergency situation. National Suicide Prevention Lifeline: 1-651.500.5046.    Return in about 2 weeks (around 9/28/2022) for Next scheduled follow up.    ____________________  This document has been electronically signed by Yareli Yeung LCSW  September 14, 2022 09:43 EDT    Part of this note may be an electronic transcription/translation of spoken language to printed text using the Dragon Dictation System.

## 2022-09-09 ENCOUNTER — OFFICE VISIT (OUTPATIENT)
Dept: PSYCHIATRY | Facility: CLINIC | Age: 27
End: 2022-09-09

## 2022-09-09 VITALS
BODY MASS INDEX: 33.99 KG/M2 | SYSTOLIC BLOOD PRESSURE: 123 MMHG | WEIGHT: 180 LBS | DIASTOLIC BLOOD PRESSURE: 76 MMHG | HEIGHT: 61 IN

## 2022-09-09 DIAGNOSIS — F32.4 MAJOR DEPRESSIVE DISORDER IN PARTIAL REMISSION, UNSPECIFIED WHETHER RECURRENT: Primary | ICD-10-CM

## 2022-09-09 DIAGNOSIS — F90.0 ADHD (ATTENTION DEFICIT HYPERACTIVITY DISORDER), INATTENTIVE TYPE: ICD-10-CM

## 2022-09-09 DIAGNOSIS — F51.05 INSOMNIA DUE TO MENTAL CONDITION: ICD-10-CM

## 2022-09-09 DIAGNOSIS — F41.1 GENERALIZED ANXIETY DISORDER: ICD-10-CM

## 2022-09-09 PROCEDURE — 99214 OFFICE O/P EST MOD 30 MIN: CPT | Performed by: STUDENT IN AN ORGANIZED HEALTH CARE EDUCATION/TRAINING PROGRAM

## 2022-09-09 PROCEDURE — 90833 PSYTX W PT W E/M 30 MIN: CPT | Performed by: STUDENT IN AN ORGANIZED HEALTH CARE EDUCATION/TRAINING PROGRAM

## 2022-09-09 RX ORDER — DEXTROAMPHETAMINE SACCHARATE, AMPHETAMINE ASPARTATE, DEXTROAMPHETAMINE SULFATE AND AMPHETAMINE SULFATE 2.5; 2.5; 2.5; 2.5 MG/1; MG/1; MG/1; MG/1
1 TABLET ORAL DAILY
Qty: 30 TABLET | Refills: 0 | Status: SHIPPED | OUTPATIENT
Start: 2022-09-09 | End: 2022-10-19 | Stop reason: SDUPTHER

## 2022-09-09 RX ORDER — BUPROPION HYDROCHLORIDE 150 MG/1
300 TABLET ORAL DAILY
Qty: 7 TABLET | Refills: 0 | Status: SHIPPED | OUTPATIENT
Start: 2022-09-09 | End: 2022-12-16

## 2022-09-09 RX ORDER — FLUCONAZOLE 150 MG/1
TABLET ORAL
COMMUNITY
Start: 2022-08-19 | End: 2022-09-09

## 2022-09-09 RX ORDER — DEXTROAMPHETAMINE SACCHARATE, AMPHETAMINE ASPARTATE, DEXTROAMPHETAMINE SULFATE AND AMPHETAMINE SULFATE 5; 5; 5; 5 MG/1; MG/1; MG/1; MG/1
1 TABLET ORAL 2 TIMES DAILY
Qty: 60 TABLET | Refills: 0 | Status: SHIPPED | OUTPATIENT
Start: 2022-09-09 | End: 2022-10-19 | Stop reason: SDUPTHER

## 2022-09-09 NOTE — PATIENT INSTRUCTIONS
1.  Please return to clinic at your next scheduled visit.  Contact the clinic ([phone]) at least 24 hours prior in the event you need to cancel.  2.  Do no harm to yourself or others.    3.  Avoid alcohol and drugs.    4.  Take all medications as prescribed.  Please contact the clinic with any concerns. If you are in need of medication refills, please call the clinic at [phone].    5. Should you want to get in touch with your provider, Dr. Eli Murray, please utilize [employer] or contact the office ([phone]), and staff will be able to page Dr. Murray directly.  6.  In the event you have personal crisis, contact the following crisis numbers: Suicide Prevention Hotline 1-910.949.5217; [facility] Helpline 6-447-818-HCDZ; [facility] Emergency Room [phone]; text HELLO to [phone]; or 028.     SPECIFIC RECOMMENDATIONS:     1.      Medications discussed at this encounter:                   - taper off bupropion     2.      Psychotherapy recommendations:      3.     Return to clinic: 6 weeks

## 2022-09-09 NOTE — PROGRESS NOTES
"Subjective   Janice Padilla is a 27 y.o. female who presents today for follow up    Referring Provider:  No referring provider defined for this encounter.    Chief Complaint:  adhd cornel mdd    History of Present Illness:     Janice Padilla is a 25 year old /White female referred by Balbina SEAMAN.     Chart review 1/6: Patient is on Wellbutrin, status post Zoloft. Also on Adderall. Had tachycardia at 130. Started on metoprolol 50 mg extended release daily. Labs in December: BMI 35, LDL is elevated at 121, ALT AST within normal limits, creatinine 0.99, hematocrit 45, electrolytes are essentially normal, TSH is 3.65 normal, iron is 53 low, vitamin D is 23 low, ferritin is normal at 96. No EKG or head imaging.     \"Janice\"  Father passed away from drunk driving at 6 yo     9/9: In person interview:  1. Chart review: Continuing psychotherapy.  2. Planning: Switched from Prozac to Cymbalta.  Consider adding Abilify.  3. \"I think... I've been better.\"  a. Got worked up for PCOS, told \"everything is fine.\"  b. I don't want to be on so many medications.  4. Mood/Depression: no crying spells  a. Still not 100%, poor energy and motivation  b. Denies depressed mood most days  5. Anxiety: \"I feel like it's pretty good\"  a. Daughter had a fever and needed to go to urgent care and \"I handled that pretty well.\"  b. Some obsessive thoughts regarding doing the dishes/cleanliness  i. Not living in her own space; has to deal with others  ii. Wasn't like that when it was just her own house  6. ADHD: fairly well controlled most days  7. Panic attacks: n  8. Energy: not at goal  9. Concentration: stable  10. Sleeping: stable  11. Eating: wg  12. Refills: y  13. Substances: n  14. Therapy: y, likes [facility]  15. Medication compliant: y  16. No SI HI AVH.      7/27: In person interview:  17. Chart review: Now seeing psychotherapy. Lots of anxiety in her mood log.  18. Planning: Increased Prozac 40 to 60 at last visit to " "target anxiety.  Depression has resolved.    19. \"I'm depressed.\"  a. Crying today during appnt  b. Relp with mom is \"not stable.\" drinking heavily, and when she does, patient cannot deal with it.  c. Father  from drunk driving.  20. Mood/Depression: 8/10, crying spells (2 so far, one in the office), depressed mood  21. Anxiety: some worrying, /10, essentially controlled  22. Panic attacks: n  23. Energy: down  24. Concentration: down  25. Sleeping: well  26. Eating: weight gain  27. Refills: y  28. Substances: n  29. Therapy: continuing with [facility]  30. Medication compliant: y  31. No SI HI AVH.      6/15: In person interview:  32. Chart review: Seen by OB/GYN May 25 for well woman exam.  33. Planning: Increased Prozac at last visit.  Has she set up therapy with our office?  34. \"I'm better, but still depressed.\"  a. They got a camper so she will have some space. Also broke ground on her new home.  b. Anxiety: uncontrolled worrying, on edge, irritable a little, muscle tension  c. Still have days where she's just blah. \"What does tomorrow hold.\"  d. Dread \"every now and then.\"  e. Not really having depressive symptoms: Energy better, no hopelessness (\"I feel aleksey\"), no anhedonia  f. ADHD: fairly stable  35. Panic attacks: n  36. Energy: good, no psychomotor retardation  37. Concentration: stable  38. Sleeping: yes  39. Eating: less, stable weight however  40. Refills: y  41. Substances: n  42. Therapy:   43. Medication compliant: y  44. No SI HI AVH.      : in person.  Interview:  45. Chart review: Seen by urgent care in March for nausea vomiting diarrhea.  CMP is reassuring in February with the exception of albumin 5.3.  46. \"I need a new therapist.\"  a. Interested in our office.  b. Worsening depression: poor energy, depressed mood, anhedonia developing.  c. ADHD under control  d. Some stress re: inlaws. Looking to move soon by building a house.  47. Refills: Yes  48. Substances: No  49. Therapy: " "Referral to our office  50. Medication compliant: Yes  51. No SI HI AVH.      3/16: Virtual visit via Zoom audio and video due to the COVID-19 pandemic.  Patient is accepting of and agreeable to visit.  The visit consisted of the patient and I. The patient is at home, and I am at the office.  Interview:  52. Chart review: Seen by primary care February 16 for vitamin D deficiency.  Vitamin D is normal now at 35.9.  CMP just shows elevated albumin 5.3, iron studies show  iron is high at 167 and ferritin is high at 169.  Lipids are abnormal, CBC is reassuring.  UDS is negative in May of last year.  53. \" I think I am doing well.\"  a. Some issues with anxiety related to living with her in-laws.  b. Still waiting on a bed to be accepted to build to their house.  c. Overprotective of her daughter.  Has a checkup today.  d. Anxiety, depression, ADHD are essentially under control, however.  54. Medication compliant: Yes  55. No SI HI AVH.      2/2: Virtual visit via Zoom audio and video due to the COVID-19 pandemic.  Patient is accepting of and agreeable to visit.  The visit consisted of the patient and I. Patient is at home, I'm at work.  56. Chart review: No new developments in chart review since December 22.  57. \"I'm good mentally.\"  a. Well, depression and anxiety are well controlled. So is ADHD.  b. Sore throat 2 wks ago, now cough.  58. P0, G7.  59. Energy: stable  60. Concentration: stable  61. Sleeping: well  62. Therapy: continuing, doing some couples therapy as well.  63. Medication compliant: y  64. No SI HI AVH.      12/22: Virtual visit via Zoom audio and video due to the COVID-19 pandemic.  Patient is accepting of and agreeable to visit.  The visit consisted of the patient and I.  Interview:  65. Chart review: No new chart developments since November 17.  66. \"Good.\"  a. Going thru some life challenges.   i.  closed due to COVID.  ii. Daughter is not sick.  iii. Switched adderall so that higher dose is " "in the morning. Sometimes doesn't even need the afternoon dose.  b. \"I'm handling things better than I used to.\"  67. Depression/Mood: denies  68. Anxiety: \"I feel like it's better\" \"I feel like it's under control\"  1. Uncontrolled worrying: much better  2. Severity: mild to moderate situational anxiety  3. Muscle tension  4. Fatigue has improved  5. Concentration better  6. Restlessness/feeling on edge: sometimes, situational  7. Irritability: denies  8. Insomnia:  a. Initial insomnia some nights, chronic \"my whole life\"  b. Started melatonin 10 mg nightly  9. Duration: months  10. Panic attacks: denies  69. Refills: y  70. Sleeping: n  71. Eating: stable  72. Substances: n  73. Therapy: started therapy as well.  74. Medication compliant: y  75. No SI HI AVH.      11/17: Virtual visit via Zoom audio and video due to the COVID-19 pandemic.  Patient is accepting of and agreeable to visit.  The visit consisted of the patient and I.  Interview:  76. Chart review: No new developments.  77. \"Doing okay.\"  a. Prozac has helped so far.  i. Anxiety and mood  b. Also done moving and sold their house.  c. More stuff at work.  78. ADHD:  a. Starting to procrastinate more.  b. Feels like she needs more at 12:30 - 1pm.   c. Takes adderall 7:30 am and 12:30 pm. Wears off around when she leaves work.   d. Feels like she is \"slipping back.\"  e. Weekend breaks  79. Depression/Mood: better mood, more energy  80. Anxiety: less worrying, less irritability, less on edge  81. Sleeping: \"fine\"  82. Eating: lost weight  83. Substances: denies  84. Therapy: interested  a. Feels females might be judging her  b. Thinks that might be due to her relp with her mom; they didn't get along growing up.  c. Mom treated her like she was never good enough.  85. Medication compliant: y  86. No SI HI AVH.      10/15: Virtual visit via Zoom audio and video due to the COVID-19 pandemic.  Patient is accepting of and agreeable to visit.  The visit consisted " "of the patient and I.  Interview:  87. Chart review: Seen recently by PCP and diagnosed with acute swimmer's ear on the right side.  88. \"I am ok.\"  89. Depression/Mood: denies  90. ADHD: under control  91. Stressors:  a. Sold her house  b. Now building a house; not sure where they are going to live  c. Taking care of her 19 mo old  d. Pandemic cases that were going up  92. Anxiety:  a. Irritable  b. Worrying  c. On edge  d. Obsessing about her daughter as well; constantly worried something bad is going to happen  e. Sometimes has trouble thinking about anything else but her  f. No panic attacks  93. Sleeping: yes  94. Eating: stable  95. Substances: denies  96. Therapy: not interested  97. Medication compliant: yes  98. No SI HI AVH.      8/20: Virtual visit via Zoom audio and video due to the COVID-19 pandemic.  Patient is accepting of and agreeable to visit.  The visit consisted of the patient and I.  Interview:  99. Seen by PCP June 23 and was doing well overall.  100. \"I'm good.\"  101. ADHD controlled.  102. Denies depression and anxiety. Some situational anxiety due to COVID-19 and her job.  103. Finally able to get around to doing projects at home.  104. Denies SI HI AVH.      6/18: In-person Interview: Patient reports some improvement on Adderall XR.  Discontinued Topamax.  No longer going to the weight loss clinic.  Notes residual depression and anxiety symptoms, but they are mild.  They are well controlled on the bupropion.  No SI HI AVH.  Denies depression, muscle tension, fatigue, restlessness. Continues to endorse poor concentration. See scores below.      IMPORTANT:  From previous note, patient was held back in the third grade. Struggled to complete high school and missed 60 days although she did graduate with good grades. Got in trouble for talking a lot in class. She was not in any special classes. She did not have an IEP.     1/6 H&P: Virtual visit via Zoom audio and video due to the COVID-19 " "pandemic. Patient is accepting of and agreeable to appointment. The appointment consisted of the patient and I only. Interview: Patient reports that she had her first child in March and suffered from postpartum depression. The pandemic did not help things. It was a \"hard time,\" and the patient reports she had already had anxiety before. Patient is presently bottlefeeding. She became pregnant through an infertility specialist. Presently not on any contraception.   Endorses muscle tension, fatigue, poor concentration, restlessness, irritability, and some broken sleep, although she is sleeping for about 8 hours a night. She wakes up frequently to check on her baby girl, Alexandra. Also endorses guilt at being a bad mom. Duration has been since March of last year. Patient feels her anxiety is worse than her depression.   Denies SI HI AVH. Has access to weapons that are locked in a gun safe. Patient does not know the combination. She also does not know how to work the guns that are in the safe. Psychiatric review of systems is positive for anxiety and depression, negative for psychosis and maribel.   Possible ADHD. Patient was diagnosed by Dr. Joe in 2017. 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 Possible family history as well as her sister may have ADHD.   Past Psychiatric History:  Began Psychiatric Treatment: Since 2011   Dx: Anxiety   Psychiatrist: Has not seen a psychiatrist   Therapist: Saw therapist in 2018x1, unsure if it was helpful.   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     Feeling down, depressed, or hopeless?     Trouble falling or staying asleep, or sleeping too much?     Feeling tired or having little energy?     Poor appetite or overeating?     Feeling bad about yourself - or that you are a failure or have let yourself or your family down?     Trouble concentrating on things, such as reading the newspaper or watching television?     Moving or speaking so slowly that other people could have noticed? Or the opposite - being so fidgety or restless that you have been moving around a lot more than usual?     Thoughts that you would be better off dead, or of hurting yourself in some way?     PHQ-9 Total Score       MATTHEW-7       Past Surgical History:  Past Surgical History:   Procedure Laterality Date   • ADENOIDECTOMY     • DILATATION AND CURETTAGE     • SKIN BIOPSY     • TONSILLECTOMY         Problem List:  Patient Active Problem List   Diagnosis   • Acne   • Anxiety   • Attention deficit hyperactivity disorder (ADHD)   • Depression   • Generalized anxiety disorder   • Gestational hypertension   • Major depressive disorder in partial remission (HCC)   • PCOS (polycystic ovarian syndrome)   • Pre-eclampsia        Allergy:   No Known Allergies     Discontinued Medications:  Medications Discontinued During This Encounter   Medication Reason   • fluconazole (DIFLUCAN) 150 MG tablet *Therapy completed   • minoxidil (LONITEN) 2.5 MG tablet *Therapy completed   • buPROPion XL (WELLBUTRIN XL) 300 MG 24 hr tablet Reorder   • amphetamine-dextroamphetamine (ADDERALL) 10 MG tablet Reorder   • amphetamine-dextroamphetamine (ADDERALL) 20 MG tablet Reorder       Current Medications:   Current Outpatient Medications   Medication Sig Dispense Refill   • Aklief 0.005 % cream      • amphetamine-dextroamphetamine (ADDERALL) 10 MG tablet Take 1 tablet by mouth Daily. 30 tablet 0   • amphetamine-dextroamphetamine (ADDERALL) 20 MG tablet Take 1 tablet by mouth 2 (Two) Times a Day. 60 tablet 0   • buPROPion XL (WELLBUTRIN XL) 150 MG 24 hr tablet Take 2 tablets by mouth Daily. 7 tablet 0   • DULoxetine (CYMBALTA) 30 MG capsule Take 1 capsule by mouth Daily. 30 capsule 2   • hydrOXYzine (ATARAX) 50 MG tablet TAKE 2 TABLETS BY MOUTH AT BEDTIME FOR ANXIETY 60 tablet 2   • ketoconazole (NIZORAL) 2 % shampoo apply to SCALP THREE TIMES WEEKLY     • levocetirizine (XYZAL) 5 MG tablet Take 5 mg by mouth Every Evening.     • metoprolol succinate XL (Toprol XL) 25 MG 24 hr tablet Take 1 tablet by mouth Daily for 90 days. 90 tablet 1   • minocycline (MINOCIN,DYNACIN) 100 MG capsule      • Prenatal MV-Min-Fe Fum-FA-DHA (PRENATAL+DHA PO)      • Soolantra 1 % cream      • spironolactone (ALDACTONE) 100 MG tablet spironolactone 100 mg oral tablet take 1 tablet (100 mg) by oral route once daily   Active     • triamcinolone (KENALOG) 0.1 % ointment apply to trunk TWICE DAILY AS NEEDED FOR itching     • vitamin D (ERGOCALCIFEROL) 1.25 MG (16401 UT) capsule capsule Take 1 capsule by mouth 1 (One) Time Per Week. 13 capsule 1     No current facility-administered medications for this visit.       Past Medical History:  Past Medical History:   Diagnosis Date   •  Acne    • ADHD    • Anxiety    • Depression    • Generalized anxiety disorder 01/06/2021   • History of medical problems 02/28/2019    Miscarriage   • Major depressive disorder in partial remission (HCC) 01/06/2021   • PCOS (polycystic ovarian syndrome)    • Pre-eclampsia        Social History     Socioeconomic History   • Marital status:    Tobacco Use   • Smoking status: Never Smoker   • Smokeless tobacco: Never Used   • Tobacco comment: 2/10/2021- 1/6/2021   Vaping Use   • Vaping Use: Never used   Substance and Sexual Activity   • Alcohol use: Yes     Comment: light; 2/10/2021- 1/6/2021   • Drug use: Never     Comment: 2/10/2021- 1/6/2021   • Sexual activity: Yes     Partners: Male     Birth control/protection: None       Family History   Problem Relation Age of Onset   • Anxiety disorder Mother    • Bipolar disorder Mother    • Depression Mother    • ADD / ADHD Sister    • Depression Sister    • ADD / ADHD Sister        Mental Status Exam:   Hygiene:   good, groomed  Cooperation:  Cooperative  Eye Contact:  Good  Psychomotor Behavior:  Appropriate  Affect:  Depressed and tearful, congruent  Mood: depressed  Hopelessness: Denies  Speech:  Normal  Thought Process:  Goal directed  Thought Content:  Normal  Suicidal:  None  Homicidal:  None  Hallucinations:  None  Delusion:  None  Memory:  Intact  Orientation:  Person, Place, Time and Situation  Reliability:  good  Insight:  Fair  Judgement:  Fair  Impulse Control:  Fair  Physical/Medical Issues:  No      Review of Systems:  Review of Systems   Constitutional: Positive for diaphoresis. Negative for fatigue.   HENT: Negative for drooling.    Eyes: Negative for visual disturbance.   Respiratory: Negative for cough and shortness of breath.    Cardiovascular: Negative for chest pain, palpitations and leg swelling.   Gastrointestinal: Negative for nausea and vomiting.   Endocrine: Negative for cold intolerance and heat intolerance.   Genitourinary: Negative for  "difficulty urinating.   Musculoskeletal: Negative for joint swelling.   Allergic/Immunologic: Negative for immunocompromised state.   Neurological: Negative for dizziness, seizures, speech difficulty and numbness.         Physical Exam:  Physical Exam    Vital Signs:   /76   Ht 154.9 cm (61\")   Wt 81.6 kg (180 lb)   BMI 34.01 kg/m²      Lab Results:   Office Visit on 05/25/2022   Component Date Value Ref Range Status   • Diagnosis 05/25/2022 Comment   Final    NEGATIVE FOR INTRAEPITHELIAL LESION OR MALIGNANCY.  THIS SPECIMEN WAS RESCREENED AS PART OF OUR  PROGRAM.   • Specimen adequacy: 05/25/2022 Comment   Final    Satisfactory for evaluation.  Endocervical and/or squamous metaplastic  cells (endocervical component) are present.   • Clinician Provided ICD-10: 05/25/2022 Comment   Final    Z01.419   • Performed by: 05/25/2022 Comment   Final    Aby Moya, Cytotechnologist (ASCP)   • QC reviewed by: 05/25/2022 Comment   Final    She Burt, Supervisory Cytotechnologist (ASCP)   • . 05/25/2022 .   Final   • Note: 05/25/2022 Comment   Final    The Pap smear is a screening test designed to aid in the detection of  premalignant and malignant conditions of the uterine cervix.  It is not a  diagnostic procedure and should not be used as the sole means of detecting  cervical cancer.  Both false-positive and false-negative reports do occur.   • Method: 05/25/2022 Comment   Final    This liquid based ThinPrep(R) pap test was screened with the  use of an image guided system.   • Conv .conv 05/25/2022 Comment   Final    The HPV DNA reflex criteria were not met with this specimen result  therefore, no HPV testing was performed.   Lab on 04/26/2022   Component Date Value Ref Range Status   • Hepatitis B Surface Ag 04/26/2022 Negative  Negative Final   • Hep B E Ag 04/26/2022 Negative  Negative Final   • Hep B Core IgM 04/26/2022 Negative  Negative Final   • Hep B Core Total Ab 04/26/2022 " Negative  Negative Final   • Hep B E Ab 04/26/2022 Negative  Negative Final   • Hep B S Ab 04/26/2022 Non Reactive   Final                  Non Reactive: Inconsistent with immunity,                              less than 10 mIU/mL                Reactive:     Consistent with immunity,                              greater than 9.9 mIU/mL  **Effective May 2, 2022 Hepatitis B Virus (Profile VI) will  **    be made non-orderable.    [facility] offers order code [id number] HBV Screening and Diagnosis.   Admission on 03/22/2022, Discharged on 03/22/2022   Component Date Value Ref Range Status   • Color 03/22/2022 Yellow  Yellow, Straw, Dark Yellow, Roxana Final   • Clarity, UA 03/22/2022 Clear  Clear Final   • Glucose, UA 03/22/2022 Negative  Negative, 1000 mg/dL (3+) mg/dL Final   • Bilirubin 03/22/2022 Negative  Negative Final   • Ketones, UA 03/22/2022 Trace (A) Negative Final   • Specific Gravity  03/22/2022 1.020  1.005 - 1.030 Final   • Blood, UA 03/22/2022 Moderate (A) Negative Final   • pH, Urine 03/22/2022 5.5  5.0 - 8.0 Final   • Protein, POC 03/22/2022 30 mg/dL (A) Negative mg/dL Final   • Urobilinogen, UA 03/22/2022 Normal  Normal Final   • Nitrite, UA 03/22/2022 Positive (A) Negative Final   • Leukocytes 03/22/2022 Trace (A) Negative Final   • Rapid Strep A Screen 03/22/2022 Negative  Negative, VALID, INVALID, Not Performed Final   • Internal Control 03/22/2022 Passed  Passed Final   • Lot Number 03/22/2022 [id number]   Final   • Expiration Date 03/22/2022 05/31/2023   Final   • Rapid Influenza A Ag 03/22/2022 Negative  Negative Final   • Rapid Influenza B Ag 03/22/2022 Negative  Negative Final   • Internal Control 03/22/2022 Passed  Passed Final   • Lot Number 03/22/2022 [id number]   Final   • Expiration Date 03/22/2022 [id number]   Final   • Urine Culture 03/22/2022 Yeast isolated (A)  Final    No further workup       EKG Results:  No orders to display       Imaging Results:  No Images in the past 120 days  found..      Assessment & Plan   Diagnoses and all orders for this visit:    1. Major depressive disorder in partial remission, unspecified whether recurrent (HCC) (Primary)  -     buPROPion XL (WELLBUTRIN XL) 150 MG 24 hr tablet; Take 2 tablets by mouth Daily.  Dispense: 7 tablet; Refill: 0    2. Generalized anxiety disorder  -     buPROPion XL (WELLBUTRIN XL) 150 MG 24 hr tablet; Take 2 tablets by mouth Daily.  Dispense: 7 tablet; Refill: 0    3. ADHD (attention deficit hyperactivity disorder), inattentive type  -     amphetamine-dextroamphetamine (ADDERALL) 10 MG tablet; Take 1 tablet by mouth Daily.  Dispense: 30 tablet; Refill: 0  -     amphetamine-dextroamphetamine (ADDERALL) 20 MG tablet; Take 1 tablet by mouth 2 (Two) Times a Day.  Dispense: 60 tablet; Refill: 0    4. Insomnia due to mental condition        Presentation most consistent with major depressive disorder in partial remission, generalized anxiety disorder, and ADHD.  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  Also endorses a childhood history that is compelling.    9/9: Wants to be on less meds. Taper off bupropion. Continue other meds. Let's streamline the meds. 16 minutes of supportive psychotherapy with goal to strengthen defenses, promote problems solving, restore adaptive functioning and provide symptom relief. The therapeutic alliance was strengthened to encourage the patient to express their thoughts and feelings. Esteem building was enhanced through praise, reassurance, normalizing and encouragement. Coping skills were enhanced to build distress tolerance skills and emotional regulation. Allowed patient to freely discuss issues without interruption or judgement with unconditional positive regard, active listening skills, and empathy.  Provided a safe, confidential environment to facilitate the development of a positive therapeutic relationship and encourage open, honest communication. Assisted patient in identifying risk factors which would indicate the need for higher level of care including thoughts to harm self or others and/or self-harming behavior and encouraged patient to contact this office, call 911, or present to the nearest emergency room should any of these events occur. Assisted patient in processing session content; acknowledged and normalized patient’s thoughts, feelings, and concerns by utilizing a person-centered approach in efforts to build appropriate rapport and a positive therapeutic relationship with open and honest communication. Patient given education on medication side effects, diagnosis/illness and relapse symptoms. Plan to continue supportive psychotherapy in next appointment to provide symptom relief.  Diagnoses: as above  Symptoms: as above  Functional status: good  Mental Status Exam: as above    Treatment plan: Medication management and supportive psychotherapy  Prognosis: good  Progress: better, not at goal  6 wks      7/27: Stop prozac, 2 week washout, start cymbalta. Consider abilify on top of bupropion. 17 minutes of supportive psychotherapy Treatment plan: Medication management and supportive psychotherapy  Prognosis: good  Progress: more depressed  6 wks      6/15: Residual anxiety, increase prozac. Goal is to increase until maxed out; then possible switch. Depression has resolved. 18 minutes of supportive psychotherapy Treatment plan: Medication management and supportive psychotherapy  Prognosis: good  Progress: better  6 wks    5/4: Therapy referral.  Increase Prozac.  May consider switching from bupropion to Abilify in the future.  Patient was not able to tolerate bupropion 450 mg daily.  17 minutes of supportive psychotherapyTreatment plan: Medication management and supportive psychotherapy  Prognosis:  Very good  Progress: Significant since our first visit.  Now developing reemerging depression  6 weeks    3/16: No change to medications, no side effects.  Living with in-laws at their house has been a source of anxiety for the patient.  Also taking care of her daughter, who she is very protective of.  Some issues with keeping restraint during therapy sessions as well.  17 minutes of supportive psychotherapy   6 weeks    2/2: Anxious about the weather, and its consequences (closed daycares, etc.). 18 minutes of supportive psychotherapy  6 wks    12/22: Increase Prozac to target anxiety, increase hydroxyzine to target insomnia. 17 minutes of supportive psychotherapy   6 weeks    11/17: Some ADHD symptoms re-emerging. Add 10 mg adderall at 11 am. Schedule will be 7:30 first dose, 11 second higher dose of 30 mg. Pt also interested in therapy. 17 minutes of supportive psychotherapy   4 wks.    10/15: Start prozac. Hydroxyzine at night calms her; consider adding am dose. 4 wks    8/20: Patient is doing extremely well.  No change to medications.  See back in 8 weeks.    6/18: Doing a little better, however patient was doing much better on Adderall 20 mg twice a day, which was her previous dose.  Discontinue Adderall XR and start Adderall immediate release 20 mg twice a day.  Some residual depressive and anxiety symptoms that are well controlled on bupropion.  Treating ADHD will also treat residual depressive and anxiety symptoms as well.  See back in 2 months.  Declines psychotherapy.    Visit Diagnoses:    ICD-10-CM ICD-9-CM   1. Major depressive disorder in partial remission, unspecified whether recurrent ([facility])  F32.4 296.25   2. Generalized anxiety disorder  F41.1 300.02   3. ADHD (attention deficit hyperactivity disorder), inattentive type  F90.0 314.00   4. Insomnia due to mental condition  F51.05 300.9     327.02       PLAN:  105. Risk Assessment: Risk of self-harm acutely is low. Risk factors include anxiety  disorder, mood disorder, access to weapons, recent psychosocial stressors. Protective factors include no family history, no present SI, no history of suicide attempts or self-harm in the past, minimal AODA, healthcare seeking, future orientation, willingness to engage in care,  baby. Risk of self-harm chronically is also low, but could be further elevated in the event of treatment noncompliance and/or AODA.  106. Safety: No acute safety concerns.  107. Medications:   a. STOP prozac 40 to 60 mg PO QDAY. Risks, benefits, alternatives discussed with patient including GI upset, nausea vomiting diarrhea, theoretical decrease of seizure threshold predisposing the patient to a slightly higher seizure risk, headaches, sexual dysfunction, serotonin syndrome, bleeding risk, increased suicidality in patients 24 years and younger.  After discussion of these risks and benefits, the patient voiced understanding and agreed to proceed.  b. CONTINUE cymbalta 30 mg daily. Risks, benefits, alternatives discussed with patient including GI upset, nausea vomiting diarrhea, theoretical decrease of seizure threshold predisposing the patient to a slightly higher seizure risk, headaches, sexual dysfunction, serotonin syndrome, bleeding risk, increased suicidality in patients 24 years and younger.  Also constipation and urinary retention.  After discussion of these risks and benefits, the patient voiced understanding and agreed to proceed.  c. TAPER OFF Bupropion  mg p.o. daily: 150 mg daily x 7d. Risks, benefits, alternatives discussed with patient including nausea, GI upset, increased energy, exacerbation of irritability, lowering of seizure threshold. After discussion of these risks and benefits, the patient voiced understanding and agreed to proceed.   d. CONTINUE hydroxyzine 100 mg PO QHS. Risks, benefits, alternatives discussed with patient including sedation, dizziness, fall risk, GI upset.  After discussion of these risks  and benefits, the patient voiced understanding and agreed to proceed.  e. CONTINUE Adderall 30 mg PO QAM, 20 mg PO QPM. Risks, benefits, side effects discussed with patient including elevated heart rate, elevated blood pressure, irritability, insomnia, sexual dysfunction, appetite suppressing properties, psychosis.  After discussion of these risks and benefits, the patient voiced understanding and agreed to proceed. Controlled substances consent verbally signed. UDS and Tom ordered.  f. S/P   i. Escitalopram: sexual dysfunction  ii. Adderall XR 10 mg daily: not effective  iii. Bupropion  made her nauseated  108. Therapy: Consider in the future, presently not interested.  109. Labs/Studies: EKG reassuring.  110. Follow Up: 6 wks.      TREATMENT PLAN/GOALS: Continue supportive psychotherapy efforts and medications as indicated. Treatment and medication options discussed during today's visit. Patient acknowledged and verbally consented to continue with current treatment plan and was educated on the importance of compliance with treatment and follow-up appointments.    MEDICATION ISSUES:  TOM reviewed as expected.  Discussed medication options and treatment plan of prescribed medication as well as the risks, benefits, and side effects including potential falls, possible impaired driving and metabolic adversities among others. Patient is agreeable to call the office with any worsening of symptoms or onset of side effects. Patient is agreeable to call 911 or go to the nearest ER should he/she begin having SI/HI. No medication side effects or related complaints today.     MEDS ORDERED DURING VISIT:  New Medications Ordered This Visit   Medications   • amphetamine-dextroamphetamine (ADDERALL) 10 MG tablet     Sig: Take 1 tablet by mouth Daily.     Dispense:  30 tablet     Refill:  0   • amphetamine-dextroamphetamine (ADDERALL) 20 MG tablet     Sig: Take 1 tablet by mouth 2 (Two) Times a Day.     Dispense:  60  tablet     Refill:  0   • buPROPion XL (WELLBUTRIN XL) 150 MG 24 hr tablet     Sig: Take 2 tablets by mouth Daily.     Dispense:  7 tablet     Refill:  0     This prescription was filled on 8/19/2022. Any refills authorized will be placed on file.       Return in about 6 weeks (around 10/21/2022).         This document has been electronically signed by Eli Murray MD  September 9, 2022 15:56 EDT

## 2022-09-14 ENCOUNTER — OFFICE VISIT (OUTPATIENT)
Dept: PSYCHIATRY | Facility: CLINIC | Age: 27
End: 2022-09-14

## 2022-09-14 DIAGNOSIS — F41.1 GENERALIZED ANXIETY DISORDER: Primary | ICD-10-CM

## 2022-09-14 PROCEDURE — 90837 PSYTX W PT 60 MINUTES: CPT | Performed by: SOCIAL WORKER

## 2022-09-14 NOTE — PSYCHOTHERAPY NOTE
"Psychotherapy Note - 2022    Sold house in 2021 - going to build a house because we have land  Moved in father-in-law & sister-in-law, Toño () - Alexandra (daughter-age 2)    Mother is \"histrionic\" - very focused on herself, plays everything up and is difficult to talk to - I leave Alexandra with her sometimes  She doesn't have Toño's mom - she hasn't seen Alexandra in over a year (she's an alcoholic) - needs a pancreas - Toño has been on his own since he was 16     thinks I should be helping with the house    Making decisions - on my phone all the time, looking at stuff  Bedtime is still our time when     Mom has a boyfriend - has  and  4 times    Being a parent - makes me have a second chance to stand up for her    Sister - Mary - lives in [place]  She moved out of the house when she started college (I was just starting high school)  We are 4 years apart - I thought she abandoned me  She felt like she abandoned me  I'm still mad that she moved 2 hours away (she did that 5 years ago)  Jenni - my niece - she was my everything  One day, mom had a breakdown - when she drinks, she cannot control herself    Dad  - Mary helped me deal with dad's death    It was scary for me  A week ago, she called me at 10pm - I knew she had been drinking  She was acting like she was going to kill myself    Talking about emotions with daughter  Accepting appreciation from     ___________________________  22  STRENGTHS  Humor - I'm usually the one that's making everyone laugh at work; I use a lot of humor to hide my trauma; I joke about it to not be upset - lightens the mood  Empathy - I feel like I have good empathy and sympathy skills - I'm a good listener and offer feedback if appropriate (I like to have a success story told to me to give me hope and make it seem like things could turn out well)  Open-mindedness - a lot of people are judgmental (LGBT); love to learn  " (googling interior design for the new house) - I'm very indecisive (I'm open to anybody's opinion)  [place] -   Discipline - arriving to work early, sticking to my schedule, bedtime routine  **intelligence -       might say:  Creativity -   Kindness -   Ambition -

## 2022-09-19 RX ORDER — ERGOCALCIFEROL 1.25 MG/1
CAPSULE ORAL
Qty: 13 CAPSULE | Refills: 1 | Status: SHIPPED | OUTPATIENT
Start: 2022-09-19 | End: 2023-03-06

## 2022-09-21 NOTE — PROGRESS NOTES
"Progress Note    Date: September 28, 2022  Time In: 0859  Time Out: 0978    Patient Name: Janice Padilla  Patient Age: 27 y.o.      Subjective   History of Present Illness     From previous progress note on 9/14/22:  Patient is to begin expressing her emotions more clearly with her daughter, as discussed in session, in order to build their bond and decrease patient's anxiety regarding parenting.  Patient is also to practice accepting appreciation and gratitude when others express it to her.  We will discuss this further, along with reviewing patient's strengths, in the next session.    Janice Padilla is a 27 y.o. female who presents today as a follow-up for continued psychotherapy.  Patient reports that 1 morning last week, she felt that her brain was \"in slow motion.\"  She states that she was at work, and was having trouble putting her words together.  She states that it \"scared her\" enough that she called this office for assistance.  Patient states that by the afternoon, she \"felt a lot better.\"  She states that overall, she does not feel any more anxious or depressed since our last session.  She also states that she is compliant with her medication.      Assessment    Mental Status Exam     Appearance: good hygiene and dressed appropriately for the weather  Behavior: restless  Cooperation:  engaged, cooperative, attentive, and friendly  Eye Contact:  good  Affect:  congruent  Mood: anxious  Speech: responsive  Thought Process:  linear and organized  Thought Content: intrusive thoughts  Suicidal: denies  Homicidal:  denies  Hallucinations:  denies  Memory:  intact  Orientation:  person, place, time, and situation  Reliability:  reliable  Insight:  good  Judgement:  good     Clinical Intervention       ICD-10-CM ICD-9-CM   1. Generalized anxiety disorder  F41.1 300.02        Individual psychotherapy was provided utilizing CBT techniques to manage stress, identify triggers, provide support and define and " "establish appropriate boundaries.  Patient was able to acknowledge that her life circumstances have created increased stress for her and her .  She reflected on her relationship with her , and communication between each other.  Patient was encouraged to think back to when she felt that her life was more stable and less stressful.  Patient reports that she was \"happiest\" when she was eating healthy, working out regularly, and had her own \"comfortable space.\"    Plan   Plan & Goals     Patient is to begin a workout routine in order to reduce anxiety, as discussed in session.  Patient reports that being held accountable is what has worked for her in the past, so she plans to start working with a weight management clinic for accountability as well.  Patient is to continue utilizing healthy communication skills as discussed.    1. Patient acknowledged and verbally consented to continue working toward resolving current treatment plan goals and was educated on the importance of participation in the therapeutic process.  2. Patient will remain compliant with medication regimen as prescribed. Discuss any medication side effects, questions or concerns with prescribing provider.  3. Call 911 or present to the nearest emergency room in an emergency situation. National Suicide Prevention Lifeline: 6-018-708-7126.    Return in about 2 weeks (around 10/12/2022) for Next scheduled follow up.    ____________________  This document has been electronically signed by Yareli Yeung LCSW  September 28, 2022 10:03 EDT    Part of this note may be an electronic transcription/translation of spoken language to printed text using the Dragon Dictation System.  "

## 2022-09-22 ENCOUNTER — TELEPHONE (OUTPATIENT)
Dept: PSYCHIATRY | Facility: CLINIC | Age: 27
End: 2022-09-22

## 2022-09-22 NOTE — TELEPHONE ENCOUNTER
"CLINICAL VOICEMAIL     PT(PATIENT) STATES SHE FEELS \"OFF\" SINCE STOPPING MEDICATION     PT(PATIENT) DID NOT LEAVE A MESSAGE AS TO WHAT MEDICATION SHE RECENTLY DC'ED    NEXT APPT WITH PROVIDER  Appointment with Eli Murray MD (10/19/2022)    LAST APPT WITH PROVIDER  Office Visit with Eli Murray MD (09/09/2022)    PER LAST OFFICE NOTE  SPECIFIC RECOMMENDATIONS:     1.      Medications discussed at this encounter:                   - taper off bupropion    PROVIDER PLEASE ADVISE   "

## 2022-09-27 NOTE — TELEPHONE ENCOUNTER
Attempted to call pt to offer earlier appt slot. Pt did not answer, LVMTCB to schedule urgent slot.

## 2022-09-28 ENCOUNTER — OFFICE VISIT (OUTPATIENT)
Dept: PSYCHIATRY | Facility: CLINIC | Age: 27
End: 2022-09-28

## 2022-09-28 DIAGNOSIS — F41.1 GENERALIZED ANXIETY DISORDER: Primary | ICD-10-CM

## 2022-09-28 PROCEDURE — 90837 PSYTX W PT 60 MINUTES: CPT | Performed by: SOCIAL WORKER

## 2022-09-28 NOTE — PSYCHOTHERAPY NOTE
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  I knew I felt weird    Stopped Wellbutrin  I was tired of being on so many medicines  I wanted to explore being off of it    I'm still taking Cymbalta  ADHD medicine - every day through the week  Heart rate  Cholesterol support  Prenatal  Zyrtec    I don't feel any more anxious  I don't feel any more depressed    Sister-in-law - bela relationship with her boyfriend (lives in the home with us)    Toño is really stressed out - taking it out on me some    Before - when we lived in our house before we sold it  Made dinner every single night  Got to clean my home - own dishes - not worried about anything else  Took care of my baby  Was the healthiest I've ever been    Brain is sizzled out     Weight loss appointment  Monthly  Motivated to start working out again    It's a step in the right

## 2022-09-28 NOTE — TELEPHONE ENCOUNTER
CALLED PATIENT TO ATTEMPT TO SCHEDULE AN EARLIER APPOINTMENT.  PATIENT SAID TO DISREGARD PREVIOUS MESSAGE.  SHE SAID THAT SHE WAS JUST HAVING A BAD DAY AND HAS BEEN FINE EVER SINCE.  SHE IS CURRENTLY IN THE OFFICE FOR THERAPY AND LOOKS FORWARD TO HER PREVIOUSLY SCHEDULED APPT.

## 2022-10-01 DIAGNOSIS — F41.1 GENERALIZED ANXIETY DISORDER: ICD-10-CM

## 2022-10-01 DIAGNOSIS — F51.05 INSOMNIA DUE TO MENTAL CONDITION: ICD-10-CM

## 2022-10-01 DIAGNOSIS — F32.4 MAJOR DEPRESSIVE DISORDER IN PARTIAL REMISSION, UNSPECIFIED WHETHER RECURRENT: ICD-10-CM

## 2022-10-03 RX ORDER — METOPROLOL SUCCINATE 25 MG/1
TABLET, EXTENDED RELEASE ORAL
Qty: 90 TABLET | Refills: 1 | OUTPATIENT
Start: 2022-10-03

## 2022-10-03 RX ORDER — DULOXETIN HYDROCHLORIDE 30 MG/1
CAPSULE, DELAYED RELEASE ORAL
Qty: 30 CAPSULE | Refills: 2 | Status: SHIPPED | OUTPATIENT
Start: 2022-10-03 | End: 2022-12-16 | Stop reason: SDUPTHER

## 2022-10-03 NOTE — TELEPHONE ENCOUNTER
[facility] Protocol Passed 10/01/2022 09:31 AM   Protocol Details  No active pregnancy on record    No positive pregnancy test in past 12 months    Blood Pressure on record in past 12 months    PHQ-2 or PHQ-9 within last 12 Mo    Recent or Future Visit (12mo/30days)    No BP Higher than 180/110 in past 12 Mo     NEXT APPT WITH PROVIDER  Appointment with Eli Murray MD (10/19/2022)    LAST MED REFILL  DULoxetine (CYMBALTA) 30 MG capsule (07/27/2022)    PROVIDER PLEASE ADVISE

## 2022-10-19 ENCOUNTER — OFFICE VISIT (OUTPATIENT)
Dept: PSYCHIATRY | Facility: CLINIC | Age: 27
End: 2022-10-19

## 2022-10-19 VITALS
SYSTOLIC BLOOD PRESSURE: 126 MMHG | HEIGHT: 61 IN | HEART RATE: 82 BPM | BODY MASS INDEX: 33.19 KG/M2 | DIASTOLIC BLOOD PRESSURE: 80 MMHG | WEIGHT: 175.8 LBS

## 2022-10-19 DIAGNOSIS — F51.05 INSOMNIA DUE TO MENTAL CONDITION: ICD-10-CM

## 2022-10-19 DIAGNOSIS — F32.4 MAJOR DEPRESSIVE DISORDER IN PARTIAL REMISSION, UNSPECIFIED WHETHER RECURRENT: ICD-10-CM

## 2022-10-19 DIAGNOSIS — F90.0 ADHD (ATTENTION DEFICIT HYPERACTIVITY DISORDER), INATTENTIVE TYPE: ICD-10-CM

## 2022-10-19 DIAGNOSIS — F41.1 GENERALIZED ANXIETY DISORDER: Primary | ICD-10-CM

## 2022-10-19 PROCEDURE — 99214 OFFICE O/P EST MOD 30 MIN: CPT | Performed by: STUDENT IN AN ORGANIZED HEALTH CARE EDUCATION/TRAINING PROGRAM

## 2022-10-19 PROCEDURE — 90833 PSYTX W PT W E/M 30 MIN: CPT | Performed by: STUDENT IN AN ORGANIZED HEALTH CARE EDUCATION/TRAINING PROGRAM

## 2022-10-19 RX ORDER — DEXTROAMPHETAMINE SACCHARATE, AMPHETAMINE ASPARTATE, DEXTROAMPHETAMINE SULFATE AND AMPHETAMINE SULFATE 2.5; 2.5; 2.5; 2.5 MG/1; MG/1; MG/1; MG/1
1 TABLET ORAL DAILY
Qty: 30 TABLET | Refills: 0 | Status: SHIPPED | OUTPATIENT
Start: 2022-11-03 | End: 2022-12-09

## 2022-10-19 RX ORDER — PEN NEEDLE, DIABETIC 32GX 5/32"
NEEDLE, DISPOSABLE MISCELLANEOUS
COMMUNITY
Start: 2022-10-12 | End: 2023-03-17

## 2022-10-19 RX ORDER — DEXTROAMPHETAMINE SACCHARATE, AMPHETAMINE ASPARTATE, DEXTROAMPHETAMINE SULFATE AND AMPHETAMINE SULFATE 5; 5; 5; 5 MG/1; MG/1; MG/1; MG/1
1 TABLET ORAL 2 TIMES DAILY
Qty: 60 TABLET | Refills: 0 | Status: SHIPPED | OUTPATIENT
Start: 2022-11-03 | End: 2022-12-09

## 2022-10-19 RX ORDER — LIRAGLUTIDE 6 MG/ML
INJECTION, SOLUTION SUBCUTANEOUS
COMMUNITY
Start: 2022-10-12 | End: 2023-03-17

## 2022-10-19 RX ORDER — HYDROXYZINE 50 MG/1
100 TABLET, FILM COATED ORAL NIGHTLY
Qty: 180 TABLET | Refills: 1 | Status: SHIPPED | OUTPATIENT
Start: 2022-11-16 | End: 2023-03-17

## 2022-10-19 NOTE — PROGRESS NOTES
"Subjective   Janice Padilla is a 27 y.o. female who presents today for follow up    Referring Provider:  Balbina Todd, APRN  [address]  [address]  [place] [address]    Chief Complaint:  adhd cornel mdd    History of Present Illness:     Janice Padilla is a 25 year old /White female referred by Balbina Todd APRN.     Chart review : Patient is on Wellbutrin, status post Zoloft. Also on Adderall. Had tachycardia at 130. Started on metoprolol 50 mg extended release daily. Labs in December: BMI 35, LDL is elevated at 121, ALT AST within normal limits, creatinine 0.99, hematocrit 45, electrolytes are essentially normal, TSH is 3.65 normal, iron is 53 low, vitamin D is 23 low, ferritin is normal at 96. No EKG or head imaging.     \"Janice\"  Father passed away from drunk driving at 6 yo     10/19: In person interview:  1. Chart review: Originally, patient called in stating that she did not feel well tapering off the bupropion.  She later recanted.  Continues to see psychotherapy.  2. Planning: Discontinued bupropion at last visit, likely want to increase Cymbalta as she is now on a slightly lower dose.  3. \"It was one day.\"  a. Didn't even last the whole day.  i. Everything was slow mo about a week after I stopped the bupropion. Was fine by the end of the day.  b. House outside is done except 2 windows.   i. Has learned to be more decisive  c. Not sweating at all now  4. Mood/Depression:  5. Anxiety:  a. Handling things better, less irritability  b. Minimal worrying  6. Panic attacks: n  7. Energy: good  8. Concentration/ADHD: ok, mind is in a lot of different places, distractible and issues with focus, \"nothing to make changes about\"  9. Sleeping: well  10. Eatin lb wl sine   11. Refills: y  12. Substances: n  13. Therapy: n  14. Medication compliant:  15. No SI HI AVH.      : In person interview:  16. Chart review: Continuing psychotherapy.  17. Planning: Switched from Prozac to " "Cymbalta.  Consider adding Abilify.  18. \"I think... I've been better.\"  a. Got worked up for PCOS, told \"everything is fine.\"  b. I don't want to be on so many medications.  19. Mood/Depression: no crying spells  a. Still not 100%, poor energy and motivation  b. Denies depressed mood most days  20. Anxiety: \"I feel like it's pretty good\"  a. Daughter had a fever and needed to go to urgent care and \"I handled that pretty well.\"  b. Some obsessive thoughts regarding doing the dishes/cleanliness  i. Not living in her own space; has to deal with others  ii. Wasn't like that when it was just her own house  21. ADHD: fairly well controlled most days  22. Panic attacks: n  23. Energy: not at goal  24. Concentration: stable  25. Sleeping: stable  26. Eating: wg  27. Refills: y  28. Substances: n  29. Therapy: y, likes [facility]  30. Medication compliant: y  31. No SI HI AVH.      : In person interview:  32. Chart review: Now seeing psychotherapy. Lots of anxiety in her mood log.  33. Planning: Increased Prozac 40 to 60 at last visit to target anxiety.  Depression has resolved.    34. \"I'm depressed.\"  a. Crying today during appnt  b. Relp with mom is \"not stable.\" drinking heavily, and when she does, patient cannot deal with it.  c. Father  from drunk driving.  35. Mood/Depression: 8/10, crying spells (2 so far, one in the office), depressed mood  36. Anxiety: some worrying, /10, essentially controlled  37. Panic attacks: n  38. Energy: down  39. Concentration: down  40. Sleeping: well  41. Eating: weight gain  42. Refills: y  43. Substances: n  44. Therapy: continuing with [facility]  45. Medication compliant: y  46. No SI HI AVH.      6/15: In person interview:  47. Chart review: Seen by OB/GYN May 25 for well woman exam.  48. Planning: Increased Prozac at last visit.  Has she set up therapy with our office?  49. \"I'm better, but still depressed.\"  a. They got a camper so she will have some space. Also broke ground " "on her new home.  b. Anxiety: uncontrolled worrying, on edge, irritable a little, muscle tension  c. Still have days where she's just blah. \"What does tomorrow hold.\"  d. Dread \"every now and then.\"  e. Not really having depressive symptoms: Energy better, no hopelessness (\"I feel aleksey\"), no anhedonia  f. ADHD: fairly stable  50. Panic attacks: n  51. Energy: good, no psychomotor retardation  52. Concentration: stable  53. Sleeping: yes  54. Eating: less, stable weight however  55. Refills: y  56. Substances: n  57. Therapy: 6/22  58. Medication compliant: y  59. No SI HI AVH.      5/4: in person.  Interview:  60. Chart review: Seen by urgent care in March for nausea vomiting diarrhea.  CMP is reassuring in February with the exception of albumin 5.3.  61. \"I need a new therapist.\"  a. Interested in our office.  b. Worsening depression: poor energy, depressed mood, anhedonia developing.  c. ADHD under control  d. Some stress re: inlaws. Looking to move soon by building a house.  62. Refills: Yes  63. Substances: No  64. Therapy: Referral to our office  65. Medication compliant: Yes  66. No SI HI AVH.      3/16: Virtual visit via Zoom audio and video due to the COVID-19 pandemic.  Patient is accepting of and agreeable to visit.  The visit consisted of the patient and I. The patient is at home, and I am at the office.  Interview:  67. Chart review: Seen by primary care February 16 for vitamin D deficiency.  Vitamin D is normal now at 35.9.  CMP just shows elevated albumin 5.3, iron studies show  iron is high at 167 and ferritin is high at 169.  Lipids are abnormal, CBC is reassuring.  UDS is negative in May of last year.  68. \" I think I am doing well.\"  a. Some issues with anxiety related to living with her in-laws.  b. Still waiting on a bed to be accepted to build to their house.  c. Overprotective of her daughter.  Has a checkup today.  d. Anxiety, depression, ADHD are essentially under control, " "however.  69. Medication compliant: Yes  70. No SI HI AVH.      2/2: Virtual visit via Zoom audio and video due to the COVID-19 pandemic.  Patient is accepting of and agreeable to visit.  The visit consisted of the patient and I. Patient is at home, I'm at work.  71. Chart review: No new developments in chart review since December 22.  72. \"I'm good mentally.\"  a. Well, depression and anxiety are well controlled. So is ADHD.  b. Sore throat 2 wks ago, now cough.  73. P0, G7.  74. Energy: stable  75. Concentration: stable  76. Sleeping: well  77. Therapy: continuing, doing some couples therapy as well.  78. Medication compliant: y  79. No SI HI AVH.      12/22: Virtual visit via Zoom audio and video due to the COVID-19 pandemic.  Patient is accepting of and agreeable to visit.  The visit consisted of the patient and I.  Interview:  80. Chart review: No new chart developments since November 17.  81. \"Good.\"  a. Going thru some life challenges.   i.  closed due to COVID.  ii. Daughter is not sick.  iii. Switched adderall so that higher dose is in the morning. Sometimes doesn't even need the afternoon dose.  b. \"I'm handling things better than I used to.\"  82. Depression/Mood: denies  83. Anxiety: \"I feel like it's better\" \"I feel like it's under control\"  1. Uncontrolled worrying: much better  2. Severity: mild to moderate situational anxiety  3. Muscle tension  4. Fatigue has improved  5. Concentration better  6. Restlessness/feeling on edge: sometimes, situational  7. Irritability: denies  8. Insomnia:  a. Initial insomnia some nights, chronic \"my whole life\"  b. Started melatonin 10 mg nightly  9. Duration: months  10. Panic attacks: denies  84. Refills: y  85. Sleeping: n  86. Eating: stable  87. Substances: n  88. Therapy: started therapy as well.  89. Medication compliant: y  90. No SI HI AVH.      11/17: Virtual visit via Zoom audio and video due to the COVID-19 pandemic.  Patient is accepting of and " "agreeable to visit.  The visit consisted of the patient and I.  Interview:  91. Chart review: No new developments.  92. \"Doing okay.\"  a. Prozac has helped so far.  i. Anxiety and mood  b. Also done moving and sold their house.  c. More stuff at work.  93. ADHD:  a. Starting to procrastinate more.  b. Feels like she needs more at 12:30 - 1pm.   c. Takes adderall 7:30 am and 12:30 pm. Wears off around when she leaves work.   d. Feels like she is \"slipping back.\"  e. Weekend breaks  94. Depression/Mood: better mood, more energy  95. Anxiety: less worrying, less irritability, less on edge  96. Sleeping: \"fine\"  97. Eating: lost weight  98. Substances: denies  99. Therapy: interested  a. Feels females might be judging her  b. Thinks that might be due to her relp with her mom; they didn't get along growing up.  c. Mom treated her like she was never good enough.  100. Medication compliant: y  101. No SI HI AVH.      10/15: Virtual visit via Zoom audio and video due to the COVID-19 pandemic.  Patient is accepting of and agreeable to visit.  The visit consisted of the patient and I.  Interview:  102. Chart review: Seen recently by PCP and diagnosed with acute swimmer's ear on the right side.  103. \"I am ok.\"  104. Depression/Mood: denies  105. ADHD: under control  106. Stressors:  a. Sold her house  b. Now building a house; not sure where they are going to live  c. Taking care of her 19 mo old  d. Pandemic cases that were going up  107. Anxiety:  a. Irritable  b. Worrying  c. On edge  d. Obsessing about her daughter as well; constantly worried something bad is going to happen  e. Sometimes has trouble thinking about anything else but her  f. No panic attacks  108. Sleeping: yes  109. Eating: stable  110. Substances: denies  111. Therapy: not interested  112. Medication compliant: yes  113. No SI HI AVH.      8/20: Virtual visit via Zoom audio and video due to the COVID-19 pandemic.  Patient is accepting of and agreeable to " "visit.  The visit consisted of the patient and I.  Interview:  114. Seen by PCP June 23 and was doing well overall.  115. \"I'm good.\"  116. ADHD controlled.  117. Denies depression and anxiety. Some situational anxiety due to COVID-19 and her job.  118. Finally able to get around to doing projects at home.  119. Denies SI HI AVH.      6/18: In-person Interview: Patient reports some improvement on Adderall XR.  Discontinued Topamax.  No longer going to the weight loss clinic.  Notes residual depression and anxiety symptoms, but they are mild.  They are well controlled on the bupropion.  No SI HI AVH.  Denies depression, muscle tension, fatigue, restlessness. Continues to endorse poor concentration. See scores below.      IMPORTANT:  From previous note, patient was held back in the third grade. Struggled to complete high school and missed 60 days although she did graduate with good grades. Got in trouble for talking a lot in class. She was not in any special classes. She did not have an IEP.     1/6 H&P: Virtual visit via Zoom audio and video due to the COVID-19 pandemic. Patient is accepting of and agreeable to appointment. The appointment consisted of the patient and I only. Interview: Patient reports that she had her first child in March and suffered from postpartum depression. The pandemic did not help things. It was a \"hard time,\" and the patient reports she had already had anxiety before. Patient is presently bottlefeeding. She became pregnant through an infertility specialist. Presently not on any contraception.   Endorses muscle tension, fatigue, poor concentration, restlessness, irritability, and some broken sleep, although she is sleeping for about 8 hours a night. She wakes up frequently to check on her baby girl, Alexandra. Also endorses guilt at being a bad mom. Duration has been since March of last year. Patient feels her anxiety is worse than her depression.   Denies SI HI AVH. Has access to weapons that are " "locked in a gun safe. Patient does not know the combination. She also does not know how to work the guns that are in the safe. Psychiatric review of systems is positive for anxiety and depression, negative for psychosis and maribel.   Possible ADHD. Patient was diagnosed by Dr. Joe in 2017. 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 Possible family history as well as her sister may have ADHD.   Past Psychiatric History:  Began Psychiatric Treatment: Since    Dx: Anxiety   Psychiatrist: Has not seen a psychiatrist   Therapist: Saw therapist in 2018x1, unsure if it was helpful.   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     Feeling down, depressed, or hopeless?     Trouble falling or staying asleep, or sleeping " too much?     Feeling tired or having little energy?     Poor appetite or overeating?     Feeling bad about yourself - or that you are a failure or have let yourself or your family down?     Trouble concentrating on things, such as reading the newspaper or watching television?     Moving or speaking so slowly that other people could have noticed? Or the opposite - being so fidgety or restless that you have been moving around a lot more than usual?     Thoughts that you would be better off dead, or of hurting yourself in some way?     PHQ-9 Total Score       MATTHEW-7  Feeling nervous, anxious or on edge: Several days  Not being able to stop or control worrying: Not at all  Worrying too much about different things: Not at all  Trouble Relaxing: Several days  Being so restless that it is hard to sit still: Not at all  Feeling afraid as if something awful might happen: Not at all  Becoming easily annoyed or irritable: Several days  MATTHEW 7 Total Score: 3  If you checked any problems, how difficult have these problems made it for you to do your work, take care of things at home, or get along with other people: Not difficult at all    Past Surgical History:  Past Surgical History:   Procedure Laterality Date   • ADENOIDECTOMY     • DILATATION AND CURETTAGE     • SKIN BIOPSY     • TONSILLECTOMY         Problem List:  Patient Active Problem List   Diagnosis   • Acne   • Anxiety   • Attention deficit hyperactivity disorder (ADHD)   • Depression   • Generalized anxiety disorder   • Gestational hypertension   • Major depressive disorder in partial remission (HCC)   • PCOS (polycystic ovarian syndrome)   • Pre-eclampsia       Allergy:   No Known Allergies     Discontinued Medications:  Medications Discontinued During This Encounter   Medication Reason   • amphetamine-dextroamphetamine (ADDERALL) 10 MG tablet Reorder   • amphetamine-dextroamphetamine (ADDERALL) 20 MG tablet Reorder   • hydrOXYzine (ATARAX) 50 MG tablet Reorder        Current Medications:   Current Outpatient Medications   Medication Sig Dispense Refill   • Aklief 0.005 % cream      • [START ON 11/3/2022] amphetamine-dextroamphetamine (ADDERALL) 10 MG tablet Take 1 tablet by mouth Daily. 30 tablet 0   • [START ON 11/3/2022] amphetamine-dextroamphetamine (ADDERALL) 20 MG tablet Take 1 tablet by mouth 2 (Two) Times a Day. 60 tablet 0   • BD Pen Needle María 2nd Gen 32G X 4 MM misc USE WITH SAXENDA PEN AS DIRECTED     • DULoxetine (CYMBALTA) 30 MG capsule TAKE 1 CAPSULE BY MOUTH ONCE DAILY 30 capsule 2   • [START ON 11/16/2022] hydrOXYzine (ATARAX) 50 MG tablet Take 2 tablets by mouth Every Night. 180 tablet 1   • ketoconazole (NIZORAL) 2 % shampoo apply to SCALP THREE TIMES WEEKLY     • metFORMIN (GLUCOPHAGE) 500 MG tablet Take 1 tablet by mouth Daily.     • metoprolol succinate XL (Toprol XL) 25 MG 24 hr tablet Take 1 tablet by mouth Daily for 90 days. 90 tablet 1   • minocycline (MINOCIN,DYNACIN) 100 MG capsule      • Prenatal MV-Min-Fe Fum-FA-DHA (PRENATAL+DHA PO)      • Saxenda 18 MG/3ML injection pen      • Soolantra 1 % cream      • spironolactone (ALDACTONE) 100 MG tablet spironolactone 100 mg oral tablet take 1 tablet (100 mg) by oral route once daily   Active     • triamcinolone (KENALOG) 0.1 % ointment apply to trunk TWICE DAILY AS NEEDED FOR itching     • vitamin D (ERGOCALCIFEROL) 1.25 MG (71693 UT) capsule capsule TAKE 1 CAPSULE BY MOUTH ONCE WEEKLY 13 capsule 1   • buPROPion XL (WELLBUTRIN XL) 150 MG 24 hr tablet Take 2 tablets by mouth Daily. 7 tablet 0   • levocetirizine (XYZAL) 5 MG tablet Take 5 mg by mouth Every Evening.       No current facility-administered medications for this visit.       Past Medical History:  Past Medical History:   Diagnosis Date   • Acne    • ADHD    • Anxiety    • Depression    • Generalized anxiety disorder 01/06/2021   • History of medical problems 02/28/2019    Miscarriage   • Major depressive disorder in partial remission (HCC)  01/06/2021   • PCOS (polycystic ovarian syndrome)    • Pre-eclampsia        Social History     Socioeconomic History   • Marital status:    Tobacco Use   • Smoking status: Never   • Smokeless tobacco: Never   • Tobacco comments:     2/10/2021- 1/6/2021   Vaping Use   • Vaping Use: Never used   Substance and Sexual Activity   • Alcohol use: Yes     Comment: light; 2/10/2021- 1/6/2021   • Drug use: Never     Comment: 2/10/2021- 1/6/2021   • Sexual activity: Yes     Partners: Male     Birth control/protection: None       Family History   Problem Relation Age of Onset   • Anxiety disorder Mother    • Bipolar disorder Mother    • Depression Mother    • ADD / ADHD Sister    • Depression Sister    • ADD / ADHD Sister        Mental Status Exam:   Hygiene:   good, groomed  Cooperation:  Cooperative  Eye Contact:  Good  Psychomotor Behavior:  Appropriate  Affect:  Depressed and tearful, congruent  Mood: depressed  Hopelessness: Denies  Speech:  Normal  Thought Process:  Goal directed  Thought Content:  Normal  Suicidal:  None  Homicidal:  None  Hallucinations:  None  Delusion:  None  Memory:  Intact  Orientation:  Person, Place, Time and Situation  Reliability:  good  Insight:  Fair  Judgement:  Fair  Impulse Control:  Fair  Physical/Medical Issues:  No      Review of Systems:  Review of Systems   Constitutional: Negative for diaphoresis and fatigue.   HENT: Negative for drooling.    Eyes: Negative for visual disturbance.   Respiratory: Negative for cough and shortness of breath.    Cardiovascular: Negative for chest pain, palpitations and leg swelling.   Gastrointestinal: Negative for diarrhea, nausea and vomiting.   Endocrine: Negative for cold intolerance and heat intolerance.   Genitourinary: Negative for difficulty urinating.   Musculoskeletal: Negative for joint swelling.   Allergic/Immunologic: Negative for immunocompromised state.   Neurological: Negative for dizziness, seizures, syncope, speech difficulty,  "light-headedness, numbness and headaches.         Physical Exam:  Physical Exam    Vital Signs:   /80   Pulse 82   Ht 154.9 cm (61\")   Wt 79.7 kg (175 lb 12.8 oz)   BMI 33.22 kg/m²      Lab Results:   Office Visit on 05/25/2022   Component Date Value Ref Range Status   • Diagnosis 05/25/2022 Comment   Final    NEGATIVE FOR INTRAEPITHELIAL LESION OR MALIGNANCY.  THIS SPECIMEN WAS RESCREENED AS PART OF OUR  PROGRAM.   • Specimen adequacy: 05/25/2022 Comment   Final    Satisfactory for evaluation.  Endocervical and/or squamous metaplastic  cells (endocervical component) are present.   • Clinician Provided ICD-10: 05/25/2022 Comment   Final    Z01.419   • Performed by: 05/25/2022 Comment   Final    Aby Moya, Cytotechnologist (ASCP)   • QC reviewed by: 05/25/2022 Comment   Final    She Burt, Supervisory Cytotechnologist (ASCP)   • . 05/25/2022 .   Final   • Note: 05/25/2022 Comment   Final    The Pap smear is a screening test designed to aid in the detection of  premalignant and malignant conditions of the uterine cervix.  It is not a  diagnostic procedure and should not be used as the sole means of detecting  cervical cancer.  Both false-positive and false-negative reports do occur.   • Method: 05/25/2022 Comment   Final    This liquid based ThinPrep(R) pap test was screened with the  use of an image guided system.   • Conv .conv 05/25/2022 Comment   Final    The HPV DNA reflex criteria were not met with this specimen result  therefore, no HPV testing was performed.   Lab on 04/26/2022   Component Date Value Ref Range Status   • Hepatitis B Surface Ag 04/26/2022 Negative  Negative Final   • Hep B E Ag 04/26/2022 Negative  Negative Final   • Hep B Core IgM 04/26/2022 Negative  Negative Final   • Hep B Core Total Ab 04/26/2022 Negative  Negative Final   • Hep B E Ab 04/26/2022 Negative  Negative Final   • Hep B S Ab 04/26/2022 Non Reactive   Final                  Non Reactive: " Inconsistent with immunity,                              less than 10 mIU/mL                Reactive:     Consistent with immunity,                              greater than 9.9 mIU/mL  **Effective May 2, 2022 Hepatitis B Virus (Profile VI) will  **    be made non-orderable.    [facility] code [id number] HBV Screening and Diagnosis.       EKG Results:  No orders to display       Imaging Results:  No Images in the past 120 days found..      Assessment & Plan   Diagnoses and all orders for this visit:    1. Generalized anxiety disorder (Primary)  -     hydrOXYzine (ATARAX) 50 MG tablet; Take 2 tablets by mouth Every Night.  Dispense: 180 tablet; Refill: 1    2. Major depressive disorder in partial remission, unspecified whether recurrent (HCC)    3. ADHD (attention deficit hyperactivity disorder), inattentive type  -     amphetamine-dextroamphetamine (ADDERALL) 10 MG tablet; Take 1 tablet by mouth Daily.  Dispense: 30 tablet; Refill: 0  -     amphetamine-dextroamphetamine (ADDERALL) 20 MG tablet; Take 1 tablet by mouth 2 (Two) Times a Day.  Dispense: 60 tablet; Refill: 0  -     hydrOXYzine (ATARAX) 50 MG tablet; Take 2 tablets by mouth Every Night.  Dispense: 180 tablet; Refill: 1    4. Insomnia due to mental condition        Presentation most consistent with major depressive disorder in partial remission, generalized anxiety disorder, and ADHD.  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  Also endorses a childhood history that is compelling.    10/19: Doing well, no changes. Anx, dep, adhd under control. 17 minutes of supportive psychotherapy with goal to strengthen defenses, promote problems solving, restore adaptive functioning and provide symptom relief. The therapeutic alliance was strengthened to  encourage the patient to express their thoughts and feelings. Esteem building was enhanced through praise, reassurance, normalizing and encouragement. Coping skills were enhanced to build distress tolerance skills and emotional regulation. Allowed patient to freely discuss issues without interruption or judgement with unconditional positive regard, active listening skills, and empathy. Provided a safe, confidential environment to facilitate the development of a positive therapeutic relationship and encourage open, honest communication. Assisted patient in identifying risk factors which would indicate the need for higher level of care including thoughts to harm self or others and/or self-harming behavior and encouraged patient to contact this office, call 911, or present to the nearest emergency room should any of these events occur. Assisted patient in processing session content; acknowledged and normalized patient’s thoughts, feelings, and concerns by utilizing a person-centered approach in efforts to build appropriate rapport and a positive therapeutic relationship with open and honest communication. Patient given education on medication side effects, diagnosis/illness and relapse symptoms. Plan to continue supportive psychotherapy in next appointment to provide symptom relief.  Diagnoses: as above  Symptoms: as above  Functional status: good  Mental Status Exam: as above    Treatment plan: Medication management and supportive psychotherapy  Prognosis: good  Progress: well, stable  6 wks      9/9: Wants to be on less meds. Taper off bupropion. Continue other meds. Let's streamline the meds. 16 minutes of supportive psychotherapy   Treatment plan: Medication management and supportive psychotherapy  Prognosis: good  Progress: better, not at goal  6 wks      7/27: Stop prozac, 2 week washout, start cymbalta. Consider abilify on top of bupropion. 17 minutes of supportive psychotherapy Treatment plan: Medication  management and supportive psychotherapy  Prognosis: good  Progress: more depressed  6 wks      6/15: Residual anxiety, increase prozac. Goal is to increase until maxed out; then possible switch. Depression has resolved. 18 minutes of supportive psychotherapy Treatment plan: Medication management and supportive psychotherapy  Prognosis: good  Progress: better  6 wks    5/4: Therapy referral.  Increase Prozac.  May consider switching from bupropion to Abilify in the future.  Patient was not able to tolerate bupropion 450 mg daily.  17 minutes of supportive psychotherapyTreatment plan: Medication management and supportive psychotherapy  Prognosis: Very good  Progress: Significant since our first visit.  Now developing reemerging depression  6 weeks    3/16: No change to medications, no side effects.  Living with in-laws at their house has been a source of anxiety for the patient.  Also taking care of her daughter, who she is very protective of.  Some issues with keeping restraint during therapy sessions as well.  17 minutes of supportive psychotherapy   6 weeks    2/2: Anxious about the weather, and its consequences (closed daycares, etc.). 18 minutes of supportive psychotherapy  6 wks    12/22: Increase Prozac to target anxiety, increase hydroxyzine to target insomnia. 17 minutes of supportive psychotherapy   6 weeks    11/17: Some ADHD symptoms re-emerging. Add 10 mg adderall at 11 am. Schedule will be 7:30 first dose, 11 second higher dose of 30 mg. Pt also interested in therapy. 17 minutes of supportive psychotherapy   4 wks.    10/15: Start prozac. Hydroxyzine at night calms her; consider adding am dose. 4 wks    8/20: Patient is doing extremely well.  No change to medications.  See back in 8 weeks.    6/18: Doing a little better, however patient was doing much better on Adderall 20 mg twice a day, which was her previous dose.  Discontinue Adderall XR and start Adderall immediate release 20 mg twice a day.  Some  residual depressive and anxiety symptoms that are well controlled on bupropion.  Treating ADHD will also treat residual depressive and anxiety symptoms as well.  See back in 2 months.  Declines psychotherapy.    Visit Diagnoses:    ICD-10-CM ICD-9-CM   1. Generalized anxiety disorder  F41.1 300.02   2. Major depressive disorder in partial remission, unspecified whether recurrent (HCC)  F32.4 296.25   3. ADHD (attention deficit hyperactivity disorder), inattentive type  F90.0 314.00   4. Insomnia due to mental condition  F51.05 300.9     327.02       PLAN:  120. Risk Assessment: Risk of self-harm acutely is low. Risk factors include anxiety disorder, mood disorder, access to weapons, recent psychosocial stressors. Protective factors include no family history, no present SI, no history of suicide attempts or self-harm in the past, minimal AODA, healthcare seeking, future orientation, willingness to engage in care,  baby. Risk of self-harm chronically is also low, but could be further elevated in the event of treatment noncompliance and/or AODA.  121. Safety: No acute safety concerns.  122. Medications:   a. CONTINUE cymbalta 30 mg daily. Risks, benefits, alternatives discussed with patient including GI upset, nausea vomiting diarrhea, theoretical decrease of seizure threshold predisposing the patient to a slightly higher seizure risk, headaches, sexual dysfunction, serotonin syndrome, bleeding risk, increased suicidality in patients 24 years and younger.  Also constipation and urinary retention.  After discussion of these risks and benefits, the patient voiced understanding and agreed to proceed.   b. CONTINUE hydroxyzine 100 mg PO QHS. Risks, benefits, alternatives discussed with patient including sedation, dizziness, fall risk, GI upset.  After discussion of these risks and benefits, the patient voiced understanding and agreed to proceed.  c. CONTINUE Adderall 30 mg PO QAM, 20 mg PO QPM. Risks, benefits, side  effects discussed with patient including elevated heart rate, elevated blood pressure, irritability, insomnia, sexual dysfunction, appetite suppressing properties, psychosis.  After discussion of these risks and benefits, the patient voiced understanding and agreed to proceed. Controlled substances consent verbally signed. UDS and Tom ordered.  d. S/P   i. prozac 60 mg ineffective.  ii. Escitalopram: sexual dysfunction  iii. Adderall XR 10 mg daily: not effective  iv. Bupropion  made her nauseated. 300 mg made her sweat (irritable?)  123. Therapy: Consider in the future, presently not interested.  124. Labs/Studies: EKG reassuring.  125. Follow Up: 6 wks.      TREATMENT PLAN/GOALS: Continue supportive psychotherapy efforts and medications as indicated. Treatment and medication options discussed during today's visit. Patient acknowledged and verbally consented to continue with current treatment plan and was educated on the importance of compliance with treatment and follow-up appointments.    MEDICATION ISSUES:  TOM reviewed as expected.  Discussed medication options and treatment plan of prescribed medication as well as the risks, benefits, and side effects including potential falls, possible impaired driving and metabolic adversities among others. Patient is agreeable to call the office with any worsening of symptoms or onset of side effects. Patient is agreeable to call 911 or go to the nearest ER should he/she begin having SI/HI. No medication side effects or related complaints today.     MEDS ORDERED DURING VISIT:  New Medications Ordered This Visit   Medications   • amphetamine-dextroamphetamine (ADDERALL) 10 MG tablet     Sig: Take 1 tablet by mouth Daily.     Dispense:  30 tablet     Refill:  0   • amphetamine-dextroamphetamine (ADDERALL) 20 MG tablet     Sig: Take 1 tablet by mouth 2 (Two) Times a Day.     Dispense:  60 tablet     Refill:  0   • hydrOXYzine (ATARAX) 50 MG tablet     Sig: Take 2  tablets by mouth Every Night.     Dispense:  180 tablet     Refill:  1       Return in about 2 months (around 12/19/2022).         This document has been electronically signed by Eli Murray MD  October 19, 2022 10:59 EDT

## 2022-10-19 NOTE — PATIENT INSTRUCTIONS
1.  Please return to clinic at your next scheduled visit.  Contact the clinic ([phone]) at least 24 hours prior in the event you need to cancel.  2.  Do no harm to yourself or others.    3.  Avoid alcohol and drugs.    4.  Take all medications as prescribed.  Please contact the clinic with any concerns. If you are in need of medication refills, please call the clinic at [phone].    5. Should you want to get in touch with your provider, Dr. Eli Murray, please utilize [employer] or contact the office ([phone]), and staff will be able to page Dr. Murray directly.  6.  In the event you have personal crisis, contact the following crisis numbers: Suicide Prevention Hotline 1-729.950.8426; JOSE Helpline 1-556-366-GLNY; [facility] Emergency Room [phone]; text HELLO to 383483; or 080.     SPECIFIC RECOMMENDATIONS:     1.      Medications discussed at this encounter:                   - no changes     2.      Psychotherapy recommendations:      3.     Return to clinic: 2 mos

## 2022-11-11 NOTE — PROGRESS NOTES
Progress Note    Date: November 16, 2022  Time In: 0901  Time Out: 0956    Patient Name: Janice Padilla  Patient Age: 27 y.o.      Subjective   History of Present Illness     From previous progress note on 9/28/22:  Patient is to begin a workout routine in order to reduce anxiety, as discussed in session.  Patient reports that being held accountable is what has worked for her in the past, so she plans to start working with a weight management clinic for accountability as well.  Patient is to continue utilizing healthy communication skills as discussed.    Janice Padilla is a 27 y.o. female who presents today as a follow-up for continued psychotherapy.  Patient reports that she has been more mindful about being assertive of her needs and expressing her emotions with friends and family.  She reflected on recent circumstances which has caused her to take things personally, and shared examples of times that she has set healthy boundaries.      Assessment    Mental Status Exam     Appearance: good hygiene and dressed appropriately for the weather  Behavior: calm  Cooperation:  engaged, cooperative, attentive, and friendly  Eye Contact:  good  Affect:  congruent  Mood: anxious  Speech: responsive  Thought Process:  linear and organized  Thought Content: appropriate and abstract  Suicidal: denies  Homicidal:  denies  Hallucinations:  denies  Memory:  intact  Orientation:  person, place, time, and situation  Reliability:  reliable  Insight:  good  Judgement:  good     Clinical Intervention       ICD-10-CM ICD-9-CM   1. Generalized anxiety disorder  F41.1 300.02        Individual psychotherapy was provided utilizing CBT techniques to acknowledge sources of feelings and behaviors, challenge negative thinking patterns, recognize cognitive distortions and provide support.  Patient was encouraged to reframe negative thought processes to think more positively, and she had no difficulty with this task, especially after  exploring examples of recent stressors and related symptoms.  Patient was able to identify her strong social support and the benefit to having healthy boundaries with others, including friends, family, and coworkers.    Plan   Plan & Goals     Patient is to continue utilizing healthy communication skills, as discussed in session.  She is to begin practicing identifying and acknowledging positive thoughts in order to improve mood, as well as setting reasonable expectations for herself.  We will discuss further next session.    1. Patient acknowledged and verbally consented to continue working toward resolving current treatment plan goals and was educated on the importance of participation in the therapeutic process.  2. Patient will remain compliant with medication regimen as prescribed. Discuss any medication side effects, questions or concerns with prescribing provider.  3. Call 911 or present to the nearest emergency room in an emergency situation. National Suicide Prevention Lifeline: 1-599.597.9631.    Return in about 2 weeks (around 11/30/2022) for Next scheduled follow up.    ____________________  This document has been electronically signed by Yareli Yeung LCSW  November 16, 2022 10:05 EST    Part of this note may be an electronic transcription/translation of spoken language to printed text using the Dragon Dictation System.

## 2022-11-16 ENCOUNTER — OFFICE VISIT (OUTPATIENT)
Dept: PSYCHIATRY | Facility: CLINIC | Age: 27
End: 2022-11-16

## 2022-11-16 DIAGNOSIS — F41.1 GENERALIZED ANXIETY DISORDER: Primary | ICD-10-CM

## 2022-11-16 PROCEDURE — 90837 PSYTX W PT 60 MINUTES: CPT | Performed by: SOCIAL WORKER
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
## 2022-11-18 NOTE — PROGRESS NOTES
"Progress Note    Date: November 30, 2022  Time In: 0900  Time Out: 0959    Patient Name: Janice Padilla  Patient Age: 27 y.o.      Subjective   History of Present Illness     From previous progress note on 11/16/22:  Patient is to continue utilizing healthy communication skills, as discussed in session.  She is to begin practicing identifying and acknowledging positive thoughts in order to improve mood, as well as setting reasonable expectations for herself.  We will discuss further next session.    Janice Padilla is a 27 y.o. female who presents today as a follow-up for continued psychotherapy.  Patient reports that she has been more aware of changes she has made in her interpersonal relationships, especially since becoming a mother (setting healthy boundaries, advocating for her needs, etc.).        Assessment    Mental Status Exam     Appearance: good hygiene and dressed appropriately for the weather  Behavior: calm  Cooperation:  engaged, cooperative, attentive, and friendly  Eye Contact:  good  Affect:  sad and tearful  Mood: sad and anxious  Speech: responsive  Thought Process:  linear and organized  Thought Content: appropriate and abstract  Suicidal: denies  Homicidal:  denies  Hallucinations:  denies  Memory:  intact  Orientation:  person, place, time, and situation  Reliability:  reliable  Insight:  good  Judgement:  good     Clinical Intervention       ICD-10-CM ICD-9-CM   1. Generalized anxiety disorder  F41.1 300.02        Individual psychotherapy was provided utilizing IPT & CBT techniques to challenge avoidance, challenge negative thinking patterns, provide support and discuss interpersonal conflicts.  Patient was encouraged to reflect on her interpersonal relationships, including family members, friends and coworkers.  She identified feeling \"codependent\" with her , and reflected on their relationship history.  Patient identified ways in which her past still affect her mood, and states " that she wants to continue to explore these negative thinking patterns in future sessions (negative self-talk and criticism, perfectionism, avoidance, etc).    Plan   Plan & Goals     Patient is to continue bringing awareness to triggers to mood changes, as discussed in session.  We will continue to explore interpersonal conflict and how her childhood experience may be affecting current relationships.     1. Patient acknowledged and verbally consented to continue working toward resolving current treatment plan goals and was educated on the importance of participation in the therapeutic process.  2. Patient will remain compliant with medication regimen as prescribed. Discuss any medication side effects, questions or concerns with prescribing provider.  3. Call 911 or present to the nearest emergency room in an emergency situation. National Suicide Prevention Lifeline: 1-227.859.7366.    Return in about 2 weeks (around 12/14/2022) for Next scheduled follow up.    ____________________  This document has been electronically signed by Yareli Yeung LCSW  November 30, 2022 11:01 EST    Part of this note may be an electronic transcription/translation of spoken language to printed text using the Dragon Dictation System.

## 2022-11-30 ENCOUNTER — OFFICE VISIT (OUTPATIENT)
Dept: PSYCHIATRY | Facility: CLINIC | Age: 27
End: 2022-11-30

## 2022-11-30 DIAGNOSIS — F41.1 GENERALIZED ANXIETY DISORDER: Primary | ICD-10-CM

## 2022-11-30 PROCEDURE — 90837 PSYTX W PT 60 MINUTES: CPT | Performed by: SOCIAL WORKER

## 2022-11-30 NOTE — PSYCHOTHERAPY NOTE
"Psychotherapy Note - November 30, 2022    daughter - Alexandra (age 2)   - Toño - started dating in 10th grade  Best friend - Annette - friends for years  _______    I decided not to go to dad's side this year    Sister hasn't talked to my mom in like 6 months    When she's around my dad's side, she gets sad    I will be in the middle of everything  I don't want to get into it - I'm not a drama person - no business advocating for my mom  I don't feel like I should prove to my sister    I used to think about how to make everyone else happy    Ever since I had Alexandra, I've gotten better at not making myself unhappy    7 weeks - miscarriage - March 2019 - (saw the heartbeat, was cramping)  Was supposed to go back at 8 weeks - not on an ultrasound    ______    I'm a very independent person, but to a certain extent, I'm co-dependent  Toño is my comfort    On the phone, I'm scared I'm going to make someone mad  If they sit in silence, I think they're mad, then I start rambling    Not whole relationship - co-dependency  Mom wasn't really a parent for a while  Moved in together full-time - age 19   when 20    We've never been toxic to each other - he's never called me names (the worst \"a brat\")  There were times that I deserved to be called  I did things in our relationship (before Alexandra) - not proud of it    2 ongoing affairs (out of high school - 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 - me and Toño had just gotten  - he was gone for over a week at a time - out of town) - I was by myself all the time (he looked at my phone - I was 21 or 22) - for whatever reason, I flipped it around on him - why are you on my phone    Years in between the affairs - was in RN school, 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  We were having fertility issues and I took a break from him for a while  I got back with an ex-boyfriend (dated before Toño) - when me and Toño got back together, I told him I was going to be with Toño - stressed and " "wanted attention (2 months) - I ended up telling Toño - I couldn't hide it anymore (I was heart-broken) - ended 3 years before Alexandra was born (2017)    I hate that I did that - cannot believe that I did that  I would never do that    There were some jealousy issues    Male friend - since high school, no desire to be  Toño     Negative self-talk - I think how could I not be the best wife or mother \"look at what I've done\"  2018 - bought our first house          "

## 2022-12-05 NOTE — PROGRESS NOTES
Progress Note    Date: December 14, 2022  Time In: 0900  Time Out: 1004    Patient Name: Janice Padilla  Patient Age: 27 y.o.      Subjective   History of Present Illness     From previous progress note on 11/30/22:  Patient is to continue bringing awareness to triggers to mood changes, as discussed in session.  We will continue to explore interpersonal conflict and how her childhood experience may be affecting current relationships.     Janice Padilla is a 27 y.o. female who presents today as a follow-up for continued psychotherapy.  Patient reports that she has noticed a change in communication between her and her .  She states that she wants more from him (both emotionally and logistically - specifically to help make decisions).  She states that she would like to work on healthier communication patterns between them.      Assessment    Mental Status Exam     Appearance: good hygiene and dressed appropriately for the weather  Behavior: restless  Cooperation:  engaged, cooperative, attentive, and friendly  Eye Contact:  good  Affect:  tearful  Mood: anxious  Speech: responsive  Thought Process:  linear and organized  Thought Content: appropriate and abstract  Suicidal: denies  Homicidal:  denies  Hallucinations:  denies  Memory:  intact  Orientation:  person, place, time, and situation  Reliability:  reliable  Insight:  good  Judgement:  good     Clinical Intervention       ICD-10-CM ICD-9-CM   1. Generalized anxiety disorder  F41.1 300.02   2. Major depressive disorder, recurrent episode, in partial remission ([facility])  F33.41 296.35        Individual psychotherapy was provided utilizing IPT techniques to develop healthy communication skills, manage stress, recognize patterns of behavior, identify communication style, discuss interpersonal conflicts and identify healthy vs unhealthy relationships. Patient was educated on I-statements and how to incorporate feeling words to promote healthy communication.   Patient reflected on her current and past interpersonal conflicts and stressors.  She had good insight into her patterns of behavior.  She indicated that she and her  have different needs, and she wants to be more assertive of her own.    Plan   Plan & Goals     Patient is to practice healthy communication, as discussed in session, in order to assert her needs (using I-statements, planning what she wants to say ahead of time, etc.).  We will discuss further next session.    1. Patient acknowledged and verbally consented to continue working toward resolving current treatment plan goals and was educated on the importance of participation in the therapeutic process.  2. Patient will remain compliant with medication regimen as prescribed. Discuss any medication side effects, questions or concerns with prescribing provider.  3. Call 911 or present to the nearest emergency room in an emergency situation. National Suicide Prevention Lifeline: 1-558.980.5967.    Return in about 2 weeks (around 12/28/2022) for Next scheduled follow up.    ____________________  This document has been electronically signed by Yareli Yeung LCSW  December 14, 2022 11:02 EST    Part of this note may be an electronic transcription/translation of spoken language to printed text using the Dragon Dictation System.

## 2022-12-09 DIAGNOSIS — F90.0 ADHD (ATTENTION DEFICIT HYPERACTIVITY DISORDER), INATTENTIVE TYPE: ICD-10-CM

## 2022-12-09 RX ORDER — DEXTROAMPHETAMINE SACCHARATE, AMPHETAMINE ASPARTATE, DEXTROAMPHETAMINE SULFATE AND AMPHETAMINE SULFATE 5; 5; 5; 5 MG/1; MG/1; MG/1; MG/1
TABLET ORAL
Qty: 60 TABLET | Refills: 0 | Status: SHIPPED | OUTPATIENT
Start: 2022-12-09 | End: 2023-01-11

## 2022-12-09 RX ORDER — DEXTROAMPHETAMINE SACCHARATE, AMPHETAMINE ASPARTATE, DEXTROAMPHETAMINE SULFATE AND AMPHETAMINE SULFATE 2.5; 2.5; 2.5; 2.5 MG/1; MG/1; MG/1; MG/1
TABLET ORAL
Qty: 30 TABLET | Refills: 0 | Status: SHIPPED | OUTPATIENT
Start: 2022-12-09 | End: 2023-01-11

## 2022-12-14 ENCOUNTER — OFFICE VISIT (OUTPATIENT)
Dept: PSYCHIATRY | Facility: CLINIC | Age: 27
End: 2022-12-14

## 2022-12-14 DIAGNOSIS — F41.1 GENERALIZED ANXIETY DISORDER: Primary | ICD-10-CM

## 2022-12-14 DIAGNOSIS — F33.41 MAJOR DEPRESSIVE DISORDER, RECURRENT EPISODE, IN PARTIAL REMISSION: ICD-10-CM

## 2022-12-14 PROCEDURE — 90837 PSYTX W PT 60 MINUTES: CPT | Performed by: SOCIAL WORKER

## 2022-12-14 NOTE — PSYCHOTHERAPY NOTE
Psychotherapy Note - December 14, 2022    I don't like feeling like it never happened    That's a part of my story - sub-consciously punish myself  I'll create a toxic environment between me and Toño because I don't deserve to be in a happy relationship    Toño never felt the need to share it with anyone else  Best friend - Annette - the only other person that knows    Toño said he forgave me and don't need to beat yourself up over it  In my mind, if I was capable of doing it not once, not twice  Once you made a mistake, you didn't live it down    I take a step back - things are different now  More successful marriage now  More at stake now, building a home with a family    Before the miscarriage (2019) - very cautious about who I talked to  On fertility treatment - wasn't talking to anyone    First affair - 20  Second affair - 22    Graduated with RN in 2019 - age 24    I think I'm going to [employer]  Toño grew up Muslim - he went to all the classes (it was pushed on him)  His dad is very Orthodox - he holds a grudge that Alexandra hasn't been baptized   Truly believe that's what he feels    Toño's mom was unfaithful - started an affair & hooked on Rx medicine  I feel like he had such a hard time with their divorce  Longest time, didn't talk to his dad after that - had his mom's side (was brainwashed)    I believe in God and I pray  I teach Alexandra about God    Annette was pushed into Muslim Worship growing up like Toño    I wish we could have a conversation about his feelings  I crave a deeper connection with him    He was not the best  during pregnancy  He was hateful, not understanding about how I helped, it was almost like he didn't want to get attached to this pregnancy because he didn't know what would happen    We project onto each other  If he isn't helping make a decision, I get so mad - I've had to make so many decisions    7-year anniversary - Monday night - didn't want to mess up her bedtime  routine  consistent - tired from working all day - all my idea    We went to dinner and I made reservations    I went to a [employer] - my dad was there - I was mourning again  I loved the experience of hearing from him - it was hard because he really is gone (almost 4) - I lost him all over again  (the legal pad - he would write poetry every night on a legal pad)    **to talk about dad more

## 2022-12-16 ENCOUNTER — OFFICE VISIT (OUTPATIENT)
Dept: PSYCHIATRY | Facility: CLINIC | Age: 27
End: 2022-12-16

## 2022-12-16 VITALS
SYSTOLIC BLOOD PRESSURE: 130 MMHG | DIASTOLIC BLOOD PRESSURE: 77 MMHG | HEART RATE: 102 BPM | BODY MASS INDEX: 32.47 KG/M2 | HEIGHT: 61 IN | WEIGHT: 172 LBS

## 2022-12-16 DIAGNOSIS — F51.05 INSOMNIA DUE TO MENTAL CONDITION: ICD-10-CM

## 2022-12-16 DIAGNOSIS — F90.0 ADHD (ATTENTION DEFICIT HYPERACTIVITY DISORDER), INATTENTIVE TYPE: ICD-10-CM

## 2022-12-16 DIAGNOSIS — F32.4 MAJOR DEPRESSIVE DISORDER IN PARTIAL REMISSION, UNSPECIFIED WHETHER RECURRENT: ICD-10-CM

## 2022-12-16 DIAGNOSIS — F33.41 MAJOR DEPRESSIVE DISORDER, RECURRENT EPISODE, IN PARTIAL REMISSION: ICD-10-CM

## 2022-12-16 DIAGNOSIS — F41.1 GENERALIZED ANXIETY DISORDER: Primary | ICD-10-CM

## 2022-12-16 PROCEDURE — 99214 OFFICE O/P EST MOD 30 MIN: CPT | Performed by: STUDENT IN AN ORGANIZED HEALTH CARE EDUCATION/TRAINING PROGRAM

## 2022-12-16 RX ORDER — METFORMIN HYDROCHLORIDE 500 MG/1
500 TABLET, EXTENDED RELEASE ORAL DAILY
COMMUNITY
Start: 2022-11-28

## 2022-12-16 RX ORDER — DULOXETIN HYDROCHLORIDE 30 MG/1
30 CAPSULE, DELAYED RELEASE ORAL DAILY
Qty: 90 CAPSULE | Refills: 1 | Status: SHIPPED | OUTPATIENT
Start: 2022-12-16

## 2022-12-16 RX ORDER — AZITHROMYCIN 250 MG/1
TABLET, FILM COATED ORAL
COMMUNITY
Start: 2022-11-21 | End: 2022-12-16

## 2022-12-16 NOTE — PATIENT INSTRUCTIONS
1.  Please return to clinic at your next scheduled visit.  Contact the clinic ([phone]) at least 24 hours prior in the event you need to cancel.  2.  Do no harm to yourself or others.    3.  Avoid alcohol and drugs.    4.  Take all medications as prescribed.  Please contact the clinic with any concerns. If you are in need of medication refills, please call the clinic at [phone].    5. Should you want to get in touch with your provider, Dr. Eli Murray, please utilize [employer] or contact the office ([phone]), and staff will be able to page Dr. Murray directly.  6.  In the event you have personal crisis, contact the following crisis numbers: Suicide Prevention Hotline 1-267.930.5604; [facility] Helpline 7-110-782-VKAL; [facility] Emergency Room [phone]; text HELLO to 584324; or 364.     SPECIFIC RECOMMENDATIONS:     1.      Medications discussed at this encounter:                   - no changes     2.      Psychotherapy recommendations:      3.     Return to clinic: 3 mos

## 2022-12-16 NOTE — PROGRESS NOTES
"Subjective   Janice Padilla is a 27 y.o. female who presents today for follow up    Referring Provider:  Balbina Todd, APRN  [address]  [address]  [place] [address]    Chief Complaint:  adhd cornel mdd    History of Present Illness:     Janice Padilla is a 25 year old /White female referred by Balbina Todd APRN.     Chart review : Patient is on Wellbutrin, status post Zoloft. Also on Adderall. Had tachycardia at 130. Started on metoprolol 50 mg extended release daily. Labs in December: BMI 35, LDL is elevated at 121, ALT AST within normal limits, creatinine 0.99, hematocrit 45, electrolytes are essentially normal, TSH is 3.65 normal, iron is 53 low, vitamin D is 23 low, ferritin is normal at 96. No EKG or head imaging.     \"Janice\"  Father passed away from drunk driving at 6 yo     : In person interview:  1. Chart review: Continuing psychotherapy.  2. Planning: No changes at last visit.  3. \"A little sleepy.\"  a. Things are going good.  b. Working on the house, waiting.  c. Trying to look forward to the holidays.  d. House will be done next Xmas  4. Mood/Depression: minimal depression  5. Anxiety:  a. Stressed from the holidays, but situational  6. Panic attacks:  7. Energy: good  8. Concentration: ADHD under control  9. Sleeping: well  10. Eatin lb wl   11. Refills: y  12. Substances: n  13. Therapy: continuing  14. Medication compliant: y  15. No SI HI AVH.      10/19: In person interview:  16. Chart review: Originally, patient called in stating that she did not feel well tapering off the bupropion.  She later recanted.  Continues to see psychotherapy.  17. Planning: Discontinued bupropion at last visit, likely want to increase Cymbalta as she is now on a slightly lower dose.  18. \"It was one day.\"  a. Didn't even last the whole day.  i. Everything was slow mo about a week after I stopped the bupropion. Was fine by the end of the day.  b. House outside is done except 2 " "windows.   i. Has learned to be more decisive  c. Not sweating at all now  19. Mood/Depression:  20. Anxiety:  a. Handling things better, less irritability  b. Minimal worrying  21. Panic attacks: n  22. Energy: good  23. Concentration/ADHD: ok, mind is in a lot of different places, distractible and issues with focus, \"nothing to make changes about\"  24. Sleeping: well  25. Eatin lb wl sine   26. Refills: y  27. Substances: n  28. Therapy: n  29. Medication compliant:  30. No SI HI AVH.      : In person interview:  31. Chart review: Continuing psychotherapy.  32. Planning: Switched from Prozac to Cymbalta.  Consider adding Abilify.  33. \"I think... I've been better.\"  a. Got worked up for PCOS, told \"everything is fine.\"  b. I don't want to be on so many medications.  34. Mood/Depression: no crying spells  a. Still not 100%, poor energy and motivation  b. Denies depressed mood most days  35. Anxiety: \"I feel like it's pretty good\"  a. Daughter had a fever and needed to go to urgent care and \"I handled that pretty well.\"  b. Some obsessive thoughts regarding doing the dishes/cleanliness  i. Not living in her own space; has to deal with others  ii. Wasn't like that when it was just her own house  36. ADHD: fairly well controlled most days  37. Panic attacks: n  38. Energy: not at goal  39. Concentration: stable  40. Sleeping: stable  41. Eating: wg  42. Refills: y  43. Substances: n  44. Therapy: y, likes [facility]  45. Medication compliant: y  46. No SI HI AVH.      : In person interview:  47. Chart review: Now seeing psychotherapy. Lots of anxiety in her mood log.  48. Planning: Increased Prozac 40 to 60 at last visit to target anxiety.  Depression has resolved.    49. \"I'm depressed.\"  a. Crying today during appnt  b. Relp with mom is \"not stable.\" drinking heavily, and when she does, patient cannot deal with it.  c. Father  from drunk driving.  50. Mood/Depression: 8/10, crying spells (2 so far, " "one in the office), depressed mood  51. Anxiety: some worrying, 4/10, essentially controlled  52. Panic attacks: n  53. Energy: down  54. Concentration: down  55. Sleeping: well  56. Eating: weight gain  57. Refills: y  58. Substances: n  59. Therapy: continuing with [facility]  60. Medication compliant: y  61. No SI HI AVH.      6/15: In person interview:  62. Chart review: Seen by OB/GYN May 25 for well woman exam.  63. Planning: Increased Prozac at last visit.  Has she set up therapy with our office?  64. \"I'm better, but still depressed.\"  a. They got a camper so she will have some space. Also broke ground on her new home.  b. Anxiety: uncontrolled worrying, on edge, irritable a little, muscle tension  c. Still have days where she's just blah. \"What does tomorrow hold.\"  d. Dread \"every now and then.\"  e. Not really having depressive symptoms: Energy better, no hopelessness (\"I feel aleksey\"), no anhedonia  f. ADHD: fairly stable  65. Panic attacks: n  66. Energy: good, no psychomotor retardation  67. Concentration: stable  68. Sleeping: yes  69. Eating: less, stable weight however  70. Refills: y  71. Substances: n  72. Therapy: 6/22  73. Medication compliant: y  74. No SI HI AVH.      5/4: in person.  Interview:  75. Chart review: Seen by urgent care in March for nausea vomiting diarrhea.  CMP is reassuring in February with the exception of albumin 5.3.  76. \"I need a new therapist.\"  a. Interested in our office.  b. Worsening depression: poor energy, depressed mood, anhedonia developing.  c. ADHD under control  d. Some stress re: inlaws. Looking to move soon by building a house.  77. Refills: Yes  78. Substances: No  79. Therapy: Referral to our office  80. Medication compliant: Yes  81. No SI HI AVH.      3/16: Virtual visit via Zoom audio and video due to the COVID-19 pandemic.  Patient is accepting of and agreeable to visit.  The visit consisted of the patient and I. The patient is at home, and I am at the " "office.  Interview:  82. Chart review: Seen by primary care February 16 for vitamin D deficiency.  Vitamin D is normal now at 35.9.  CMP just shows elevated albumin 5.3, iron studies show  iron is high at 167 and ferritin is high at 169.  Lipids are abnormal, CBC is reassuring.  UDS is negative in May of last year.  83. \" I think I am doing well.\"  a. Some issues with anxiety related to living with her in-laws.  b. Still waiting on a bed to be accepted to build to their house.  c. Overprotective of her daughter.  Has a checkup today.  d. Anxiety, depression, ADHD are essentially under control, however.  84. Medication compliant: Yes  85. No SI HI AVH.      2/2: Virtual visit via Zoom audio and video due to the COVID-19 pandemic.  Patient is accepting of and agreeable to visit.  The visit consisted of the patient and I. Patient is at home, I'm at work.  86. Chart review: No new developments in chart review since December 22.  87. \"I'm good mentally.\"  a. Well, depression and anxiety are well controlled. So is ADHD.  b. Sore throat 2 wks ago, now cough.  88. P0, G7.  89. Energy: stable  90. Concentration: stable  91. Sleeping: well  92. Therapy: continuing, doing some couples therapy as well.  93. Medication compliant: y  94. No SI HI AVH.      12/22: Virtual visit via Zoom audio and video due to the COVID-19 pandemic.  Patient is accepting of and agreeable to visit.  The visit consisted of the patient and I.  Interview:  95. Chart review: No new chart developments since November 17.  96. \"Good.\"  a. Going thru some life challenges.   i.  closed due to COVID.  ii. Daughter is not sick.  iii. Switched adderall so that higher dose is in the morning. Sometimes doesn't even need the afternoon dose.  b. \"I'm handling things better than I used to.\"  97. Depression/Mood: denies  98. Anxiety: \"I feel like it's better\" \"I feel like it's under control\"  1. Uncontrolled worrying: much better  2. Severity: mild to moderate " "situational anxiety  3. Muscle tension  4. Fatigue has improved  5. Concentration better  6. Restlessness/feeling on edge: sometimes, situational  7. Irritability: denies  8. Insomnia:  a. Initial insomnia some nights, chronic \"my whole life\"  b. Started melatonin 10 mg nightly  9. Duration: months  10. Panic attacks: denies  99. Refills: y  100. Sleeping: n  101. Eating: stable  102. Substances: n  103. Therapy: started therapy as well.  104. Medication compliant: y  105. No SI HI AVH.      11/17: Virtual visit via Zoom audio and video due to the COVID-19 pandemic.  Patient is accepting of and agreeable to visit.  The visit consisted of the patient and I.  Interview:  106. Chart review: No new developments.  107. \"Doing okay.\"  a. Prozac has helped so far.  i. Anxiety and mood  b. Also done moving and sold their house.  c. More stuff at work.  108. ADHD:  a. Starting to procrastinate more.  b. Feels like she needs more at 12:30 - 1pm.   c. Takes adderall 7:30 am and 12:30 pm. Wears off around when she leaves work.   d. Feels like she is \"slipping back.\"  e. Weekend breaks  109. Depression/Mood: better mood, more energy  110. Anxiety: less worrying, less irritability, less on edge  111. Sleeping: \"fine\"  112. Eating: lost weight  113. Substances: denies  114. Therapy: interested  a. Feels females might be judging her  b. Thinks that might be due to her relp with her mom; they didn't get along growing up.  c. Mom treated her like she was never good enough.  115. Medication compliant: y  116. No SI HI AVH.      10/15: Virtual visit via Zoom audio and video due to the COVID-19 pandemic.  Patient is accepting of and agreeable to visit.  The visit consisted of the patient and I.  Interview:  117. Chart review: Seen recently by PCP and diagnosed with acute swimmer's ear on the right side.  118. \"I am ok.\"  119. Depression/Mood: denies  120. ADHD: under control  121. Stressors:  a. Sold her house  b. Now building a house; " "not sure where they are going to live  c. Taking care of her 19 mo old  d. Pandemic cases that were going up  122. Anxiety:  a. Irritable  b. Worrying  c. On edge  d. Obsessing about her daughter as well; constantly worried something bad is going to happen  e. Sometimes has trouble thinking about anything else but her  f. No panic attacks  123. Sleeping: yes  124. Eating: stable  125. Substances: denies  126. Therapy: not interested  127. Medication compliant: yes  128. No SI HI AVH.      8/20: Virtual visit via Zoom audio and video due to the COVID-19 pandemic.  Patient is accepting of and agreeable to visit.  The visit consisted of the patient and I.  Interview:  129. Seen by PCP June 23 and was doing well overall.  130. \"I'm good.\"  131. ADHD controlled.  132. Denies depression and anxiety. Some situational anxiety due to COVID-19 and her job.  133. Finally able to get around to doing projects at home.  134. Denies SI HI AVH.      6/18: In-person Interview: Patient reports some improvement on Adderall XR.  Discontinued Topamax.  No longer going to the weight loss clinic.  Notes residual depression and anxiety symptoms, but they are mild.  They are well controlled on the bupropion.  No SI HI AVH.  Denies depression, muscle tension, fatigue, restlessness. Continues to endorse poor concentration. See scores below.      IMPORTANT:  From previous note, patient was held back in the third grade. Struggled to complete high school and missed 60 days although she did graduate with good grades. Got in trouble for talking a lot in class. She was not in any special classes. She did not have an IEP.     1/6 H&P: Virtual visit via Zoom audio and video due to the COVID-19 pandemic. Patient is accepting of and agreeable to appointment. The appointment consisted of the patient and I only. Interview: Patient reports that she had her first child in March and suffered from postpartum depression. The pandemic did not help things. It " "was a \"hard time,\" and the patient reports she had already had anxiety before. Patient is presently bottlefeeding. She became pregnant through an infertility specialist. Presently not on any contraception.   Endorses muscle tension, fatigue, poor concentration, restlessness, irritability, and some broken sleep, although she is sleeping for about 8 hours a night. She wakes up frequently to check on her baby girl, Alexandra. Also endorses guilt at being a bad mom. Duration has been since March of last year. Patient feels her anxiety is worse than her depression.   Denies SI HI AVH. Has access to weapons that are locked in a gun safe. Patient does not know the combination. She also does not know how to work the guns that are in the safe. Psychiatric review of systems is positive for anxiety and depression, negative for psychosis and maribel.   Possible ADHD. Patient was diagnosed by Dr. Joe in 2017. 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 Possible family history as well as her sister may have ADHD.   Past Psychiatric History:  Began Psychiatric Treatment: Since 2011   Dx: Anxiety   Psychiatrist: Has not seen a psychiatrist   Therapist: Saw therapist in 2018x1, unsure if it was helpful.   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     Feeling down, depressed, or hopeless?     Trouble falling or staying asleep, or sleeping too much?     Feeling tired or having little energy?     Poor appetite or overeating?     Feeling bad about yourself - or that you are a failure or have let yourself or your family down?     Trouble concentrating on things, such as reading the newspaper or watching television?     Moving or speaking so slowly that other people could have noticed? Or the opposite - being so fidgety or restless that you have been moving around a lot more than usual?     Thoughts that you would be better off dead, or of hurting yourself in some way?     PHQ-9 Total Score       MATTHEW-7       Past Surgical History:  Past Surgical History:   Procedure Laterality Date   • ADENOIDECTOMY     • DILATATION AND CURETTAGE     • SKIN BIOPSY     • TONSILLECTOMY         Problem List:  Patient Active Problem List   Diagnosis   • Acne   • Anxiety   • Attention deficit hyperactivity disorder (ADHD)   • Depression   • Generalized anxiety disorder   • Gestational hypertension   • Major depressive disorder in partial remission (HCC)   • PCOS (polycystic ovarian syndrome)   • Pre-eclampsia       Allergy:   No Known Allergies     Discontinued Medications:  Medications Discontinued During This Encounter   Medication Reason   • minocycline (MINOCIN,DYNACIN) 100 MG capsule *Therapy completed   • levocetirizine (XYZAL) 5 MG tablet *Therapy completed   •  buPROPion XL (WELLBUTRIN XL) 150 MG 24 hr tablet *Therapy completed   • metFORMIN (GLUCOPHAGE) 500 MG tablet *Therapy completed   • azithromycin (ZITHROMAX) 250 MG tablet *Therapy completed   • DULoxetine (CYMBALTA) 30 MG capsule Reorder       Current Medications:   Current Outpatient Medications   Medication Sig Dispense Refill   • Aklief 0.005 % cream      • amphetamine-dextroamphetamine (ADDERALL) 10 MG tablet TAKE 1 TABLET BY MOUTH ONCE DAILY 30 tablet 0   • amphetamine-dextroamphetamine (ADDERALL) 20 MG tablet TAKE 1 TABLET BY MOUTH TWICE DAILY 60 tablet 0   • BD Pen Needle María 2nd Gen 32G X 4 MM misc USE WITH SAXENDA PEN AS DIRECTED     • DULoxetine (CYMBALTA) 30 MG capsule Take 1 capsule by mouth Daily. 90 capsule 1   • hydrOXYzine (ATARAX) 50 MG tablet Take 2 tablets by mouth Every Night. 180 tablet 1   • ketoconazole (NIZORAL) 2 % shampoo apply to SCALP THREE TIMES WEEKLY     • metFORMIN ER (GLUCOPHAGE-XR) 500 MG 24 hr tablet Take 500 mg by mouth Daily.     • metoprolol succinate XL (Toprol XL) 25 MG 24 hr tablet Take 1 tablet by mouth Daily for 90 days. 90 tablet 1   • Prenatal MV-Min-Fe Fum-FA-DHA (PRENATAL+DHA PO)      • Saxenda 18 MG/3ML injection pen      • Soolantra 1 % cream      • spironolactone (ALDACTONE) 100 MG tablet spironolactone 100 mg oral tablet take 1 tablet (100 mg) by oral route once daily   Active     • triamcinolone (KENALOG) 0.1 % ointment apply to trunk TWICE DAILY AS NEEDED FOR itching     • vitamin D (ERGOCALCIFEROL) 1.25 MG (52153 UT) capsule capsule TAKE 1 CAPSULE BY MOUTH ONCE WEEKLY 13 capsule 1     No current facility-administered medications for this visit.       Past Medical History:  Past Medical History:   Diagnosis Date   • Acne    • ADHD    • Anxiety    • Depression    • Generalized anxiety disorder 01/06/2021   • History of medical problems 02/28/2019    Miscarriage   • Major depressive disorder in partial remission (HCC) 01/06/2021   • PCOS (polycystic ovarian  syndrome)    • Pre-eclampsia        Social History     Socioeconomic History   • Marital status:    Tobacco Use   • Smoking status: Never   • Smokeless tobacco: Never   • Tobacco comments:     2/10/2021- 1/6/2021   Vaping Use   • Vaping Use: Never used   Substance and Sexual Activity   • Alcohol use: Yes     Comment: light; 2/10/2021- 1/6/2021   • Drug use: Never     Comment: 2/10/2021- 1/6/2021   • Sexual activity: Yes     Partners: Male     Birth control/protection: None       Family History   Problem Relation Age of Onset   • Anxiety disorder Mother    • Bipolar disorder Mother    • Depression Mother    • ADD / ADHD Sister    • Depression Sister    • ADD / ADHD Sister        Mental Status Exam:   Hygiene:   good, groomed  Cooperation:  Cooperative  Eye Contact:  Good  Psychomotor Behavior:  Appropriate  Affect:  euthymic, congruent  Mood: pretty good  Hopelessness: Denies  Speech:  Normal  Thought Process:  Goal directed  Thought Content:  Normal  Suicidal:  None  Homicidal:  None  Hallucinations:  None  Delusion:  None  Memory:  Intact  Orientation:  Person, Place, Time and Situation  Reliability:  good  Insight:  Fair  Judgement:  Fair  Impulse Control:  Fair  Physical/Medical Issues:  No      Review of Systems:  Review of Systems   Constitutional: Negative for diaphoresis and fatigue.   HENT: Negative for drooling.    Eyes: Negative for visual disturbance.   Respiratory: Negative for cough and shortness of breath.    Cardiovascular: Negative for chest pain, palpitations and leg swelling.   Gastrointestinal: Negative for diarrhea, nausea and vomiting.   Endocrine: Negative for cold intolerance and heat intolerance.   Genitourinary: Negative for difficulty urinating.   Musculoskeletal: Negative for joint swelling.   Allergic/Immunologic: Negative for immunocompromised state.   Neurological: Negative for dizziness, seizures, syncope, speech difficulty, light-headedness, numbness and headaches.  "        Physical Exam:  Physical Exam    Vital Signs:   /77   Pulse 102   Ht 154.9 cm (61\")   Wt 78 kg (172 lb)   BMI 32.50 kg/m²      Lab Results:   No visits with results within 6 Month(s) from this visit.   Latest known visit with results is:   Office Visit on 05/25/2022   Component Date Value Ref Range Status   • Diagnosis 05/25/2022 Comment   Final    NEGATIVE FOR INTRAEPITHELIAL LESION OR MALIGNANCY.  THIS SPECIMEN WAS RESCREENED AS PART OF OUR  PROGRAM.   • Specimen adequacy: 05/25/2022 Comment   Final    Satisfactory for evaluation.  Endocervical and/or squamous metaplastic  cells (endocervical component) are present.   • Clinician Provided ICD-10: 05/25/2022 Comment   Final    Z01.419   • Performed by: 05/25/2022 Comment   Final    Aby Moya, Cytotechnologist ([facility])   • QC reviewed by: 05/25/2022 Comment   Final    She Burt, Supervisory Cytotechnologist (AS[facility])   • . 05/25/2022 .   Final   • Note: 05/25/2022 Comment   Final    The Pap smear is a screening test designed to aid in the detection of  premalignant and malignant conditions of the uterine cervix.  It is not a  diagnostic procedure and should not be used as the sole means of detecting  cervical cancer.  Both false-positive and false-negative reports do occur.   • Method: 05/25/2022 Comment   Final    This liquid based ThinPrep(R) pap test was screened with the  use of an image guided system.   • Conv .conv 05/25/2022 Comment   Final    The HPV DNA reflex criteria were not met with this specimen result  therefore, no HPV testing was performed.       EKG Results:  No orders to display       Imaging Results:  No Images in the past 120 days found..      Assessment & Plan   Diagnoses and all orders for this visit:    1. Generalized anxiety disorder (Primary)  -     DULoxetine (CYMBALTA) 30 MG capsule; Take 1 capsule by mouth Daily.  Dispense: 90 capsule; Refill: 1    2. Major depressive disorder, recurrent episode, " in partial remission (HCC)    3. ADHD (attention deficit hyperactivity disorder), inattentive type    4. Insomnia due to mental condition  -     DULoxetine (CYMBALTA) 30 MG capsule; Take 1 capsule by mouth Daily.  Dispense: 90 capsule; Refill: 1    5. Major depressive disorder in partial remission, unspecified whether recurrent (HCC)  -     DULoxetine (CYMBALTA) 30 MG capsule; Take 1 capsule by mouth Daily.  Dispense: 90 capsule; Refill: 1        Presentation most consistent with major depressive disorder in partial remission, generalized anxiety disorder, and ADHD.  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  Also endorses a childhood history that is compelling.    12/16: Well, stable, no changes.  3 months      10/19: Doing well, no changes. Anx, dep, adhd under control. 17 minutes of supportive psychotherapy  Treatment plan: Medication management and supportive psychotherapy  Prognosis: good  Progress: well, stable  6 wks      9/9: Wants to be on less meds. Taper off bupropion. Continue other meds. Let's streamline the meds. 16 minutes of supportive psychotherapy   Treatment plan: Medication management and supportive psychotherapy  Prognosis: good  Progress: better, not at goal  6 wks      7/27: Stop prozac, 2 week washout, start cymbalta. Consider abilify on top of bupropion. 17 minutes of supportive psychotherapy Treatment plan: Medication management and supportive psychotherapy  Prognosis: good  Progress: more depressed  6 wks      6/15: Residual anxiety, increase prozac. Goal is to increase until maxed out; then possible switch. Depression has resolved. 18 minutes of supportive psychotherapy Treatment plan: Medication management and supportive psychotherapy  Prognosis: good  Progress: better  6 wks    5/4:  Therapy referral.  Increase Prozac.  May consider switching from bupropion to Abilify in the future.  Patient was not able to tolerate bupropion 450 mg daily.  17 minutes of supportive psychotherapyTreatment plan: Medication management and supportive psychotherapy  Prognosis: Very good  Progress: Significant since our first visit.  Now developing reemerging depression  6 weeks    3/16: No change to medications, no side effects.  Living with in-laws at their house has been a source of anxiety for the patient.  Also taking care of her daughter, who she is very protective of.  Some issues with keeping restraint during therapy sessions as well.  17 minutes of supportive psychotherapy   6 weeks    2/2: Anxious about the weather, and its consequences (closed daycares, etc.). 18 minutes of supportive psychotherapy  6 wks    12/22: Increase Prozac to target anxiety, increase hydroxyzine to target insomnia. 17 minutes of supportive psychotherapy   6 weeks    11/17: Some ADHD symptoms re-emerging. Add 10 mg adderall at 11 am. Schedule will be 7:30 first dose, 11 second higher dose of 30 mg. Pt also interested in therapy. 17 minutes of supportive psychotherapy   4 wks.    10/15: Start prozac. Hydroxyzine at night calms her; consider adding am dose. 4 wks    8/20: Patient is doing extremely well.  No change to medications.  See back in 8 weeks.    6/18: Doing a little better, however patient was doing much better on Adderall 20 mg twice a day, which was her previous dose.  Discontinue Adderall XR and start Adderall immediate release 20 mg twice a day.  Some residual depressive and anxiety symptoms that are well controlled on bupropion.  Treating ADHD will also treat residual depressive and anxiety symptoms as well.  See back in 2 months.  Declines psychotherapy.    Visit Diagnoses:    ICD-10-CM ICD-9-CM   1. Generalized anxiety disorder  F41.1 300.02   2. Major depressive disorder, recurrent episode, in partial remission (HCC)   F33.41 296.35   3. ADHD (attention deficit hyperactivity disorder), inattentive type  F90.0 314.00   4. Insomnia due to mental condition  F51.05 300.9     327.02   5. Major depressive disorder in partial remission, unspecified whether recurrent (HCC)  F32.4 296.25       PLAN:  135. Risk Assessment: Risk of self-harm acutely is low. Risk factors include anxiety disorder, mood disorder, access to weapons, recent psychosocial stressors. Protective factors include no family history, no present SI, no history of suicide attempts or self-harm in the past, minimal AODA, healthcare seeking, future orientation, willingness to engage in care,  baby. Risk of self-harm chronically is also low, but could be further elevated in the event of treatment noncompliance and/or AODA.  136. Safety: No acute safety concerns.  137. Medications:   a. CONTINUE cymbalta 30 mg daily. Risks, benefits, alternatives discussed with patient including GI upset, nausea vomiting diarrhea, theoretical decrease of seizure threshold predisposing the patient to a slightly higher seizure risk, headaches, sexual dysfunction, serotonin syndrome, bleeding risk, increased suicidality in patients 24 years and younger.  Also constipation and urinary retention.  After discussion of these risks and benefits, the patient voiced understanding and agreed to proceed.   b. CONTINUE hydroxyzine 100 mg PO QHS. Risks, benefits, alternatives discussed with patient including sedation, dizziness, fall risk, GI upset.  After discussion of these risks and benefits, the patient voiced understanding and agreed to proceed.  c. CONTINUE Adderall 30 mg PO QAM, 20 mg PO QPM. Risks, benefits, side effects discussed with patient including elevated heart rate, elevated blood pressure, irritability, insomnia, sexual dysfunction, appetite suppressing properties, psychosis.  After discussion of these risks and benefits, the patient voiced understanding and agreed to proceed.  Controlled substances consent verbally signed. UDS and Tom ordered.  d. S/P   i. prozac 60 mg ineffective.  ii. Escitalopram: sexual dysfunction  iii. Adderall XR 10 mg daily: not effective  iv. Bupropion  made her nauseated. 300 mg made her sweat (irritable?)  138. Therapy: Consider in the future, presently not interested.  139. Labs/Studies: EKG reassuring.  140. Follow Up: 3 mos      TREATMENT PLAN/GOALS: Continue supportive psychotherapy efforts and medications as indicated. Treatment and medication options discussed during today's visit. Patient acknowledged and verbally consented to continue with current treatment plan and was educated on the importance of compliance with treatment and follow-up appointments.    MEDICATION ISSUES:  TOM reviewed as expected.  Discussed medication options and treatment plan of prescribed medication as well as the risks, benefits, and side effects including potential falls, possible impaired driving and metabolic adversities among others. Patient is agreeable to call the office with any worsening of symptoms or onset of side effects. Patient is agreeable to call 911 or go to the nearest ER should he/she begin having SI/HI. No medication side effects or related complaints today.     MEDS ORDERED DURING VISIT:  New Medications Ordered This Visit   Medications   • DULoxetine (CYMBALTA) 30 MG capsule     Sig: Take 1 capsule by mouth Daily.     Dispense:  90 capsule     Refill:  1     This prescription was filled on 10/1/2022. Any refills authorized will be placed on file.       Return in about 3 months (around 3/16/2023).         This document has been electronically signed by Eli Murray MD  December 16, 2022 14:47 EST

## 2023-01-11 DIAGNOSIS — F90.0 ADHD (ATTENTION DEFICIT HYPERACTIVITY DISORDER), INATTENTIVE TYPE: Primary | ICD-10-CM

## 2023-01-11 RX ORDER — DEXTROAMPHETAMINE SACCHARATE, AMPHETAMINE ASPARTATE, DEXTROAMPHETAMINE SULFATE AND AMPHETAMINE SULFATE 5; 5; 5; 5 MG/1; MG/1; MG/1; MG/1
TABLET ORAL
Qty: 60 TABLET | Refills: 0 | Status: SHIPPED | OUTPATIENT
Start: 2023-01-11 | End: 2023-02-09

## 2023-01-11 RX ORDER — DEXTROAMPHETAMINE SACCHARATE, AMPHETAMINE ASPARTATE, DEXTROAMPHETAMINE SULFATE AND AMPHETAMINE SULFATE 2.5; 2.5; 2.5; 2.5 MG/1; MG/1; MG/1; MG/1
TABLET ORAL
Qty: 30 TABLET | Refills: 0 | Status: SHIPPED | OUTPATIENT
Start: 2023-01-11 | End: 2023-02-09

## 2023-01-11 NOTE — TELEPHONE ENCOUNTER
CONTROLLED MEDICATION REFILL REQUEST    STATE REGULATION APPT EVERY 3 MONTHS     UDS(URINE DRUG SCREEN) EVERY 6 MONTHS     NEW NARC CONSENT EVERY YEAR      URINE DRUG SCREEN: Urine Drug Screen - (05/07/2021 15:20)    OVER 6 MONTHS SINCE LAST UDS(URINE DRUG SCREEN)     UDS PENDED FOR PROVIDER REVIEW     LAST OFFICE VISIT: Office Visit with Eli Murray MD (12/16/2022)      NARC CONSENT: NONE IN EPIC     NEXT OFFICE VISIT: Appointment with Eli Murray MD (03/17/2023)      MEDICATION: amphetamine-dextroamphetamine (ADDERALL) 20 MG tablet (12/09/2022)  amphetamine-dextroamphetamine (ADDERALL) 10 MG tablet (12/09/2022)     PROVIDER PLEASE ADVISE

## 2023-01-20 ENCOUNTER — TELEPHONE (OUTPATIENT)
Dept: PSYCHIATRY | Facility: CLINIC | Age: 28
End: 2023-01-20

## 2023-01-20 ENCOUNTER — TELEPHONE (OUTPATIENT)
Dept: PSYCHIATRY | Facility: CLINIC | Age: 28
End: 2023-01-20
Payer: COMMERCIAL

## 2023-01-20 NOTE — TELEPHONE ENCOUNTER
Pt called requesting a sooner appt.  Pt was told by  that there were no sooner appts.  Pt was routed to MA.  Patient was told that an appointment was available on  2/23 which the patient agreed with.  The patient stated that she needed the appointment to discuss possibly raising her Cymbalta dosage as she feels it is not working as well as it previously was

## 2023-02-09 DIAGNOSIS — F90.0 ADHD (ATTENTION DEFICIT HYPERACTIVITY DISORDER), INATTENTIVE TYPE: ICD-10-CM

## 2023-02-09 RX ORDER — DEXTROAMPHETAMINE SACCHARATE, AMPHETAMINE ASPARTATE, DEXTROAMPHETAMINE SULFATE AND AMPHETAMINE SULFATE 5; 5; 5; 5 MG/1; MG/1; MG/1; MG/1
TABLET ORAL
Qty: 60 TABLET | Refills: 0 | Status: SHIPPED | OUTPATIENT
Start: 2023-02-10 | End: 2023-03-17 | Stop reason: SDUPTHER

## 2023-02-09 RX ORDER — DEXTROAMPHETAMINE SACCHARATE, AMPHETAMINE ASPARTATE, DEXTROAMPHETAMINE SULFATE AND AMPHETAMINE SULFATE 2.5; 2.5; 2.5; 2.5 MG/1; MG/1; MG/1; MG/1
TABLET ORAL
Qty: 30 TABLET | Refills: 0 | Status: SHIPPED | OUTPATIENT
Start: 2023-02-10 | End: 2023-03-17 | Stop reason: SDUPTHER

## 2023-02-09 NOTE — TELEPHONE ENCOUNTER
Medication refill requests.     Pt has upcoming appt on 02/23/2023.     Medication orders pended.

## 2023-02-23 ENCOUNTER — TELEMEDICINE (OUTPATIENT)
Dept: PSYCHIATRY | Facility: CLINIC | Age: 28
End: 2023-02-23
Payer: COMMERCIAL

## 2023-02-23 DIAGNOSIS — F41.0 PANIC ATTACKS: Primary | ICD-10-CM

## 2023-02-23 DIAGNOSIS — F51.05 INSOMNIA DUE TO MENTAL CONDITION: ICD-10-CM

## 2023-02-23 DIAGNOSIS — F90.0 ADHD (ATTENTION DEFICIT HYPERACTIVITY DISORDER), INATTENTIVE TYPE: ICD-10-CM

## 2023-02-23 DIAGNOSIS — F33.41 MAJOR DEPRESSIVE DISORDER, RECURRENT EPISODE, IN PARTIAL REMISSION: ICD-10-CM

## 2023-02-23 DIAGNOSIS — F41.1 GENERALIZED ANXIETY DISORDER: ICD-10-CM

## 2023-02-23 PROCEDURE — 99214 OFFICE O/P EST MOD 30 MIN: CPT | Performed by: STUDENT IN AN ORGANIZED HEALTH CARE EDUCATION/TRAINING PROGRAM

## 2023-02-23 PROCEDURE — 90833 PSYTX W PT W E/M 30 MIN: CPT | Performed by: STUDENT IN AN ORGANIZED HEALTH CARE EDUCATION/TRAINING PROGRAM

## 2023-02-23 RX ORDER — GABAPENTIN 100 MG/1
100 CAPSULE ORAL 3 TIMES DAILY
Qty: 90 CAPSULE | Refills: 2 | Status: SHIPPED | OUTPATIENT
Start: 2023-02-23

## 2023-02-23 NOTE — PROGRESS NOTES
"Subjective   Janice Padilla is a 27 y.o. female who presents today for follow up    Referring Provider:  Balbina Todd, APRN  [address]  [address]  [place] [address]    Chief Complaint:  adhd cornel mdd    History of Present Illness:     Janice Padilla is a 25 year old /White female referred by Balbina Todd APRN.     Chart review 1/6: Patient is on Wellbutrin, status post Zoloft. Also on Adderall. Had tachycardia at 130. Started on metoprolol 50 mg extended release daily. Labs in December: BMI 35, LDL is elevated at 121, ALT AST within normal limits, creatinine 0.99, hematocrit 45, electrolytes are essentially normal, TSH is 3.65 normal, iron is 53 low, vitamin D is 23 low, ferritin is normal at 96. No EKG or head imaging.     \"Janice\"  Father passed away from drunk driving at 3 yo     2/23: In person interview:  1. Chart review: Patient called to set up an earlier appointment because she does not feel her Cymbalta is working as well as it was.  a. Seeing [facility].  2. Planning: Well, no changes.  3. \"I've just been thinking. I have therapy in April.\"  a. Pt wants more frequent therapy.   b. Recently confronted by father in law for a \"comment\" she had no idea she made. She had a panic attack.  i. Triggered her anxiety.  c. Recently was using THC gummies to calm her when father in law is around  4. Mood/Depression: depressed mood  5. Anxiety: much worse  a. Waiting for the ball to drop.  6. Panic attacks: y  7. Energy: stable  8. Concentration: ADHD: under control  9. Sleeping:  a. Doesn't need hydroxyzine and still sleeping  10. Eating: stable  11. Refills: n  12. Substances: n  13. Therapy: [facility]  14. Medication compliant: y  15. No SI HI AVH.      12/16: In person interview:  16. Chart review: Continuing psychotherapy.  17. Planning: No changes at last visit.  18. \"A little sleepy.\"  a. Things are going good.  b. Working on the house, waiting.  c. Trying to look forward to " "the holidays.  d. House will be done next Xmas  19. Mood/Depression: minimal depression  20. Anxiety:  a. Stressed from the holidays, but situational  21. Panic attacks:  22. Energy: good  23. Concentration: ADHD under control  24. Sleeping: well  25. Eatin lb wl   26. Refills: y  27. Substances: n  28. Therapy: continuing  29. Medication compliant: y  30. No SI HI AVH.      10/19: In person interview:  31. Chart review: Originally, patient called in stating that she did not feel well tapering off the bupropion.  She later recanted.  Continues to see psychotherapy.  32. Planning: Discontinued bupropion at last visit, likely want to increase Cymbalta as she is now on a slightly lower dose.  33. \"It was one day.\"  a. Didn't even last the whole day.  i. Everything was slow mo about a week after I stopped the bupropion. Was fine by the end of the day.  b. House outside is done except 2 windows.   i. Has learned to be more decisive  c. Not sweating at all now  34. Mood/Depression:  35. Anxiety:  a. Handling things better, less irritability  b. Minimal worrying  36. Panic attacks: n  37. Energy: good  38. Concentration/ADHD: ok, mind is in a lot of different places, distractible and issues with focus, \"nothing to make changes about\"  39. Sleeping: well  40. Eatin lb wl sine   41. Refills: y  42. Substances: n  43. Therapy: n  44. Medication compliant:  45. No SI HI AVH.      : In person interview:  46. Chart review: Continuing psychotherapy.  47. Planning: Switched from Prozac to Cymbalta.  Consider adding Abilify.  48. \"I think... I've been better.\"  a. Got worked up for PCOS, told \"everything is fine.\"  b. I don't want to be on so many medications.  49. Mood/Depression: no crying spells  a. Still not 100%, poor energy and motivation  b. Denies depressed mood most days  50. Anxiety: \"I feel like it's pretty good\"  a. Daughter had a fever and needed to go to urgent care and \"I handled that pretty " "well.\"  b. Some obsessive thoughts regarding doing the dishes/cleanliness  i. Not living in her own space; has to deal with others  ii. Wasn't like that when it was just her own house  51. ADHD: fairly well controlled most days  52. Panic attacks: n  53. Energy: not at goal  54. Concentration: stable  55. Sleeping: stable  56. Eating: wg  57. Refills: y  58. Substances: n  59. Therapy: y, likes [facility]  60. Medication compliant: y  61. No SI HI AVH.    ...    IMPORTANT:  From previous note, patient was held back in the third grade. Struggled to complete high school and missed 60 days although she did graduate with good grades. Got in trouble for talking a lot in class. She was not in any special classes. She did not have an IEP.     1/6/21 H&P: Virtual visit via Zoom audio and video due to the COVID-19 pandemic. Patient is accepting of and agreeable to appointment. The appointment consisted of the patient and I only. Interview: Patient reports that she had her first child in March and suffered from postpartum depression. The pandemic did not help things. It was a \"hard time,\" and the patient reports she had already had anxiety before. Patient is presently bottlefeeding. She became pregnant through an infertility specialist. Presently not on any contraception.   Endorses muscle tension, fatigue, poor concentration, restlessness, irritability, and some broken sleep, although she is sleeping for about 8 hours a night. She wakes up frequently to check on her baby girl, Alexandra. Also endorses guilt at being a bad mom. Duration has been since March of last year. Patient feels her anxiety is worse than her depression.   Denies SI HI AVH. Has access to weapons that are locked in a gun safe. Patient does not know the combination. She also does not know how to work the guns that are in the safe. Psychiatric review of systems is positive for anxiety and depression, negative for psychosis and maribel.   Possible ADHD. Patient was " "diagnosed by Dr. Joe in 2017. 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 Possible family history as well as her sister may have ADHD.   Past Psychiatric History:  Began Psychiatric Treatment: Since    Dx: Anxiety   Psychiatrist: Has not seen a psychiatrist   Therapist: Saw therapist in 2018x1, unsure if it was helpful.   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     Feeling down, depressed, or hopeless?     Trouble falling or staying asleep, or sleeping too much?     Feeling tired or having little energy?     Poor appetite or overeating?     Feeling bad about yourself - or that you are a failure or have let yourself or your family down?     Trouble concentrating on things, such as reading the newspaper or " watching television?     Moving or speaking so slowly that other people could have noticed? Or the opposite - being so fidgety or restless that you have been moving around a lot more than usual?     Thoughts that you would be better off dead, or of hurting yourself in some way?     PHQ-9 Total Score       MATTHEW-7       Past Surgical History:  Past Surgical History:   Procedure Laterality Date   • ADENOIDECTOMY     • DILATATION AND CURETTAGE     • SKIN BIOPSY     • TONSILLECTOMY         Problem List:  Patient Active Problem List   Diagnosis   • Acne   • Anxiety   • Attention deficit hyperactivity disorder (ADHD)   • Depression   • Generalized anxiety disorder   • Gestational hypertension   • Major depressive disorder in partial remission (HCC)   • PCOS (polycystic ovarian syndrome)   • Pre-eclampsia       Allergy:   No Known Allergies     Discontinued Medications:  There are no discontinued medications.    Current Medications:   Current Outpatient Medications   Medication Sig Dispense Refill   • Aklief 0.005 % cream      • amphetamine-dextroamphetamine (ADDERALL) 10 MG tablet TAKE 1 TABLET BY MOUTH ONCE DAILY 30 tablet 0   • amphetamine-dextroamphetamine (ADDERALL) 20 MG tablet TAKE 1 TABLET BY MOUTH TWICE DAILY 60 tablet 0   • DULoxetine (CYMBALTA) 30 MG capsule Take 1 capsule by mouth Daily. 90 capsule 1   • hydrOXYzine (ATARAX) 50 MG tablet Take 2 tablets by mouth Every Night. 180 tablet 1   • metFORMIN ER (GLUCOPHAGE-XR) 500 MG 24 hr tablet Take 500 mg by mouth Daily.     • Prenatal MV-Min-Fe Fum-FA-DHA (PRENATAL+DHA PO)      • Soolantra 1 % cream      • spironolactone (ALDACTONE) 100 MG tablet spironolactone 100 mg oral tablet take 1 tablet (100 mg) by oral route once daily   Active     • triamcinolone (KENALOG) 0.1 % ointment apply to trunk TWICE DAILY AS NEEDED FOR itching     • vitamin D (ERGOCALCIFEROL) 1.25 MG (28017 UT) capsule capsule TAKE 1 CAPSULE BY MOUTH ONCE WEEKLY 13 capsule 1   • BD Pen Needle María  2nd Gen 32G X 4 MM misc USE WITH SAXENDA PEN AS DIRECTED     • gabapentin (Neurontin) 100 MG capsule Take 1 capsule by mouth 3 (Three) Times a Day. 90 capsule 2   • ketoconazole (NIZORAL) 2 % shampoo apply to SCALP THREE TIMES WEEKLY     • metoprolol succinate XL (Toprol XL) 25 MG 24 hr tablet Take 1 tablet by mouth Daily for 90 days. 90 tablet 1   • Saxenda 18 MG/3ML injection pen        No current facility-administered medications for this visit.       Past Medical History:  Past Medical History:   Diagnosis Date   • Acne    • ADHD    • Anxiety    • Depression    • Generalized anxiety disorder 01/06/2021   • History of medical problems 02/28/2019    Miscarriage   • Major depressive disorder in partial remission (HCC) 01/06/2021   • PCOS (polycystic ovarian syndrome)    • Pre-eclampsia        Social History     Socioeconomic History   • Marital status:    Tobacco Use   • Smoking status: Never   • Smokeless tobacco: Never   • Tobacco comments:     2/10/2021- 1/6/2021   Vaping Use   • Vaping Use: Never used   Substance and Sexual Activity   • Alcohol use: Yes     Comment: light; 2/10/2021- 1/6/2021   • Drug use: Never     Comment: 2/10/2021- 1/6/2021   • Sexual activity: Yes     Partners: Male     Birth control/protection: None       Family History   Problem Relation Age of Onset   • Anxiety disorder Mother    • Bipolar disorder Mother    • Depression Mother    • ADD / ADHD Sister    • Depression Sister    • ADD / ADHD Sister        Mental Status Exam:   Hygiene:   good, groomed  Cooperation:  Cooperative  Eye Contact:  Good  Psychomotor Behavior:  Appropriate  Affect:  Depressed, anxious tearful, congruent  Mood: I had a panic attack  Hopelessness: Denies  Speech:  Normal  Thought Process:  Goal directed  Thought Content:  Normal  Suicidal:  None  Homicidal:  None  Hallucinations:  None  Delusion:  None  Memory:  Intact  Orientation:  Person, Place, Time and Situation  Reliability:  good  Insight:   Fair  Judgement:  Fair  Impulse Control:  Fair  Physical/Medical Issues:  No      Review of Systems:  Review of Systems   Constitutional: Negative for diaphoresis and fatigue.   HENT: Negative for drooling.    Eyes: Negative for visual disturbance.   Respiratory: Negative for cough and shortness of breath.    Cardiovascular: Negative for chest pain, palpitations and leg swelling.   Gastrointestinal: Negative for diarrhea, nausea and vomiting.   Endocrine: Negative for cold intolerance and heat intolerance.   Genitourinary: Negative for difficulty urinating.   Musculoskeletal: Negative for joint swelling.   Allergic/Immunologic: Negative for immunocompromised state.   Neurological: Negative for dizziness, seizures, syncope, speech difficulty, light-headedness, numbness and headaches.         Physical Exam:  Physical Exam    Vital Signs:   There were no vitals taken for this visit.     Lab Results:   No visits with results within 6 Month(s) from this visit.   Latest known visit with results is:   Office Visit on 05/25/2022   Component Date Value Ref Range Status   • Diagnosis 05/25/2022 Comment   Final    NEGATIVE FOR INTRAEPITHELIAL LESION OR MALIGNANCY.  THIS SPECIMEN WAS RESCREENED AS PART OF OUR  PROGRAM.   • Specimen adequacy: 05/25/2022 Comment   Final    Satisfactory for evaluation.  Endocervical and/or squamous metaplastic  cells (endocervical component) are present.   • Clinician Provided ICD-10: 05/25/2022 Comment   Final    Z01.419   • Performed by: 05/25/2022 Comment   Final    Aby Moya, Cytotechnologist ([facility])   • QC reviewed by: 05/25/2022 Comment   Final    She Burt, Supervisory Cytotechnologist (ASCP)   • . 05/25/2022 .   Final   • Note: 05/25/2022 Comment   Final    The Pap smear is a screening test designed to aid in the detection of  premalignant and malignant conditions of the uterine cervix.  It is not a  diagnostic procedure and should not be used as the sole means of  detecting  cervical cancer.  Both false-positive and false-negative reports do occur.   • Method: 05/25/2022 Comment   Final    This liquid based ThinPrep(R) pap test was screened with the  use of an image guided system.   • Conv .conv 05/25/2022 Comment   Final    The HPV DNA reflex criteria were not met with this specimen result  therefore, no HPV testing was performed.       EKG Results:  No orders to display       Imaging Results:  No Images in the past 120 days found..      Assessment & Plan   Diagnoses and all orders for this visit:    1. Panic attacks (Primary)  -     gabapentin (Neurontin) 100 MG capsule; Take 1 capsule by mouth 3 (Three) Times a Day.  Dispense: 90 capsule; Refill: 2    2. ADHD (attention deficit hyperactivity disorder), inattentive type  -     Ambulatory Referral to Behavioral Health    3. Generalized anxiety disorder  -     Ambulatory Referral to Behavioral Health  -     gabapentin (Neurontin) 100 MG capsule; Take 1 capsule by mouth 3 (Three) Times a Day.  Dispense: 90 capsule; Refill: 2    4. Major depressive disorder, recurrent episode, in partial remission (HCC)  -     Ambulatory Referral to Behavioral Health    5. Insomnia due to mental condition  -     Ambulatory Referral to Behavioral Health        Presentation most consistent with major depressive disorder in partial remission, generalized anxiety disorder, and ADHD.  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  Also endorses a childhood history that is compelling.    2/23: Stop THC gummies as they are now a schedule I substance. Start gabapentin for anx. Stop hydroxyzine. [facility]. 17 minutes of supportive psychotherapy with goal to strengthen defenses, promote problems solving, restore adaptive functioning and provide  symptom relief. The therapeutic alliance was strengthened to encourage the patient to express their thoughts and feelings. Esteem building was enhanced through praise, reassurance, normalizing and encouragement. Coping skills were enhanced to build distress tolerance skills and emotional regulation. Allowed patient to freely discuss issues without interruption or judgement with unconditional positive regard, active listening skills, and empathy. Provided a safe, confidential environment to facilitate the development of a positive therapeutic relationship and encourage open, honest communication. Assisted patient in identifying risk factors which would indicate the need for higher level of care including thoughts to harm self or others and/or self-harming behavior and encouraged patient to contact this office, call 911, or present to the nearest emergency room should any of these events occur. Assisted patient in processing session content; acknowledged and normalized patient’s thoughts, feelings, and concerns by utilizing a person-centered approach in efforts to build appropriate rapport and a positive therapeutic relationship with open and honest communication. Patient given education on medication side effects, diagnosis/illness and relapse symptoms. Plan to continue supportive psychotherapy in next appointment to provide symptom relief.  Diagnoses: as above  Symptoms: as above  Functional status: good  Mental Status Exam: as above    Treatment plan: Medication management and supportive psychotherapy  Prognosis: good  Progress: more anxious  1 mo      12/16: Well, stable, no changes.  3 months      10/19: Doing well, no changes. Anx, dep, adhd under control. 17 minutes of supportive psychotherapy  Treatment plan: Medication management and supportive psychotherapy  Prognosis: good  Progress: well, stable  6 wks      9/9: Wants to be on less meds. Taper off bupropion. Continue other meds. Let's streamline the meds.  16 minutes of supportive psychotherapy   Treatment plan: Medication management and supportive psychotherapy  Prognosis: good  Progress: better, not at goal  6 wks      7/27: Stop prozac, 2 week washout, start cymbalta. Consider abilify on top of bupropion. 17 minutes of supportive psychotherapy Treatment plan: Medication management and supportive psychotherapy  Prognosis: good  Progress: more depressed  6 wks      6/15: Residual anxiety, increase prozac. Goal is to increase until maxed out; then possible switch. Depression has resolved. 18 minutes of supportive psychotherapy Treatment plan: Medication management and supportive psychotherapy  Prognosis: good  Progress: better  6 wks    5/4: Therapy referral.  Increase Prozac.  May consider switching from bupropion to Abilify in the future.  Patient was not able to tolerate bupropion 450 mg daily.  17 minutes of supportive psychotherapyTreatment plan: Medication management and supportive psychotherapy  Prognosis: Very good  Progress: Significant since our first visit.  Now developing reemerging depression  6 weeks    3/16: No change to medications, no side effects.  Living with in-laws at their house has been a source of anxiety for the patient.  Also taking care of her daughter, who she is very protective of.  Some issues with keeping restraint during therapy sessions as well.  17 minutes of supportive psychotherapy   6 weeks    2/2: Anxious about the weather, and its consequences (closed daycares, etc.). 18 minutes of supportive psychotherapy  6 wks    12/22: Increase Prozac to target anxiety, increase hydroxyzine to target insomnia. 17 minutes of supportive psychotherapy   6 weeks    11/17: Some ADHD symptoms re-emerging. Add 10 mg adderall at 11 am. Schedule will be 7:30 first dose, 11 second higher dose of 30 mg. Pt also interested in therapy. 17 minutes of supportive psychotherapy   4 wks.    10/15: Start prozac. Hydroxyzine at night calms her; consider adding  am dose. 4 wks    : Patient is doing extremely well.  No change to medications.  See back in 8 weeks.    : Doing a little better, however patient was doing much better on Adderall 20 mg twice a day, which was her previous dose.  Discontinue Adderall XR and start Adderall immediate release 20 mg twice a day.  Some residual depressive and anxiety symptoms that are well controlled on bupropion.  Treating ADHD will also treat residual depressive and anxiety symptoms as well.  See back in 2 months.  Declines psychotherapy.    Visit Diagnoses:    ICD-10-CM ICD-9-CM   1. Panic attacks  F41.0 300.01   2. ADHD (attention deficit hyperactivity disorder), inattentive type  F90.0 314.00   3. Generalized anxiety disorder  F41.1 300.02   4. Major depressive disorder, recurrent episode, in partial remission (HCC)  F33.41 296.35   5. Insomnia due to mental condition  F51.05 300.9     327.02       PLAN:  62. Risk Assessment: Risk of self-harm acutely is low. Risk factors include anxiety disorder, mood disorder, access to weapons, recent psychosocial stressors. Protective factors include no family history, no present SI, no history of suicide attempts or self-harm in the past, minimal AODA, healthcare seeking, future orientation, willingness to engage in care,  baby. Risk of self-harm chronically is also low, but could be further elevated in the event of treatment noncompliance and/or AODA.  63. Safety: No acute safety concerns.  64. Medications:   a. CONTINUE cymbalta 30 mg daily. Risks, benefits, alternatives discussed with patient including GI upset, nausea vomiting diarrhea, theoretical decrease of seizure threshold predisposing the patient to a slightly higher seizure risk, headaches, sexual dysfunction, serotonin syndrome, bleeding risk, increased suicidality in patients 24 years and younger.  Also constipation and urinary retention.  After discussion of these risks and benefits, the patient voiced understanding  and agreed to proceed.   b. STOPPED hydroxyzine 100 mg PO QHS. No help 2/23.   c. START gabapentin 100 mg qhs prn anxiety. Can go up to 300 mg qhs. Risks, benefits, alternatives discussed with patient including sedation, dizziness/falls risk, GI upset, weight gain.  After discussion of these risks and benefits, the patient voiced understanding and agreed to proceed. UDS ordered, Tom reviewed.  d. CONTINUE Adderall 30 mg PO QAM, 20 mg PO QPM. Risks, benefits, side effects discussed with patient including elevated heart rate, elevated blood pressure, irritability, insomnia, sexual dysfunction, appetite suppressing properties, psychosis.  After discussion of these risks and benefits, the patient voiced understanding and agreed to proceed. Controlled substances consent verbally signed. DENNIS and Tom ordered.  e. S/P   i. prozac 60 mg ineffective.  ii. Escitalopram: sexual dysfunction  iii. Adderall XR 10 mg daily: not effective  iv. Bupropion  made her nauseated. 300 mg made her sweat (irritable?)  65. Therapy: Consider in the future, presently not interested.  66. Labs/Studies: EKG reassuring.      TREATMENT PLAN/GOALS: Continue supportive psychotherapy efforts and medications as indicated. Treatment and medication options discussed during today's visit. Patient acknowledged and verbally consented to continue with current treatment plan and was educated on the importance of compliance with treatment and follow-up appointments.    MEDICATION ISSUES:  TOM reviewed as expected.  Discussed medication options and treatment plan of prescribed medication as well as the risks, benefits, and side effects including potential falls, possible impaired driving and metabolic adversities among others. Patient is agreeable to call the office with any worsening of symptoms or onset of side effects. Patient is agreeable to call 911 or go to the nearest ER should he/she begin having SI/HI. No medication side effects or  related complaints today.     MEDS ORDERED DURING VISIT:  New Medications Ordered This Visit   Medications   • gabapentin (Neurontin) 100 MG capsule     Sig: Take 1 capsule by mouth 3 (Three) Times a Day.     Dispense:  90 capsule     Refill:  2       Return in about 4 weeks (around 3/23/2023).         This document has been electronically signed by Eli Murray MD  February 23, 2023 08:24 EST

## 2023-02-23 NOTE — PATIENT INSTRUCTIONS
1.  Please return to clinic at your next scheduled visit.  Contact the clinic ([phone]) at least 24 hours prior in the event you need to cancel.  2.  Do no harm to yourself or others.    3.  Avoid alcohol and drugs.    4.  Take all medications as prescribed.  Please contact the clinic with any concerns. If you are in need of medication refills, please call the clinic at [phone].    5. Should you want to get in touch with your provider, Dr. Eli Murray, please utilize [employer] or contact the office ([phone]), and staff will be able to page Dr. Murray directly.  6.  In the event you have personal crisis, contact the following crisis numbers: Suicide Prevention Hotline 1-891.219.2628; [facility] Helpline 3-071-461-RAQW; [facility] [phone]; text HELLO to 612142; or 109.     SPECIFIC RECOMMENDATIONS:     1.      Medications discussed at this encounter:                   - stop hydroxyzine start gabapentin     2.      Psychotherapy recommendations:

## 2023-02-28 NOTE — TELEPHONE ENCOUNTER
PT(PATIENT) VERIFIED      CONTACTED PT(PATIENT) PER OVERDUE LAB ORDERS PROTOCOL     PT(PATIENT) HAS OVERDUE LAB ORDERS   Urine Drug Screen - Urine, Clean Catch (2023 09:30)     PT(PATIENT) ADVISED TO COMPLETE ORDER VIA OUTPATIENT LAB SERVICES     PT(PATIENT) STATE SHE PREVIOUSLY SPOKE WITH DR MANRIQUEZ REGARDING THE ORDERS AND THAT SHE WILL COMPLETE IN MARCH     ORDER POSTPONED TO 2023

## 2023-03-06 RX ORDER — ERGOCALCIFEROL 1.25 MG/1
CAPSULE ORAL
Qty: 13 CAPSULE | Refills: 1 | Status: SHIPPED | OUTPATIENT
Start: 2023-03-06

## 2023-03-17 ENCOUNTER — OFFICE VISIT (OUTPATIENT)
Dept: PSYCHIATRY | Facility: CLINIC | Age: 28
End: 2023-03-17
Payer: COMMERCIAL

## 2023-03-17 VITALS
HEIGHT: 61 IN | DIASTOLIC BLOOD PRESSURE: 78 MMHG | WEIGHT: 164.6 LBS | HEART RATE: 103 BPM | SYSTOLIC BLOOD PRESSURE: 132 MMHG | BODY MASS INDEX: 31.08 KG/M2

## 2023-03-17 DIAGNOSIS — F41.0 PANIC ATTACKS: ICD-10-CM

## 2023-03-17 DIAGNOSIS — F33.41 MAJOR DEPRESSIVE DISORDER, RECURRENT EPISODE, IN PARTIAL REMISSION: ICD-10-CM

## 2023-03-17 DIAGNOSIS — F41.1 GENERALIZED ANXIETY DISORDER: ICD-10-CM

## 2023-03-17 DIAGNOSIS — F90.0 ADHD (ATTENTION DEFICIT HYPERACTIVITY DISORDER), INATTENTIVE TYPE: Primary | ICD-10-CM

## 2023-03-17 DIAGNOSIS — F51.05 INSOMNIA DUE TO MENTAL CONDITION: ICD-10-CM

## 2023-03-17 PROCEDURE — 90833 PSYTX W PT W E/M 30 MIN: CPT | Performed by: STUDENT IN AN ORGANIZED HEALTH CARE EDUCATION/TRAINING PROGRAM

## 2023-03-17 PROCEDURE — 99214 OFFICE O/P EST MOD 30 MIN: CPT | Performed by: STUDENT IN AN ORGANIZED HEALTH CARE EDUCATION/TRAINING PROGRAM

## 2023-03-17 RX ORDER — HYDROXYZINE 50 MG/1
100 TABLET, FILM COATED ORAL NIGHTLY
Qty: 180 TABLET | Refills: 1 | Status: SHIPPED | OUTPATIENT
Start: 2023-03-17 | End: 2023-03-17 | Stop reason: SDUPTHER

## 2023-03-17 RX ORDER — SEMAGLUTIDE 1 MG/.5ML
INJECTION, SOLUTION SUBCUTANEOUS
COMMUNITY
Start: 2023-03-08

## 2023-03-17 RX ORDER — DEXTROAMPHETAMINE SACCHARATE, AMPHETAMINE ASPARTATE, DEXTROAMPHETAMINE SULFATE AND AMPHETAMINE SULFATE 5; 5; 5; 5 MG/1; MG/1; MG/1; MG/1
1 TABLET ORAL 2 TIMES DAILY
Qty: 60 TABLET | Refills: 0 | Status: SHIPPED | OUTPATIENT
Start: 2023-03-17

## 2023-03-17 RX ORDER — LEVOTHYROXINE, LIOTHYRONINE 19; 4.5 UG/1; UG/1
TABLET ORAL
COMMUNITY
Start: 2023-03-06

## 2023-03-17 RX ORDER — ARIPIPRAZOLE 2 MG/1
2 TABLET ORAL NIGHTLY
Qty: 30 TABLET | Refills: 5 | Status: SHIPPED | OUTPATIENT
Start: 2023-03-17

## 2023-03-17 RX ORDER — HYDROXYZINE 50 MG/1
100 TABLET, FILM COATED ORAL NIGHTLY
Qty: 180 TABLET | Refills: 1 | Status: SHIPPED | OUTPATIENT
Start: 2023-03-17

## 2023-03-17 RX ORDER — DEXTROAMPHETAMINE SACCHARATE, AMPHETAMINE ASPARTATE, DEXTROAMPHETAMINE SULFATE AND AMPHETAMINE SULFATE 2.5; 2.5; 2.5; 2.5 MG/1; MG/1; MG/1; MG/1
1 TABLET ORAL DAILY
Qty: 30 TABLET | Refills: 0 | Status: SHIPPED | OUTPATIENT
Start: 2023-03-17

## 2023-03-17 NOTE — PROGRESS NOTES
"Subjective   Janice Padilla is a 27 y.o. female who presents today for follow up    Referring Provider:  Balbina Todd, APRN  [address]  [address]  [place] [address]    Chief Complaint:  adhd cornel mdd    History of Present Illness:     Janice Padilla is a 25 year old /White female referred by Balbina Todd APRN.     Chart review : Patient is on Wellbutrin, status post Zoloft. Also on Adderall. Had tachycardia at 130. Started on metoprolol 50 mg extended release daily. Labs in December: BMI 35, LDL is elevated at 121, ALT AST within normal limits, creatinine 0.99, hematocrit 45, electrolytes are essentially normal, TSH is 3.65 normal, iron is 53 low, vitamin D is 23 low, ferritin is normal at 96. No EKG or head imaging.     \"Jaince\"  Father passed away from drunk driving at 5 yo      3/17: In person interview:  1. Chart review: No new peer  2. Planning: Stop THC gummies as they are now a schedule I substance. Start gabapentin for anx. Stop hydroxyzine. [facility].   3. Patient goals:  4. Lifestyle changes:  5. Self-compassion:   6. Maladaptive thinkin. Improving interpersonal relationships:  8. Expressing difficult emotions:  9. Taking the perspective of others:  10. \"I can't really tell.\"  a. Has taken up to 200 mg of gabapentin at a time, and   11. Mood/Depression: ups and downs  12. Anxiety: irritable, on edge  13. Panic attacks: none since  14. Energy: stable  15. Concentration: ADHD: under control  16. Sleeping: initial insomnia  17. Eatin lb wl   18. Refills: y  19. Substances: n  20. Therapy: waitlisted at [facility]  21. Medication compliant:  22. No SI HI AVH.      : In person interview:  23. Chart review: Patient called to set up an earlier appointment because she does not feel her Cymbalta is working as well as it was.  a. Seeing [facility].  24. Planning: Well, no changes.  25. \"I've just been thinking. I have therapy in April.\"  a. Pt wants more " "frequent therapy.   b. Recently confronted by father in law for a \"comment\" she had no idea she made. She had a panic attack.  i. Triggered her anxiety.  c. Recently was using THC gummies to calm her when father in law is around  26. Mood/Depression: depressed mood  27. Anxiety: much worse  a. Waiting for the ball to drop.  28. Panic attacks: y  29. Energy: stable  30. Concentration: ADHD: under control  31. Sleeping:  a. Doesn't need hydroxyzine and still sleeping  32. Eating: stable  33. Refills: n  34. Substances: n  35. Therapy: Refer to [facility]  36. Medication compliant: y  37. No SI HI AVH.      : In person interview:  38. Chart review: Continuing psychotherapy.  39. Planning: No changes at last visit.  40. \"A little sleepy.\"  a. Things are going good.  b. Working on the house, waiting.  c. Trying to look forward to the holidays.  d. House will be done next Xmas  41. Mood/Depression: minimal depression  42. Anxiety:  a. Stressed from the holidays, but situational  43. Panic attacks:  44. Energy: good  45. Concentration: ADHD under control  46. Sleeping: well  47. Eatin lb wl   48. Refills: y  49. Substances: n  50. Therapy: continuing  51. Medication compliant: y  52. No SI HI AVH.      10/19: In person interview:  53. Chart review: Originally, patient called in stating that she did not feel well tapering off the bupropion.  She later recanted.  Continues to see psychotherapy.  54. Planning: Discontinued bupropion at last visit, likely want to increase Cymbalta as she is now on a slightly lower dose.  55. \"It was one day.\"  a. Didn't even last the whole day.  i. Everything was slow mo about a week after I stopped the bupropion. Was fine by the end of the day.  b. House outside is done except 2 windows.   i. Has learned to be more decisive  c. Not sweating at all now  56. Mood/Depression:  57. Anxiety:  a. Handling things better, less irritability  b. Minimal worrying  58. Panic attacks: " "n  59. Energy: good  60. Concentration/ADHD: ok, mind is in a lot of different places, distractible and issues with focus, \"nothing to make changes about\"  61. Sleeping: well  62. Eatin lb wl sine   63. Refills: y  64. Substances: n  65. Therapy: n  66. Medication compliant:  67. No SI HI AVH.      : In person interview:  68. Chart review: Continuing psychotherapy.  69. Planning: Switched from Prozac to Cymbalta.  Consider adding Abilify.  70. \"I think... I've been better.\"  a. Got worked up for PCOS, told \"everything is fine.\"  b. I don't want to be on so many medications.  71. Mood/Depression: no crying spells  a. Still not 100%, poor energy and motivation  b. Denies depressed mood most days  72. Anxiety: \"I feel like it's pretty good\"  a. Daughter had a fever and needed to go to urgent care and \"I handled that pretty well.\"  b. Some obsessive thoughts regarding doing the dishes/cleanliness  i. Not living in her own space; has to deal with others  ii. Wasn't like that when it was just her own house  73. ADHD: fairly well controlled most days  74. Panic attacks: n  75. Energy: not at goal  76. Concentration: stable  77. Sleeping: stable  78. Eating: wg  79. Refills: y  80. Substances: n  81. Therapy: y, likes Y[facility]  82. Medication compliant: y  83. No SI HI AVH.    ...    IMPORTANT:  From previous note, patient was held back in the third grade. Struggled to complete high school and missed 60 days although she did graduate with good grades. Got in trouble for talking a lot in class. She was not in any special classes. She did not have an IEP.     21 H&P: Virtual visit via Zoom audio and video due to the COVID-19 pandemic. Patient is accepting of and agreeable to appointment. The appointment consisted of the patient and I only. Interview: Patient reports that she had her first child in March and suffered from postpartum depression. The pandemic did not help things. It was a \"hard time,\" and the " "patient reports she had already had anxiety before. Patient is presently bottlefeeding. She became pregnant through an infertility specialist. Presently not on any contraception.   Endorses muscle tension, fatigue, poor concentration, restlessness, irritability, and some broken sleep, although she is sleeping for about 8 hours a night. She wakes up frequently to check on her baby girl, Alexandra. Also endorses guilt at being a bad mom. Duration has been since March of last year. Patient feels her anxiety is worse than her depression.   Denies SI HI AVH. Has access to weapons that are locked in a gun safe. Patient does not know the combination. She also does not know how to work the guns that are in the safe. Psychiatric review of systems is positive for anxiety and depression, negative for psychosis and maribel.   Possible ADHD. Patient was diagnosed by Dr. Joe in 2017. 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 Possible family history as well as her sister may have ADHD.   Past Psychiatric History:  Began Psychiatric Treatment: Since 2011   Dx: Anxiety   Psychiatrist: Has not seen a psychiatrist   Therapist: Saw therapist in 2018x1, unsure if it was helpful.   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     Feeling down, depressed, or hopeless?     Trouble falling or staying asleep, or sleeping too much?     Feeling tired or having little energy?     Poor appetite or overeating?     Feeling bad about yourself - or that you are a failure or have let yourself or your family down?     Trouble concentrating on things, such as reading the newspaper or watching television?     Moving or speaking so slowly that other people could have noticed? Or the opposite - being so fidgety or restless that you have been moving around a lot more than usual?     Thoughts that you would be better off dead, or of hurting yourself in some way?     PHQ-9 Total Score       MATTHEW-7       Past Surgical History:  Past Surgical History:   Procedure Laterality Date   • ADENOIDECTOMY     • DILATATION AND CURETTAGE     • SKIN BIOPSY     • TONSILLECTOMY         Problem List:  Patient Active Problem List   Diagnosis   • Acne   • Anxiety   • Attention deficit hyperactivity disorder (ADHD)   • Depression   • Generalized anxiety disorder   • Gestational hypertension   • Major depressive disorder in partial remission (HCC)   • PCOS (polycystic ovarian syndrome)   • Pre-eclampsia       Allergy:   No Known Allergies     Discontinued Medications:  Medications Discontinued During This Encounter   Medication Reason   • BD Pen Needle María 2nd Gen 32G X 4 MM misc *Therapy completed   • ketoconazole (NIZORAL) 2 % shampoo *Therapy completed   • Saxenda 18 MG/3ML injection pen  *Therapy completed   • hydrOXYzine (ATARAX) 50 MG tablet *Therapy completed   • Soolantra 1 % cream *Therapy completed   • Prenatal MV-Min-Fe Fum-FA-DHA (PRENATAL+DHA PO) *Therapy completed   • triamcinolone (KENALOG) 0.1 % ointment *Therapy completed   • hydrOXYzine (ATARAX) 50 MG tablet Reorder   • amphetamine-dextroamphetamine (ADDERALL) 20 MG tablet Reorder   • amphetamine-dextroamphetamine (ADDERALL) 10 MG tablet Reorder       Current Medications:   Current Outpatient Medications   Medication Sig Dispense Refill   • Aklief 0.005 % cream      • amphetamine-dextroamphetamine (ADDERALL) 10 MG tablet Take 1 tablet by mouth Daily. 30 tablet 0   • amphetamine-dextroamphetamine (ADDERALL) 20 MG tablet Take 1 tablet by mouth 2 (Two) Times a Day. 60 tablet 0   • DULoxetine (CYMBALTA) 30 MG capsule Take 1 capsule by mouth Daily. 90 capsule 1   • gabapentin (Neurontin) 100 MG capsule Take 1 capsule by mouth 3 (Three) Times a Day. 90 capsule 2   • hydrOXYzine (ATARAX) 50 MG tablet Take 2 tablets by mouth Every Night. 180 tablet 1   • metFORMIN ER (GLUCOPHAGE-XR) 500 MG 24 hr tablet Take 1 tablet by mouth Daily.     • metoprolol succinate XL (Toprol XL) 25 MG 24 hr tablet Take 1 tablet by mouth Daily for 90 days. 90 tablet 1   • NP Thyroid 30 MG tablet TAKE 1 TABLET BY MOUTH EVERY MORNING. TAKE 30 minutes prior to food, beverages other than water, or other medications.     • spironolactone (ALDACTONE) 100 MG tablet spironolactone 100 mg oral tablet take 1 tablet (100 mg) by oral route once daily   Active     • vitamin D (ERGOCALCIFEROL) 1.25 MG (31129 UT) capsule capsule TAKE 1 CAPSULE BY MOUTH ONCE A WEEK 13 capsule 1   • Wegovy 1 MG/0.5ML solution auto-injector      • ARIPiprazole (Abilify) 2 MG tablet Take 1 tablet by mouth Every Night. 30 tablet 5     No current facility-administered medications for this visit.       Past Medical History:  Past Medical History:   Diagnosis Date   • Acne    • ADHD    • Anxiety    •  Depression    • Generalized anxiety disorder 01/06/2021   • History of medical problems 02/28/2019    Miscarriage   • Major depressive disorder in partial remission (HCC) 01/06/2021   • PCOS (polycystic ovarian syndrome)    • Pre-eclampsia        Social History     Socioeconomic History   • Marital status:    Tobacco Use   • Smoking status: Never   • Smokeless tobacco: Never   • Tobacco comments:     2/10/2021- 1/6/2021   Vaping Use   • Vaping Use: Never used   Substance and Sexual Activity   • Alcohol use: Yes     Comment: light; 2/10/2021- 1/6/2021   • Drug use: Never     Comment: 2/10/2021- 1/6/2021   • Sexual activity: Yes     Partners: Male     Birth control/protection: None       Family History   Problem Relation Age of Onset   • Anxiety disorder Mother    • Bipolar disorder Mother    • Depression Mother    • ADD / ADHD Sister    • Depression Sister    • ADD / ADHD Sister        Mental Status Exam:   Hygiene:   good, groomed  Cooperation:  Cooperative  Eye Contact:  Good  Psychomotor Behavior:  Appropriate  Affect:  Mild constriction, congruent  Mood: no difference  Hopelessness: Denies  Speech:  Normal  Thought Process:  Goal directed  Thought Content:  Normal  Suicidal:  None  Homicidal:  None  Hallucinations:  None  Delusion:  None  Memory:  Intact  Orientation:  Person, Place, Time and Situation  Reliability:  good  Insight:  Fair  Judgement:  Fair  Impulse Control:  Fair  Physical/Medical Issues:  No      Review of Systems:  Review of Systems   Constitutional: Negative for diaphoresis and fatigue.   HENT: Negative for drooling.    Eyes: Negative for visual disturbance.   Respiratory: Negative for cough and shortness of breath.    Cardiovascular: Negative for chest pain, palpitations and leg swelling.   Gastrointestinal: Negative for diarrhea, nausea and vomiting.   Endocrine: Negative for cold intolerance and heat intolerance.   Genitourinary: Negative for difficulty urinating.   Musculoskeletal:  "Negative for joint swelling.   Allergic/Immunologic: Negative for immunocompromised state.   Neurological: Negative for dizziness, seizures, syncope, speech difficulty, light-headedness, numbness and headaches.         Physical Exam:  Physical Exam    Vital Signs:   /78   Pulse 103   Ht 154.9 cm (61\")   Wt 74.7 kg (164 lb 9.6 oz)   BMI 31.10 kg/m²      Lab Results:   No visits with results within 6 Month(s) from this visit.   Latest known visit with results is:   Office Visit on 05/25/2022   Component Date Value Ref Range Status   • Diagnosis 05/25/2022 Comment   Final    NEGATIVE FOR INTRAEPITHELIAL LESION OR MALIGNANCY.  THIS SPECIMEN WAS RESCREENED AS PART OF OUR  PROGRAM.   • Specimen adequacy: 05/25/2022 Comment   Final    Satisfactory for evaluation.  Endocervical and/or squamous metaplastic  cells (endocervical component) are present.   • Clinician Provided ICD-10: 05/25/2022 Comment   Final    Z01.419   • Performed by: 05/25/2022 Comment   Final    Aby Moya, Cytotechnologist (ASCP)   • QC reviewed by: 05/25/2022 Comment   Final    She Burt, Supervisory Cytotechnologist (ASCP)   • . 05/25/2022 .   Final   • Note: 05/25/2022 Comment   Final    The Pap smear is a screening test designed to aid in the detection of  premalignant and malignant conditions of the uterine cervix.  It is not a  diagnostic procedure and should not be used as the sole means of detecting  cervical cancer.  Both false-positive and false-negative reports do occur.   • Method: 05/25/2022 Comment   Final    This liquid based ThinPrep(R) pap test was screened with the  use of an image guided system.   • Conv .conv 05/25/2022 Comment   Final    The HPV DNA reflex criteria were not met with this specimen result  therefore, no HPV testing was performed.       EKG Results:  No orders to display       Imaging Results:  No Images in the past 120 days found..      Assessment & Plan   Diagnoses and all orders " for this visit:    1. ADHD (attention deficit hyperactivity disorder), inattentive type (Primary)  -     Discontinue: hydrOXYzine (ATARAX) 50 MG tablet; Take 2 tablets by mouth Every Night.  Dispense: 180 tablet; Refill: 1  -     hydrOXYzine (ATARAX) 50 MG tablet; Take 2 tablets by mouth Every Night.  Dispense: 180 tablet; Refill: 1  -     amphetamine-dextroamphetamine (ADDERALL) 10 MG tablet; Take 1 tablet by mouth Daily.  Dispense: 30 tablet; Refill: 0  -     amphetamine-dextroamphetamine (ADDERALL) 20 MG tablet; Take 1 tablet by mouth 2 (Two) Times a Day.  Dispense: 60 tablet; Refill: 0    2. Generalized anxiety disorder  -     Discontinue: hydrOXYzine (ATARAX) 50 MG tablet; Take 2 tablets by mouth Every Night.  Dispense: 180 tablet; Refill: 1  -     hydrOXYzine (ATARAX) 50 MG tablet; Take 2 tablets by mouth Every Night.  Dispense: 180 tablet; Refill: 1    3. Major depressive disorder, recurrent episode, in partial remission (HCC)  -     ARIPiprazole (Abilify) 2 MG tablet; Take 1 tablet by mouth Every Night.  Dispense: 30 tablet; Refill: 5    4. Insomnia due to mental condition  -     ARIPiprazole (Abilify) 2 MG tablet; Take 1 tablet by mouth Every Night.  Dispense: 30 tablet; Refill: 5    5. Panic attacks        Presentation most consistent with major depressive disorder in partial remission, generalized anxiety disorder, and ADHD.  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  Also endorses a childhood history that is compelling.    3/17: House almost done. Continue therapy with Yareli. Start abilify. Increase gabapentin to 300 mg daily prn anxiety. Start hydroxyzine nightly for insomnia. Minimal effect on dep and anx (not at goal). 17 minutes of supportive psychotherapy with goal to strengthen defenses,  promote problems solving, restore adaptive functioning and provide symptom relief. The therapeutic alliance was strengthened to encourage the patient to express their thoughts and feelings. Esteem building was enhanced through praise, reassurance, normalizing and encouragement. Coping skills were enhanced to build distress tolerance skills and emotional regulation. Allowed patient to freely discuss issues without interruption or judgement with unconditional positive regard, active listening skills, and empathy. Provided a safe, confidential environment to facilitate the development of a positive therapeutic relationship and encourage open, honest communication. Assisted patient in identifying risk factors which would indicate the need for higher level of care including thoughts to harm self or others and/or self-harming behavior and encouraged patient to contact this office, call 911, or present to the nearest emergency room should any of these events occur. Assisted patient in processing session content; acknowledged and normalized patient’s thoughts, feelings, and concerns by utilizing a person-centered approach in efforts to build appropriate rapport and a positive therapeutic relationship with open and honest communication. Patient given education on medication side effects, diagnosis/illness and relapse symptoms. Plan to continue supportive psychotherapy in next appointment to provide symptom relief.  Diagnoses: as above  Symptoms: as above  Functional status: good  Mental Status Exam: as above    Treatment plan: Medication management and supportive psychotherapy  Prognosis: good  Progress: resid dep and anx  6 wks      2/23: Stop THC gummies as they are now a schedule I substance. Start gabapentin for anx. Stop hydroxyzine. [facility]. 17   Progress: more anxious  1 mo      12/16: Well, stable, no changes.  3 months      10/19: Doing well, no changes. Anx, dep, adhd under control. 17 minutes of  supportive psychotherapy  Treatment plan: Medication management and supportive psychotherapy  Prognosis: good  Progress: well, stable  6 wks      9/9: Wants to be on less meds. Taper off bupropion. Continue other meds. Let's streamline the meds. 16 minutes of supportive psychotherapy   Treatment plan: Medication management and supportive psychotherapy  Prognosis: good  Progress: better, not at goal  6 wks      7/27: Stop prozac, 2 week washout, start cymbalta. Consider abilify on top of bupropion. 17 minutes of supportive psychotherapy Treatment plan: Medication management and supportive psychotherapy  Prognosis: good  Progress: more depressed  6 wks      6/15: Residual anxiety, increase prozac. Goal is to increase until maxed out; then possible switch. Depression has resolved. 18 minutes of supportive psychotherapy Treatment plan: Medication management and supportive psychotherapy  Prognosis: good  Progress: better  6 wks    5/4: Therapy referral.  Increase Prozac.  May consider switching from bupropion to Abilify in the future.  Patient was not able to tolerate bupropion 450 mg daily.  17 minutes of supportive psychotherapyTreatment plan: Medication management and supportive psychotherapy  Prognosis: Very good  Progress: Significant since our first visit.  Now developing reemerging depression  6 weeks    3/16: No change to medications, no side effects.  Living with in-laws at their house has been a source of anxiety for the patient.  Also taking care of her daughter, who she is very protective of.  Some issues with keeping restraint during therapy sessions as well.  17 minutes of supportive psychotherapy   6 weeks    2/2: Anxious about the weather, and its consequences (closed daycares, etc.). 18 minutes of supportive psychotherapy  6 wks    12/22: Increase Prozac to target anxiety, increase hydroxyzine to target insomnia. 17 minutes of supportive psychotherapy   6 weeks    11/17: Some ADHD symptoms re-emerging.  Add 10 mg adderall at 11 am. Schedule will be 7:30 first dose, 11 second higher dose of 30 mg. Pt also interested in therapy. 17 minutes of supportive psychotherapy   4 wks.    10/15: Start prozac. Hydroxyzine at night calms her; consider adding am dose. 4 wks    : Patient is doing extremely well.  No change to medications.  See back in 8 weeks.    : Doing a little better, however patient was doing much better on Adderall 20 mg twice a day, which was her previous dose.  Discontinue Adderall XR and start Adderall immediate release 20 mg twice a day.  Some residual depressive and anxiety symptoms that are well controlled on bupropion.  Treating ADHD will also treat residual depressive and anxiety symptoms as well.  See back in 2 months.  Declines psychotherapy.    Visit Diagnoses:    ICD-10-CM ICD-9-CM   1. ADHD (attention deficit hyperactivity disorder), inattentive type  F90.0 314.00   2. Generalized anxiety disorder  F41.1 300.02   3. Major depressive disorder, recurrent episode, in partial remission (HCC)  F33.41 296.35   4. Insomnia due to mental condition  F51.05 300.9     327.02   5. Panic attacks  F41.0 300.01       PLAN:  84. Risk Assessment: Risk of self-harm acutely is low. Risk factors include anxiety disorder, mood disorder, access to weapons, recent psychosocial stressors. Protective factors include no family history, no present SI, no history of suicide attempts or self-harm in the past, minimal AODA, healthcare seeking, future orientation, willingness to engage in care,  baby. Risk of self-harm chronically is also low, but could be further elevated in the event of treatment noncompliance and/or AODA.  85. Safety: No acute safety concerns.  86. Medications:   a. START abilify 2 mg nightly. Risks, benefits, alternatives discussed with patient including increased energy, exacerbation of irritability, akathisia, GI upset, orthostatic hypotension, increased appetite, tardive dyskinesia.   After discussion of these risks and benefits, the patient voiced understanding and agreed to proceed.  b. CONTINUE cymbalta 30 mg daily. Risks, benefits, alternatives discussed with patient including GI upset, nausea vomiting diarrhea, theoretical decrease of seizure threshold predisposing the patient to a slightly higher seizure risk, headaches, sexual dysfunction, serotonin syndrome, bleeding risk, increased suicidality in patients 24 years and younger.  Also constipation and urinary retention.  After discussion of these risks and benefits, the patient voiced understanding and agreed to proceed.   c.  RESTART hydroxyzine 100 mg PO QHS. Risks, benefits, alternatives discussed with patient including sedation, dizziness, fall risk, GI upset, and risk of increased CNS depression and elevated heart rate if taken with other antihistamines.  After discussion of these risks and benefits, the patient voiced understanding and agreed to proceed.  d. CONTINUE gabapentin 100 mg but increase to 300 mg qday prn anxiety. Risks, benefits, alternatives discussed with patient including sedation, dizziness/falls risk, GI upset, weight gain.  After discussion of these risks and benefits, the patient voiced understanding and agreed to proceed. UDS ordered, Brian reviewed.  e. CONTINUE Adderall 30 mg PO QAM, 20 mg PO QPM. Risks, benefits, side effects discussed with patient including elevated heart rate, elevated blood pressure, irritability, insomnia, sexual dysfunction, appetite suppressing properties, psychosis.  After discussion of these risks and benefits, the patient voiced understanding and agreed to proceed. Controlled substances consent verbally signed. UDS and Brian ordered.  f. S/P   i. prozac 60 mg ineffective.  ii. Escitalopram: sexual dysfunction  iii. Adderall XR 10 mg daily: not effective  iv. Bupropion  made her nauseated. 300 mg made her sweat (irritable?)  87. Therapy: Consider in the future, presently not  interested.  88. Labs/Studies: EKG reassuring.      TREATMENT PLAN/GOALS: Continue supportive psychotherapy efforts and medications as indicated. Treatment and medication options discussed during today's visit. Patient acknowledged and verbally consented to continue with current treatment plan and was educated on the importance of compliance with treatment and follow-up appointments.    MEDICATION ISSUES:  TOM reviewed as expected.  Discussed medication options and treatment plan of prescribed medication as well as the risks, benefits, and side effects including potential falls, possible impaired driving and metabolic adversities among others. Patient is agreeable to call the office with any worsening of symptoms or onset of side effects. Patient is agreeable to call 911 or go to the nearest ER should he/she begin having SI/HI. No medication side effects or related complaints today.     MEDS ORDERED DURING VISIT:  New Medications Ordered This Visit   Medications   • hydrOXYzine (ATARAX) 50 MG tablet     Sig: Take 2 tablets by mouth Every Night.     Dispense:  180 tablet     Refill:  1   • amphetamine-dextroamphetamine (ADDERALL) 10 MG tablet     Sig: Take 1 tablet by mouth Daily.     Dispense:  30 tablet     Refill:  0   • amphetamine-dextroamphetamine (ADDERALL) 20 MG tablet     Sig: Take 1 tablet by mouth 2 (Two) Times a Day.     Dispense:  60 tablet     Refill:  0   • ARIPiprazole (Abilify) 2 MG tablet     Sig: Take 1 tablet by mouth Every Night.     Dispense:  30 tablet     Refill:  5       Return in about 6 weeks (around 4/28/2023).         This document has been electronically signed by Eli Murray MD  March 17, 2023 14:54 EDT

## 2023-03-17 NOTE — PATIENT INSTRUCTIONS
1.  Please return to clinic at your next scheduled visit.  Contact the clinic ([phone]) at least 24 hours prior in the event you need to cancel.  2.  Do no harm to yourself or others.    3.  Avoid alcohol and drugs.    4.  Take all medications as prescribed.  Please contact the clinic with any concerns. If you are in need of medication refills, please call the clinic at [phone].    5. Should you want to get in touch with your provider, Dr. Eli Murray, please utilize [facility] or contact the office ([phone]), and staff will be able to page Dr. Murray directly.  6.  In the event you have personal crisis, contact the following crisis numbers: Suicide Prevention Hotline 1-693.885.5464; JOSE Helpline 7-988-296-UQYU; [facility] Emergency Room [phone]; text HELLO to 096748; or 481.     SPECIFIC RECOMMENDATIONS:     1.      Medications discussed at this encounter:                   - increase gabaepntin, start abilify, restart hydroxyzine     2.      Psychotherapy recommendations:

## 2023-03-22 ENCOUNTER — LAB (OUTPATIENT)
Dept: LAB | Facility: HOSPITAL | Age: 28
End: 2023-03-22
Payer: COMMERCIAL

## 2023-03-22 DIAGNOSIS — F90.0 ADHD (ATTENTION DEFICIT HYPERACTIVITY DISORDER), INATTENTIVE TYPE: ICD-10-CM

## 2023-03-22 LAB
AMPHET+METHAMPHET UR QL: POSITIVE
BARBITURATES UR QL SCN: NEGATIVE
BENZODIAZ UR QL SCN: NEGATIVE
CANNABINOIDS SERPL QL: POSITIVE
COCAINE UR QL: NEGATIVE
METHADONE UR QL SCN: NEGATIVE
OPIATES UR QL: NEGATIVE
OXYCODONE UR QL SCN: NEGATIVE

## 2023-03-22 PROCEDURE — 80307 DRUG TEST PRSMV CHEM ANLYZR: CPT

## 2023-04-04 NOTE — PROGRESS NOTES
"Progress Note    Date: 04/12/2023  Time In: 0902  Time Out: 0955    Patient Legal Name: Janice Padilla  Patient Age: 27 y.o.    CHIEF COMPLAINT: Anxiety, Depression    Subjective   History of Present Illness     From previous progress note on 12/14/23:  Patient is to practice healthy communication, as discussed in session, in order to assert her needs (using I-statements, planning what she wants to say ahead of time, etc.).  We will discuss further next session.    Janice is a 27 y.o. female who presents today as a follow-up for continued psychotherapy.  Patient reports that since last session, she feels that her symptoms of depression have been \"about the same\" and her anxiety has slightly increased (likely circumstantial, as patient has recently moved into her new home that had been under construction for months prior).  She states that she has been \"missing her dad a lot lately,\" which typically happens during a major life change.      Assessment    Mental Status Exam     Appearance: good hygiene and dressed appropriately for the weather  Behavior: calm  Cooperation:  engaged, cooperative, attentive, and friendly  Eye Contact:  good  Affect:  sad and tearful  Mood: anxious  Speech: responsive  Thought Process:  linear and organized  Thought Content: intrusive thoughts  Suicidal: denies  Homicidal:  denies  Hallucinations:  denies  Memory:  intact  Orientation:  person, place, time, and situation  Reliability:  reliable  Insight:  good  Judgment:  good     Clinical Intervention       ICD-10-CM ICD-9-CM   1. Generalized anxiety disorder  F41.1 300.02   2. Major depressive disorder, recurrent episode, in partial remission  F33.41 296.35        Individual psychotherapy was provided utilizing CBT techniques to restore adaptive functioning, build rapport, encourage expression of thoughts and feelings, establish new coping skills, discuss values and strengths, manage stress, recognize patterns of behavior, acknowledge " "sources of feelings and behaviors, challenge negative thinking patterns, recognize cognitive distortions and provide support.  Therapist encouraged patient to reflect on her relationships, and to evaluate her connections with friends and family.  Patient indicates that she does rely on social support when needed, but she is reserved at times for various reasons, including not wanting to steam \"overly enthusiastic\" and also taking past experiences into account (i.e. feeling more anxious after talking to certain people makes it difficult to open up to them).  Patient had good insight into her patterns of behavior and specific triggers that increase her anxiety.    Plan   Plan & Goals     Therapist encouraged patient to nurture healthy relationships with loved ones in order to assist with the grieving process of her father, and also to continue relying on social support as needed.  Patient could benefit from continuing to explore the basic CBT model of thoughts, feelings and behaviors being connected.  Will discuss further next session.    We discussed this therapist's availability, and patient was encouraged to schedule more appointments into the future to avoid gap in service.    1. Patient acknowledged and verbally consented to continue working toward resolving current treatment plan goals and was educated on the importance of participation in the therapeutic process.  2. Patient will remain compliant with medication regimen as prescribed. Discuss any medication side effects, questions or concerns with prescribing provider.  3. Call 911 or present to the nearest emergency room in an emergency situation.  4. National Suicide Prevention Lifeline: Call 988. The Lifeline provides 24/7, free and confidential support for people in distress, prevention and crisis resources.    Return in about 2 weeks (around 4/26/2023) for Next scheduled follow up.    ____________________  This document has been electronically signed by " Yareli Yeung, DAVID  April 12, 2023 10:07 EDT    Part of this note may be an electronic transcription/translation of spoken language to printed text using the Dragon Dictation System.

## 2023-04-12 ENCOUNTER — OFFICE VISIT (OUTPATIENT)
Dept: PSYCHIATRY | Facility: CLINIC | Age: 28
End: 2023-04-12
Payer: COMMERCIAL

## 2023-04-12 DIAGNOSIS — F33.41 MAJOR DEPRESSIVE DISORDER, RECURRENT EPISODE, IN PARTIAL REMISSION: ICD-10-CM

## 2023-04-12 DIAGNOSIS — F41.1 GENERALIZED ANXIETY DISORDER: Primary | ICD-10-CM

## 2023-04-12 PROCEDURE — 90837 PSYTX W PT 60 MINUTES: CPT | Performed by: SOCIAL WORKER
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
## 2023-04-19 ENCOUNTER — TELEPHONE (OUTPATIENT)
Dept: PSYCHIATRY | Facility: CLINIC | Age: 28
End: 2023-04-19
Payer: COMMERCIAL

## 2023-04-19 NOTE — TELEPHONE ENCOUNTER
I have attempted to contact this patient by phone with the following results: no answer no voicemail option.

## 2023-04-19 NOTE — TELEPHONE ENCOUNTER
Her test results are acceptable in light of the situation we have discussed in the past. Please let her know. Thank you.

## 2023-04-20 DIAGNOSIS — F90.0 ADHD (ATTENTION DEFICIT HYPERACTIVITY DISORDER), INATTENTIVE TYPE: ICD-10-CM

## 2023-04-20 RX ORDER — DEXTROAMPHETAMINE SACCHARATE, AMPHETAMINE ASPARTATE, DEXTROAMPHETAMINE SULFATE AND AMPHETAMINE SULFATE 5; 5; 5; 5 MG/1; MG/1; MG/1; MG/1
TABLET ORAL
Qty: 60 TABLET | Refills: 0 | Status: SHIPPED | OUTPATIENT
Start: 2023-04-20

## 2023-04-20 RX ORDER — DEXTROAMPHETAMINE SACCHARATE, AMPHETAMINE ASPARTATE, DEXTROAMPHETAMINE SULFATE AND AMPHETAMINE SULFATE 2.5; 2.5; 2.5; 2.5 MG/1; MG/1; MG/1; MG/1
1 TABLET ORAL DAILY
Qty: 30 TABLET | Refills: 0 | Status: SHIPPED | OUTPATIENT
Start: 2023-04-20

## 2023-04-20 NOTE — TELEPHONE ENCOUNTER
I relayed the message to the patient verbatim and she expressed relief and had no further questions

## 2023-04-20 NOTE — TELEPHONE ENCOUNTER
Medication refill requests for Adderall 10mg and 20mg.     amphetamine-dextroamphetamine (ADDERALL) 10 MG tablet (03/17/2023)    amphetamine-dextroamphetamine (ADDERALL) 20 MG tablet (03/17/2023)    Pt has upcoming appt  Appointment with Eli Murray MD (05/12/2023)    Medication orders pended.

## 2023-04-21 NOTE — PROGRESS NOTES
Progress Note    Date: 04/26/2023  Time In: 0859  Time Out: 0984    Patient Legal Name: Janice Padilla  Patient Age: 27 y.o.    CHIEF COMPLAINT: Anxiety, ADHD    Subjective   History of Present Illness     From previous progress note on 4/12/23:  Therapist encouraged patient to nurture healthy relationships with loved ones in order to assist with the grieving process of her father, and also to continue relying on social support as needed.  Patient could benefit from continuing to explore the basic CBT model of thoughts, feelings and behaviors being connected.  Will discuss further next session.     We discussed this therapist's availability, and patient was encouraged to schedule more appointments into the future to avoid gap in service.    Janice is a 27 y.o. female who presents today as a follow-up for continued psychotherapy.  Patient reports that since last session, she has been working on healthy communication with her  in order to assert her needs and to advocate for herself.  She states that she wrote a letter to him, which seemed to be an effective form of communication, as he responded well to her thoughts and feelings in that format.  Patient states that she has had more noticeable physical symptoms (hand trembling, vision problems, pain during cold weather, etc.), as well as some short-term memory loss and difficulty concentrating.  She states that she has not discussed this in detail with any medical provider, and therapist encouraged her to do so to rule out potential medical causes.    Assessment    Mental Status Exam     Appearance: good hygiene and dressed appropriately for the weather  Behavior: calm  Cooperation:  engaged, cooperative, attentive, and friendly  Eye Contact:  good  Affect:  congruent  Mood: anxious  Speech: responsive  Thought Process:  linear and organized  Thought Content: intrusive thoughts  Suicidal: denies  Homicidal:  denies  Hallucinations:  denies  Memory:   intact  Orientation:  person, place, time, and situation  Reliability:  reliable  Insight:  good  Judgment:  good     Clinical Intervention       ICD-10-CM ICD-9-CM   1. Generalized anxiety disorder  F41.1 300.02   2. ADHD (attention deficit hyperactivity disorder), inattentive type  F90.0 314.00   3. Major depressive disorder, recurrent episode, in partial remission  F33.41 296.35        Individual psychotherapy was provided utilizing CBT techniques to encourage expression of thoughts and feelings, establish new coping skills, develop healthy communication skills, manage stress, recognize patterns of behavior, acknowledge sources of feelings and behaviors, recognize cognitive distortions and provide support.  Patient reflected on recent and past interpersonal conflict, and described having difficulty expressing herself in the past.  However, patient states that she has learned the importance of asserting her needs and using healthy communication to discuss her concerns with her .  Therapist offered insight and support to patient, highlighting patient's tendency to be sensitive to criticism (likely due to a lifelong struggle with ADHD symptoms and receiving criticism from others), and ways to increase acceptance of various moods, including giving permission to her  to express his needs as well.     Plan   Plan & Goals     Patient was given a list of cognitive distortions and encouraged to begin recognizing these patterns of thinking which could cause decline in mood. Will discuss further next session.    1. Patient acknowledged and verbally consented to continue working toward resolving current treatment plan goals and was educated on the importance of participation in the therapeutic process.  2. Patient will remain compliant with medication regimen as prescribed. Discuss any medication side effects, questions or concerns with prescribing provider.  3. Call 911 or present to the nearest emergency room  in an emergency situation.  4. National Suicide Prevention Lifeline: Call 988. The Lifeline provides 24/7, free and confidential support for people in distress, prevention and crisis resources.    Return in about 2 weeks (around 5/10/2023) for Next scheduled follow up.    ____________________  This document has been electronically signed by Yareli Yeung LCSW  April 26, 2023 10:00 EDT    Part of this note may be an electronic transcription/translation of spoken language to printed text using the Dragon Dictation System.

## 2023-04-26 ENCOUNTER — OFFICE VISIT (OUTPATIENT)
Dept: PSYCHIATRY | Facility: CLINIC | Age: 28
End: 2023-04-26
Payer: COMMERCIAL

## 2023-04-26 DIAGNOSIS — F90.0 ADHD (ATTENTION DEFICIT HYPERACTIVITY DISORDER), INATTENTIVE TYPE: ICD-10-CM

## 2023-04-26 DIAGNOSIS — F41.1 GENERALIZED ANXIETY DISORDER: Primary | ICD-10-CM

## 2023-04-26 DIAGNOSIS — F33.41 MAJOR DEPRESSIVE DISORDER, RECURRENT EPISODE, IN PARTIAL REMISSION: ICD-10-CM

## 2023-04-26 NOTE — PSYCHOTHERAPY NOTE
Psychotherapy Note - April 26, 2023    daughter - Alexandra (age 2)   - Toño - started dating in 10th grade  Best friend - Annette - friends for years  _______    Hand trembling - to talk to doctor about it  Pain, memory issues, eye problems    The anniversary of dad's death was this past Saturday  Sister was in town last time - she never wants to talk about it    Toño works with my uncle (dad's sister's )    I wrote a letter to Toño a couple of days ago  Gratitude - started positive  He was concerned that he had been taking care of Alexandra by himself  I overlook red flags that I shouldn't  He doesn't know how to take care of someone who has mental health problems  I was angry and sad about what he chose to do    The things that matter to me don't matter as much to him (like painting her bed)  Sometimes I'm not well mentally - then I feel sick mentally  I need more patience from you  I want us to be a team and be strong  I feel like when he's angry at me - it's amplified because of the things I did in the past    guilty    Criticism  Do you need care vs just space to care for yourself    Direct

## 2023-05-01 ENCOUNTER — TELEPHONE (OUTPATIENT)
Dept: PSYCHIATRY | Facility: CLINIC | Age: 28
End: 2023-05-01
Payer: COMMERCIAL

## 2023-05-01 NOTE — TELEPHONE ENCOUNTER
PATIENT WAS REFERRED OUT TO NEXT STEP COUNSELING. CALLED PATIENT TO FOLLOW UP ON REFERRAL ORDER, PATIENT STATES SHE IS JUST GOING TO CONTINUE TO SEE CLAUDIA, CLOSING OUT REFERRAL.

## 2023-05-03 NOTE — PROGRESS NOTES
"Progress Note    Date: 05/10/2023  Time In: 0903  Time Out: 1000    Patient Legal Name: Janice Padilla  Patient Age: 27 y.o.    CHIEF COMPLAINT: ADHD, Anxiety    Subjective   History of Present Illness     From previous progress note on 4/26/23:  Patient was given a list of cognitive distortions and encouraged to begin recognizing these patterns of thinking which could cause decline in mood. Will discuss further next session.    Janice is a 27 y.o. female who presents today as a follow-up for continued psychotherapy.  Patient reports that she has been reflecting on both short-term and long-term goals recently.  She states that she is \"always looking for the next project\" to work on, as she has a lot of creative energy that she expresses through furniture and decor.  However, patient states that she gets \"stuck\" with her tendency to be indecisive, and \"will spend hours online\" reviewing posts and reading reviews.      Assessment    Mental Status Exam     Appearance: good hygiene and dressed appropriately for the weather  Behavior: fidgeting  Cooperation:  engaged, cooperative, attentive, and friendly  Eye Contact:  good  Affect:  congruent  Mood: expressive  Speech: responsive  Thought Process:  linear and organized  Thought Content: intrusive thoughts  Suicidal: denies  Homicidal:  denies  Hallucinations:  denies  Memory:  intact  Orientation:  person, place, time, and situation  Reliability:  reliable  Insight:  good  Judgment:  good     Clinical Intervention       ICD-10-CM ICD-9-CM   1. ADHD (attention deficit hyperactivity disorder), inattentive type  F90.0 314.00   2. Generalized anxiety disorder  F41.1 300.02        Individual psychotherapy was provided utilizing CBT techniques to restore adaptive functioning, encourage expression of thoughts and feelings, manage stress, recognize patterns of behavior, acknowledge sources of feelings and behaviors, identify strengths, gather history, provide support and " discuss interpersonal conflicts.  Therapist encouraged patient to reflect on her strengths and her personality traits that make her feel confident.  Patient was able to change her negative thoughts to be more positive, and she began to recognize that some of the parts about herself that other people tend to criticize, could be viewed as strengths to build her self-confidence.  Patient did acknowledge that she has made some adjustments to her thinking patterns, which has helped reduce her anxiety, especially within interpersonal relationships (i.e. family, coworkers, etc.).    Plan   Plan & Goals     Patient could benefit from practicing a full day without reading reviews or other people's creative posts online, while exploring her own creativity and making decisions without reassurance from others, in order to build self-confidence and reduce symptoms of anxiety. Will discuss further next session.    1. Patient acknowledged and verbally consented to continue working toward resolving current treatment plan goals and was educated on the importance of participation in the therapeutic process.  2. Patient will remain compliant with medication regimen as prescribed. Discuss any medication side effects, questions or concerns with prescribing provider.  3. Call 911 or present to the nearest emergency room in an emergency situation.  4. National Suicide Prevention Lifeline: Call 988. The Lifeline provides 24/7, free and confidential support for people in distress, prevention and crisis resources.    Return in about 2 weeks (around 5/24/2023) for Next scheduled follow up.    ____________________  This document has been electronically signed by Yareli Yeung LCSW  May 10, 2023 11:08 EDT    Part of this note may be an electronic transcription/translation of spoken language to printed text using the Dragon Dictation System.

## 2023-05-10 ENCOUNTER — OFFICE VISIT (OUTPATIENT)
Dept: PSYCHIATRY | Facility: CLINIC | Age: 28
End: 2023-05-10
Payer: COMMERCIAL

## 2023-05-10 DIAGNOSIS — F90.0 ADHD (ATTENTION DEFICIT HYPERACTIVITY DISORDER), INATTENTIVE TYPE: Primary | ICD-10-CM

## 2023-05-10 DIAGNOSIS — F32.4 MAJOR DEPRESSIVE DISORDER IN PARTIAL REMISSION, UNSPECIFIED WHETHER RECURRENT: ICD-10-CM

## 2023-05-10 DIAGNOSIS — F51.05 INSOMNIA DUE TO MENTAL CONDITION: ICD-10-CM

## 2023-05-10 DIAGNOSIS — F41.1 GENERALIZED ANXIETY DISORDER: ICD-10-CM

## 2023-05-10 RX ORDER — DULOXETIN HYDROCHLORIDE 30 MG/1
30 CAPSULE, DELAYED RELEASE ORAL DAILY
Qty: 90 CAPSULE | Refills: 1 | Status: SHIPPED | OUTPATIENT
Start: 2023-05-10

## 2023-05-10 NOTE — TELEPHONE ENCOUNTER
Requested: duloxetine 30 mg capsule,delayed release  • Last refill:5/10/2023      Next appt in Epic 05/12/2023

## 2023-05-10 NOTE — PSYCHOTHERAPY NOTE
Psychotherapy Note - May 10, 2023    daughter - Alexandra (age 3)   - Toño - started dating in 10th grade  Best friend - Annette - friends for years  _______    Creative energy    I like paperwork    Indecisive    Random facts in my head  I was valedictorian

## 2023-05-12 ENCOUNTER — OFFICE VISIT (OUTPATIENT)
Dept: PSYCHIATRY | Facility: CLINIC | Age: 28
End: 2023-05-12
Payer: COMMERCIAL

## 2023-05-12 VITALS
HEART RATE: 76 BPM | BODY MASS INDEX: 30.4 KG/M2 | SYSTOLIC BLOOD PRESSURE: 123 MMHG | HEIGHT: 61 IN | WEIGHT: 161 LBS | DIASTOLIC BLOOD PRESSURE: 82 MMHG

## 2023-05-12 DIAGNOSIS — F51.05 INSOMNIA DUE TO MENTAL CONDITION: ICD-10-CM

## 2023-05-12 DIAGNOSIS — F90.0 ADHD (ATTENTION DEFICIT HYPERACTIVITY DISORDER), INATTENTIVE TYPE: ICD-10-CM

## 2023-05-12 DIAGNOSIS — F33.40 RECURRENT MAJOR DEPRESSIVE DISORDER IN REMISSION: ICD-10-CM

## 2023-05-12 DIAGNOSIS — F41.0 PANIC ATTACKS: ICD-10-CM

## 2023-05-12 DIAGNOSIS — F41.1 GENERALIZED ANXIETY DISORDER: Primary | ICD-10-CM

## 2023-05-12 RX ORDER — DEXTROAMPHETAMINE SACCHARATE, AMPHETAMINE ASPARTATE, DEXTROAMPHETAMINE SULFATE AND AMPHETAMINE SULFATE 5; 5; 5; 5 MG/1; MG/1; MG/1; MG/1
1 TABLET ORAL 2 TIMES DAILY
Qty: 60 TABLET | Refills: 0 | Status: SHIPPED | OUTPATIENT
Start: 2023-05-20

## 2023-05-12 RX ORDER — SEMAGLUTIDE 2.4 MG/.75ML
INJECTION, SOLUTION SUBCUTANEOUS
COMMUNITY
Start: 2023-05-08

## 2023-05-12 RX ORDER — MINOCYCLINE HYDROCHLORIDE 100 MG/1
CAPSULE ORAL
COMMUNITY
Start: 2023-04-25

## 2023-05-12 RX ORDER — DEXTROAMPHETAMINE SACCHARATE, AMPHETAMINE ASPARTATE, DEXTROAMPHETAMINE SULFATE AND AMPHETAMINE SULFATE 2.5; 2.5; 2.5; 2.5 MG/1; MG/1; MG/1; MG/1
1 TABLET ORAL DAILY
Qty: 30 TABLET | Refills: 0 | Status: SHIPPED | OUTPATIENT
Start: 2023-05-20

## 2023-05-12 NOTE — PROGRESS NOTES
"Subjective   Janice Padilla is a 27 y.o. female who presents today for follow up    Referring Provider:  Balbina Todd, APRN  [address]  [address]  [place] [address]    Chief Complaint:  adhd cornel mdd    History of Present Illness:     Janice Padilla is a 25 year old /White female referred by Balbina Todd APRN.     Chart review : Patient is on Wellbutrin, status post Zoloft. Also on Adderall. Had tachycardia at 130. Started on metoprolol 50 mg extended release daily. Labs in December: BMI 35, LDL is elevated at 121, ALT AST within normal limits, creatinine 0.99, hematocrit 45, electrolytes are essentially normal, TSH is 3.65 normal, iron is 53 low, vitamin D is 23 low, ferritin is normal at 96. No EKG or head imaging.     \"Janice\"  Father passed away from drunk driving at 3 yo       : In person interview:  1. Chart review: Seeing psychotherapy.  UDS positive for amphetamines and THC most recently.  Patient had stopped using THC gummies.  2. Planning: House almost done. Continue therapy with [facility]. Start abilify. Increase gabapentin to 300 mg daily prn anxiety. Start hydroxyzine nightly for insomnia. Minimal effect on dep and anx (not at goal).  a. [facility] some[facility] else besides [facility].  3. \"I've been good.\"  a. Living in our house. It has changed everything.   b. abilify has helped.  4. Mood/Depression: good mood  5. Anxiety: I feel a lot calmer  6. ADHD: under control  7. Panic attacks: n  8. Energy: n  9. Concentration: adhd under control  10. Sleeping: better on hydroxyzine.  11. Eatin lb wl 10/22  12. Refills: y  13. Substances: def  14. Therapy: [facility]  15. Medication compliant: no, stopped gabapentin  16. SE: n  17. No SI HI AVH.         3/17: In person interview:  18. Chart review: No new peer  19. Planning: Stop THC gummies as they are now a schedule I substance. Start gabapentin for anx. Stop hydroxyzine. [facility] to [facility].   20. Patient " "goals:  21. Lifestyle changes:  22. Self-compassion:   23. Maladaptive thinkin. Improving interpersonal relationships:  25. Expressing difficult emotions:  26. Taking the perspective of others:  27. \"I can't really tell.\"  a. Has taken up to 200 mg of gabapentin at a time, and   28. Mood/Depression: ups and downs  29. Anxiety: irritable, on edge  30. Panic attacks: none since  31. Energy: stable  32. Concentration: ADHD: under control  33. Sleeping: initial insomnia  34. Eatin lb wl   35. Refills: y  36. Substances: n  37. Therapy: waitlisted at [facility]  38. Medication compliant:  39. No SI HI AVH.      : In person interview:  40. Chart review: Patient called to set up an earlier appointment because she does not feel her Cymbalta is working as well as it was.  a. Seeing [facility].  41. Planning: Well, no changes.  42. \"I've just been thinking. I have therapy in April.\"  a. Pt wants more frequent therapy.   b. Recently confronted by father in law for a \"comment\" she had no idea she made. She had a panic attack.  i. Triggered her anxiety.  c. Recently was using THC gummies to calm her when father in law is around  43. Mood/Depression: depressed mood  44. Anxiety: much worse  a. Waiting for the ball to drop.  45. Panic attacks: y  46. Energy: stable  47. Concentration: ADHD: under control  48. Sleeping:  a. Doesn't need hydroxyzine and still sleeping  49. Eating: stable  50. Refills: n  51. Substances: n  52. Therapy: Refer to [facility]  53. Medication compliant: y  54. No SI HI AVH.      : In person interview:  55. Chart review: Continuing psychotherapy.  56. Planning: No changes at last visit.  57. \"A little sleepy.\"  a. Things are going good.  b. Working on the house, waiting.  c. Trying to look forward to the holidays.  d. House will be done next Xmas  58. Mood/Depression: minimal depression  59. Anxiety:  a. Stressed from the holidays, but situational  60. Panic attacks:  61. Energy: " "good  62. Concentration: ADHD under control  63. Sleeping: well  64. Eatin lb wl   65. Refills: y  66. Substances: n  67. Therapy: continuing  68. Medication compliant: y  69. No SI HI AVH.      10/19: In person interview:  70. Chart review: Originally, patient called in stating that she did not feel well tapering off the bupropion.  She later recanted.  Continues to see psychotherapy.  71. Planning: Discontinued bupropion at last visit, likely want to increase Cymbalta as she is now on a slightly lower dose.  72. \"It was one day.\"  a. Didn't even last the whole day.  i. Everything was slow mo about a week after I stopped the bupropion. Was fine by the end of the day.  b. House outside is done except 2 windows.   i. Has learned to be more decisive  c. Not sweating at all now  73. Mood/Depression:  74. Anxiety:  a. Handling things better, less irritability  b. Minimal worrying  75. Panic attacks: n  76. Energy: good  77. Concentration/ADHD: ok, mind is in a lot of different places, distractible and issues with focus, \"nothing to make changes about\"  78. Sleeping: well  79. Eatin lb wl sine   80. Refills: y  81. Substances: n  82. Therapy: n  83. Medication compliant:  84. No SI HI AVH.      : In person interview:  85. Chart review: Continuing psychotherapy.  86. Planning: Switched from Prozac to Cymbalta.  Consider adding Abilify.  87. \"I think... I've been better.\"  a. Got worked up for PCOS, told \"everything is fine.\"  b. I don't want to be on so many medications.  88. Mood/Depression: no crying spells  a. Still not 100%, poor energy and motivation  b. Denies depressed mood most days  89. Anxiety: \"I feel like it's pretty good\"  a. Daughter had a fever and needed to go to urgent care and \"I handled that pretty well.\"  b. Some obsessive thoughts regarding doing the dishes/cleanliness  i. Not living in her own space; has to deal with others  ii. Wasn't like that when it was just her own " "house  90. ADHD: fairly well controlled most days  91. Panic attacks: n  92. Energy: not at goal  93. Concentration: stable  94. Sleeping: stable  95. Eating: wg  96. Refills: y  97. Substances: n  98. Therapy: y, likes [facility]  99. Medication compliant: y  100. No SI HI AVH.    ...    IMPORTANT:  From previous note, patient was held back in the third grade. Struggled to complete high school and missed 60 days although she did graduate with good grades. Got in trouble for talking a lot in class. She was not in any special classes. She did not have an IEP.     1/6/21 H&P: Virtual visit via Zoom audio and video due to the COVID-19 pandemic. Patient is accepting of and agreeable to appointment. The appointment consisted of the patient and I only. Interview: Patient reports that she had her first child in March and suffered from postpartum depression. The pandemic did not help things. It was a \"hard time,\" and the patient reports she had already had anxiety before. Patient is presently bottlefeeding. She became pregnant through an infertility specialist. Presently not on any contraception.   Endorses muscle tension, fatigue, poor concentration, restlessness, irritability, and some broken sleep, although she is sleeping for about 8 hours a night. She wakes up frequently to check on her baby girl, Alexandra. Also endorses guilt at being a bad mom. Duration has been since March of last year. Patient feels her anxiety is worse than her depression.   Denies SI HI AVH. Has access to weapons that are locked in a gun safe. Patient does not know the combination. She also does not know how to work the guns that are in the safe. Psychiatric review of systems is positive for anxiety and depression, negative for psychosis and maribel.   Possible ADHD. Patient was diagnosed by Dr. Joe in 2017. 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 Possible " "family history as well as her sister may have ADHD.   Past Psychiatric History:  Began Psychiatric Treatment: Since    Dx: Anxiety   Psychiatrist: Has not seen a psychiatrist   Therapist: Saw therapist in 2018x1, unsure if it was helpful.   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     Feeling down, depressed, or hopeless?     Trouble falling or staying asleep, or sleeping too much?     Feeling tired or having little energy?     Poor appetite or overeating?     Feeling bad about yourself - or that you are a failure or have let yourself or your family down?     Trouble concentrating on things, such as reading the newspaper or watching television?     Moving or speaking so slowly that other people could have noticed? Or the opposite - being so fidgety or restless that you have been moving around a lot " more than usual?     Thoughts that you would be better off dead, or of hurting yourself in some way?     PHQ-9 Total Score       MATTHEW-7  Feeling nervous, anxious or on edge: Several days  Not being able to stop or control worrying: Several days  Worrying too much about different things: Several days  Trouble Relaxing: Several days  Being so restless that it is hard to sit still: Several days  Feeling afraid as if something awful might happen: Several days  Becoming easily annoyed or irritable: Several days  MATTHEW 7 Total Score: 7  If you checked any problems, how difficult have these problems made it for you to do your work, take care of things at home, or get along with other people: Somewhat difficult    Past Surgical History:  Past Surgical History:   Procedure Laterality Date   • ADENOIDECTOMY     • DILATATION AND CURETTAGE     • SKIN BIOPSY     • TONSILLECTOMY         Problem List:  Patient Active Problem List   Diagnosis   • Acne   • Anxiety   • Attention deficit hyperactivity disorder (ADHD)   • Depression   • Generalized anxiety disorder   • Gestational hypertension   • Major depressive disorder in partial remission   • PCOS (polycystic ovarian syndrome)   • Pre-eclampsia       Allergy:   No Known Allergies     Discontinued Medications:  Medications Discontinued During This Encounter   Medication Reason   • gabapentin (Neurontin) 100 MG capsule Dose adjustment   • Wegovy 1 MG/0.5ML solution auto-injector Dose adjustment   • amphetamine-dextroamphetamine (ADDERALL) 20 MG tablet Reorder   • amphetamine-dextroamphetamine (ADDERALL) 10 MG tablet Reorder       Current Medications:   Current Outpatient Medications   Medication Sig Dispense Refill   • Aklief 0.005 % cream      • [START ON 5/20/2023] amphetamine-dextroamphetamine (ADDERALL) 10 MG tablet Take 1 tablet by mouth Daily. 30 tablet 0   • [START ON 5/20/2023] amphetamine-dextroamphetamine (ADDERALL) 20 MG tablet Take 1 tablet by mouth 2 (Two) Times a Day. 60  tablet 0   • ARIPiprazole (Abilify) 2 MG tablet Take 1 tablet by mouth Every Night. 30 tablet 5   • DULoxetine (CYMBALTA) 30 MG capsule TAKE 1 CAPSULE BY MOUTH DAILY 90 capsule 1   • hydrOXYzine (ATARAX) 50 MG tablet Take 2 tablets by mouth Every Night. 180 tablet 1   • metFORMIN ER (GLUCOPHAGE-XR) 500 MG 24 hr tablet Take 1 tablet by mouth Daily.     • metoprolol succinate XL (Toprol XL) 25 MG 24 hr tablet Take 1 tablet by mouth Daily for 90 days. 90 tablet 1   • minocycline (MINOCIN,DYNACIN) 100 MG capsule      • NP Thyroid 30 MG tablet TAKE 1 TABLET BY MOUTH EVERY MORNING. TAKE 30 minutes prior to food, beverages other than water, or other medications.     • spironolactone (ALDACTONE) 100 MG tablet spironolactone 100 mg oral tablet take 1 tablet (100 mg) by oral route once daily   Active     • vitamin D (ERGOCALCIFEROL) 1.25 MG (80076 UT) capsule capsule TAKE 1 CAPSULE BY MOUTH ONCE A WEEK 13 capsule 1   • Wegovy 2.4 MG/0.75ML solution auto-injector        No current facility-administered medications for this visit.       Past Medical History:  Past Medical History:   Diagnosis Date   • Acne    • ADHD    • Anxiety    • Depression    • Generalized anxiety disorder 01/06/2021   • History of medical problems 02/28/2019    Miscarriage   • Major depressive disorder in partial remission 01/06/2021   • PCOS (polycystic ovarian syndrome)    • Pre-eclampsia        Social History     Socioeconomic History   • Marital status:    Tobacco Use   • Smoking status: Never   • Smokeless tobacco: Never   • Tobacco comments:     2/10/2021- 1/6/2021   Vaping Use   • Vaping Use: Never used   Substance and Sexual Activity   • Alcohol use: Yes     Comment: light; 2/10/2021- 1/6/2021   • Drug use: Never     Comment: 2/10/2021- 1/6/2021   • Sexual activity: Yes     Partners: Male     Birth control/protection: None       Family History   Problem Relation Age of Onset   • Anxiety disorder Mother    • Bipolar disorder Mother    •  "Depression Mother    • ADD / ADHD Sister    • Depression Sister    • ADD / ADHD Sister        Mental Status Exam:   Hygiene:   good, groomed  Cooperation:  Cooperative  Eye Contact:  Good  Psychomotor Behavior:  Appropriate  Affect:  euthymic, congruent  Mood: I'm much better  Hopelessness: Denies  Speech:  Normal  Thought Process:  Goal directed  Thought Content:  Normal  Suicidal:  None  Homicidal:  None  Hallucinations:  None  Delusion:  None  Memory:  Intact  Orientation:  Person, Place, Time and Situation  Reliability:  good  Insight:  Fair  Judgement:  Fair  Impulse Control:  Fair  Physical/Medical Issues:  No      Review of Systems:  Review of Systems   Constitutional: Negative for diaphoresis and fatigue.   HENT: Negative for drooling.    Eyes: Negative for visual disturbance.   Respiratory: Negative for cough and shortness of breath.    Cardiovascular: Negative for chest pain, palpitations and leg swelling.   Gastrointestinal: Negative for diarrhea, nausea and vomiting.   Endocrine: Negative for cold intolerance and heat intolerance.   Genitourinary: Negative for difficulty urinating.   Musculoskeletal: Negative for joint swelling.   Allergic/Immunologic: Negative for immunocompromised state.   Neurological: Negative for dizziness, seizures, syncope, speech difficulty, light-headedness, numbness and headaches.         Physical Exam:  Physical Exam    Vital Signs:   /82   Pulse 76   Ht 154.9 cm (61\")   Wt 73 kg (161 lb)   BMI 30.42 kg/m²      Lab Results:   Lab on 03/22/2023   Component Date Value Ref Range Status   • Amphet/Methamphet, Screen 03/22/2023 Positive (A)  Negative Final   • Barbiturates Screen, Urine 03/22/2023 Negative  Negative Final   • Benzodiazepine Screen, Urine 03/22/2023 Negative  Negative Final   • Cocaine Screen, Urine 03/22/2023 Negative  Negative Final   • Opiate Screen 03/22/2023 Negative  Negative Final   • THC, Screen, Urine 03/22/2023 Positive (A)  Negative Final   • " Methadone Screen, Urine 03/22/2023 Negative  Negative Final   • Oxycodone Screen, Urine 03/22/2023 Negative  Negative Final       EKG Results:  No orders to display       Imaging Results:  No Images in the past 120 days found..      Assessment & Plan   Diagnoses and all orders for this visit:    1. Generalized anxiety disorder (Primary)    2. Insomnia due to mental condition    3. ADHD (attention deficit hyperactivity disorder), inattentive type  -     amphetamine-dextroamphetamine (ADDERALL) 10 MG tablet; Take 1 tablet by mouth Daily.  Dispense: 30 tablet; Refill: 0  -     amphetamine-dextroamphetamine (ADDERALL) 20 MG tablet; Take 1 tablet by mouth 2 (Two) Times a Day.  Dispense: 60 tablet; Refill: 0    4. Panic attacks    5. Recurrent major depressive disorder in remission        Presentation most consistent with major depressive disorder in partial remission, generalized anxiety disorder, and ADHD.  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  Also endorses a childhood history that is compelling.      5/12: Much improved, no changes. Well. 16 minutes of supportive psychotherapy with goal to strengthen defenses, promote problems solving, restore adaptive functioning and provide symptom relief. The therapeutic alliance was strengthened to encourage the patient to express their thoughts and feelings. Esteem building was enhanced through praise, reassurance, normalizing and encouragement. Coping skills were enhanced to build distress tolerance skills and emotional regulation. Allowed patient to freely discuss issues without interruption or judgement with unconditional positive regard, active listening skills, and empathy. Provided a safe, confidential environment to facilitate the development of a positive  therapeutic relationship and encourage open, honest communication. Assisted patient in identifying risk factors which would indicate the need for higher level of care including thoughts to harm self or others and/or self-harming behavior and encouraged patient to contact this office, call 911, or present to the nearest emergency room should any of these events occur. Assisted patient in processing session content; acknowledged and normalized patient’s thoughts, feelings, and concerns by utilizing a person-centered approach in efforts to build appropriate rapport and a positive therapeutic relationship with open and honest communication. Patient given education on medication side effects, diagnosis/illness and relapse symptoms. Plan to continue supportive psychotherapy in next appointment to provide symptom relief.  Diagnoses: as above  Symptoms: as above  Functional status: good  Mental Status Exam: as above    Treatment plan: Medication management and supportive psychotherapy  Prognosis: good  Progress: improved  2 mos        3/17: House almost done. Continue therapy with [facility]. Start abilify. Increase gabapentin to 300 mg daily prn anxiety. Start hydroxyzine nightly for insomnia. Minimal effect on dep and anx (not at goal). 17   Progress: resid dep and anx  6 wks      2/23: Stop THC gummies as they are now a schedule I substance. Start gabapentin for anx. Stop hydroxyzine. [facility] to [facility]. 17   Progress: more anxious  1 mo      12/16: Well, stable, no changes.  3 months      10/19: Doing well, no changes. Anx, dep, adhd under control. 17 minutes of supportive psychotherapy  Treatment plan: Medication management and supportive psychotherapy  Prognosis: good  Progress: well, stable  6 wks      9/9: Wants to be on less meds. Taper off bupropion. Continue other meds. Let's streamline the meds. 16 minutes of supportive psychotherapy   Treatment plan: Medication management and supportive psychotherapy  Prognosis:  good  Progress: better, not at goal  6 wks      7/27: Stop prozac, 2 week washout, start cymbalta. Consider abilify on top of bupropion. 17 minutes of supportive psychotherapy Treatment plan: Medication management and supportive psychotherapy  Prognosis: good  Progress: more depressed  6 wks      6/15: Residual anxiety, increase prozac. Goal is to increase until maxed out; then possible switch. Depression has resolved. 18 minutes of supportive psychotherapy Treatment plan: Medication management and supportive psychotherapy  Prognosis: good  Progress: better  6 wks    5/4: Therapy referral.  Increase Prozac.  May consider switching from bupropion to Abilify in the future.  Patient was not able to tolerate bupropion 450 mg daily.  17 minutes of supportive psychotherapyTreatment plan: Medication management and supportive psychotherapy  Prognosis: Very good  Progress: Significant since our first visit.  Now developing reemerging depression  6 weeks    3/16: No change to medications, no side effects.  Living with in-laws at their house has been a source of anxiety for the patient.  Also taking care of her daughter, who she is very protective of.  Some issues with keeping restraint during therapy sessions as well.  17 minutes of supportive psychotherapy   6 weeks    2/2: Anxious about the weather, and its consequences (closed daycares, etc.). 18 minutes of supportive psychotherapy  6 wks    12/22: Increase Prozac to target anxiety, increase hydroxyzine to target insomnia. 17 minutes of supportive psychotherapy   6 weeks    11/17: Some ADHD symptoms re-emerging. Add 10 mg adderall at 11 am. Schedule will be 7:30 first dose, 11 second higher dose of 30 mg. Pt also interested in therapy. 17 minutes of supportive psychotherapy   4 wks.    10/15: Start prozac. Hydroxyzine at night calms her; consider adding am dose. 4 wks    8/20: Patient is doing extremely well.  No change to medications.  See back in 8 weeks.    6/18: Doing  a little better, however patient was doing much better on Adderall 20 mg twice a day, which was her previous dose.  Discontinue Adderall XR and start Adderall immediate release 20 mg twice a day.  Some residual depressive and anxiety symptoms that are well controlled on bupropion.  Treating ADHD will also treat residual depressive and anxiety symptoms as well.  See back in 2 months.  Declines psychotherapy.    Visit Diagnoses:    ICD-10-CM ICD-9-CM   1. Generalized anxiety disorder  F41.1 300.02   2. Insomnia due to mental condition  F51.05 300.9     327.02   3. ADHD (attention deficit hyperactivity disorder), inattentive type  F90.0 314.00   4. Panic attacks  F41.0 300.01   5. Recurrent major depressive disorder in remission  F33.40 296.35       PLAN:  101. Risk Assessment: Risk of self-harm acutely is low. Risk factors include anxiety disorder, mood disorder, access to weapons, recent psychosocial stressors. Protective factors include no family history, no present SI, no history of suicide attempts or self-harm in the past, minimal AODA, healthcare seeking, future orientation, willingness to engage in care,  baby. Risk of self-harm chronically is also low, but could be further elevated in the event of treatment noncompliance and/or AODA.  102. Safety: No acute safety concerns.  103. Medications:   a. CONTINUE abilify 2 mg nightly. Risks, benefits, alternatives discussed with patient including increased energy, exacerbation of irritability, akathisia, GI upset, orthostatic hypotension, increased appetite, tardive dyskinesia.  After discussion of these risks and benefits, the patient voiced understanding and agreed to proceed.  b. CONTINUE cymbalta 30 mg daily. Risks, benefits, alternatives discussed with patient including GI upset, nausea vomiting diarrhea, theoretical decrease of seizure threshold predisposing the patient to a slightly higher seizure risk, headaches, sexual dysfunction, serotonin syndrome,  bleeding risk, increased suicidality in patients 24 years and younger.  Also constipation and urinary retention.  After discussion of these risks and benefits, the patient voiced understanding and agreed to proceed.   c.  CONTINUE hydroxyzine 100 mg PO QHS. Risks, benefits, alternatives discussed with patient including sedation, dizziness, fall risk, GI upset, and risk of increased CNS depression and elevated heart rate if taken with other antihistamines.  After discussion of these risks and benefits, the patient voiced understanding and agreed to proceed.  d. STOPPED gabapentin 100 mg but increase to 300 mg qday prn anxiety. No difference, so stopped 5/23 Risks, benefits, alternatives discussed with patient including sedation, dizziness/falls risk, GI upset, weight gain.  After discussion of these risks and benefits, the patient voiced understanding and agreed to proceed. UDS ordered, Brian reviewed.  e. CONTINUE Adderall 30 mg PO QAM, 20 mg PO QPM. Risks, benefits, side effects discussed with patient including elevated heart rate, elevated blood pressure, irritability, insomnia, sexual dysfunction, appetite suppressing properties, psychosis.  After discussion of these risks and benefits, the patient voiced understanding and agreed to proceed. Controlled substances consent verbally signed. UDS and Brian ordered.  f. S/P   i. prozac 60 mg ineffective.  ii. Escitalopram: sexual dysfunction  iii. Adderall XR 10 mg daily: not effective  iv. Bupropion  made her nauseated. 300 mg made her sweat (irritable?)  104. Therapy: Consider in the future, presently not interested.  105. Labs/Studies: EKG reassuring.      TREATMENT PLAN/GOALS: Continue supportive psychotherapy efforts and medications as indicated. Treatment and medication options discussed during today's visit. Patient acknowledged and verbally consented to continue with current treatment plan and was educated on the importance of compliance with treatment  and follow-up appointments.    MEDICATION ISSUES:  TOM reviewed as expected.  Discussed medication options and treatment plan of prescribed medication as well as the risks, benefits, and side effects including potential falls, possible impaired driving and metabolic adversities among others. Patient is agreeable to call the office with any worsening of symptoms or onset of side effects. Patient is agreeable to call 911 or go to the nearest ER should he/she begin having SI/HI. No medication side effects or related complaints today.     MEDS ORDERED DURING VISIT:  New Medications Ordered This Visit   Medications   • amphetamine-dextroamphetamine (ADDERALL) 10 MG tablet     Sig: Take 1 tablet by mouth Daily.     Dispense:  30 tablet     Refill:  0   • amphetamine-dextroamphetamine (ADDERALL) 20 MG tablet     Sig: Take 1 tablet by mouth 2 (Two) Times a Day.     Dispense:  60 tablet     Refill:  0       Return in about 2 months (around 7/12/2023).         This document has been electronically signed by Eli Murray MD  May 12, 2023 14:47 EDT

## 2023-05-12 NOTE — PATIENT INSTRUCTIONS
1.  Please return to clinic at your next scheduled visit.  Contact the clinic ([phone]) at least 24 hours prior in the event you need to cancel.  2.  Do no harm to yourself or others.    3.  Avoid alcohol and drugs.    4.  Take all medications as prescribed.  Please contact the clinic with any concerns. If you are in need of medication refills, please call the clinic at [phone].    5. Should you want to get in touch with your provider, Dr. Eli Murray, please utilize [employer] or contact the office ([phone]), and staff will be able to page Dr. Murray directly.  6.  In the event you have personal crisis, contact the following crisis numbers: Suicide Prevention Hotline 1-165.149.1165; JOSE Helpline 2-810-607-JOSE; [facility] Emergency Room [phone]; text HELLO to 453547; or 552.

## 2023-05-19 NOTE — PROGRESS NOTES
"Progress Note    Date: 05/24/2023  Time In: 0902  Time Out: 0956    Patient Legal Name: Janice Padilla  Patient Age: 27 y.o.    CHIEF COMPLAINT: Anxiety, ADHD    Subjective   History of Present Illness     From previous progress note on 5/10/23:  Patient could benefit from practicing a full day without reading reviews or other people's creative posts online, while exploring her own creativity and making decisions without reassurance from others, in order to build self-confidence and reduce symptoms of anxiety. Will discuss further next session.    Janice is a 27 y.o. female who presents today as a follow-up for continued psychotherapy.  Patient reports that since last session, she has been \"staying busy\" and reports that her mood has been stable.  She states that she is interested in possibly having some family sessions with her  to address their marriage, although she denies that there are any \"major issues.\"  She states that they were in marital counseling in the past, and it helped to reflect on their communication styles and to define needs within their relationship.        Assessment    Mental Status Exam     Appearance: good hygiene and dressed appropriately for the weather  Behavior: calm  Cooperation:  engaged, cooperative, attentive, and friendly  Eye Contact:  good  Affect:  congruent  Mood: expressive  Speech: responsive  Thought Process:  linear and organized  Thought Content: appropriate and abstract  Suicidal: denies  Homicidal:  denies  Hallucinations:  denies  Memory:  intact  Orientation:  person, place, time, and situation  Reliability:  reliable  Insight:  good  Judgment:  good     Clinical Intervention       ICD-10-CM ICD-9-CM   1. Generalized anxiety disorder  F41.1 300.02   2. ADHD (attention deficit hyperactivity disorder), inattentive type  F90.0 314.00   3. Recurrent major depressive disorder in remission  F33.40 296.35        Individual psychotherapy was provided utilizing CBT " techniques to encourage expression of thoughts and feelings, establish new coping skills, manage stress, recognize patterns of behavior, acknowledge sources of feelings and behaviors, assess symptoms, challenge negative thinking patterns and provide support.  Therapist encouraged patient to acknowledge and accept positive thoughts as they come, and patient had no difficulty with this task.  Patient acknowledged improvements that she has made in her life over the last few years, and how she is able to cope with anxiety and ways that she could not before.  Patient identified things that she worries about (i.e. being suddenly  from loved ones, uncertainty about the future, etc.), and states that she is motivated to continue practicing cognitive reframing to manage her symptoms.    Plan   Plan & Goals     Patient is to continue using adaptive coping mechanisms, and healthy communication styles, in order to express her needs and to manage symptoms of anxiety.  Patient could benefit from continuing to establish a routine, and to avoid hyper-fixation (i.e. through social media) to assist with ADHD management.  We will discuss further next session.    1. Patient acknowledged and verbally consented to continue working toward resolving current treatment plan goals and was educated on the importance of participation in the therapeutic process.  2. Patient will remain compliant with medication regimen as prescribed. Discuss any medication side effects, questions or concerns with prescribing provider.  3. Call 911 or present to the nearest emergency room in an emergency situation.  4. National Suicide Prevention Lifeline: Call 988. The Lifeline provides 24/7, free and confidential support for people in distress, prevention and crisis resources.    Return in about 2 weeks (around 6/7/2023) for Next scheduled follow up.    ____________________  This document has been electronically signed by Yareli Yeung LCSW  May 24,  2023 10:01 EDT    Part of this note may be an electronic transcription/translation of spoken language to printed text using the Dragon Dictation System.

## 2023-05-24 ENCOUNTER — OFFICE VISIT (OUTPATIENT)
Dept: PSYCHIATRY | Facility: CLINIC | Age: 28
End: 2023-05-24
Payer: COMMERCIAL

## 2023-05-24 DIAGNOSIS — F90.0 ADHD (ATTENTION DEFICIT HYPERACTIVITY DISORDER), INATTENTIVE TYPE: ICD-10-CM

## 2023-05-24 DIAGNOSIS — F41.1 GENERALIZED ANXIETY DISORDER: Primary | ICD-10-CM

## 2023-05-24 DIAGNOSIS — F33.40 RECURRENT MAJOR DEPRESSIVE DISORDER IN REMISSION: ICD-10-CM
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
## 2023-05-31 ENCOUNTER — OFFICE VISIT (OUTPATIENT)
Dept: OBSTETRICS AND GYNECOLOGY | Facility: CLINIC | Age: 28
End: 2023-05-31

## 2023-05-31 VITALS
HEART RATE: 102 BPM | SYSTOLIC BLOOD PRESSURE: 122 MMHG | HEIGHT: 61 IN | BODY MASS INDEX: 30.96 KG/M2 | DIASTOLIC BLOOD PRESSURE: 70 MMHG | WEIGHT: 164 LBS

## 2023-05-31 DIAGNOSIS — N93.9 ABNORMAL UTERINE BLEEDING: Primary | ICD-10-CM

## 2023-05-31 RX ORDER — MEDROXYPROGESTERONE ACETATE 10 MG/1
10 TABLET ORAL DAILY
Qty: 10 TABLET | Refills: 1 | Status: SHIPPED | OUTPATIENT
Start: 2023-05-31 | End: 2023-06-10

## 2023-05-31 NOTE — PROGRESS NOTES
"GYN Problem/Follow Up Visit    Chief Complaint   Patient presents with   • Menstrual Problem           HPI  Janice Padilla is a 27 y.o. female, , who presents for aub. States typically very regular cycles but for the past 2-3 months they have been irreg. Will bleed twice per month and it is shorter and lighter than usual. Has always had cramping with menses and no new changes. Also states acne has worsened. Does wonder if it could be r/t working very closely with a pregnant co-worker or stress from currently building a house. Was dx with pcos as a teenager but states has had two workups since then that were normal. Reports her last workup was in [place] in august and pcos panel and pelvic u/s were normal. Does report that she had to take clomid to conceive and wants to conceive again but does not get any peaks when she checks ovulation.      Additional OB/GYN History   No LMP recorded (lmp unknown).  Current contraception: contraceptive methods: None  Desires to: do not start contraception  Allergies : Patient has no known allergies.     The additional following portions of the patient's history were reviewed and updated as appropriate: allergies, current medications, past family history, past medical history, past social history, past surgical history and problem list.    Review of Systems    I have reviewed and agree with the HPI, ROS, and historical information as entered above. Ana Damian, APRN    Objective   /70   Pulse 102   Ht 154.9 cm (61\")   Wt 74.4 kg (164 lb)   LMP  (LMP Unknown)   BMI 30.99 kg/m²     Physical Exam  Vitals reviewed.   Neurological:      Mental Status: She is alert and oriented to person, place, and time.            Assessment and Plan    Diagnoses and all orders for this visit:    1. Abnormal uterine bleeding (Primary)  -     medroxyPROGESTERone (Provera) 10 MG tablet; Take 1 tablet by mouth Daily for 10 days.  Dispense: 10 tablet; Refill: 1    discussed options " to help regulate cycles. Pt desires trying provera. Also discussed seeing obgyn for an eval for clomid and pt desires. She declines any further testing for now.     Counseling:  She understands the importance of having the above orders performed in a timely fashion.  She is encouraged to review her results online and/or contact or office if she has questions.     Follow Up:  Return for eval by gyn doc for clomid.      Ana Damian, APRN  05/31/2023

## 2023-06-05 NOTE — PROGRESS NOTES
"Progress Note    Date: 06/14/2023  Time In: 0850  Time Out: 0948    Patient Legal Name: Janice Padilla  Patient Age: 27 y.o.    CHIEF COMPLAINT: Anxiety, ADHD    Subjective   History of Present Illness     From previous progress note on 5/24/23:  Patient is to continue using adaptive coping mechanisms, and healthy communication styles, in order to express her needs and to manage symptoms of anxiety. Patient could benefit from continuing to establish a routine, and to avoid hyper-fixation (i.e. through social media) to assist with ADHD management. We will discuss further next session.     Janice is a 27 y.o. female who presents today as a follow-up for continued psychotherapy.  Patient reports that since last session, she has been prioritizing projects around her house to stay busy and she reports feeling \"really good\" after completing projects.  Patient indicated that overall, she feels that her mood is being managed well, but she would like to spend more time with her  and her daughter (they typically leave the house to visit 's father every evening).       Assessment    Mental Status Exam     Appearance: good hygiene and dressed appropriately for the weather  Behavior: calm  Cooperation:  engaged, cooperative, attentive, and friendly  Eye Contact:  good  Affect:  bright  Mood: expressive  Speech: responsive  Thought Process:  linear and organized  Thought Content: appropriate and abstract  Suicidal: denies  Homicidal:  denies  Hallucinations:  denies  Memory:  intact  Orientation:  person, place, time, and situation  Reliability:  reliable  Insight:  good  Judgment:  good     Clinical Intervention       ICD-10-CM ICD-9-CM   1. Generalized anxiety disorder  F41.1 300.02   2. ADHD (attention deficit hyperactivity disorder), inattentive type  F90.0 314.00   3. Major depressive disorder, recurrent episode, in partial remission  F33.41 296.35        Individual psychotherapy was provided utilizing CBT " "techniques to restore adaptive functioning, establish new coping skills, recognize patterns of behavior, acknowledge sources of feelings and behaviors, challenge negative thinking patterns, provide support, and discuss interpersonal conflicts.  Therapist encouraged patient to reflect on recent and past stressors, and patient had no difficulty with this task.  She recognized that she has \"grown a lot in the last few years.\"  She reports that she \"used to be angry and bitter.\"  Patient was able to acknowledge positive changes that she has implemented, including shifting her focus from negative thoughts to positive ones. She attributes a lot of her growth to becoming a parent, and states that she and her  may try to have another baby soon, which is something she is looking forward to.    Plan   Plan & Goals     Therapist educated patient on the importance of emotional check ins with her  regularly, in order to express their emotions and to promote healthy communication. The Feeling Wheel was provided as a tool for patient and her  to use to express their emotions. Will discuss further next session.    Patient acknowledged and verbally consented to continue working toward resolving current treatment plan goals and was educated on the importance of participation in the therapeutic process.  Patient will remain compliant with medication regimen as prescribed. Discuss any medication side effects, questions or concerns with prescribing provider.  Call 911 or present to the nearest emergency room in an emergency situation.  National Suicide Prevention Lifeline: Call 988. The Lifeline provides 24/7, free and confidential support for people in distress, prevention and crisis resources.    Return in about 2 weeks (around 6/28/2023) for Next scheduled follow up.    ____________________  This document has been electronically signed by Yareli Yeung LCSW  June 14, 2023 10:03 EDT    Part of this note may be " an electronic transcription/translation of spoken language to printed text using the Dragon Dictation System.

## 2023-06-13 NOTE — PROGRESS NOTES
GYN Visit    Chief Complaint   Patient presents with    Fertility     Discuss clomid       HPI:   27 y.o. with LMP 23 desires pregnancy. Pt states last period due to provera with w/drawl bleeding.  Pt states previous menses May 2023 but prior period 5 mos earlier. Pt stes over last several months periods changing, not lasting as long and skipping cycles.  Only 2 pregnancies occurred after clomid.  Pt states no birth control since birth of daughter.  Pt and  desire another pregnancy.  She desires trial of clomid.      History: PMHx, Meds, Allergies, PSHx, Social Hx, and POBHx all reviewed and updated.    PHYSICAL EXAM:  /88   Pulse 101   Wt 71.7 kg (158 lb)   LMP 2023 (Exact Date)   Breastfeeding No   BMI 29.85 kg/m²   General- NAD, alert and oriented, appropriate  Psych- Normal mood, good memory        ASSESSMENT AND PLAN:  Diagnoses and all orders for this visit:    1. Secondary anovulatory infertility (Primary)  -     clomiPHENE (CLOMID) 50 MG tablet; Take one daily days 3-7  Dispense: 5 tablet; Refill: 0   Discussed patients medications and I have recommended patient discontinue all non essential medications and patient agrees.  She has appt with primary physician to discuss how to wean off meds.  She will f/u with +hcg            Follow Up:  No follow-ups on file.          Jacquelyn Gaston,   2023    [facility]  [address] [place] [address] [address] [place] [address]  Dept: [phone]  Dept Fax: [phone]  Loc: [phone]  Loc Fax: [phone]

## 2023-06-14 ENCOUNTER — OFFICE VISIT (OUTPATIENT)
Dept: PSYCHIATRY | Facility: CLINIC | Age: 28
End: 2023-06-14
Payer: COMMERCIAL

## 2023-06-14 ENCOUNTER — OFFICE VISIT (OUTPATIENT)
Dept: OBSTETRICS AND GYNECOLOGY | Facility: CLINIC | Age: 28
End: 2023-06-14
Payer: COMMERCIAL

## 2023-06-14 VITALS
DIASTOLIC BLOOD PRESSURE: 88 MMHG | BODY MASS INDEX: 29.85 KG/M2 | HEART RATE: 101 BPM | WEIGHT: 158 LBS | SYSTOLIC BLOOD PRESSURE: 125 MMHG

## 2023-06-14 DIAGNOSIS — N97.0 SECONDARY ANOVULATORY INFERTILITY: Primary | ICD-10-CM

## 2023-06-14 DIAGNOSIS — F41.1 GENERALIZED ANXIETY DISORDER: Primary | ICD-10-CM

## 2023-06-14 DIAGNOSIS — F33.41 MAJOR DEPRESSIVE DISORDER, RECURRENT EPISODE, IN PARTIAL REMISSION: ICD-10-CM

## 2023-06-14 DIAGNOSIS — F90.0 ADHD (ATTENTION DEFICIT HYPERACTIVITY DISORDER), INATTENTIVE TYPE: ICD-10-CM

## 2023-06-14 PROCEDURE — 99214 OFFICE O/P EST MOD 30 MIN: CPT | Performed by: OBSTETRICS & GYNECOLOGY

## 2023-06-14 NOTE — PSYCHOTHERAPY NOTE
Psychotherapy Note - June 14, 2023    Daughter - Alexandra (age 3)   - Toño - started dating in 10th grade  Best friend - Annette - friends for years  _______    Wanting to have another baby

## 2023-07-28 DIAGNOSIS — N97.0 SECONDARY ANOVULATORY INFERTILITY: ICD-10-CM

## 2023-08-02 NOTE — PROGRESS NOTES
"Progress Note    Date: 08/09/2023  Time In: 1106  Time Out: 1200    Patient Legal Name: Janice Padilla  Patient Age: 27 y.o.    CHIEF COMPLAINT: Depression, Anxiety, ADHD    Subjective   History of Present Illness     From previous progress note on 7/12/23:  Patient appears to be coping well with her symptoms, but she was informed to begin keeping a mood log as a \"self check-in\" as she plans to taper her antidepressant and her mood stabilizer. She plans to meet with prescribing provider later this week to discuss medication changes, and patient was encouraged to call the office to meet with this therapist at an earlier time if she notices a drastic change in her symptoms before our next session.    Janice is a 27 y.o. female who presents today as a follow-up for continued psychotherapy.  Patient reports that since last session, she started Clomid for fertility treatment and has had a significant decline in mood.  She reports anhedonia, frequent crying, low mood most of the day almost every day, thoughts of hopelessness, and worthlessness. Denies SI/HI/AVH.  Patient also describes feeling disorganized, having intrusive thoughts, lack of motivation, and poor concentration, despite being compliant with her prescribed medication.      Assessment    Mental Status Exam     Appearance: good hygiene and dressed appropriately for the weather  Behavior: calm  Cooperation:  engaged, cooperative, attentive, and friendly  Eye Contact:  good  Affect:  sad and tearful  Mood: depressed and anxious  Speech: responsive  Thought Process:  linear and organized  Thought Content: intrusive thoughts  Suicidal: denies  Homicidal:  denies  Hallucinations:  denies  Memory:  intact  Orientation:  person, place, time, and situation  Reliability:  reliable  Insight:  good  Judgment:  good     Clinical Intervention       ICD-10-CM ICD-9-CM   1. Severe episode of recurrent major depressive disorder, without psychotic features  F33.2 296.33   2. " Generalized anxiety disorder  F41.1 300.02   3. ADHD (attention deficit hyperactivity disorder), inattentive type  F90.0 314.00        Individual psychotherapy was provided utilizing ACT techniques to restore adaptive functioning, encourage expression of thoughts and feelings, establish new coping skills, recognize patterns of behavior, acknowledge sources of feelings and behaviors, confront irrational ideas, recognize cognitive distortions, practice identifying and accepting positive thoughts, provide support, and practice cognitive defusion. Therapist provided psychoeducation on the concept of cognitive defusion, and learning to accept thoughts as they come, rather than trying to change or erase them.  Patient had good insight into her symptoms, and recognized that since she started a new medication, her symptoms have significantly worsened.  However, she also identified a goal that she and her  have set regarding attempting to have another baby (which has required her to take Clomid in the past).  Therefore, she concluded that she plans to continue taking it, despite the side effects.       Plan   Plan & Goals     Patient is encouraged to practice cognitive defusion, as discussed in session, in order to reduce intrusive thoughts and to prevent herself from allowing her thoughts to negatively affect her mood. We will continue to address adaptive coping mechanisms in future sessions.    Patient acknowledged and verbally consented to continue working toward resolving current treatment plan goals and was educated on the importance of participation in the therapeutic process.  Patient will remain compliant with medication regimen as prescribed. Discuss any medication side effects, questions or concerns with prescribing provider.  Call 911 or present to the nearest emergency room in an emergency situation.  National Suicide Prevention Lifeline: Call 988. The Lifeline provides 24/7, free and confidential support  for people in distress, prevention and crisis resources.    Return in about 2 weeks (around 8/23/2023) for Next scheduled follow up.    ____________________  This document has been electronically signed by Yareli Yeung LCSW  August 9, 2023 13:25 EDT    Part of this note may be an electronic transcription/translation of spoken language to printed text using the Dragon Dictation System.

## 2023-08-03 ENCOUNTER — TELEPHONE (OUTPATIENT)
Dept: OBSTETRICS AND GYNECOLOGY | Facility: CLINIC | Age: 28
End: 2023-08-03
Payer: COMMERCIAL

## 2023-08-09 ENCOUNTER — OFFICE VISIT (OUTPATIENT)
Dept: PSYCHIATRY | Facility: CLINIC | Age: 28
End: 2023-08-09
Payer: COMMERCIAL

## 2023-08-09 DIAGNOSIS — F41.1 GENERALIZED ANXIETY DISORDER: ICD-10-CM

## 2023-08-09 DIAGNOSIS — F33.2 SEVERE EPISODE OF RECURRENT MAJOR DEPRESSIVE DISORDER, WITHOUT PSYCHOTIC FEATURES: Primary | ICD-10-CM

## 2023-08-09 DIAGNOSIS — F90.0 ADHD (ATTENTION DEFICIT HYPERACTIVITY DISORDER), INATTENTIVE TYPE: ICD-10-CM

## 2023-08-09 PROCEDURE — 90837 PSYTX W PT 60 MINUTES: CPT | Performed by: SOCIAL WORKER

## 2023-08-09 NOTE — PATIENT INSTRUCTIONS
"Cognitive defusion is the technique of becoming untangled from our thoughts. While cognitive fusion is the process of believing that our thoughts are literally "true," cognitive defusion is the ability to regard thoughts simply as thoughts. The result of defusion is usually a decrease in the thought's power over us as we loosen our attachment to the thought. The thought would not be something you had to believe or disbelieve, but would be only something you would notice. The goal of practicing defusion is to become a little bit more flexible around the thought, and to have a little more distance from it.    Defusion exercises work well when we have:  Depressing thoughts;  Thoughts about low self-worth;  Ruminative thoughts (mentally replaying something that happened in the past); or  Worry thoughts (imagining something scary happening in the future).  "Thank the mind" for the thought, but don't resist the thought or try to suppress it. Don't struggle with it, interpret it, elaborate on it, or try to process it. The thought is seductive - it will appear that if you just think about it a little longer, you'll have some clarity and then be able to let it go, but that rarely happens. Trust me.  Redirect your focus to some meaningful activity. Physically move into a different room, listen to music, go for a walk, read a book, etc. Redirecting your focus isn't the same as "thought suppression" (which never works). Instead, becoming absorbed in something new is a form of mindfulness where you are paying attention to something real in the present moment, and in a non-judgmental way.  Become an Observer by saying, "I'm having the thought that ______," and finish saying the thought that you were just having. Or, "I'm having the feeling that ________." Becoming a witness of your thoughts creates some distance between you and your mind.  Just name things: Say, "worry, there is worry." Or, "that's catastrophizing," etc. (if you " "are familiar with the names of cognitive distortions). Don't put an evaluative label on the thought as being good or bad. The reason for this is that we will always try to use escape or avoidance if we think something is "bad," but this strategy doesn't work when it comes to our internal experiences. All we can do is notice our experiences until they lose their power.  Notice when you are judging. Instead of perceiving a thought or feeling as "good or bad," use more descriptive words, like, "helpful or unhelpful," "adaptive or maladaptive," "encouraging or discouraging." Get more specific. Try to see your private experiences just as they are, as information (perhaps even misinformation) - but don't  them as having positive or negative qualities (for the reason explained above).  Come back to the present by saying, "Back to now," or "It's not happening right now." The truth is, past and future imaginings really aren't happening right now! Don't think of this strategy as simply trying to make yourself feel better. Think of it as being actually true. Then, redirect your focus to the present moment.      If you have scary, intrusive thoughts, and they tend to recur with a predictable "theme," you may be experiencing obsessive thoughts. Research has shown that we intuitively try to help ourselves by suppressing the thought, which requires switching to different thoughts in order to cancel it out.  However, this strategy backfires and creates a "rebound effect," making the intrusive thought even more persistent. To avoid this rebound effect, eventually settle on just one of these techniques, and use it each and every time your obsessive thought occurs:  Come up with a "replacement" image - something arbitrary and neutral, like a red Volkswagen, a pink balloon, whatever - and always replace your thought with that same image each time the thought occurs. Don't switch around to different images, make the image always " "the same.  Visualize the thought appearing in your inbox on the computer. Notice the subject line, read the message once, but don't delete it - just let it hang out in your inbox as a "read" message. Here you are deliberately agreeing not to suppress the thought. Don't even think about replying to it. See if you can tolerate it just sitting there. Make a decision not to delete it.  See the thought appearing as a new message on your cell phone. Picture listening to the message once but not deleting it, allowing it to remain forever in your imaginary voicemail. Accept the discomfort around it. Just say, "Yep, it's still there." Don't check it and make a decision to not delete it.  Picture your thought, feeling or image as an unwelcome guest, something you would rather turn away if it actually appeared at your door. Open the door and let it in. Let it be unpleasant, noisy, or scary. Imagine "making room" or "creating space" for it by letting it sit next to you or take up space in the room. It sounds hard but it's an effective strategy. Pretty soon it will stop trying to get your attention.  Imagine you have a "willingness dial," where you allow yourself to be 100% willing to be with the thought or image. Turn the imaginary dial up all the way to 100 while you also turn your "resistance dial" close to zero. Say, "I want this feeling." Or, "I want this thought." You can also say, "Bring it on," or "I'm willing to be uncomfortable." I believe that this technique works better than all the others, because it goes after the anxiety and turns it on its head.  Remember the saying, "What we resist persists." Do the opposite of what your intuition wants you to do.    Mindfulness  Mindfulness is a separate practice you will do on purpose, with or without symptoms or triggers, to train your mind and body to become more balanced and peaceful. It's good if you set aside quiet time to do this every day, but some people work on being " "mindful throughout many of their waking moments (for example, tuning into your senses while you are bathing, doing dishes, stroking your pet, taking a walk, putting gas in your car, or cooking a meal).  It's probably not going to feel glamorous, euphoric, totally quiet, or even like an "Aha!" experience. But it will feel better to simply have more distance from your thoughts.  Mindfulness encourages you to have ongoing, non-judgmental awareness with internal and environmental events as they occur on a qihnut-jw-heknkk basis. Remember that the present is moving, so you will try to stay present with each new moment as it emerges. Think of it like exercise: you aren't expected to do it continuously, but you can make an effort to do it intentionally at various designated times. There are different mindfulness practices, but the one that will translate best into your everyday life is the ability to become an Observer and just make contact with each experience as it shows up, noticing things in a neutral, compassionate, or curious way.  Set aside time for mindfulness, and just watch each thought, physical sensation, or feeling as it comes into and out of awareness. Lower your expectations: it's probably not going to feel glamorous, euphoric, totally quiet, or even like an "Aha!" experience. But it will feel better to simply have more distance from your thoughts. Just practice noticing your thought instead of analyzing it, getting lost in it, or pushing it away. If you do this over time, the thought will just come in and out of consciousness - it will just move on. But the mind is sneaky and will eventually take over again, so as soon as you notice you are suddenly lost in a thought, thank yourself for noticing this and come back to the Observer position. Simply start over again and don't be discouraged. Remember, this is why they call it a "practice." If you get lost a hundred times, just come back a hundred times.  If " "this seems too unstructured or vague, you might enjoy using some of these techniques to help you feel that you are "doing" something with your thoughts. By using visualization, you can gently interact with your thought, thereby giving your practice a little more structure. Although I'm giving you some different visualizations to choose from, settle on just one of these for the duration of your meditation session.  Mindfulness Exercises:  Imagine each thought appear as a drop that emerges and then disappears into a calm lake. Wait for the next thought to appear, and then watch it drop and disappear in the same way. Just notice.  Picture yourself sitting above a stream, place your thought on a awa pad, and watch it float gently by. When the next thought appears, do the same thing until it flows away from you and out of sight.  Imagine spray-painting your thought on the side of a car, and watching the car drive down the street until it disappears. When the next thought appears, do it again.  Imagine yourself driving a car and seeing your thoughts appear above you on billboards. Each time a thought appears on a billboard, imagine driving under or past it, leaving it far behind.  Imagine sitting in a field watching your thoughts float away on clouds. See this happening each time a thought appears until it eventually drifts away.  Imagine seeing your thought written in the sand before a wave washes over it and smooths it away. When the next thought appears, watch how it is erased in the same way.  Make up your own mindfulness exercise! But don't switch around during an exercise - just keep to the one you choose for that day and keep practicing until you feel some distance from your thoughts.   "

## 2023-08-09 NOTE — PSYCHOTHERAPY NOTE
Psychotherapy Note - August 9, 2023    Daughter - Alexandra (age 3)   - [place]ño - started dating in 10th grade  Best friend - Annette - friends for years     Sister: Mary  _______    Intrusive thoughts, severely depressed  Guilt about Alexandra  No motivation

## 2023-08-17 NOTE — PROGRESS NOTES
Progress Note    Date: 08/23/2023  Time In: 0800  Time Out: 0855    Patient Legal Name: Janice Padilla  Patient Age: 28 y.o.    CHIEF COMPLAINT: Depression, Anxiety, ADHD    Subjective   History of Present Illness     From previous progress note on 8/9/23:  Patient is encouraged to practice cognitive defusion, as discussed in session, in order to reduce intrusive thoughts and to prevent herself from allowing her thoughts to negatively affect her mood. We will continue to address adaptive coping mechanisms in future sessions.      Assessment    Mental Status Exam     Appearance: good hygiene and dressed appropriately for the weather  Behavior: calm  Cooperation:  engaged, cooperative, attentive, and friendly  Eye Contact:  good  Affect:  congruent  Mood: expressive; some irritability; recent conflict with   Speech: responsive  Thought Process:  linear and organized  Thought Content: intrusive thoughts  Suicidal: denies  Homicidal:  denies  Hallucinations:  denies  Memory:  intact  Orientation:  person, place, time, and situation  Reliability:  reliable  Insight:  good  Judgment:  good     Clinical Intervention       ICD-10-CM ICD-9-CM   1. Moderate episode of recurrent major depressive disorder  F33.1 296.32   2. Generalized anxiety disorder  F41.1 300.02   3. ADHD (attention deficit hyperactivity disorder), inattentive type  F90.0 314.00        Janice is a 27 y.o. female who presents today as a follow-up for continued psychotherapy. Individual psychotherapy was provided utilizing CBT techniques to restore adaptive functioning, encourage expression of thoughts and feelings, promote emotion regulation, manage stress, recognize patterns of behavior, acknowledge sources of feelings and behaviors, challenge negative thinking patterns, recognize cognitive distortions, and provide support.  Therapist offered support as patient reflected on recent stressors, including conflict with her .  She states that she  "and her  have felt like they are \"in a competition\" and have been arguing frequently.  She indicates that her mood has improved since our last session, but she feels her emotions very intensely lately.  Therapist suggested that patient use cognitive reframing, to come alongside her  to resolve conflict, rather than viewing him as an enemy.  Patient also addressed intrusive thoughts she has been having that have caused her anxiety to increase, and therapist encouraged patient to take a step back and proceed mindfully while communicating with her .    Plan   Plan & Goals     Patient is to practice the 'stop and think' technique when she feels her emotions are intense, to avoid impulsivity and to increase assertive communication.  Patient could benefit from writing lists to help with short-term memory and to improve attention, as she states that this has been a source of conflict in her marriage.  We will continue to address healthy communication skills and mood regulation in future sessions.    Patient acknowledged and verbally consented to continue working toward resolving current treatment plan goals and was educated on the importance of participation in the therapeutic process.  Patient will remain compliant with medication regimen as prescribed. Discuss any medication side effects, questions or concerns with prescribing provider.  Call 911 or present to the nearest emergency room in an emergency situation.  National Suicide Prevention Lifeline: Call 988. The Lifeline provides 24/7, free and confidential support for people in distress, prevention and crisis resources.  Crisis Text Line  Text HOME To 243449    Return in about 2 weeks (around 9/6/2023) for Next scheduled follow up.    ____________________  This document has been electronically signed by Yareli Yeung LCSW  August 23, 2023 09:02 EDT    Part of this note may be an electronic transcription/translation of spoken language to " printed text using the Dragon Dictation System.

## 2023-08-23 ENCOUNTER — OFFICE VISIT (OUTPATIENT)
Dept: FAMILY MEDICINE CLINIC | Facility: CLINIC | Age: 28
End: 2023-08-23
Payer: COMMERCIAL

## 2023-08-23 ENCOUNTER — OFFICE VISIT (OUTPATIENT)
Dept: PSYCHIATRY | Facility: CLINIC | Age: 28
End: 2023-08-23
Payer: COMMERCIAL

## 2023-08-23 VITALS
WEIGHT: 181 LBS | HEIGHT: 62 IN | SYSTOLIC BLOOD PRESSURE: 116 MMHG | TEMPERATURE: 97.6 F | OXYGEN SATURATION: 97 % | BODY MASS INDEX: 33.31 KG/M2 | DIASTOLIC BLOOD PRESSURE: 82 MMHG | HEART RATE: 123 BPM

## 2023-08-23 DIAGNOSIS — F41.1 GENERALIZED ANXIETY DISORDER: ICD-10-CM

## 2023-08-23 DIAGNOSIS — F90.0 ADHD (ATTENTION DEFICIT HYPERACTIVITY DISORDER), INATTENTIVE TYPE: ICD-10-CM

## 2023-08-23 DIAGNOSIS — Z11.59 ENCOUNTER FOR HEPATITIS C SCREENING TEST FOR LOW RISK PATIENT: ICD-10-CM

## 2023-08-23 DIAGNOSIS — Z00.00 ANNUAL PHYSICAL EXAM: Primary | ICD-10-CM

## 2023-08-23 DIAGNOSIS — Z13.220 LIPID SCREENING: ICD-10-CM

## 2023-08-23 DIAGNOSIS — Z13.29 THYROID DISORDER SCREENING: ICD-10-CM

## 2023-08-23 DIAGNOSIS — R00.0 TACHYCARDIA: ICD-10-CM

## 2023-08-23 DIAGNOSIS — F33.1 MODERATE EPISODE OF RECURRENT MAJOR DEPRESSIVE DISORDER: Primary | ICD-10-CM

## 2023-08-23 LAB
ALBUMIN SERPL-MCNC: 4.7 G/DL (ref 3.5–5.2)
ALBUMIN/GLOB SERPL: 2.1 G/DL
ALP SERPL-CCNC: 51 U/L (ref 39–117)
ALT SERPL W P-5'-P-CCNC: 22 U/L (ref 1–33)
ANION GAP SERPL CALCULATED.3IONS-SCNC: 12.1 MMOL/L (ref 5–15)
AST SERPL-CCNC: 28 U/L (ref 1–32)
BASOPHILS # BLD AUTO: 0.05 10*3/MM3 (ref 0–0.2)
BASOPHILS NFR BLD AUTO: 0.5 % (ref 0–1.5)
BILIRUB SERPL-MCNC: 0.2 MG/DL (ref 0–1.2)
BUN SERPL-MCNC: 13 MG/DL (ref 6–20)
BUN/CREAT SERPL: 16.3 (ref 7–25)
CALCIUM SPEC-SCNC: 8.9 MG/DL (ref 8.6–10.5)
CHLORIDE SERPL-SCNC: 103 MMOL/L (ref 98–107)
CHOLEST SERPL-MCNC: 171 MG/DL (ref 0–200)
CO2 SERPL-SCNC: 23.9 MMOL/L (ref 22–29)
CREAT SERPL-MCNC: 0.8 MG/DL (ref 0.57–1)
DEPRECATED RDW RBC AUTO: 42.7 FL (ref 37–54)
EGFRCR SERPLBLD CKD-EPI 2021: 102.4 ML/MIN/1.73
EOSINOPHIL # BLD AUTO: 0.12 10*3/MM3 (ref 0–0.4)
EOSINOPHIL NFR BLD AUTO: 1.1 % (ref 0.3–6.2)
ERYTHROCYTE [DISTWIDTH] IN BLOOD BY AUTOMATED COUNT: 13.5 % (ref 12.3–15.4)
GLOBULIN UR ELPH-MCNC: 2.2 GM/DL
GLUCOSE SERPL-MCNC: 91 MG/DL (ref 65–99)
HCT VFR BLD AUTO: 40.2 % (ref 34–46.6)
HDLC SERPL-MCNC: 54 MG/DL (ref 40–60)
HGB BLD-MCNC: 13.4 G/DL (ref 12–15.9)
IMM GRANULOCYTES # BLD AUTO: 0.16 10*3/MM3 (ref 0–0.05)
IMM GRANULOCYTES NFR BLD AUTO: 1.5 % (ref 0–0.5)
LDLC SERPL CALC-MCNC: 94 MG/DL (ref 0–100)
LDLC/HDLC SERPL: 1.68 {RATIO}
LYMPHOCYTES # BLD AUTO: 3.13 10*3/MM3 (ref 0.7–3.1)
LYMPHOCYTES NFR BLD AUTO: 28.5 % (ref 19.6–45.3)
MAGNESIUM SERPL-MCNC: 2.3 MG/DL (ref 1.6–2.6)
MCH RBC QN AUTO: 29.3 PG (ref 26.6–33)
MCHC RBC AUTO-ENTMCNC: 33.3 G/DL (ref 31.5–35.7)
MCV RBC AUTO: 87.8 FL (ref 79–97)
MONOCYTES # BLD AUTO: 0.67 10*3/MM3 (ref 0.1–0.9)
MONOCYTES NFR BLD AUTO: 6.1 % (ref 5–12)
NEUTROPHILS NFR BLD AUTO: 6.84 10*3/MM3 (ref 1.7–7)
NEUTROPHILS NFR BLD AUTO: 62.3 % (ref 42.7–76)
NRBC BLD AUTO-RTO: 0 /100 WBC (ref 0–0.2)
PHOSPHATE SERPL-MCNC: 2.9 MG/DL (ref 2.5–4.5)
PLATELET # BLD AUTO: 296 10*3/MM3 (ref 140–450)
PMV BLD AUTO: 9.4 FL (ref 6–12)
POTASSIUM SERPL-SCNC: 4.2 MMOL/L (ref 3.5–5.2)
PROT SERPL-MCNC: 6.9 G/DL (ref 6–8.5)
RBC # BLD AUTO: 4.58 10*6/MM3 (ref 3.77–5.28)
SODIUM SERPL-SCNC: 139 MMOL/L (ref 136–145)
TRIGL SERPL-MCNC: 131 MG/DL (ref 0–150)
TSH SERPL DL<=0.05 MIU/L-ACNC: 5.82 UIU/ML (ref 0.27–4.2)
VLDLC SERPL-MCNC: 23 MG/DL (ref 5–40)
WBC NRBC COR # BLD: 10.97 10*3/MM3 (ref 3.4–10.8)

## 2023-08-23 PROCEDURE — 80050 GENERAL HEALTH PANEL: CPT | Performed by: NURSE PRACTITIONER

## 2023-08-23 PROCEDURE — 83735 ASSAY OF MAGNESIUM: CPT | Performed by: NURSE PRACTITIONER

## 2023-08-23 PROCEDURE — 86803 HEPATITIS C AB TEST: CPT | Performed by: NURSE PRACTITIONER

## 2023-08-23 PROCEDURE — 80061 LIPID PANEL: CPT | Performed by: NURSE PRACTITIONER

## 2023-08-23 PROCEDURE — 84100 ASSAY OF PHOSPHORUS: CPT | Performed by: NURSE PRACTITIONER

## 2023-08-23 PROCEDURE — 84481 FREE ASSAY (FT-3): CPT | Performed by: NURSE PRACTITIONER

## 2023-08-23 PROCEDURE — 84439 ASSAY OF FREE THYROXINE: CPT | Performed by: NURSE PRACTITIONER

## 2023-08-23 RX ORDER — METOPROLOL SUCCINATE 25 MG/1
25 TABLET, EXTENDED RELEASE ORAL DAILY
Qty: 90 TABLET | Refills: 1 | Status: SHIPPED | OUTPATIENT
Start: 2023-08-23

## 2023-08-23 NOTE — PROGRESS NOTES
"  ENCOUNTER DATE:  08/23/2023    Janice Padilla / 28 y.o. / female      CHIEF COMPLAINT     Annual Exam (Pt is here for complete physical exam, h/o of tachycardia- had taken Rx metoprolol previously, no longer on Rx at this time.)      VITALS     Visit Vitals  /82 (BP Location: Left arm, Patient Position: Sitting, Cuff Size: Adult)   Pulse (!) 123   Temp 97.6 øF (36.4 øC) (Temporal)   Ht 157.5 cm (62\")   Wt 82.1 kg (181 lb)   LMP 07/24/2023   SpO2 97%   BMI 33.11 kg/mý       BP Readings from Last 3 Encounters:   08/23/23 116/82   07/14/23 124/81   06/14/23 125/88     Wt Readings from Last 3 Encounters:   08/23/23 82.1 kg (181 lb)   07/14/23 74.5 kg (164 lb 3.2 oz)   06/14/23 71.7 kg (158 lb)      Body mass index is 33.11 kg/mý.    MEDICATIONS     Current Outpatient Medications on File Prior to Visit   Medication Sig Dispense Refill    Aklief 0.005 % cream       amphetamine-dextroamphetamine (ADDERALL) 10 MG tablet Take 1 tablet by mouth Daily. 30 tablet 0    amphetamine-dextroamphetamine (ADDERALL) 20 MG tablet Take 1 tablet by mouth 2 (Two) Times a Day. 60 tablet 0    clomiPHENE (CLOMID) 50 MG tablet One daily days 3-7 5 tablet 0    DULoxetine (CYMBALTA) 30 MG capsule TAKE 1 CAPSULE BY MOUTH DAILY 90 capsule 1    hydrOXYzine (ATARAX) 50 MG tablet Take 2 tablets by mouth Every Night. 180 tablet 1    minocycline (MINOCIN,DYNACIN) 100 MG capsule       Prenatal MV & Min w/FA-DHA (PRENATAL ADULT GUMMY/DHA/FA PO) Take  by mouth.      spironolactone (ALDACTONE) 100 MG tablet spironolactone 100 mg oral tablet take 1 tablet (100 mg) by oral route once daily   Active       No current facility-administered medications on file prior to visit.         HISTORY OF PRESENT ILLNESS     Janice presents for annual health maintenance visit.  No results found for: HPVAPTIMA   Last health maintenance visit: approximately 1 year ago  General health: good  Lifestyle:  Attempting to lose weight?: Yes   Diet: eats moderately " healthy  Exercise: generally stays active (but no regular exercise)  Tobacco: Never used   Alcohol: does not drink  Work: Full-time  Reproductive health:  Sexually active?: yes   Sexual problems?: No problems  Concern for STD?: No    Sees Gynecologist?: No   Dayami/Postmenopausal?: No   Domestic abuse concerns: No   Depression Screening:          PHQ-9 Depression Screening  Little interest or pleasure in doing things?     Feeling down, depressed, or hopeless?     Trouble falling or staying asleep, or sleeping too much?     Feeling tired or having little energy?     Poor appetite or overeating?     Feeling bad about yourself - or that you are a failure or have let yourself or your family down?     Trouble concentrating on things, such as reading the newspaper or watching television?     Moving or speaking so slowly that other people could have noticed? Or the opposite - being so fidgety or restless that you have been moving around a lot more than usual?     Thoughts that you would be better off dead, or of hurting yourself in some way?     PHQ-9 Total Score     If you checked off any problems, how difficult have these problems made it for you to do your work, take care of things at home, or get along with other people?           MATTHEW-7           Patient Care Team:  Balbina Todd APRN as PCP - General (Nurse Practitioner)      ALLERGIES  No Known Allergies     PFSH:     The following portions of the patient's history were reviewed and updated as appropriate: Allergies / Current Medications / Past Medical History / Surgical History / Social History / Family History    PROBLEM LIST   Patient Active Problem List   Diagnosis    Acne    Anxiety    Attention deficit hyperactivity disorder (ADHD)    Depression    Generalized anxiety disorder    Gestational hypertension    Major depressive disorder in partial remission    PCOS (polycystic ovarian syndrome)    Pre-eclampsia       PAST MEDICAL HISTORY  Past Medical  History:   Diagnosis Date    Acne     ADHD     Anxiety     Depression     Generalized anxiety disorder 01/06/2021    Gestational hypertension     History of medical problems 02/28/2019    Miscarriage    Major depressive disorder in partial remission 01/06/2021    PCOS (polycystic ovarian syndrome)     Pre-eclampsia        SURGICAL HISTORY  Past Surgical History:   Procedure Laterality Date    ADENOIDECTOMY      DILATATION AND CURETTAGE      SKIN BIOPSY      TONSILLECTOMY         SOCIAL HISTORY  Social History     Socioeconomic History    Marital status:    Tobacco Use    Smoking status: Never     Passive exposure: Never    Smokeless tobacco: Never    Tobacco comments:     2/10/2021- 1/6/2021   Vaping Use    Vaping Use: Never used   Substance and Sexual Activity    Alcohol use: Yes     Comment: light; 2/10/2021- 1/6/2021    Drug use: Never     Comment: 2/10/2021- 1/6/2021    Sexual activity: Yes     Partners: Male     Birth control/protection: None       FAMILY HISTORY  Family History   Problem Relation Age of Onset    Anxiety disorder Mother     Bipolar disorder Mother     Depression Mother     Depression Sister     ADD / ADHD Sister     Diabetes Maternal Grandfather     Breast cancer Neg Hx     Ovarian cancer Neg Hx     Uterine cancer Neg Hx     Colon cancer Neg Hx     Pulmonary embolism Neg Hx     Deep vein thrombosis Neg Hx        IMMUNIZATION HISTORY  Immunization History   Administered Date(s) Administered    COVID-19 (MODERNA) 1st,2nd,3rd Dose Monovalent 01/08/2021, 01/08/2021, 01/08/2021, 02/02/2021, 02/02/2021, 02/02/2021    Flu Vaccine Split Quad 08/09/2017, 10/01/2020, 10/05/2020    Influenza, Unspecified 10/18/2021, 10/17/2022       HPI  Palpitations   This is a recurrent problem. The current episode started more than 1 month ago. The problem occurs intermittently. The problem has been gradually worsening. The symptoms are aggravated by caffeine and stress. Associated symptoms include  malaise/fatigue. Pertinent negatives include no irregular heartbeat or weakness. She has tried beta blockers for the symptoms. Improvement on treatment: Patient stopped taking medication months ago for no reason.       Physical Exam  Vitals reviewed.   Constitutional:       Appearance: Normal appearance. She is well-developed. She is obese.   HENT:      Head: Normocephalic and atraumatic.      Right Ear: External ear normal.      Left Ear: External ear normal.   Eyes:      Conjunctiva/sclera: Conjunctivae normal.      Pupils: Pupils are equal, round, and reactive to light.   Cardiovascular:      Rate and Rhythm: Regular rhythm. Tachycardia present.      Heart sounds: No murmur heard.    No friction rub.   Pulmonary:      Effort: Pulmonary effort is normal.      Breath sounds: Normal breath sounds. No wheezing or rhonchi.   Skin:     General: Skin is warm and dry.   Neurological:      Mental Status: She is alert and oriented to person, place, and time.   Psychiatric:         Mood and Affect: Mood and affect normal.         Behavior: Behavior normal.         Thought Content: Thought content normal.         Judgment: Judgment normal.       REVIEWED DATA      Labs:    Lab Results   Component Value Date     02/16/2022    K 4.0 02/16/2022    CALCIUM 9.9 02/16/2022    AST 25 02/16/2022    ALT 26 02/16/2022    BUN 12 02/16/2022    CREATININE 0.92 02/16/2022    CREATININE 0.85 06/23/2021    CREATININE 0.99 (H) 12/02/2020    EGFRIFNONA 73 02/16/2022       Lab Results   Component Value Date    TSH 2.520 02/16/2022       Lab Results   Component Value Date     (H) 02/16/2022    HDL 55 02/16/2022    TRIG 163 (H) 02/16/2022    CHOLHDLRATIO 4.0 12/02/2020       Lab Results   Component Value Date    JKJK66PM 35.9 02/16/2022        Lab Results   Component Value Date    WBC 9.43 02/16/2022    HGB 15.4 02/16/2022    MCV 86.4 02/16/2022     02/16/2022       Lab Results   Component Value Date    LEUKOCYTESUR Trace (A)  03/22/2022        No results found for: HEPCVIRUSABY        ASSESSMENT & PLAN     Diagnoses and all orders for this visit:    1. Annual physical exam (Primary)    2. Encounter for hepatitis C screening test for low risk patient  -     Hepatitis C antibody    3. Tachycardia  Comments:  doing well on metoprolol 25 mg once and stopped taking meds months ago tachycardia has started back discussed decrease caffeine will start back on metoprolol 25  Orders:  -     CBC & Differential  -     Comprehensive Metabolic Panel  -     TSH  -     Magnesium  -     Phosphorus    4. Thyroid disorder screening  -     TSH    5. Lipid screening  -     Lipid Panel    Other orders  -     metoprolol succinate XL (Toprol XL) 25 MG 24 hr tablet; Take 1 tablet by mouth Daily.  Dispense: 90 tablet; Refill: 1        HEALTHCARE MAINTENANCE ISSUES     Cancer Screening:  Colon: Initial/Next screening at age: 44  Repeat colon cancer screening: N/A at this time  Breast: Recommended monthly self exams; annual professional exam  Mammogram: N/A at this time  Cervical: 3 years  Skin: Monthly self skin examination, annual exam by health professional      Lifestyle Counseling:  Lifestyle Modifications: Attempt to lose weight, Continue good lifestyle choices/modifications, and Improve dietary compliance  Safety Issues: Always wear seatbelt, Avoid texting while driving   Use sunscreen, regular skin examination  Recommended annual dental/vision examination.  Emotional/Stress/Sleep: Reviewed and  given when appropriate    Part of this note may be electronic transcription/translation of spoken language to printed text using the Dragon dictation system     EKG: sinus tachycardia compared to EKG 1/22/2021 there is a rate increase EKG on 1/22/ 2021.  Rate of 55 EKG today shows a rate of 100 sinus tachycardia otherwise normal EKG
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
## 2023-08-24 DIAGNOSIS — R79.89 ELEVATED TSH: Primary | ICD-10-CM

## 2023-08-24 LAB
HCV AB SER DONR QL: NORMAL
T3FREE SERPL-MCNC: 2.76 PG/ML (ref 2–4.4)
T4 FREE SERPL-MCNC: 0.81 NG/DL (ref 0.93–1.7)

## 2023-08-24 RX ORDER — LEVOTHYROXINE SODIUM 0.03 MG/1
25 TABLET ORAL
Qty: 90 TABLET | Refills: 1 | Status: SHIPPED | OUTPATIENT
Start: 2023-08-24 | End: 2023-08-24 | Stop reason: SDUPTHER

## 2023-08-24 RX ORDER — LEVOTHYROXINE SODIUM 0.03 MG/1
25 TABLET ORAL
Qty: 90 TABLET | Refills: 1 | Status: SHIPPED | OUTPATIENT
Start: 2023-08-24

## 2023-08-29 DIAGNOSIS — F90.0 ADHD (ATTENTION DEFICIT HYPERACTIVITY DISORDER), INATTENTIVE TYPE: ICD-10-CM

## 2023-08-29 RX ORDER — DEXTROAMPHETAMINE SACCHARATE, AMPHETAMINE ASPARTATE, DEXTROAMPHETAMINE SULFATE AND AMPHETAMINE SULFATE 5; 5; 5; 5 MG/1; MG/1; MG/1; MG/1
1 TABLET ORAL 2 TIMES DAILY
Qty: 60 TABLET | Refills: 0 | Status: SHIPPED | OUTPATIENT
Start: 2023-08-29

## 2023-08-29 RX ORDER — DEXTROAMPHETAMINE SACCHARATE, AMPHETAMINE ASPARTATE, DEXTROAMPHETAMINE SULFATE AND AMPHETAMINE SULFATE 2.5; 2.5; 2.5; 2.5 MG/1; MG/1; MG/1; MG/1
1 TABLET ORAL DAILY
Qty: 30 TABLET | Refills: 0 | Status: SHIPPED | OUTPATIENT
Start: 2023-08-29

## 2023-08-29 NOTE — TELEPHONE ENCOUNTER
"Medication refill requests.     amphetamine-dextroamphetamine (ADDERALL) 10 MG tablet (07/14/2023)     amphetamine-dextroamphetamine (ADDERALL) 20 MG tablet (07/14/2023)     Per last appt note, pt takes \"Adderall 30mg PO QAM, 20 mg PO QPM.\"     Pt has upcoming appt  Appointment with Eli Murray MD (10/18/2023)     Medication orders pended.   "

## 2023-09-05 DIAGNOSIS — F90.0 ADHD (ATTENTION DEFICIT HYPERACTIVITY DISORDER), INATTENTIVE TYPE: ICD-10-CM

## 2023-09-05 DIAGNOSIS — F41.1 GENERALIZED ANXIETY DISORDER: ICD-10-CM

## 2023-09-06 RX ORDER — HYDROXYZINE 50 MG/1
TABLET, FILM COATED ORAL
Qty: 180 TABLET | Refills: 3 | Status: SHIPPED | OUTPATIENT
Start: 2023-09-06

## 2023-09-06 NOTE — TELEPHONE ENCOUNTER
Medication refill request.     hydrOXYzine (ATARAX) 50 MG tablet (03/17/2023)     Pt has upcoming appt  Appointment with Eli Murray MD (10/18/2023)     Medication order pended.

## 2023-09-08 NOTE — PROGRESS NOTES
Progress Note    Date: 09/13/2023  Time In: 0759  Time Out: 2152    Patient Legal Name: Janice Padilla  Patient Age: 28 y.o.    CHIEF COMPLAINT: Anxiety, ADHD, Depression    Subjective   History of Present Illness     From previous progress note on 8/23/23:  Patient is to practice the 'stop and think' technique when she feels her emotions are intense, to avoid impulsivity and to increase assertive communication.  Patient could benefit from writing lists to help with short-term memory and to improve attention, as she states that this has been a source of conflict in her marriage.  We will continue to address healthy communication skills and mood regulation in future sessions.    Assessment    Mental Status Exam     Appearance: good hygiene and dressed appropriately for the weather  Behavior: calm  Cooperation:  engaged, cooperative, attentive, and friendly  Eye Contact:  good  Affect:  sad and tearful  Mood: anxious  Speech: responsive  Thought Process:  linear, organized, and goal-oriented  Thought Content: intrusive thoughts and rumination  Suicidal: denies  Homicidal:  denies  Hallucinations:  denies  Memory:  intact  Orientation:  person, place, time, and situation  Reliability:  reliable  Insight:  good  Judgment:  good     Clinical Intervention       ICD-10-CM ICD-9-CM   1. Generalized anxiety disorder  F41.1 300.02   2. ADHD (attention deficit hyperactivity disorder), inattentive type  F90.0 314.00   3. Mild episode of recurrent major depressive disorder  F33.0 296.31        Janice is a 28 y.o. female who presents today as a follow-up for continued psychotherapy. Individual psychotherapy was provided utilizing ACT & SFT techniques to encourage expression of thoughts and feelings, establish new coping skills, discuss values and strengths, manage stress, recognize patterns of behavior, acknowledge sources of feelings and behaviors, challenge negative thinking patterns, and provide support.  Therapist offered  support and utilized reflective listening as patient shared recent stressors.  Patient reports that she was recently diagnosed with hypothyroidism and is currently on medication for that.  Patient had good insight into her symptoms, stating that she has noticed a significant increase in anxiety in the last week (i.e. stomach in knots, rumination, excessive worry, etc.).  Patient states that she is compliant with her medication, and recognizes that there have been life circumstances contributing to her anxiety as well (i.e. has been working later, mother calling in emotional distress, etc.).  Therapist praised patient for using self-evaluation to recognize her needs and bring awareness to her change in mood.    Plan   Plan & Goals     Patient was encouraged to start becoming aware of things to be grateful for in order to build happiness, and improve self-esteem.  She could benefit from writing down her positive qualities in order to remind herself of these things on a daily basis. Therapist also suggested that patient read Adult Children of Emotionally Immature Parents.  We will continue to explore how forgiveness and acceptance could be beneficial to her, especially in reducing symptoms of anxiety.  Patient seems to be coping well with her symptoms of ADHD, as we discussed for her to continue using lists, reminders, and notes.    Patient acknowledged and verbally consented to continue working toward resolving current treatment plan goals and was educated on the importance of participation in the therapeutic process.  Patient will remain compliant with medication regimen as prescribed. Discuss any medication side effects, questions or concerns with prescribing provider.  Call 911 or present to the nearest emergency room in an emergency situation.  National Suicide Prevention Lifeline: Call 988. The Lifeline provides 24/7, free and confidential support for people in distress, prevention and crisis resources.  Crisis  Text Line  Text HOME To 053196    Return in about 2 weeks (around 9/27/2023) for Next scheduled follow up.    ____________________  This document has been electronically signed by Yareli Yeung LCSW  September 13, 2023 09:50 EDT    Part of this note may be an electronic transcription/translation of spoken language to printed text using the Dragon Dictation System.

## 2023-09-13 ENCOUNTER — OFFICE VISIT (OUTPATIENT)
Dept: PSYCHIATRY | Facility: CLINIC | Age: 28
End: 2023-09-13
Payer: COMMERCIAL

## 2023-09-13 DIAGNOSIS — F41.1 GENERALIZED ANXIETY DISORDER: Primary | ICD-10-CM

## 2023-09-13 DIAGNOSIS — F90.0 ADHD (ATTENTION DEFICIT HYPERACTIVITY DISORDER), INATTENTIVE TYPE: ICD-10-CM

## 2023-09-13 DIAGNOSIS — F33.0 MILD EPISODE OF RECURRENT MAJOR DEPRESSIVE DISORDER: ICD-10-CM

## 2023-09-13 NOTE — PSYCHOTHERAPY NOTE
Psychotherapy Note - September 13, 2023    Daughter - Alexandra (age 3)   - Toño - started dating in 10th grade  Best friend - Annette - friends for years     Sister: Mary  _______    I was diagnosed with hypothyroidism - on medicine    My cousin - Rebeca batista (undiagnosed autism, adhd undiagnosed)    I'm still feeling sad and down a little bit  I'm worrying more again (about stuff that I can't control)  Little things - I'm fixating   I'm ruminating over the worry    I don't know if the Cymbalta is helping  Abilify might be completely     I know I don't feel good  It's not depression  Generalized anxiety trickling back in  It all started about a week ago  I'm slowly realizing what's happening    This saturday  Father-in-law & his girlfriend  Sister-in-law  It's me, Toño & Alexandra    I will think about what I didn't do  Anything I forgot to do today  Anything I did wrong today    TO PRACTICE  Grateful for  Forgive myself for    I have a lot of anger toward my mom  It builds every day  I don't know where it comes from  Anytime she tries to play the victim (of course it happens)  It makes me even more angry  All these things you continue to do to me, you're not the victim    Mom texted me yesterday and asked to get Alexandra today  I told her we already had plans - she's mad about that  I cannot put into words why things are bad with her  She creates a problem when there's not one  It's every time she talks to me    It's building up - I've ignored her the whole time and I've brushed her off  It's frustrating me and she can't understand

## 2023-09-29 NOTE — PROGRESS NOTES
Progress Note    Date: 10/04/2023  Time In: 0800  Time Out: 0854    Patient Legal Name: Janice Padilla  Patient Age: 28 y.o.    CHIEF COMPLAINT: Depression, Anxiety, ADHD    Subjective   History of Present Illness     From previous progress note on 9/13/23:  Patient was encouraged to start becoming aware of things to be grateful for in order to build happiness, and improve self-esteem.  She could benefit from writing down her positive qualities in order to remind herself of these things on a daily basis. Therapist also suggested that patient read Adult Children of Emotionally Immature Parents.  We will continue to explore how forgiveness and acceptance could be beneficial to her, especially in reducing symptoms of anxiety.  Patient seems to be coping well with her symptoms of ADHD, as we discussed for her to continue using lists, reminders, and notes.    Assessment    Mental Status Exam     Appearance: good hygiene and dressed appropriately for the weather  Behavior: calm  Cooperation:  engaged, cooperative, attentive, and friendly  Eye Contact:  good  Affect:  sad and tearful  Mood: depressed and anxious  Speech: responsive  Thought Process:  linear and organized  Thought Content: appropriate, abstract, and intrusive thoughts  Suicidal: denies  Homicidal:  denies  Hallucinations:  denies  Memory:  intact  Orientation:  person, place, time, and situation  Reliability:  reliable  Insight:  good  Judgment:  good     Clinical Intervention       ICD-10-CM ICD-9-CM   1. Generalized anxiety disorder  F41.1 300.02   2. Moderate episode of recurrent major depressive disorder  F33.1 296.32   3. ADHD (attention deficit hyperactivity disorder), inattentive type  F90.0 314.00        Janice is a 28 y.o. female who presents today as a follow-up for continued psychotherapy. Individual psychotherapy was provided utilizing ACT & SFT techniques to promote problem-solving, restore adaptive functioning, encourage expression of  "thoughts and feelings, challenge avoidance, increase acceptance, manage stress, recognize patterns of behavior, acknowledge sources of feelings and behaviors, challenge negative thinking patterns, and provide support.  Patient reports that she has had more anxiety recently, which she describes as \"stomach in knots.\"  She states that she has been more irritable, tearful, has had a lack of appetite, little motivation, and feeling down and depressed most of the day, nearly every day, for the past week.  She reports that she had a call in to work on Monday because she could not get out of bed.  Therapist encouraged patient to identify patterns of behavior that may be contributing to a decline in mood, and provided psychoeducation on the importance of radical acceptance about areas of her life that are unable to be changed (i.e. her relationship with her mother and mother's ability to manage her emotions).  Therapist also provided psychoeducation on the symptoms of depression, as patient was unaware that her irritability may be attributed to an increase in depressive symptoms.    Plan   Plan & Goals     Patient could benefit from practicing regular self check-ins to identify her needs and related emotions, while relying on social support as needed.  She was encouraged to speak with her  about identifying positive aspects of her life and providing reassurance as needed in order to boost motivation on days that her mood is particularly low.  Patient reports that she plans to speak with prescribing provider about a potential change in her medication at her next appointment.  We will continue to address emotional acceptance and ways to challenge avoidance further next session.    Patient acknowledged and verbally consented to continue working toward resolving current treatment plan goals and was educated on the importance of participation in the therapeutic process.  Patient will remain compliant with medication " regimen as prescribed. Discuss any medication side effects, questions or concerns with prescribing provider.  Call 911 or present to the nearest emergency room in an emergency situation.  National Suicide Prevention Lifeline: Call 988. The Lifeline provides 24/7, free and confidential support for people in distress, prevention and crisis resources.  Crisis Text Line  Text HOME To 426725    Return in about 2 weeks (around 10/18/2023) for Next scheduled follow up.    ____________________  This document has been electronically signed by Yareli Yeung LCSW  October 4, 2023 09:01 EDT    Part of this note may be an electronic transcription/translation of spoken language to printed text using the Dragon Dictation System.

## 2023-10-02 DIAGNOSIS — F90.0 ADHD (ATTENTION DEFICIT HYPERACTIVITY DISORDER), INATTENTIVE TYPE: ICD-10-CM

## 2023-10-03 RX ORDER — DEXTROAMPHETAMINE SACCHARATE, AMPHETAMINE ASPARTATE, DEXTROAMPHETAMINE SULFATE AND AMPHETAMINE SULFATE 5; 5; 5; 5 MG/1; MG/1; MG/1; MG/1
1 TABLET ORAL 2 TIMES DAILY
Qty: 60 TABLET | Refills: 0 | Status: SHIPPED | OUTPATIENT
Start: 2023-10-03

## 2023-10-03 RX ORDER — DEXTROAMPHETAMINE SACCHARATE, AMPHETAMINE ASPARTATE, DEXTROAMPHETAMINE SULFATE AND AMPHETAMINE SULFATE 2.5; 2.5; 2.5; 2.5 MG/1; MG/1; MG/1; MG/1
1 TABLET ORAL DAILY
Qty: 30 TABLET | Refills: 0 | Status: SHIPPED | OUTPATIENT
Start: 2023-10-03

## 2023-10-03 NOTE — TELEPHONE ENCOUNTER
amphetamine-dextroamphetamine (ADDERALL) 20 MG tablet (08/29/2023)     amphetamine-dextroamphetamine (ADDERALL) 10 MG tablet (08/29/2023)     Pt has upcoming appt  Appointment with Eli Murray MD (10/18/2023)     Medication orders pended.

## 2023-10-04 ENCOUNTER — OFFICE VISIT (OUTPATIENT)
Dept: PSYCHIATRY | Facility: CLINIC | Age: 28
End: 2023-10-04
Payer: COMMERCIAL

## 2023-10-04 DIAGNOSIS — F33.1 MODERATE EPISODE OF RECURRENT MAJOR DEPRESSIVE DISORDER: ICD-10-CM

## 2023-10-04 DIAGNOSIS — F90.0 ADHD (ATTENTION DEFICIT HYPERACTIVITY DISORDER), INATTENTIVE TYPE: ICD-10-CM

## 2023-10-04 DIAGNOSIS — F41.1 GENERALIZED ANXIETY DISORDER: Primary | ICD-10-CM

## 2023-10-04 NOTE — PSYCHOTHERAPY NOTE
Psychotherapy Note - October 4, 2023    Daughter - Alexandra (age 3)   - Toño - started dating in 10th grade  Best friend - Annette - friends for years     Sister: Mary    I was diagnosed with hypothyroidism - on medicine     My cousin - Rebeca batista (undiagnosed autism, adhd undiagnosed)     TO PRACTICE  Grateful for  Forgive myself for  _______    Generalized    I'm crying and that's not like me - dead tired all the time before I got on the medicine     I have to stop taking Cymbalta    I've always had an issue where I have ups and downs  On Monday I called into work    I want to learn why I am the way I am  The emotional up and down that's going into my body    I'm not my best version of myself for my daughter    I called into work on Monday  I slept until 1:00PM  It's really hard to do your job when you feel depressed  You're a perfectionist and you're trying really hard    Something bad happened this weekend  I handled it pretty well  My mom this weekend, I was at my grandparents' visiting  My grandpa's bday party was Sunday and we showed up Saturday  My grandma likes to talk about my mom (she has the same intuition)    Alexandra was supposed to go to my mom's on Saturday  Mom ended up saying she was sick and didn't get Alexandra    Apparently mom took a lot of benzos   My aunt and grandma called me freaking out  I go to the worst case scenario  I called 911 and didn't communicate with the , I let it good    Reading the book - adult children of emotionally immature parents

## 2023-10-05 ENCOUNTER — TELEPHONE (OUTPATIENT)
Dept: PSYCHIATRY | Facility: CLINIC | Age: 28
End: 2023-10-05
Payer: COMMERCIAL

## 2023-10-05 NOTE — TELEPHONE ENCOUNTER
I CALLED PT TO SCHEDULE EARLIER APPT.    PER PROVIDER PT CAN BE SEEN AT 9:40 ON 10/10/23 OR WE CAN DOUBLE BOOK ON 10/10/23 AT 1:20 AS WELL. PT ALSO HAS THE OPTION TO DOUBLE BOOK ON 10/10/23 AT 8:40 AM OR 8:40 AM ON WEDNESDAY.

## 2023-10-05 NOTE — TELEPHONE ENCOUNTER
PT LEFT A VOICEMAIL STATING THAT SHE HASN'T BEEN DOING VERY WELL MENTALLY FOR THE LAST FEW WEEKS. SHE HAS BEEN PUTTING IT OFF TO THE POINT THAT SHE CAN'T PUT IT OFF ANYMORE.    PT IS REQUESTING A CALL BACK.    I CALLED AND SPOKE TO PT.    PT STATED SHE HAS TRIED TO RIDE IT OUT AS LONG AS SHE COULD.    PT STATES SHE JUST HASN'T BEEN HERSELF. SHE IS HAVING A LOT OF DEPRESSION SYMPTOMS; INCREASED ANXIETY; HER MOOD IS ALL OVER THE PLACE AND SHE FEELS LIKE HER CYMBAL[place] HAS JUST COMPLETLEY STOPPED WORKING.    PT STATES SHE HAS NO THOUGHTS OF HARMING HERSELF OR OTHERS. PT STATES SHE FEELS SAFE.    PT STATES SHE WAS GOING TO TRY AND WAIT UNTIL HER NEXT APPT BUT IS NOT ABLE TO.    PT GIVES PERMISSION TO LEAVE HER A DETAILED VOICEMAIL IF SHE IS NOT ABLE TO ANSWER.    PROVIDER PLEASE ADVISE.

## 2023-10-11 ENCOUNTER — TELEMEDICINE (OUTPATIENT)
Dept: PSYCHIATRY | Facility: CLINIC | Age: 28
End: 2023-10-11
Payer: COMMERCIAL

## 2023-10-11 DIAGNOSIS — F41.0 PANIC ATTACKS: ICD-10-CM

## 2023-10-11 DIAGNOSIS — F33.1 MODERATE EPISODE OF RECURRENT MAJOR DEPRESSIVE DISORDER: ICD-10-CM

## 2023-10-11 DIAGNOSIS — F41.1 GENERALIZED ANXIETY DISORDER: Primary | ICD-10-CM

## 2023-10-11 DIAGNOSIS — F51.05 INSOMNIA DUE TO MENTAL CONDITION: ICD-10-CM

## 2023-10-11 DIAGNOSIS — F90.0 ADHD (ATTENTION DEFICIT HYPERACTIVITY DISORDER), INATTENTIVE TYPE: ICD-10-CM

## 2023-10-11 RX ORDER — DEXTROAMPHETAMINE SACCHARATE, AMPHETAMINE ASPARTATE, DEXTROAMPHETAMINE SULFATE AND AMPHETAMINE SULFATE 5; 5; 5; 5 MG/1; MG/1; MG/1; MG/1
1 TABLET ORAL 2 TIMES DAILY
Qty: 60 TABLET | Refills: 0 | Status: SHIPPED | OUTPATIENT
Start: 2023-11-02

## 2023-10-11 RX ORDER — ARIPIPRAZOLE 2 MG/1
2 TABLET ORAL DAILY
Qty: 90 TABLET | Refills: 1 | Status: SHIPPED | OUTPATIENT
Start: 2023-10-11

## 2023-10-11 RX ORDER — LAMOTRIGINE 25 MG/1
TABLET ORAL
Qty: 46 TABLET | Refills: 2 | Status: SHIPPED | OUTPATIENT
Start: 2023-10-11

## 2023-10-11 RX ORDER — DEXTROAMPHETAMINE SACCHARATE, AMPHETAMINE ASPARTATE, DEXTROAMPHETAMINE SULFATE AND AMPHETAMINE SULFATE 2.5; 2.5; 2.5; 2.5 MG/1; MG/1; MG/1; MG/1
1 TABLET ORAL DAILY
Qty: 30 TABLET | Refills: 0 | Status: SHIPPED | OUTPATIENT
Start: 2023-11-02

## 2023-10-11 NOTE — PROGRESS NOTES
"Subjective   Janice Padilla is a 28 y.o. female who presents today for follow up    Referring Provider:  No referring provider defined for this encounter.    Chief Complaint:  adhd cornel mdd    History of Present Illness:     Janice Padilla is a 25 year old /White female referred by Balbina SEAMAN.     Chart review : Patient is on Wellbutrin, status post Zoloft. Also on Adderall. Had tachycardia at 130. Started on metoprolol 50 mg extended release daily. Labs in December: BMI 35, LDL is elevated at 121, ALT AST within normal limits, creatinine 0.99, hematocrit 45, electrolytes are essentially normal, TSH is 3.65 normal, iron is 53 low, vitamin D is 23 low, ferritin is normal at 96. No EKG or head imaging.     \"Janice\"  Father passed away from drunk driving at 4 yo       10/11: In person interview:  Chart review:   Fam med, therapy x4, reassuring lipids, cmp; abnl fT4, TSH.  seen by ob x2, psychotherapy x2. AUB, infertility.  Plannin/14: improved, no changes 3m  Much improved, no changes. Well.   \"For the past 3 weeks I've been very depressed.\"  I didn't want to get out of bed, and then I'd have enough energy to clean the house.  So \"up and down\"  Recently dx'd with hypothyroidism: fatigue, hair falling out, weight gain.  Haven't started clomid.  Trying to get pregnant. We discussed how abilify does not affect pregnancy.  Mood/Depression: good  ADHD: under control  Anxiety: minimal, some days she fixates  Panic attacks: n  Energy: good  Concentration: at goal  Sleeping: stable  Eating: stable  Refills: y  Substances: def  Therapy: [facility]  Medication compliant: y  SE: n  No SI HI AVH.      : In person interview:  Chart review: seen by ob x2, psychotherapy x2. AUB, infertility.  Planning: Much improved, no changes. Well.   \"I tapered off abilify.\"  Haven't started clomid.  Trying to get pregnant. We discussed how abilify does not affect pregnancy.  Mood/Depression: good  ADHD: under " "control  Anxiety: minimal, some days she fixates  Panic attacks: n  Energy: good  Concentration: at goal  Sleeping: stable  Eating: stable  Refills: y  Substances: def  Therapy: n  Medication compliant: y  SE: n  No SI HI AVH.      : In person interview:  Chart review: Seeing psychotherapy.  UDS positive for amphetamines and THC most recently.  Patient had stopped using THC gummies.  Planning: House almost done. Continue therapy with [facility]. Start abilify. Increase gabapentin to 300 mg daily prn anxiety. Start hydroxyzine nightly for insomnia. Minimal effect on dep and anx (not at goal).  [facility] someplace else besides [facility].  \"I've been good.\"  Living in our house. It has changed everything.   abilify has helped.  Mood/Depression: good mood  Anxiety: I feel a lot calmer  ADHD: under control  Panic attacks: n  Energy: n  Concentration: adhd under control  Sleeping: better on hydroxyzine.  Eatin lb wl 10/22  Refills: y  Substances: def  Therapy: [facility]  Medication compliant: no, stopped gabapentin  SE: n  No SI HI AVH.      3/17: In person interview:  Chart review: No new peer  Planning: Stop THC gummies as they are now a schedule I substance. Start gabapentin for anx. Stop hydroxyzine. Refer to [facility].   Patient goals:  Lifestyle changes:  Self-compassion:   Maladaptive thinking:  Improving interpersonal relationships:  Expressing difficult emotions:  Taking the perspective of others:  \"I can't really tell.\"  Has taken up to 200 mg of gabapentin at a time, and   Mood/Depression: ups and downs  Anxiety: irritable, on edge  Panic attacks: none since  Energy: stable  Concentration: ADHD: under control  Sleeping: initial insomnia  Eatin lb wl   Refills: y  Substances: n  Therapy: waitlisted at [facility]  Medication compliant:  No SI HI AVH.    ...    IMPORTANT:  From previous note, patient was held back in the third grade. Struggled to complete high school and missed 60 days although she did " "graduate with good grades. Got in trouble for talking a lot in class. She was not in any special classes. She did not have an IEP.     1/6/21 H&P: Virtual visit via Zoom audio and video due to the COVID-19 pandemic. Patient is accepting of and agreeable to appointment. The appointment consisted of the patient and I only. Interview: Patient reports that she had her first child in March and suffered from postpartum depression. The pandemic did not help things. It was a \"hard time,\" and the patient reports she had already had anxiety before. Patient is presently bottlefeeding. She became pregnant through an infertility specialist. Presently not on any contraception.   Endorses muscle tension, fatigue, poor concentration, restlessness, irritability, and some broken sleep, although she is sleeping for about 8 hours a night. She wakes up frequently to check on her baby girl, Alexandra. Also endorses guilt at being a bad mom. Duration has been since March of last year. Patient feels her anxiety is worse than her depression.   Denies SI HI AVH. Has access to weapons that are locked in a gun safe. Patient does not know the combination. She also does not know how to work the guns that are in the safe. Psychiatric review of systems is positive for anxiety and depression, negative for psychosis and maribel.   Possible ADHD. Patient was diagnosed by Dr. Joe in 2017. 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 Possible family history as well as her sister may have ADHD.   Past Psychiatric History:  Began Psychiatric Treatment: Since 2011   Dx: Anxiety   Psychiatrist: Has not seen a psychiatrist   Therapist: Saw therapist in 2018x1, unsure if it was helpful.   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     Feeling down, depressed, or hopeless?     Trouble falling or staying asleep, or sleeping too much?     Feeling tired or having little energy?     Poor appetite or overeating?     Feeling bad about yourself - or that you are a failure or have let yourself or your family down?     Trouble concentrating on things, such as reading the newspaper or watching television?     Moving or speaking so slowly that other people could have noticed? Or the opposite - being so fidgety or restless that you have been moving around a lot more than usual?     Thoughts that you would be better off dead, or of hurting yourself in some way?     PHQ-9 Total Score       MATTHEW-7       Past Surgical History:  Past Surgical History:   Procedure Laterality Date    ADENOIDECTOMY      DILATATION AND CURETTAGE      SKIN BIOPSY      TONSILLECTOMY         Problem List:  Patient Active Problem List   Diagnosis    Acne    Anxiety    Attention deficit hyperactivity disorder (ADHD)     Depression    Generalized anxiety disorder    Gestational hypertension    Major depressive disorder in partial remission    PCOS (polycystic ovarian syndrome)    Pre-eclampsia       Allergy:   No Known Allergies     Discontinued Medications:  Medications Discontinued During This Encounter   Medication Reason    amphetamine-dextroamphetamine (ADDERALL) 10 MG tablet Reorder    amphetamine-dextroamphetamine (ADDERALL) 20 MG tablet Reorder         Current Medications:   Current Outpatient Medications   Medication Sig Dispense Refill    [START ON 11/2/2023] amphetamine-dextroamphetamine (ADDERALL) 10 MG tablet Take 1 tablet by mouth Daily. 30 tablet 0    [START ON 11/2/2023] amphetamine-dextroamphetamine (ADDERALL) 20 MG tablet Take 1 tablet by mouth 2 (Two) Times a Day. 60 tablet 0    Aklief 0.005 % cream       ARIPiprazole (Abilify) 2 MG tablet Take 1 tablet by mouth Daily. 90 tablet 1    clomiPHENE (CLOMID) 50 MG tablet One daily days 3-7 5 tablet 0    DULoxetine (CYMBALTA) 30 MG capsule TAKE 1 CAPSULE BY MOUTH DAILY 90 capsule 1    hydrOXYzine (ATARAX) 50 MG tablet TAKE 2 TABLETS EVERY NIGHT 180 tablet 3    lamoTRIgine (LaMICtal) 25 MG tablet 25 mg (1 tab) nightly [id number], then 50 mg (2 tab) nightly 46 tablet 2    levothyroxine (SYNTHROID, LEVOTHROID) 25 MCG tablet Take 1 tablet by mouth Every Morning. 90 tablet 1    metoprolol succinate XL (Toprol XL) 25 MG 24 hr tablet Take 1 tablet by mouth Daily. 90 tablet 1    minocycline (MINOCIN,DYNACIN) 100 MG capsule       Prenatal MV & Min w/FA-DHA (PRENATAL ADULT GUMMY/DHA/FA PO) Take  by mouth.      spironolactone (ALDACTONE) 100 MG tablet spironolactone 100 mg oral tablet take 1 tablet (100 mg) by oral route once daily   Active       No current facility-administered medications for this visit.       Past Medical History:  Past Medical History:   Diagnosis Date    Acne     ADHD     Anxiety     Depression     Generalized anxiety disorder 01/06/2021    Gestational  hypertension     History of medical problems 02/28/2019    Miscarriage    Major depressive disorder in partial remission 01/06/2021    PCOS (polycystic ovarian syndrome)     Pre-eclampsia        Social History     Socioeconomic History    Marital status:    Tobacco Use    Smoking status: Never     Passive exposure: Never    Smokeless tobacco: Never    Tobacco comments:     2/10/2021- 1/6/2021   Vaping Use    Vaping Use: Never used   Substance and Sexual Activity    Alcohol use: Yes     Comment: light; 2/10/2021- 1/6/2021    Drug use: Never     Comment: 2/10/2021- 1/6/2021    Sexual activity: Yes     Partners: Male     Birth control/protection: None       Family History   Problem Relation Age of Onset    Anxiety disorder Mother     Bipolar disorder Mother     Depression Mother     Depression Sister     ADD / ADHD Sister     Diabetes Maternal Grandfather     Breast cancer Neg Hx     Ovarian cancer Neg Hx     Uterine cancer Neg Hx     Colon cancer Neg Hx     Pulmonary embolism Neg Hx     Deep vein thrombosis Neg Hx        Mental Status Exam:   Hygiene:   good, groomed  Cooperation:  Cooperative  Eye Contact:  Good  Psychomotor Behavior:  Appropriate  Affect:  euthymic, briefly tearful, good variability, congruent  Mood: I'm so up and down  Hopelessness: Denies  Speech:  Normal  Thought Process:  Goal directed  Thought Content:  Normal  Suicidal:  None  Homicidal:  None  Hallucinations:  None  Delusion:  None  Memory:  Intact  Orientation:  Person, Place, Time and Situation  Reliability:  good  Insight:  Fair  Judgement:  Fair  Impulse Control:  Fair  Physical/Medical Issues:  No      Review of Systems:  Review of Systems   Constitutional:  Negative for diaphoresis and fatigue.   HENT:  Negative for drooling.    Eyes:  Negative for visual disturbance.   Respiratory:  Negative for cough and shortness of breath.    Cardiovascular:  Negative for chest pain, palpitations and leg swelling.   Gastrointestinal:   Negative for diarrhea, nausea and vomiting.   Endocrine: Negative for cold intolerance and heat intolerance.   Genitourinary:  Negative for difficulty urinating and frequency.   Musculoskeletal:  Negative for joint swelling.   Skin:  Negative for rash.   Allergic/Immunologic: Negative for immunocompromised state.   Neurological:  Negative for dizziness, seizures, syncope, speech difficulty, light-headedness, numbness and headaches.         Physical Exam:  Physical Exam    Vital Signs:   There were no vitals taken for this visit.     Lab Results:   Orders Only on 08/24/2023   Component Date Value Ref Range Status    T3, Free 08/23/2023 2.76  2.00 - 4.40 pg/mL Final    Free T4 08/23/2023 0.81 (L)  0.93 - 1.70 ng/dL Final   Office Visit on 08/23/2023   Component Date Value Ref Range Status    Hepatitis C Ab 08/23/2023 Non-Reactive  Non-Reactive Final    Glucose 08/23/2023 91  65 - 99 mg/dL Final    BUN 08/23/2023 13  6 - 20 mg/dL Final    Creatinine 08/23/2023 0.80  0.57 - 1.00 mg/dL Final    Sodium 08/23/2023 139  136 - 145 mmol/L Final    Potassium 08/23/2023 4.2  3.5 - 5.2 mmol/L Final    Chloride 08/23/2023 103  98 - 107 mmol/L Final    CO2 08/23/2023 23.9  22.0 - 29.0 mmol/L Final    Calcium 08/23/2023 8.9  8.6 - 10.5 mg/dL Final    Total Protein 08/23/2023 6.9  6.0 - 8.5 g/dL Final    Albumin 08/23/2023 4.7  3.5 - 5.2 g/dL Final    ALT (SGPT) 08/23/2023 22  1 - 33 U/L Final    AST (SGOT) 08/23/2023 28  1 - 32 U/L Final    Alkaline Phosphatase 08/23/2023 51  39 - 117 U/L Final    Total Bilirubin 08/23/2023 0.2  0.0 - 1.2 mg/dL Final    Globulin 08/23/2023 2.2  gm/dL Final    A/G Ratio 08/23/2023 2.1  g/dL Final    BUN/Creatinine Ratio 08/23/2023 16.3  7.0 - 25.0 Final    Anion Gap 08/23/2023 12.1  5.0 - 15.0 mmol/L Final    eGFR 08/23/2023 102.4  >60.0 mL/min/1.73 Final    Total Cholesterol 08/23/2023 171  0 - 200 mg/dL Final    Triglycerides 08/23/2023 131  0 - 150 mg/dL Final    HDL Cholesterol 08/23/2023 54  40  - 60 mg/dL Final    LDL Cholesterol  08/23/2023 94  0 - 100 mg/dL Final    VLDL Cholesterol 08/23/2023 23  5 - 40 mg/dL Final    LDL/HDL Ratio 08/23/2023 1.68   Final    TSH 08/23/2023 5.820 (H)  0.270 - 4.200 uIU/mL Final    Magnesium 08/23/2023 2.3  1.6 - 2.6 mg/dL Final    Phosphorus 08/23/2023 2.9  2.5 - 4.5 mg/dL Final    WBC 08/23/2023 10.97 (H)  3.40 - 10.80 10*3/mm3 Final    RBC 08/23/2023 4.58  3.77 - 5.28 10*6/mm3 Final    Hemoglobin 08/23/2023 13.4  12.0 - 15.9 g/dL Final    Hematocrit 08/23/2023 40.2  34.0 - 46.6 % Final    MCV 08/23/2023 87.8  79.0 - 97.0 fL Final    MCH 08/23/2023 29.3  26.6 - 33.0 pg Final    MCHC 08/23/2023 33.3  31.5 - 35.7 g/dL Final    RDW 08/23/2023 13.5  12.3 - 15.4 % Final    RDW-SD 08/23/2023 42.7  37.0 - 54.0 fl Final    MPV 08/23/2023 9.4  6.0 - 12.0 fL Final    Platelets 08/23/2023 296  140 - 450 10*3/mm3 Final    Neutrophil % 08/23/2023 62.3  42.7 - 76.0 % Final    Lymphocyte % 08/23/2023 28.5  19.6 - 45.3 % Final    Monocyte % 08/23/2023 6.1  5.0 - 12.0 % Final    Eosinophil % 08/23/2023 1.1  0.3 - 6.2 % Final    Basophil % 08/23/2023 0.5  0.0 - 1.5 % Final    Immature Grans % 08/23/2023 1.5 (H)  0.0 - 0.5 % Final    Neutrophils, Absolute 08/23/2023 6.84  1.70 - 7.00 10*3/mm3 Final    Lymphocytes, Absolute 08/23/2023 3.13 (H)  0.70 - 3.10 10*3/mm3 Final    Monocytes, Absolute 08/23/2023 0.67  0.10 - 0.90 10*3/mm3 Final    Eosinophils, Absolute 08/23/2023 0.12  0.00 - 0.40 10*3/mm3 Final    Basophils, Absolute 08/23/2023 0.05  0.00 - 0.20 10*3/mm3 Final    Immature Grans, Absolute 08/23/2023 0.16 (H)  0.00 - 0.05 10*3/mm3 Final    nRBC 08/23/2023 0.0  0.0 - 0.2 /100 WBC Final       EKG Results:  No orders to display       Imaging Results:  No Images in the past 120 days found..      Assessment & Plan   Diagnoses and all orders for this visit:    1. Generalized anxiety disorder (Primary)  -     ARIPiprazole (Abilify) 2 MG tablet; Take 1 tablet by mouth Daily.  Dispense: 90  tablet; Refill: 1  -     lamoTRIgine (LaMICtal) 25 MG tablet; 25 mg (1 tab) nightly [id number], then 50 mg (2 tab) nightly  Dispense: 46 tablet; Refill: 2    2. Moderate episode of recurrent major depressive disorder  -     ARIPiprazole (Abilify) 2 MG tablet; Take 1 tablet by mouth Daily.  Dispense: 90 tablet; Refill: 1  -     lamoTRIgine (LaMICtal) 25 MG tablet; 25 mg (1 tab) nightly [id number], then 50 mg (2 tab) nightly  Dispense: 46 tablet; Refill: 2    3. ADHD (attention deficit hyperactivity disorder), inattentive type  -     amphetamine-dextroamphetamine (ADDERALL) 10 MG tablet; Take 1 tablet by mouth Daily.  Dispense: 30 tablet; Refill: 0  -     amphetamine-dextroamphetamine (ADDERALL) 20 MG tablet; Take 1 tablet by mouth 2 (Two) Times a Day.  Dispense: 60 tablet; Refill: 0    4. Insomnia due to mental condition  -     ARIPiprazole (Abilify) 2 MG tablet; Take 1 tablet by mouth Daily.  Dispense: 90 tablet; Refill: 1  -     lamoTRIgine (LaMICtal) 25 MG tablet; 25 mg (1 tab) nightly [id number], then 50 mg (2 tab) nightly  Dispense: 46 tablet; Refill: 2    5. Panic attacks  -     lamoTRIgine (LaMICtal) 25 MG tablet; 25 mg (1 tab) nightly [id number], then 50 mg (2 tab) nightly  Dispense: 46 tablet; Refill: 2        Presentation most consistent with major depressive disorder in partial remission, generalized anxiety disorder, and ADHD.  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  Also endorses a childhood history that is compelling.      10/11: Re-emerging depression, not seasonal, likely related to dc'g abilify. Restart abilify and start lamictal for further mood stability at pt's request.    Allowed patient to freely discuss and process issues, such as:  Recent worsening mood.   Planned for pregnancy. Stay on  cymbalta, adderall, stay off abilify for now. Close monitoring during pregnany q6wks. Watch PP depression.   ... using Saud (Kirk Kamara) psychotherapeutic techniques including unconditional positive regard, reflective listening, and demonstrating clear empathy.     Time (minutes) spent providing supportive psychotherapy: 17  Functional status: mild impairment  Treatment plan: Medication management and supportive psychotherapy  Prognosis: good  Progress: worsening dep  4w    7/14: improved, no changes 3m    5/12: Much improved, no changes. Well. 16    3/17: House almost done. Continue therapy with [facility]. Start abilify. Increase gabapentin to 300 mg daily prn anxiety. Start hydroxyzine nightly for insomnia. Minimal effect on dep and anx (not at goal). 17     2/23: Stop THC gummies as they are now a schedule I substance. Start gabapentin for anx. Stop hydroxyzine. [facility] to [facility]. 17    12/16: Well, stable, no changes.  3 months    10/19: Doing well, no changes. Anx, dep, adhd under control. 17     9/9: Wants to be on less meds. Taper off bupropion. Continue other meds. Let's streamline the meds. 16    7/27: Stop prozac, 2 week washout, start cymbalta. Consider abilify on top of bupropion. 17     6/15: Residual anxiety, increase prozac. Goal is to increase until maxed out; then possible switch. Depression has resolved. 18     5/4: Therapy referral.  Increase Prozac.  May consider switching from bupropion to Abilify in the future.  Patient was not able to tolerate bupropion 450 mg daily.  17     3/16: No change to medications, no side effects.  Living with in-laws at their house has been a source of anxiety for the patient.  Also taking care of her daughter, who she is very protective of.  Some issues with keeping restraint during therapy sessions as well.  17    2/2: Anxious about the weather, and its consequences (closed daycares, etc.). 18     12/22: Increase Prozac to target anxiety, increase hydroxyzine  to target insomnia. 17     : Some ADHD symptoms re-emerging. Add 10 mg adderall at 11 am. Schedule will be 7:30 first dose, 11 second higher dose of 30 mg. Pt also interested in therapy. 17     10/15: Start prozac. Hydroxyzine at night calms her; consider adding am dose.     : Patient is doing extremely well.  No change to medications.      : Doing a little better, however patient was doing much better on Adderall 20 mg twice a day, which was her previous dose.  Discontinue Adderall XR and start Adderall immediate release 20 mg twice a day.  Some residual depressive and anxiety symptoms that are well controlled on bupropion.  Treating ADHD will also treat residual depressive and anxiety symptoms as well.  See back in 2 months.  Declines psychotherapy.    Visit Diagnoses:    ICD-10-CM ICD-9-CM   1. Generalized anxiety disorder  F41.1 300.02   2. Moderate episode of recurrent major depressive disorder  F33.1 296.32   3. ADHD (attention deficit hyperactivity disorder), inattentive type  F90.0 314.00   4. Insomnia due to mental condition  F51.05 300.9     327.02   5. Panic attacks  F41.0 300.01       PLAN:  Risk Assessment: Risk of self-harm acutely is low. Risk factors include anxiety disorder, mood disorder, access to weapons, recent psychosocial stressors. Protective factors include no family history, no present SI, no history of suicide attempts or self-harm in the past, minimal AODA, healthcare seeking, future orientation, willingness to engage in care,  baby. Risk of self-harm chronically is also low, but could be further elevated in the event of treatment noncompliance and/or AODA.  Safety: No acute safety concerns.  Medications:   RESTART abilify 2 mg nightly. Risks, benefits, alternatives discussed with patient including increased energy, exacerbation of irritability, akathisia, GI upset, orthostatic hypotension, increased appetite, tardive dyskinesia.  After discussion of these risks and  benefits, the patient voiced understanding and agreed to proceed.  START lamictal 25 mg qhs x14 nights, then 50 mg qhs. Risks, benefits, alternatives discussed with patient including rash, rebound depressive or manic symptoms if prompt discontinuation, GI upset, agitation, sedation/falls risk.  Use care when operating vehicle, vessel, or machine. After discussion of these risks and benefits, patient voiced understanding and agreed to proceed.  CONTINUE cymbalta 30 mg daily. Risks, benefits, alternatives discussed with patient including GI upset, nausea vomiting diarrhea, theoretical decrease of seizure threshold predisposing the patient to a slightly higher seizure risk, headaches, sexual dysfunction, serotonin syndrome, bleeding risk, increased suicidality in patients 24 years and younger.  Also constipation and urinary retention.  After discussion of these risks and benefits, the patient voiced understanding and agreed to proceed.   CONTINUE hydroxyzine 100 mg PO QHS. Risks, benefits, alternatives discussed with patient including sedation, dizziness, fall risk, GI upset, and risk of increased CNS depression and elevated heart rate if taken with other antihistamines.  After discussion of these risks and benefits, the patient voiced understanding and agreed to proceed.  CONTINUE Adderall 30 mg PO QAM, 20 mg PO QPM. Risks, benefits, side effects discussed with patient including elevated heart rate, elevated blood pressure, irritability, insomnia, sexual dysfunction, appetite suppressing properties, psychosis.  After discussion of these risks and benefits, the patient voiced understanding and agreed to proceed. Controlled substances consent verbally signed. UDS and Brian ordered.  S/P   gabapentin 300 mg qday prn anxiety. No difference, so stopped 5/23 prozac 60 mg ineffective.  Escitalopram: sexual dysfunction  Adderall XR 10 mg daily: not effective  Bupropion  made her nauseated. 300 mg made her sweat  (irritable?)  Therapy: Consider in the future, presently not interested.  Labs/Studies: EKG reassuring.      TREATMENT PLAN/GOALS: Continue supportive psychotherapy efforts and medications as indicated. Treatment and medication options discussed during today's visit. Patient acknowledged and verbally consented to continue with current treatment plan and was educated on the importance of compliance with treatment and follow-up appointments.    MEDICATION ISSUES:  TOM reviewed as expected.  Discussed medication options and treatment plan of prescribed medication as well as the risks, benefits, and side effects including potential falls, possible impaired driving and metabolic adversities among others. Patient is agreeable to call the office with any worsening of symptoms or onset of side effects. Patient is agreeable to call 911 or go to the nearest ER should he/she begin having SI/HI. No medication side effects or related complaints today.     MEDS ORDERED DURING VISIT:  New Medications Ordered This Visit   Medications    ARIPiprazole (Abilify) 2 MG tablet     Sig: Take 1 tablet by mouth Daily.     Dispense:  90 tablet     Refill:  1    lamoTRIgine (LaMICtal) 25 MG tablet     Si mg (1 tab) nightly [id number], then 50 mg (2 tab) nightly     Dispense:  46 tablet     Refill:  2    amphetamine-dextroamphetamine (ADDERALL) 10 MG tablet     Sig: Take 1 tablet by mouth Daily.     Dispense:  30 tablet     Refill:  0    amphetamine-dextroamphetamine (ADDERALL) 20 MG tablet     Sig: Take 1 tablet by mouth 2 (Two) Times a Day.     Dispense:  60 tablet     Refill:  0       Return in about 4 weeks (around 2023).         This document has been electronically signed by Eli Murray MD  2023 18:12 EDT

## 2023-10-11 NOTE — PATIENT INSTRUCTIONS
1.  Please return to clinic at your next scheduled visit.  Contact the clinic ([phone]) at least 24 hours prior in the event you need to cancel.  2.  Do no harm to yourself or others.    3.  Avoid alcohol and drugs.    4.  Take all medications as prescribed.  Please contact the clinic with any concerns. If you are in need of medication refills, please call the clinic at [phone].    5. Should you want to get in touch with your provider, Dr. Eli Murray, please utilize [facility] or contact the office ([phone]), and staff will be able to page Dr. Murray directly.  6.  In the event you have personal crisis, contact the following crisis numbers: Suicide Prevention Hotline 1-399.621.2437; JOSE Helpline 6-764-788-JOSE; [facility] Emergency Room [phone]; text HELLO to 931473; or 748.

## 2023-10-18 ENCOUNTER — PRIOR AUTHORIZATION (OUTPATIENT)
Dept: FAMILY MEDICINE CLINIC | Facility: CLINIC | Age: 28
End: 2023-10-18

## 2023-10-18 ENCOUNTER — OFFICE VISIT (OUTPATIENT)
Dept: FAMILY MEDICINE CLINIC | Facility: CLINIC | Age: 28
End: 2023-10-18
Payer: COMMERCIAL

## 2023-10-18 VITALS
OXYGEN SATURATION: 100 % | SYSTOLIC BLOOD PRESSURE: 130 MMHG | DIASTOLIC BLOOD PRESSURE: 86 MMHG | WEIGHT: 176 LBS | HEART RATE: 84 BPM | HEIGHT: 62 IN | BODY MASS INDEX: 32.39 KG/M2 | TEMPERATURE: 97.7 F

## 2023-10-18 DIAGNOSIS — Z09 FOLLOW-UP EXAM: Primary | ICD-10-CM

## 2023-10-18 DIAGNOSIS — F33.1 MODERATE EPISODE OF RECURRENT MAJOR DEPRESSIVE DISORDER: ICD-10-CM

## 2023-10-18 DIAGNOSIS — R00.0 TACHYCARDIA: ICD-10-CM

## 2023-10-18 DIAGNOSIS — E66.09 CLASS 1 OBESITY DUE TO EXCESS CALORIES WITH SERIOUS COMORBIDITY AND BODY MASS INDEX (BMI) OF 32.0 TO 32.9 IN ADULT: ICD-10-CM

## 2023-10-18 DIAGNOSIS — R79.89 ELEVATED TSH: ICD-10-CM

## 2023-10-18 LAB
25(OH)D3 SERPL-MCNC: 28.2 NG/ML (ref 30–100)
T-UPTAKE NFR SERPL: 1.03 TBI (ref 0.8–1.3)
T4 SERPL-MCNC: 8.1 MCG/DL (ref 4.5–11.7)
TSH SERPL DL<=0.05 MIU/L-ACNC: 3.02 UIU/ML (ref 0.27–4.2)

## 2023-10-18 PROCEDURE — 84443 ASSAY THYROID STIM HORMONE: CPT | Performed by: NURSE PRACTITIONER

## 2023-10-18 PROCEDURE — 84479 ASSAY OF THYROID (T3 OR T4): CPT | Performed by: NURSE PRACTITIONER

## 2023-10-18 PROCEDURE — 82306 VITAMIN D 25 HYDROXY: CPT | Performed by: STUDENT IN AN ORGANIZED HEALTH CARE EDUCATION/TRAINING PROGRAM

## 2023-10-18 PROCEDURE — 84436 ASSAY OF TOTAL THYROXINE: CPT | Performed by: NURSE PRACTITIONER

## 2023-10-18 RX ORDER — IVERMECTIN 10 MG/G
CREAM TOPICAL
COMMUNITY
Start: 2023-08-25

## 2023-10-18 NOTE — TELEPHONE ENCOUNTER
CaseId:[id number];Status:Approved;Review Type:Prior Auth;Coverage Start Date:09/18/2023;Coverage End Date:05/15/2024;

## 2023-10-18 NOTE — PROGRESS NOTES
Chief Complaint  Hypothyroidism (2M follow up)    Subjective        Medical History: has a past medical history of Acne, ADHD, Anxiety, Depression, Generalized anxiety disorder (01/06/2021), Gestational hypertension, History of medical problems (02/28/2019), Major depressive disorder in partial remission (01/06/2021), PCOS (polycystic ovarian syndrome), and Pre-eclampsia.     Surgical History: has a past surgical history that includes Tonsillectomy; Dilation and curettage of uterus; Adenoidectomy; and Skin biopsy.     Family History: family history includes ADD / ADHD in her sister; Anxiety disorder in her mother; Bipolar disorder in her mother; Depression in her mother and sister; Diabetes in her maternal grandfather.     Social History: reports that she has never smoked. She has never been exposed to tobacco smoke. She has never used smokeless tobacco. She reports current alcohol use. She reports that she does not use drugs.    Janice Padilla presents to [facility]  Hypothyroidism  This is a new (Patient was seen 8/20/2023, thyroid was slightly elevated patient was having and symptoms of fatigue and weight gain, I went ahead and started patient on levothyroxine 25 mcg she comes in today for follow-up to see how she is feeling.) problem. The current episode started more than 1 month ago. The problem occurs constantly. Associated symptoms include fatigue. Associated symptoms comments: Weight gain. Treatments tried: Levothyroxine.     Palpitations   This is a recurrent problem. The current episode started more than 1 month ago. The problem occurs intermittently. The problem has been gradually worsening. The symptoms are aggravated by caffeine and stress. Associated symptoms include malaise/fatigue. Pertinent negatives include no irregular heartbeat or weakness. She has tried beta blockers for the symptoms. Improvement on treatment: Patient stopped taking medication months ago for  "no reason.  Patient was restarted on metoprolol, her TSH was slightly elevated went ahead and started on levothyroxine 25 mcg comes in today for follow-up.    Obesity  Patient has a BMI of 32.19, she has previously been on Wegovy, did not have issues with insurance paying for the medication she was seeing weight loss management.  Weight loss management at [place] only takes cash pay patient had to stop going to them and is wondering if I could possibly prescribe her the Wegovy to help with the weight loss.  Patient states that she has been offered about 4 months.  She states she did lose weight and had appetite control with decrease cravings on the medication.  Patient states she was on the max dose of 2.4 weekly.    Objective   Vital Signs:   /86 (BP Location: Left arm, Patient Position: Sitting, Cuff Size: Adult)   Pulse 84   Temp 97.7 °F (36.5 °C) (Temporal)   Ht 157.5 cm (62\")   Wt 79.8 kg (176 lb)   SpO2 100%   BMI 32.19 kg/m²       Wt Readings from Last 3 Encounters:   10/18/23 79.8 kg (176 lb)   08/23/23 82.1 kg (181 lb)   07/14/23 74.5 kg (164 lb 3.2 oz)        BP Readings from Last 3 Encounters:   10/18/23 130/86   08/23/23 116/82   07/14/23 124/81        BMI is >= 30 and <35. (Class 1 Obesity). The following options were offered after discussion;: weight loss educational material (shared in after visit summary)     Physical Exam  Vitals reviewed.   Constitutional:       Appearance: Normal appearance. She is well-developed. She is obese.   HENT:      Head: Normocephalic and atraumatic.   Eyes:      Conjunctiva/sclera: Conjunctivae normal.      Pupils: Pupils are equal, round, and reactive to light.   Cardiovascular:      Rate and Rhythm: Normal rate and regular rhythm.      Heart sounds: No murmur heard.  Pulmonary:      Effort: Pulmonary effort is normal.      Breath sounds: Normal breath sounds. No wheezing or rhonchi.   Skin:     General: Skin is warm and dry.   Neurological:      " Mental Status: She is alert and oriented to person, place, and time.   Psychiatric:         Mood and Affect: Mood and affect normal.         Behavior: Behavior normal.         Thought Content: Thought content normal.         Judgment: Judgment normal.        Result Review :  {The following data was reviewed by JURGEN Faustin on 10/18/2023.  Common labs          8/23/2023    11:58   Common Labs   Glucose 91    BUN 13    Creatinine 0.80    Sodium 139    Potassium 4.2    Chloride 103    Calcium 8.9    Albumin 4.7    Total Bilirubin 0.2    Alkaline Phosphatase 51    AST (SGOT) 28    ALT (SGPT) 22    WBC 10.97    Hemoglobin 13.4    Hematocrit 40.2    Platelets 296    Total Cholesterol 171    Triglycerides 131    HDL Cholesterol 54    LDL Cholesterol  94      Data reviewed : Previous office note             Current Outpatient Medications on File Prior to Visit   Medication Sig Dispense Refill    [START ON 11/2/2023] amphetamine-dextroamphetamine (ADDERALL) 10 MG tablet Take 1 tablet by mouth Daily. 30 tablet 0    [START ON 11/2/2023] amphetamine-dextroamphetamine (ADDERALL) 20 MG tablet Take 1 tablet by mouth 2 (Two) Times a Day. 60 tablet 0    ARIPiprazole (Abilify) 2 MG tablet Take 1 tablet by mouth Daily. 90 tablet 1    DULoxetine (CYMBALTA) 30 MG capsule TAKE 1 CAPSULE BY MOUTH DAILY 90 capsule 1    hydrOXYzine (ATARAX) 50 MG tablet TAKE 2 TABLETS EVERY NIGHT 180 tablet 3    levothyroxine (SYNTHROID, LEVOTHROID) 25 MCG tablet Take 1 tablet by mouth Every Morning. 90 tablet 1    metoprolol succinate XL (Toprol XL) 25 MG 24 hr tablet Take 1 tablet by mouth Daily. 90 tablet 1    Prenatal MV & Min w/FA-DHA (PRENATAL ADULT GUMMY/DHA/FA PO) Take  by mouth.      Soolantra 1 % cream       spironolactone (ALDACTONE) 100 MG tablet spironolactone 100 mg oral tablet take 1 tablet (100 mg) by oral route once daily   Active      [DISCONTINUED] Aklief 0.005 % cream       [DISCONTINUED] clomiPHENE (CLOMID) 50 MG tablet  One daily days 3-7 (Patient not taking: Reported on 10/18/2023) 5 tablet 0    [DISCONTINUED] lamoTRIgine (LaMICtal) 25 MG tablet 25 mg (1 tab) nightly [id number], then 50 mg (2 tab) nightly (Patient not taking: Reported on 10/18/2023) 46 tablet 2    [DISCONTINUED] minocycline (MINOCIN,DYNACIN) 100 MG capsule        No current facility-administered medications on file prior to visit.        Assessment and Plan  Diagnoses and all orders for this visit:    1. Follow-up exam (Primary)    2. Elevated TSH  Comments:  feeling better, notice some difference in mood, heart palpitations has improved  Orders:  -     Thyroid Panel With TSH    3. Tachycardia  Comments:  better since being on the Metoprolol    4. Class 1 obesity due to excess calories with serious comorbidity and body mass index (BMI) of 32.0 to 32.9 in adult  Comments:  We will start back on Wegovy 0.5 mg and titrate up monthly patient is agreeable    5. Moderate episode of recurrent major depressive disorder  Comments:  Vitamin D ordered per Dr. Murray psychiatric physician.  Orders:  -     Vitamin D 25 Hydroxy    Other orders  -     Semaglutide-Weight Management 0.5 MG/0.5ML solution auto-injector; Inject 0.5 mL under the skin into the appropriate area as directed 1 (One) Time Per Week.  Dispense: 2 mL; Refill: 0        Follow Up   Return in about 6 months (around 4/18/2024) for Recheck.  Patient was given instructions and counseling regarding her condition or for health maintenance advice. Please see specific information pulled into the AVS if appropriate.       Part of this note may be electronic transcription/translation of spoken language to printed text using the Dragon dictation system

## 2023-10-19 NOTE — PROGRESS NOTES
Patient requesting Vitamin D, please submit to patient's pharmacy on file.    Called and spoke to patient to relay results. Pt verbalized understanding. No further questions/concerns at this time.

## 2023-10-20 ENCOUNTER — TELEPHONE (OUTPATIENT)
Dept: FAMILY MEDICINE CLINIC | Facility: CLINIC | Age: 28
End: 2023-10-20
Payer: COMMERCIAL

## 2023-10-20 RX ORDER — ERGOCALCIFEROL 1.25 MG/1
50000 CAPSULE ORAL WEEKLY
Qty: 13 CAPSULE | Refills: 1 | Status: SHIPPED | OUTPATIENT
Start: 2023-10-20

## 2023-10-20 RX ORDER — SEMAGLUTIDE 1 MG/.5ML
1 INJECTION, SOLUTION SUBCUTANEOUS WEEKLY
Qty: 2 ML | Refills: 1 | Status: SHIPPED | OUTPATIENT
Start: 2023-10-20

## 2023-10-20 NOTE — TELEPHONE ENCOUNTER
Caller: Janice Padilla    Relationship: Self    Best call back number: [phone]     What is the best time to reach you: ANY    Who are you requesting to speak with (clinical staff, provider,  specific staff member): CLINICAL STAFF    What was the call regarding: PATIENT CALLED STATING THAT SHE WAS ON WEGOVY ABOUT FOUR MONTHS AGO AND SHE WOULD LIKE TO KNOW IF SHE CAN HAVE THE WEGOVY 1.7 SENT TO THE PHARMACY:  [facility] - [facility] [address] [address]. - [phone]  - [phone]  [phone]

## 2023-10-20 NOTE — TELEPHONE ENCOUNTER
Patient notified medication has been sent into the pharmacy via phone call. patient voiced understanding.

## 2023-11-08 ENCOUNTER — OFFICE VISIT (OUTPATIENT)
Dept: PSYCHIATRY | Facility: CLINIC | Age: 28
End: 2023-11-08
Payer: COMMERCIAL

## 2023-11-08 VITALS
HEART RATE: 104 BPM | WEIGHT: 178.8 LBS | HEIGHT: 62 IN | DIASTOLIC BLOOD PRESSURE: 89 MMHG | SYSTOLIC BLOOD PRESSURE: 139 MMHG | BODY MASS INDEX: 32.9 KG/M2

## 2023-11-08 DIAGNOSIS — F41.1 GENERALIZED ANXIETY DISORDER: Primary | ICD-10-CM

## 2023-11-08 DIAGNOSIS — F33.0 MILD EPISODE OF RECURRENT MAJOR DEPRESSIVE DISORDER: ICD-10-CM

## 2023-11-08 DIAGNOSIS — F33.1 MODERATE EPISODE OF RECURRENT MAJOR DEPRESSIVE DISORDER: ICD-10-CM

## 2023-11-08 DIAGNOSIS — F90.0 ADHD (ATTENTION DEFICIT HYPERACTIVITY DISORDER), INATTENTIVE TYPE: ICD-10-CM

## 2023-11-08 DIAGNOSIS — F51.05 INSOMNIA DUE TO MENTAL CONDITION: ICD-10-CM

## 2023-11-08 DIAGNOSIS — F32.4 MAJOR DEPRESSIVE DISORDER IN PARTIAL REMISSION, UNSPECIFIED WHETHER RECURRENT: ICD-10-CM

## 2023-11-08 DIAGNOSIS — F41.0 PANIC ATTACKS: ICD-10-CM

## 2023-11-08 RX ORDER — DEXTROAMPHETAMINE SACCHARATE, AMPHETAMINE ASPARTATE, DEXTROAMPHETAMINE SULFATE AND AMPHETAMINE SULFATE 5; 5; 5; 5 MG/1; MG/1; MG/1; MG/1
1 TABLET ORAL 2 TIMES DAILY
Qty: 60 TABLET | Refills: 0 | Status: SHIPPED | OUTPATIENT
Start: 2023-11-08

## 2023-11-08 RX ORDER — DULOXETIN HYDROCHLORIDE 30 MG/1
30 CAPSULE, DELAYED RELEASE ORAL DAILY
Qty: 90 CAPSULE | Refills: 1 | Status: SHIPPED | OUTPATIENT
Start: 2023-11-08

## 2023-11-08 RX ORDER — ARIPIPRAZOLE 5 MG/1
5 TABLET ORAL DAILY
Qty: 90 TABLET | Refills: 1 | Status: SHIPPED | OUTPATIENT
Start: 2023-11-08

## 2023-11-08 RX ORDER — DEXTROAMPHETAMINE SACCHARATE, AMPHETAMINE ASPARTATE, DEXTROAMPHETAMINE SULFATE AND AMPHETAMINE SULFATE 2.5; 2.5; 2.5; 2.5 MG/1; MG/1; MG/1; MG/1
1 TABLET ORAL DAILY
Qty: 30 TABLET | Refills: 0 | Status: SHIPPED | OUTPATIENT
Start: 2023-11-08

## 2023-11-08 NOTE — PROGRESS NOTES
"Subjective   Janice Padilla is a 28 y.o. female who presents today for follow up    Referring Provider:  No referring provider defined for this encounter.    Chief Complaint:  adhd cornel mdd    History of Present Illness:     Janice Padilla is a 25 year old /White female referred by Balbina SEAMAN.     Chart review 1/6: Patient is on Wellbutrin, status post Zoloft. Also on Adderall. Had tachycardia at 130. Started on metoprolol 50 mg extended release daily. Labs in December: BMI 35, LDL is elevated at 121, ALT AST within normal limits, creatinine 0.99, hematocrit 45, electrolytes are essentially normal, TSH is 3.65 normal, iron is 53 low, vitamin D is 23 low, ferritin is normal at 96. No EKG or head imaging.     \"Janice\"  Father passed away from drunk driving at 3 yo. Last true memory of father was in 1998.      11/8: In person interview:  Chart review:   Fam medx2, tsh normal, vit D low.  Fam med, therapy x4, reassuring lipids, cmp; abnl fT4, TSH.  seen by ob x2, psychotherapy x2. AUB, infertility.  Planning:   STARTED Vit D. 10/11: Re-emerging depression, not seasonal, likely related to dc'g abilify. Restart abilify and start lamictal for further mood stability at pt's request.  7/14: improved, no changes 3m  Much improved, no changes. Well.   \"I did not start the lamictal, because I trust you.\"  I'm better  No more depressive episodes.  Sometimes I get irritable, angry. This is new, but was present before the abilify.  Recently dx'd with hypothyroidism: fatigue, hair falling out, weight gain.  Seasonal pattern. Father's passing also influences.  Haven't started clomid. Not trying to get pregnant.   Mood/Depression: improved mood  ADHD: under control  Anxiety: minimal, some days she fixates  Panic attacks: n  Energy: good  Concentration: at goal  Sleeping: stable  Eating: stable  Refills: y  Substances: def  Therapy: [facility]  Medication compliant: y  SE: n  No SI HI AVH.      10/11: In person " "interview:  Chart review:   Fam med, therapy x4, reassuring lipids, cmp; abnl fT4, TSH.  seen by ob x2, psychotherapy x2. AUB, infertility.  Plannin/14: improved, no changes 3m  Much improved, no changes. Well.   \"For the past 3 weeks I've been very depressed.\"  I didn't want to get out of bed, and then I'd have enough energy to clean the house.  So \"up and down\"  Recently dx'd with hypothyroidism: fatigue, hair falling out, weight gain.  Haven't started clomid.  Trying to get pregnant. We discussed how abilify does not affect pregnancy.  Mood/Depression: good  ADHD: under control  Anxiety: minimal, some days she fixates  Panic attacks: n  Energy: good  Concentration: at goal  Sleeping: stable  Eating: stable  Refills: y  Substances: def  Therapy: [facility]  Medication compliant: y  SE: n  No SI HI AVH.      : In person interview:  Chart review: seen by ob x2, psychotherapy x2. AUB, infertility.  Planning: Much improved, no changes. Well.   \"I tapered off abilify.\"  Haven't started clomid.  Trying to get pregnant. We discussed how abilify does not affect pregnancy.  Mood/Depression: good  ADHD: under control  Anxiety: minimal, some days she fixates  Panic attacks: n  Energy: good  Concentration: at goal  Sleeping: stable  Eating: stable  Refills: y  Substances: def  Therapy: n  Medication compliant: y  SE: n  No SI HI AVH.      : In person interview:  Chart review: Seeing psychotherapy.  UDS positive for amphetamines and THC most recently.  Patient had stopped using THC gummies.  Planning: House almost done. Continue therapy with Yare[facility]. Start abilify. Increase gabapentin to 300 mg daily prn anxiety. Start hydroxyzine nightly for insomnia. Minimal effect on dep and anx (not at goal).  [facility] her someplace else besides [facility].  \"I've been good.\"  Living in our house. It has changed everything.   abilify has helped.  Mood/Depression: good mood  Anxiety: I feel a lot calmer  ADHD: under control  Panic " "attacks: n  Energy: n  Concentration: adhd under control  Sleeping: better on hydroxyzine.  Eatin lb wl 10/22  Refills: y  Substances: def  Therapy: [facility]  Medication compliant: no, stopped gabapentin  SE: n  No SI HI AVH.      3/17: In person interview:  Chart review: No new peer  Planning: Stop THC gummies as they are now a schedule I substance. Start gabapentin for anx. Stop hydroxyzine. Refer to [facility].   Patient goals:  Lifestyle changes:  Self-compassion:   Maladaptive thinking:  Improving interpersonal relationships:  Expressing difficult emotions:  Taking the perspective of others:  \"I can't really tell.\"  Has taken up to 200 mg of gabapentin at a time, and   Mood/Depression: ups and downs  Anxiety: irritable, on edge  Panic attacks: none since  Energy: stable  Concentration: ADHD: under control  Sleeping: initial insomnia  Eatin lb wl   Refills: y  Substances: n  Therapy: waitlisted at [facility]  Medication compliant:  No SI HI AVH.    ...    IMPORTANT:  From previous note, patient was held back in the third grade. Struggled to complete high school and missed 60 days although she did graduate with good grades. Got in trouble for talking a lot in class. She was not in any special classes. She did not have an IEP.     21 H&P: Virtual visit via Zoom audio and video due to the COVID-19 pandemic. Patient is accepting of and agreeable to appointment. The appointment consisted of the patient and I only. Interview: Patient reports that she had her first child in March and suffered from postpartum depression. The pandemic did not help things. It was a \"hard time,\" and the patient reports she had already had anxiety before. Patient is presently bottlefeeding. She became pregnant through an infertility specialist. Presently not on any contraception.   Endorses muscle tension, fatigue, poor concentration, restlessness, irritability, and some broken sleep, although she is sleeping for about 8 " "hours a night. She wakes up frequently to check on her baby girl, Alexandra. Also endorses guilt at being a bad mom. Duration has been since March of last year. Patient feels her anxiety is worse than her depression.   Denies SI HI AVH. Has access to weapons that are locked in a gun safe. Patient does not know the combination. She also does not know how to work the guns that are in the safe. Psychiatric review of systems is positive for anxiety and depression, negative for psychosis and maribel.   Possible ADHD. Patient was diagnosed by Dr. Joe in 2017. 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 Possible family history as well as her sister may have ADHD.   Past Psychiatric History:  Began Psychiatric Treatment: Since    Dx: Anxiety   Psychiatrist: Has not seen a psychiatrist   Therapist: Saw therapist in 2018x1, unsure if it was helpful.   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     Feeling down, depressed, or hopeless?     Trouble falling or staying asleep, or sleeping too much?     Feeling tired or having little energy?     Poor appetite or overeating?     Feeling bad about yourself - or that you are a failure or have let yourself or your family down?     Trouble concentrating on things, such as reading the newspaper or watching television?     Moving or speaking so slowly that other people could have noticed? Or the opposite - being so fidgety or restless that you have been moving around a lot more than usual?     Thoughts that you would be better off dead, or of hurting yourself in some way?     PHQ-9 Total Score       MATTHEW-7       Past Surgical History:  Past Surgical History:   Procedure Laterality Date    ADENOIDECTOMY      DILATATION AND CURETTAGE      SKIN BIOPSY      TONSILLECTOMY         Problem List:  Patient Active Problem List   Diagnosis    Acne    Anxiety    Attention deficit hyperactivity disorder (ADHD)    Depression    Generalized anxiety disorder    Gestational hypertension    Major depressive disorder in partial remission    PCOS (polycystic ovarian syndrome)    Pre-eclampsia       Allergy:   No Known Allergies     Discontinued Medications:  Medications Discontinued During This Encounter   Medication Reason    Semaglutide-Weight Management (Wegovy) 1 MG/0.5ML solution auto-injector Other- See Medication Note    Liraglutide (SAXENDA) 18 MG/3ML injection pen Other- See Medication Note    ARIPiprazole (Abilify) 2 MG tablet Reorder    DULoxetine (CYMBALTA) 30 MG capsule Reorder    amphetamine-dextroamphetamine (ADDERALL) 10 MG tablet Reorder    amphetamine-dextroamphetamine (ADDERALL) 20 MG tablet Reorder           Current Medications:   Current Outpatient Medications   Medication Sig Dispense Refill     amphetamine-dextroamphetamine (ADDERALL) 10 MG tablet Take 1 tablet by mouth Daily. 30 tablet 0    amphetamine-dextroamphetamine (ADDERALL) 20 MG tablet Take 1 tablet by mouth 2 (Two) Times a Day. 60 tablet 0    ARIPiprazole (Abilify) 5 MG tablet Take 1 tablet by mouth Daily. 90 tablet 1    DULoxetine (CYMBALTA) 30 MG capsule Take 1 capsule by mouth Daily. 90 capsule 1    hydrOXYzine (ATARAX) 50 MG tablet TAKE 2 TABLETS EVERY NIGHT 180 tablet 3    levothyroxine (SYNTHROID, LEVOTHROID) 25 MCG tablet Take 1 tablet by mouth Every Morning. 90 tablet 1    metoprolol succinate XL (Toprol XL) 25 MG 24 hr tablet Take 1 tablet by mouth Daily. 90 tablet 1    Prenatal MV & Min w/FA-DHA (PRENATAL ADULT GUMMY/DHA/FA PO) Take  by mouth.      Soolantra 1 % cream       spironolactone (ALDACTONE) 100 MG tablet spironolactone 100 mg oral tablet take 1 tablet (100 mg) by oral route once daily   Active      vitamin D (ERGOCALCIFEROL) 1.25 MG (34134 UT) capsule capsule Take 1 capsule by mouth 1 (One) Time Per Week. 13 capsule 1     No current facility-administered medications for this visit.       Past Medical History:  Past Medical History:   Diagnosis Date    Acne     ADHD     Anxiety     Depression     Generalized anxiety disorder 01/06/2021    Gestational hypertension     History of medical problems 02/28/2019    Miscarriage    Major depressive disorder in partial remission 01/06/2021    PCOS (polycystic ovarian syndrome)     Pre-eclampsia        Social History     Socioeconomic History    Marital status:    Tobacco Use    Smoking status: Never     Passive exposure: Never    Smokeless tobacco: Never    Tobacco comments:     2/10/2021- 1/6/2021   Vaping Use    Vaping Use: Never used   Substance and Sexual Activity    Alcohol use: Yes     Comment: light; 2/10/2021- 1/6/2021    Drug use: Never     Comment: 2/10/2021- 1/6/2021    Sexual activity: Yes     Partners: Male     Birth control/protection: None       Family History  "  Problem Relation Age of Onset    Anxiety disorder Mother     Bipolar disorder Mother     Depression Mother     Depression Sister     ADD / ADHD Sister     Diabetes Maternal Grandfather     Breast cancer Neg Hx     Ovarian cancer Neg Hx     Uterine cancer Neg Hx     Colon cancer Neg Hx     Pulmonary embolism Neg Hx     Deep vein thrombosis Neg Hx        Mental Status Exam:   Hygiene:   good, groomed  Cooperation:  Cooperative  Eye Contact:  Good  Psychomotor Behavior:  Appropriate  Affect:  euthymic, briefly tearful, good variability, congruent  Mood: I'm so up and down  Hopelessness: Denies  Speech:  Normal  Thought Process:  Goal directed  Thought Content:  Normal  Suicidal:  None  Homicidal:  None  Hallucinations:  None  Delusion:  None  Memory:  Intact  Orientation:  Person, Place, Time and Situation  Reliability:  good  Insight:  Fair  Judgement:  Fair  Impulse Control:  Fair  Physical/Medical Issues:  No      Review of Systems:  Review of Systems   Constitutional:  Negative for diaphoresis and fatigue.   HENT:  Negative for drooling.    Eyes:  Negative for visual disturbance.   Respiratory:  Negative for cough and shortness of breath.    Cardiovascular:  Negative for chest pain, palpitations and leg swelling.   Gastrointestinal:  Negative for diarrhea, nausea and vomiting.   Endocrine: Negative for cold intolerance and heat intolerance.   Genitourinary:  Negative for difficulty urinating and frequency.   Musculoskeletal:  Negative for joint swelling.   Skin:  Negative for rash.   Allergic/Immunologic: Negative for immunocompromised state.   Neurological:  Negative for dizziness, seizures, syncope, speech difficulty, light-headedness, numbness and headaches.         Physical Exam:  Physical Exam    Vital Signs:   /89   Pulse 104   Ht 157.5 cm (62\")   Wt 81.1 kg (178 lb 12.8 oz)   BMI 32.70 kg/m²      Lab Results:   Office Visit on 10/18/2023   Component Date Value Ref Range Status    TSH 10/18/2023 " 3.020  0.270 - 4.200 uIU/mL Final    T Uptake 10/18/2023 1.03  0.80 - 1.30 TBI Final    T4, Total 10/18/2023 8.10  4.50 - 11.70 mcg/dL Final    T4 results may be falsely increased if patient taking Biotin.    25 Hydroxy, Vitamin D 10/18/2023 28.2 (L)  30.0 - 100.0 ng/ml Final   Orders Only on 08/24/2023   Component Date Value Ref Range Status    T3, Free 08/23/2023 2.76  2.00 - 4.40 pg/mL Final    Free T4 08/23/2023 0.81 (L)  0.93 - 1.70 ng/dL Final   Office Visit on 08/23/2023   Component Date Value Ref Range Status    Hepatitis C Ab 08/23/2023 Non-Reactive  Non-Reactive Final    Glucose 08/23/2023 91  65 - 99 mg/dL Final    BUN 08/23/2023 13  6 - 20 mg/dL Final    Creatinine 08/23/2023 0.80  0.57 - 1.00 mg/dL Final    Sodium 08/23/2023 139  136 - 145 mmol/L Final    Potassium 08/23/2023 4.2  3.5 - 5.2 mmol/L Final    Chloride 08/23/2023 103  98 - 107 mmol/L Final    CO2 08/23/2023 23.9  22.0 - 29.0 mmol/L Final    Calcium 08/23/2023 8.9  8.6 - 10.5 mg/dL Final    Total Protein 08/23/2023 6.9  6.0 - 8.5 g/dL Final    Albumin 08/23/2023 4.7  3.5 - 5.2 g/dL Final    ALT (SGPT) 08/23/2023 22  1 - 33 U/L Final    AST (SGOT) 08/23/2023 28  1 - 32 U/L Final    Alkaline Phosphatase 08/23/2023 51  39 - 117 U/L Final    Total Bilirubin 08/23/2023 0.2  0.0 - 1.2 mg/dL Final    Globulin 08/23/2023 2.2  gm/dL Final    A/G Ratio 08/23/2023 2.1  g/dL Final    BUN/Creatinine Ratio 08/23/2023 16.3  7.0 - 25.0 Final    Anion Gap 08/23/2023 12.1  5.0 - 15.0 mmol/L Final    eGFR 08/23/2023 102.4  >60.0 mL/min/1.73 Final    Total Cholesterol 08/23/2023 171  0 - 200 mg/dL Final    Triglycerides 08/23/2023 131  0 - 150 mg/dL Final    HDL Cholesterol 08/23/2023 54  40 - 60 mg/dL Final    LDL Cholesterol  08/23/2023 94  0 - 100 mg/dL Final    VLDL Cholesterol 08/23/2023 23  5 - 40 mg/dL Final    LDL/HDL Ratio 08/23/2023 1.68   Final    TSH 08/23/2023 5.820 (H)  0.270 - 4.200 uIU/mL Final    Magnesium 08/23/2023 2.3  1.6 - 2.6 mg/dL Final     Phosphorus 08/23/2023 2.9  2.5 - 4.5 mg/dL Final    WBC 08/23/2023 10.97 (H)  3.40 - 10.80 10*3/mm3 Final    RBC 08/23/2023 4.58  3.77 - 5.28 10*6/mm3 Final    Hemoglobin 08/23/2023 13.4  12.0 - 15.9 g/dL Final    Hematocrit 08/23/2023 40.2  34.0 - 46.6 % Final    MCV 08/23/2023 87.8  79.0 - 97.0 fL Final    MCH 08/23/2023 29.3  26.6 - 33.0 pg Final    MCHC 08/23/2023 33.3  31.5 - 35.7 g/dL Final    RDW 08/23/2023 13.5  12.3 - 15.4 % Final    RDW-SD 08/23/2023 42.7  37.0 - 54.0 fl Final    MPV 08/23/2023 9.4  6.0 - 12.0 fL Final    Platelets 08/23/2023 296  140 - 450 10*3/mm3 Final    Neutrophil % 08/23/2023 62.3  42.7 - 76.0 % Final    Lymphocyte % 08/23/2023 28.5  19.6 - 45.3 % Final    Monocyte % 08/23/2023 6.1  5.0 - 12.0 % Final    Eosinophil % 08/23/2023 1.1  0.3 - 6.2 % Final    Basophil % 08/23/2023 0.5  0.0 - 1.5 % Final    Immature Grans % 08/23/2023 1.5 (H)  0.0 - 0.5 % Final    Neutrophils, Absolute 08/23/2023 6.84  1.70 - 7.00 10*3/mm3 Final    Lymphocytes, Absolute 08/23/2023 3.13 (H)  0.70 - 3.10 10*3/mm3 Final    Monocytes, Absolute 08/23/2023 0.67  0.10 - 0.90 10*3/mm3 Final    Eosinophils, Absolute 08/23/2023 0.12  0.00 - 0.40 10*3/mm3 Final    Basophils, Absolute 08/23/2023 0.05  0.00 - 0.20 10*3/mm3 Final    Immature Grans, Absolute 08/23/2023 0.16 (H)  0.00 - 0.05 10*3/mm3 Final    nRBC 08/23/2023 0.0  0.0 - 0.2 /100 WBC Final       EKG Results:  No orders to display       Imaging Results:  No Images in the past 120 days found..      Assessment & Plan   Diagnoses and all orders for this visit:    1. Generalized anxiety disorder (Primary)  -     ARIPiprazole (Abilify) 5 MG tablet; Take 1 tablet by mouth Daily.  Dispense: 90 tablet; Refill: 1  -     DULoxetine (CYMBALTA) 30 MG capsule; Take 1 capsule by mouth Daily.  Dispense: 90 capsule; Refill: 1    2. ADHD (attention deficit hyperactivity disorder), inattentive type  -     amphetamine-dextroamphetamine (ADDERALL) 10 MG tablet; Take 1 tablet  by mouth Daily.  Dispense: 30 tablet; Refill: 0  -     amphetamine-dextroamphetamine (ADDERALL) 20 MG tablet; Take 1 tablet by mouth 2 (Two) Times a Day.  Dispense: 60 tablet; Refill: 0    3. Insomnia due to mental condition  -     ARIPiprazole (Abilify) 5 MG tablet; Take 1 tablet by mouth Daily.  Dispense: 90 tablet; Refill: 1  -     DULoxetine (CYMBALTA) 30 MG capsule; Take 1 capsule by mouth Daily.  Dispense: 90 capsule; Refill: 1    4. Panic attacks    5. Mild episode of recurrent major depressive disorder    6. Moderate episode of recurrent major depressive disorder  -     ARIPiprazole (Abilify) 5 MG tablet; Take 1 tablet by mouth Daily.  Dispense: 90 tablet; Refill: 1    7. Major depressive disorder in partial remission, unspecified whether recurrent  -     DULoxetine (CYMBALTA) 30 MG capsule; Take 1 capsule by mouth Daily.  Dispense: 90 capsule; Refill: 1        Presentation most consistent with major depressive disorder in partial remission, generalized anxiety disorder, and ADHD.  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  Also endorses a childhood history that is compelling.      11/8: Increase abilify for anxiety. Consider a light box.    Allowed patient to freely discuss and process issues, such as:  Recent worsening mood.   Planned for pregnancy. Stay on cymbalta, adderall, stay off abilify for now. Close monitoring during pregnany q6wks. Watch PP depression.   ... using Saud (Kirk Kamara) psychotherapeutic techniques including unconditional positive regard, reflective listening, and demonstrating clear empathy.     Time (minutes) spent providing supportive psychotherapy: 16  Functional status: mild impairment  Treatment plan: Medication management and supportive psychotherapy  Prognosis:  good  Progress: improving dep  4w    10/11: Re-emerging depression, not seasonal, likely related to dc'g abilify. Restart abilify and start lamictal for further mood stability at pt's request.    7/14: improved, no changes 3m    5/12: Much improved, no changes. Well. 16    3/17: House almost done. Continue therapy with Y[facility]li. Start abilify. Increase gabapentin to 300 mg daily prn anxiety. Start hydroxyzine nightly for insomnia. Minimal effect on dep and anx (not at goal). 17     2/23: Stop THC gummies as they are now a schedule I substance. Start gabapentin for anx. Stop hydroxyzine. [facility] to [facility]. 17    12/16: Well, stable, no changes.  3 months    10/19: Doing well, no changes. Anx, dep, adhd under control. 17     9/9: Wants to be on less meds. Taper off bupropion. Continue other meds. Let's streamline the meds. 16    7/27: Stop prozac, 2 week washout, start cymbalta. Consider abilify on top of bupropion. 17     6/15: Residual anxiety, increase prozac. Goal is to increase until maxed out; then possible switch. Depression has resolved. 18     5/4: Therapy referral.  Increase Prozac.  May consider switching from bupropion to Abilify in the future.  Patient was not able to tolerate bupropion 450 mg daily.  17     3/16: No change to medications, no side effects.  Living with in-laws at their house has been a source of anxiety for the patient.  Also taking care of her daughter, who she is very protective of.  Some issues with keeping restraint during therapy sessions as well.  17    2/2: Anxious about the weather, and its consequences (closed daycares, etc.). 18     12/22: Increase Prozac to target anxiety, increase hydroxyzine to target insomnia. 17     11/17: Some ADHD symptoms re-emerging. Add 10 mg adderall at 11 am. Schedule will be 7:30 first dose, 11 second higher dose of 30 mg. Pt also interested in therapy. 17     10/15: Start prozac. Hydroxyzine at night calms her; consider adding am dose.     8/20:  Patient is doing extremely well.  No change to medications.      : Doing a little better, however patient was doing much better on Adderall 20 mg twice a day, which was her previous dose.  Discontinue Adderall XR and start Adderall immediate release 20 mg twice a day.  Some residual depressive and anxiety symptoms that are well controlled on bupropion.  Treating ADHD will also treat residual depressive and anxiety symptoms as well.  See back in 2 months.  Declines psychotherapy.    Visit Diagnoses:    ICD-10-CM ICD-9-CM   1. Generalized anxiety disorder  F41.1 300.02   2. ADHD (attention deficit hyperactivity disorder), inattentive type  F90.0 314.00   3. Insomnia due to mental condition  F51.05 300.9     327.02   4. Panic attacks  F41.0 300.01   5. Mild episode of recurrent major depressive disorder  F33.0 296.31   6. Moderate episode of recurrent major depressive disorder  F33.1 296.32   7. Major depressive disorder in partial remission, unspecified whether recurrent  F32.4 296.25       PLAN:  Risk Assessment: Risk of self-harm acutely is low. Risk factors include anxiety disorder, mood disorder, access to weapons, recent psychosocial stressors. Protective factors include no family history, no present SI, no history of suicide attempts or self-harm in the past, minimal AODA, healthcare seeking, future orientation, willingness to engage in care,  baby. Risk of self-harm chronically is also low, but could be further elevated in the event of treatment noncompliance and/or AODA.  Safety: No acute safety concerns.  Medications:   INCREASE abilify 2 to 5 mg nightly. Risks, benefits, alternatives discussed with patient including increased energy, exacerbation of irritability, akathisia, GI upset, orthostatic hypotension, increased appetite, tardive dyskinesia.  After discussion of these risks and benefits, the patient voiced understanding and agreed to proceed.  NEVER STARTED lamictal 25 mg qhs x14 nights,  then 50 mg qhs. Risks, benefits, alternatives discussed with patient including rash, rebound depressive or manic symptoms if prompt discontinuation, GI upset, agitation, sedation/falls risk.  Use care when operating vehicle, vessel, or machine. After discussion of these risks and benefits, patient voiced understanding and agreed to proceed.  CONTINUE cymbalta 30 mg daily. Risks, benefits, alternatives discussed with patient including GI upset, nausea vomiting diarrhea, theoretical decrease of seizure threshold predisposing the patient to a slightly higher seizure risk, headaches, sexual dysfunction, serotonin syndrome, bleeding risk, increased suicidality in patients 23 years and younger.  Also constipation and urinary retention.  After discussion of these risks and benefits, the patient voiced understanding and agreed to proceed.   CONTINUE hydroxyzine 100 mg PO QHS. Risks, benefits, alternatives discussed with patient including sedation, dizziness, fall risk, GI upset, and risk of increased CNS depression and elevated heart rate if taken with other antihistamines.  After discussion of these risks and benefits, the patient voiced understanding and agreed to proceed.  CONTINUE Adderall 30 mg PO QAM, 20 mg PO QPM. Risks, benefits, side effects discussed with patient including elevated heart rate, elevated blood pressure, irritability, insomnia, sexual dysfunction, appetite suppressing properties, psychosis.  After discussion of these risks and benefits, the patient voiced understanding and agreed to proceed. Controlled substances consent verbally signed. DENNIS and Brian ordered.  S/P   gabapentin 300 mg qday prn anxiety. No difference, so stopped 5/23 prozac 60 mg ineffective.  Escitalopram: sexual dysfunction  Adderall XR 10 mg daily: not effective  Bupropion  made her nauseated. 300 mg made her sweat (irritable?)  Therapy: Consider in the future, presently not interested.  Labs/Studies: EKG  reassuring.      TREATMENT PLAN/GOALS: Continue supportive psychotherapy efforts and medications as indicated. Treatment and medication options discussed during today's visit. Patient acknowledged and verbally consented to continue with current treatment plan and was educated on the importance of compliance with treatment and follow-up appointments.    MEDICATION ISSUES:  TOM reviewed as expected.  Discussed medication options and treatment plan of prescribed medication as well as the risks, benefits, and side effects including potential falls, possible impaired driving and metabolic adversities among others. Patient is agreeable to call the office with any worsening of symptoms or onset of side effects. Patient is agreeable to call 911 or go to the nearest ER should he/she begin having SI/HI. No medication side effects or related complaints today.     MEDS ORDERED DURING VISIT:  New Medications Ordered This Visit   Medications    ARIPiprazole (Abilify) 5 MG tablet     Sig: Take 1 tablet by mouth Daily.     Dispense:  90 tablet     Refill:  1     Please dc 2 mg dose. Thank you.    amphetamine-dextroamphetamine (ADDERALL) 10 MG tablet     Sig: Take 1 tablet by mouth Daily.     Dispense:  30 tablet     Refill:  0    amphetamine-dextroamphetamine (ADDERALL) 20 MG tablet     Sig: Take 1 tablet by mouth 2 (Two) Times a Day.     Dispense:  60 tablet     Refill:  0    DULoxetine (CYMBALTA) 30 MG capsule     Sig: Take 1 capsule by mouth Daily.     Dispense:  90 capsule     Refill:  1     This prescription was filled on 5/10/2023. Any refills authorized will be placed on file.       Return in about 4 weeks (around 12/6/2023).         This document has been electronically signed by Eli Murray MD  November 8, 2023 09:52 EST

## 2023-11-08 NOTE — PROGRESS NOTES
Progress Note    Date: 11/15/2023  Time In: 1400  Time Out: 1453    Patient Legal Name: Janice Padilla  Patient Age: 28 y.o.    CHIEF COMPLAINT: Anxiety, ADHD, Depression    Subjective   History of Present Illness     From previous progress note on 10/4/23:  Patient could benefit from practicing regular self check-ins to identify her needs and related emotions, while relying on social support as needed.  She was encouraged to speak with her  about identifying positive aspects of her life and providing reassurance as needed in order to boost motivation on days that her mood is particularly low.  Patient reports that she plans to speak with prescribing provider about a potential change in her medication at her next appointment.  We will continue to address emotional acceptance and ways to challenge avoidance further next session.       Assessment    Mental Status Exam     Appearance: good hygiene and dressed appropriately for the weather  Behavior: calm  Cooperation:  engaged, cooperative, attentive, and friendly  Eye Contact:  good  Affect:  congruent  Mood: expressive  Speech: responsive  Thought Process:  linear, organized, and goal-oriented  Thought Content: appropriate and abstract  Suicidal: denies  Homicidal:  denies  Hallucinations:  denies  Memory:  intact  Orientation:  person, place, time, and situation  Reliability:  reliable  Insight:  good  Judgment:  good     Clinical Intervention       ICD-10-CM ICD-9-CM   1. Generalized anxiety disorder  F41.1 300.02   2. Mild episode of recurrent major depressive disorder  F33.0 296.31   3. ADHD (attention deficit hyperactivity disorder), inattentive type  F90.0 314.00        Janice is a 28 y.o. female who presents today as a follow-up for continued psychotherapy. Individual psychotherapy was provided utilizing ACT techniques to encourage expression of thoughts and feelings, discuss values and strengths, manage stress, recognize patterns of behavior,  acknowledge sources of feelings and behaviors, challenge negative thinking patterns, and provide support.  Patient reports that since last session, she has had an improvement in her mood.  She states that she feels her medication regimen has been stabilized and is now effective, she has also started treatment for other medical conditions, and is noticing a significant improvement.  Therapist encouraged patient to consider her interpersonal relationships, boundaries that she has set within her family, work and friend groups, and to address any thoughts which may be contributing to her anxiety.  She discussed parenting as it relates to her childhood experiences, and therapist provided some guidance and reassurance in this area.  Therapist suggested a visual aid to assist with parenting, being consistent with consequences, and using assertive communication.    Plan   Plan & Goals     Therapist recommended that patient continue to set clear boundaries and limits within her relationships, as discussed in session.  She could benefit from exploring creative outlets to express her emotions, while also engaging in imaginative play with her daughter (age 3), in order to reinforce their bond and relationship with each other, which could provide reassurance to patient for reduction in symptoms of depression and anxiety.    Patient acknowledged and verbally consented to continue working toward resolving current treatment plan goals and was educated on the importance of participation in the therapeutic process.  Patient will remain compliant with medication regimen as prescribed. Discuss any medication side effects, questions or concerns with prescribing provider.  Call 911 or present to the nearest emergency room in an emergency situation.  National Suicide Prevention Lifeline: Call 988. The Lifeline provides 24/7, free and confidential support for people in distress, prevention and crisis resources.  Crisis Text Line  Text  HOME To 571536    Return in about 2 weeks (around 11/29/2023) for Next scheduled follow up.    ____________________  This document has been electronically signed by Yareli Yeung LCSW  November 15, 2023 17:04 EST    Part of this note may be an electronic transcription/translation of spoken language to printed text using the Dragon Dictation System.

## 2023-11-08 NOTE — PATIENT INSTRUCTIONS
1.  Please return to clinic at your next scheduled visit.  Contact the clinic ([phone]) at least 24 hours prior in the event you need to cancel.  2.  Do no harm to yourself or others.    3.  Avoid alcohol and drugs.    4.  Take all medications as prescribed.  Please contact the clinic with any concerns. If you are in need of medication refills, please call the clinic at [phone].    5. Should you want to get in touch with your provider, Dr. Eli Murray, please utilize [employer] or contact the office ([phone]), and staff will be able to page Dr. Murray directly.  6.  In the event you have personal crisis, contact the following crisis numbers: Suicide Prevention Hotline 1-878.849.1506; JOSE Helpline 4-450-749-BOFS; [facility] Emergency Room [phone]; text HELLO to 925220; or 791.     Take 4 mg nightly of abilify x7d, then start 5 mg nightly.    Light box/Happy Light/Light Therapy: Obtain a light box, 10,000 lux, put the box about 1 foot away from you, facing you at an angle (not directly), use it daily, right after waking up, for 1 hour daily. Don't stare directly at it. Read, eat, watch tv, etc. Call your insurance company to see if they can reimburse you for the cost. Available at big box retailers. I used [employer] to get a [place] one.

## 2023-11-15 ENCOUNTER — OFFICE VISIT (OUTPATIENT)
Dept: PSYCHIATRY | Facility: CLINIC | Age: 28
End: 2023-11-15
Payer: COMMERCIAL

## 2023-11-15 DIAGNOSIS — F33.0 MILD EPISODE OF RECURRENT MAJOR DEPRESSIVE DISORDER: ICD-10-CM

## 2023-11-15 DIAGNOSIS — F41.1 GENERALIZED ANXIETY DISORDER: Primary | ICD-10-CM

## 2023-11-15 DIAGNOSIS — F90.0 ADHD (ATTENTION DEFICIT HYPERACTIVITY DISORDER), INATTENTIVE TYPE: ICD-10-CM

## 2023-11-15 NOTE — PSYCHOTHERAPY NOTE
Psychotherapy Note - November 15, 2023    Daughter - Alexandra (age 3)   - Toño - started dating in 10th grade  Best friend - Annette - friends for years     Sister: Mary     I was diagnosed with hypothyroidism - on medicine      My cousin - Rebeca batista (undiagnosed autism, adhd undiagnosed)      TO PRACTICE  Grateful for  Forgive myself for  ____________      There's 9 of us and I was the only grandkid that showed up to my papaw's 80-year-old birthday

## 2023-12-06 NOTE — PROGRESS NOTES
Progress Note    Date: 12/13/2023  Time In: 1500  Time Out: 1556    Patient Legal Name: Janice Padilla  Patient Age: 28 y.o.    CHIEF COMPLAINT: Anxiety, Depression, ADHD    Subjective   History of Present Illness     From previous progress note on 11/15/23:  Therapist recommended that patient continue to set clear boundaries and limits within her relationships, as discussed in session.  She could benefit from exploring creative outlets to express her emotions, while also engaging in imaginative play with her daughter (age 3), in order to reinforce their bond and relationship with each other, which could provide reassurance to patient for reduction in symptoms of depression and anxiety.    Assessment    Mental Status Exam     Appearance: good hygiene and dressed appropriately for the weather  Behavior: calm  Cooperation:  engaged, cooperative, attentive, and friendly  Eye Contact:  good  Affect:  congruent and bright  Mood: euthymic  Speech: responsive  Thought Process:  linear, organized, and goal-oriented  Thought Content: appropriate and abstract  Suicidal: denies  Homicidal:  denies  Hallucinations:  denies  Memory:  intact  Orientation:  person, place, time, and situation  Reliability:  reliable  Insight:  good  Judgment:  good     Clinical Intervention       ICD-10-CM ICD-9-CM   1. Generalized anxiety disorder  F41.1 300.02   2. Mild episode of recurrent major depressive disorder  F33.0 296.31   3. ADHD (attention deficit hyperactivity disorder), inattentive type  F90.0 314.00        Janice is a 28 y.o. female who presents today as a follow-up for continued psychotherapy. Individual psychotherapy was provided utilizing CBT techniques to encourage expression of thoughts and feelings, establish new coping skills, manage stress, recognize patterns of behavior, acknowledge sources of feelings and behaviors, challenge negative thinking patterns, and provide support. Discussed the connection between anxiety and  "perfectionism.  Discussed the role of supportive relationships in anxiety management.   Encouraged identifying and challenging \"what if\" thinking patterns.   Patient reflected on her progress in therapy, and we completed her treatment plan review.  She states that her anxiety average is at a 3-4 (on a scale of 1-10; 1=least anxious, 10=most anxious) and her depression averages at a 2.  She reports that her symptoms of ADHD have been more prominent (5/10), and therapist encouraged patient to utilize tools to her benefit (i.e. calendars, lists, reminders, etc.) to improve her focus and to reduce distractions.     Plan   Plan & Goals     Overall, patient appears to be managing her symptoms well.  She is likely overcompensating out of anticipation for an increase in anxiety, and she was encouraged to accept aspects of her life that she has no control over, as discussed in session.  We will continue to explore adaptive coping mechanisms and ways in which patient can challenge negative thinking patterns.    Patient acknowledged and verbally consented to continue working toward resolving current treatment plan goals and was educated on the importance of participation in the therapeutic process.  Patient will remain compliant with medication regimen as prescribed. Discuss any medication side effects, questions or concerns with prescribing provider.  Call 911 or present to the nearest emergency room in an emergency situation.  National Suicide Prevention Lifeline: Call 988. The Lifeline provides 24/7, free and confidential support for people in distress, prevention and crisis resources.  Crisis Text Line  Text HOME To 460284    Return in about 2 weeks (around 12/27/2023) for Next scheduled follow up.    ____________________  This document has been electronically signed by Yareli Yeung LCSW  December 13, 2023 17:12 EST    Part of this note may be an electronic transcription/translation of spoken language to printed text " using the Dragon Dictation System.

## 2023-12-13 ENCOUNTER — OFFICE VISIT (OUTPATIENT)
Dept: PSYCHIATRY | Facility: CLINIC | Age: 28
End: 2023-12-13
Payer: COMMERCIAL

## 2023-12-13 DIAGNOSIS — F41.1 GENERALIZED ANXIETY DISORDER: Primary | ICD-10-CM

## 2023-12-13 DIAGNOSIS — F33.0 MILD EPISODE OF RECURRENT MAJOR DEPRESSIVE DISORDER: ICD-10-CM

## 2023-12-13 DIAGNOSIS — F90.0 ADHD (ATTENTION DEFICIT HYPERACTIVITY DISORDER), INATTENTIVE TYPE: ICD-10-CM

## 2023-12-13 PROCEDURE — 90837 PSYTX W PT 60 MINUTES: CPT | Performed by: SOCIAL WORKER

## 2023-12-13 NOTE — PSYCHOTHERAPY NOTE
Psychotherapy Note - December 13, 2023    Daughter - Alexandra (age 3)   - Toño - started dating in 10th grade  Best friend - Annette - friends for years     Sister: Mary  Niece: Jenni (age 10)    I was diagnosed with hypothyroidism - on medicine      My cousin - Rebeca batista (undiagnosed autism, adhd undiagnosed)      TO PRACTICE  Grateful for  Forgive myself for  ____________    Playing with Alexandra Lundberg had surgery on his nasal pollups    My mom stayed the night and helped with Alexandra Lundberg's dad was amazing    I wish I could know more about my dad.  My mamaw is not an emotional person.  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

## 2023-12-13 NOTE — TREATMENT PLAN
"Multi-Disciplinary Problems (from Behavioral Health Treatment Plan)      Active Problems       Problem: Anxiety  Start Date: 12/13/23      Problem Details: The patient self-scales this problem as a 3 with 10 being the worst.          Goal Priority Start Date Expected End Date End Date    Patient will develop and implement behavioral and cognitive strategies to reduce anxiety and irrational fears. -- 12/13/23 -- --    Goal Details: Progress toward goal:  \"it's very situational, but my anxiety is under control. I reason with myself and recognizing the anxiety when it's there helps a lot.\"          Goal Intervention Frequency Start Date End Date    Help patient explore past emotional issues in relation to present anxiety. Weekly 12/13/23 --    Intervention Details: Duration of treatment until until remission of symptoms.          Goal Intervention Frequency Start Date End Date    Help patient develop an awareness of their cognitive and physical responses to anxiety. Weekly 12/13/23 --    Intervention Details: Duration of treatment until until remission of symptoms.                  Problem: Depression  Start Date: 12/13/23      Problem Details: The patient self-scales this problem as a 2 with 10 being the worst.          Goal Priority Start Date Expected End Date End Date    Patient will demonstrate the ability to initiate new constructive life skills outside of sessions on a consistent basis. -- 12/13/23 -- --    Goal Details: Progress toward goal:  \"Having a project helps.\"          Goal Intervention Frequency Start Date End Date    Assist patient in setting attainable activities of daily living goals. PRN 12/13/23 --      Goal Intervention Frequency Start Date End Date    Provide education about depression Weekly 12/13/23 --    Intervention Details: Duration of treatment until until remission of symptoms.          Goal Intervention Frequency Start Date End Date    Assist patient in developing healthy coping " "strategies. Weekly 12/13/23 --    Intervention Details: Duration of treatment until until remission of symptoms.                  Problem: ADHD (Adult)  Start Date: 12/13/23      Problem Details: The patient self-scales this problem as a 5 with 10 being the worst.        Goal Priority Start Date Expected End Date End Date    Patient will sustain attention and concentration to complete chores, and work responsibilites and increase positive interaction in all relationships. -- 12/13/23 -- --    Goal Details: Progress toward goal:  \"I notice when I don't have my meds. I use my pill divider. I make a list for everything. I use a calendar.\"        Goal Intervention Frequency Start Date End Date    Assist patient in setting responsible goals and breaking down large tasks. Weekly 12/13/23 --    Intervention Details: Duration of treatment until until remission of symptoms.        Goal Intervention Frequency Start Date End Date    Assist patient in using self monitoring checklist to improve attention, work performance, and social skills. Weekly 12/13/23 --    Intervention Details: Duration of treatment until until remission of symptoms.                        Reviewed By       Yareli Yeung LCSW 12/13/23 2121                     I have discussed and reviewed this treatment plan with the patient.     "

## 2023-12-15 ENCOUNTER — OFFICE VISIT (OUTPATIENT)
Dept: PSYCHIATRY | Facility: CLINIC | Age: 28
End: 2023-12-15
Payer: COMMERCIAL

## 2023-12-15 VITALS
WEIGHT: 181.4 LBS | HEIGHT: 62 IN | DIASTOLIC BLOOD PRESSURE: 85 MMHG | BODY MASS INDEX: 33.38 KG/M2 | SYSTOLIC BLOOD PRESSURE: 121 MMHG | HEART RATE: 81 BPM

## 2023-12-15 DIAGNOSIS — F41.1 GENERALIZED ANXIETY DISORDER: Primary | ICD-10-CM

## 2023-12-15 DIAGNOSIS — F41.0 PANIC ATTACKS: ICD-10-CM

## 2023-12-15 DIAGNOSIS — F51.05 INSOMNIA DUE TO MENTAL CONDITION: ICD-10-CM

## 2023-12-15 DIAGNOSIS — F33.0 MILD EPISODE OF RECURRENT MAJOR DEPRESSIVE DISORDER: ICD-10-CM

## 2023-12-15 DIAGNOSIS — F90.0 ADHD (ATTENTION DEFICIT HYPERACTIVITY DISORDER), INATTENTIVE TYPE: ICD-10-CM

## 2023-12-15 RX ORDER — SEMAGLUTIDE 2.4 MG/.75ML
INJECTION, SOLUTION SUBCUTANEOUS
COMMUNITY
Start: 2023-11-29

## 2023-12-15 RX ORDER — DEXTROAMPHETAMINE SACCHARATE, AMPHETAMINE ASPARTATE, DEXTROAMPHETAMINE SULFATE AND AMPHETAMINE SULFATE 5; 5; 5; 5 MG/1; MG/1; MG/1; MG/1
1 TABLET ORAL 2 TIMES DAILY
Qty: 60 TABLET | Refills: 0 | Status: SHIPPED | OUTPATIENT
Start: 2023-12-15

## 2023-12-15 RX ORDER — DEXTROAMPHETAMINE SACCHARATE, AMPHETAMINE ASPARTATE, DEXTROAMPHETAMINE SULFATE AND AMPHETAMINE SULFATE 2.5; 2.5; 2.5; 2.5 MG/1; MG/1; MG/1; MG/1
1 TABLET ORAL DAILY
Qty: 30 TABLET | Refills: 0 | Status: SHIPPED | OUTPATIENT
Start: 2023-12-15 | End: 2023-12-17

## 2023-12-15 NOTE — PROGRESS NOTES
"Subjective   Janice Padilla is a 28 y.o. female who presents today for follow up    Referring Provider:  No referring provider defined for this encounter.    Chief Complaint:  adhd cornel mdd    History of Present Illness:     Janice Padilla is a 25 year old /White female referred by Balbina SEAMAN.     Chart review : Patient is on Wellbutrin, status post Zoloft. Also on Adderall. Had tachycardia at 130. Started on metoprolol 50 mg extended release daily. Labs in December: BMI 35, LDL is elevated at 121, ALT AST within normal limits, creatinine 0.99, hematocrit 45, electrolytes are essentially normal, TSH is 3.65 normal, iron is 53 low, vitamin D is 23 low, ferritin is normal at 96. No EKG or head imaging.     \"Janice\"  Father passed away from drunk driving at 6 yo. Last true memory of father was in .  Recently dx'd with hypothyroidism        12/15: In person interview:  Chart review:   [facility] x2.  Fam medx2, tsh normal, vit D low.  Fam med, therapy x4, reassuring lipids, cmp; abnl fT4, TSH.  seen by ob x2, psychotherapy x2. AUB, infertility.  Planning:   Consider strattera for ADHD. : Increase abilify for anxiety. Consider a light box.  \"I've been ok.\"  No problems or issues.  No more depressive episodes.  Seasonal pattern.   Mood/Depression: stable mood  ADHD: under control  Anxiety: stable   Panic attacks: n  Energy: good  Concentration: at goal  Sleeping: stable  Eatin lbs  Refills: y  Substances: def  Therapy: [facility]  Medication compliant: y  SE: n  No SI HI AVH.      : In person interview:  Chart review:   Fam medx2, tsh normal, vit D low.  Fam med, therapy x4, reassuring lipids, cmp; abnl fT4, TSH.  seen by ob x2, psychotherapy x2. AUB, infertility.  Planning:   STARTED Vit D. 10/11: Re-emerging depression, not seasonal, likely related to dc'g abilify. Restart abilify and start lamictal for further mood stability at pt's request.  : improved, no changes 3m  Much " "improved, no changes. Well.   \"I did not start the lamictal, because I trust you.\"  I'm better  No more depressive episodes.  Sometimes I get irritable, angry. This is new, but was present before the abilify.  Recently dx'd with hypothyroidism: fatigue, hair falling out, weight gain.  Seasonal pattern. Father's passing also influences.  Haven't started clomid. Not trying to get pregnant.   Mood/Depression: improved mood  ADHD: under control  Anxiety: minimal, some days she fixates  Panic attacks: n  Energy: good  Concentration: at goal  Sleeping: stable  Eating: stable  Refills: y  Substances: def  Therapy: [facility]  Medication compliant: y  SE: n  No SI HI AVH.      10/11: In person interview:  Chart review:   Fam med, therapy x4, reassuring lipids, cmp; abnl fT4, TSH.  seen by ob x2, psychotherapy x2. AUB, infertility.  Plannin/14: improved, no changes 3m  Much improved, no changes. Well.   \"For the past 3 weeks I've been very depressed.\"  I didn't want to get out of bed, and then I'd have enough energy to clean the house.  So \"up and down\"  Recently dx'd with hypothyroidism: fatigue, hair falling out, weight gain.  Haven't started clomid.  Trying to get pregnant. We discussed how abilify does not affect pregnancy.  Mood/Depression: good  ADHD: under control  Anxiety: minimal, some days she fixates  Panic attacks: n  Energy: good  Concentration: at goal  Sleeping: stable  Eating: stable  Refills: y  Substances: def  Therapy: [facility]  Medication compliant: y  SE: n  No SI HI AVH.      : In person interview:  Chart review: seen by ob x2, psychotherapy x2. AUB, infertility.  Planning: Much improved, no changes. Well.   \"I tapered off abilify.\"  Haven't started clomid.  Trying to get pregnant. We discussed how abilify does not affect pregnancy.  Mood/Depression: good  ADHD: under control  Anxiety: minimal, some days she fixates  Panic attacks: n  Energy: good  Concentration: at goal  Sleeping: stable  Eating: " "stable  Refills: y  Substances: def  Therapy: n  Medication compliant: y  SE: n  No SI HI AVH.      : In person interview:  Chart review: Seeing psychotherapy.  UDS positive for amphetamines and THC most recently.  Patient had stopped using THC gummies.  Planning: House almost done. Continue therapy with [facility]. Start abilify. Increase gabapentin to 300 mg daily prn anxiety. Start hydroxyzine nightly for insomnia. Minimal effect on dep and anx (not at goal).  [facility] her someplace else besides [facility].  \"I've been good.\"  Living in our house. It has changed everything.   abilify has helped.  Mood/Depression: good mood  Anxiety: I feel a lot calmer  ADHD: under control  Panic attacks: n  Energy: n  Concentration: adhd under control  Sleeping: better on hydroxyzine.  Eatin lb wl 10/22  Refills: y  Substances: def  Therapy: [facility]  Medication compliant: no, stopped gabapentin  SE: n  No SI HI AVH.      3/17: In person interview:  Chart review: No new peer  Planning: Stop THC gummies as they are now a schedule I substance. Start gabapentin for anx. Stop hydroxyzine. Refer to [facility].   Patient goals:  Lifestyle changes:  Self-compassion:   Maladaptive thinking:  Improving interpersonal relationships:  Expressing difficult emotions:  Taking the perspective of others:  \"I can't really tell.\"  Has taken up to 200 mg of gabapentin at a time, and   Mood/Depression: ups and downs  Anxiety: irritable, on edge  Panic attacks: none since  Energy: stable  Concentration: ADHD: under control  Sleeping: initial insomnia  Eatin lb wl   Refills: y  Substances: n  Therapy: waitlisted at [facility]  Medication compliant:  No SI HI AVH.    ...    IMPORTANT:  From previous note, patient was held back in the third grade. Struggled to complete high school and missed 60 days although she did graduate with good grades. Got in trouble for talking a lot in class. She was not in any special classes. She did not have an IEP. " "    1/6/21 H&P: Virtual visit via Zoom audio and video due to the COVID-19 pandemic. Patient is accepting of and agreeable to appointment. The appointment consisted of the patient and I only. Interview: Patient reports that she had her first child in March and suffered from postpartum depression. The pandemic did not help things. It was a \"hard time,\" and the patient reports she had already had anxiety before. Patient is presently bottlefeeding. She became pregnant through an infertility specialist. Presently not on any contraception.   Endorses muscle tension, fatigue, poor concentration, restlessness, irritability, and some broken sleep, although she is sleeping for about 8 hours a night. She wakes up frequently to check on her baby girl, Alexandra. Also endorses guilt at being a bad mom. Duration has been since March of last year. Patient feels her anxiety is worse than her depression.   Denies SI HI AVH. Has access to weapons that are locked in a gun safe. Patient does not know the combination. She also does not know how to work the guns that are in the safe. Psychiatric review of systems is positive for anxiety and depression, negative for psychosis and maribel.   Possible ADHD. Patient was diagnosed by Dr. Joe in 2017. 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 Possible family history as well as her sister may have ADHD.   Past Psychiatric History:  Began Psychiatric Treatment: Since 2011   Dx: Anxiety   Psychiatrist: Has not seen a psychiatrist   Therapist: Saw therapist in 2018x1, unsure if it was helpful.   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     Feeling down, depressed, or hopeless?     Trouble falling or staying asleep, or sleeping too much?     Feeling tired or having little energy?     Poor appetite or overeating?     Feeling bad about yourself - or that you are a failure or have let yourself or your family down?     Trouble concentrating on things, such as reading the newspaper or watching television?     Moving or speaking so slowly that other people could have noticed? Or the opposite - being so fidgety or restless that you have been moving around a lot more than usual?     Thoughts that you would be better off dead, or of hurting yourself in some way?     PHQ-9 Total Score       MATTHEW-7       Past Surgical History:  Past Surgical History:   Procedure Laterality Date    ADENOIDECTOMY      DILATATION AND CURETTAGE      SKIN BIOPSY      TONSILLECTOMY         Problem List:  Patient Active Problem List   Diagnosis    Acne    Anxiety    Attention deficit hyperactivity disorder (ADHD)    Depression    Generalized anxiety disorder    Gestational hypertension    Major depressive disorder in partial remission    PCOS  (polycystic ovarian syndrome)    Pre-eclampsia       Allergy:   No Known Allergies     Discontinued Medications:  There are no discontinued medications.          Current Medications:   Current Outpatient Medications   Medication Sig Dispense Refill    amphetamine-dextroamphetamine (ADDERALL) 10 MG tablet Take 1 tablet by mouth Daily. 30 tablet 0    amphetamine-dextroamphetamine (ADDERALL) 20 MG tablet Take 1 tablet by mouth 2 (Two) Times a Day. 60 tablet 0    ARIPiprazole (Abilify) 5 MG tablet Take 1 tablet by mouth Daily. 90 tablet 1    DULoxetine (CYMBALTA) 30 MG capsule Take 1 capsule by mouth Daily. 90 capsule 1    hydrOXYzine (ATARAX) 50 MG tablet TAKE 2 TABLETS EVERY NIGHT 180 tablet 3    levothyroxine (SYNTHROID, LEVOTHROID) 25 MCG tablet Take 1 tablet by mouth Every Morning. 90 tablet 1    metoprolol succinate XL (Toprol XL) 25 MG 24 hr tablet Take 1 tablet by mouth Daily. 90 tablet 1    Prenatal MV & Min w/FA-DHA (PRENATAL ADULT GUMMY/DHA/FA PO) Take  by mouth.      Soolantra 1 % cream       spironolactone (ALDACTONE) 100 MG tablet spironolactone 100 mg oral tablet take 1 tablet (100 mg) by oral route once daily   Active      vitamin D (ERGOCALCIFEROL) 1.25 MG (59011 UT) capsule capsule Take 1 capsule by mouth 1 (One) Time Per Week. 13 capsule 1    Wegovy 2.4 MG/0.75ML solution auto-injector        No current facility-administered medications for this visit.       Past Medical History:  Past Medical History:   Diagnosis Date    Acne     ADHD     Anxiety     Depression     Generalized anxiety disorder 01/06/2021    Gestational hypertension     History of medical problems 02/28/2019    Miscarriage    Major depressive disorder in partial remission 01/06/2021    PCOS (polycystic ovarian syndrome)     Pre-eclampsia        Social History     Socioeconomic History    Marital status:    Tobacco Use    Smoking status: Never     Passive exposure: Never    Smokeless tobacco: Never    Tobacco comments:      2/10/2021- 1/6/2021   Vaping Use    Vaping Use: Never used   Substance and Sexual Activity    Alcohol use: Yes     Comment: light; 2/10/2021- 1/6/2021    Drug use: Never     Comment: 2/10/2021- 1/6/2021    Sexual activity: Yes     Partners: Male     Birth control/protection: None       Family History   Problem Relation Age of Onset    Anxiety disorder Mother     Bipolar disorder Mother     Depression Mother     Depression Sister     ADD / ADHD Sister     Diabetes Maternal Grandfather     Breast cancer Neg Hx     Ovarian cancer Neg Hx     Uterine cancer Neg Hx     Colon cancer Neg Hx     Pulmonary embolism Neg Hx     Deep vein thrombosis Neg Hx        Mental Status Exam:   Hygiene:   good, groomed  Cooperation:  Cooperative  Eye Contact:  Good  Psychomotor Behavior:  Appropriate  Affect:  euthymic,  good variability, congruent  Mood: I'm ok  Hopelessness: Denies  Speech:  Normal  Thought Process:  Goal directed  Thought Content:  Normal  Suicidal:  None  Homicidal:  None  Hallucinations:  None  Delusion:  None  Memory:  Intact  Orientation:  Person, Place, Time and Situation  Reliability:  good  Insight:  Fair  Judgement:  Fair  Impulse Control:  Fair  Physical/Medical Issues:  No      Review of Systems:  Review of Systems   Constitutional:  Negative for diaphoresis and fatigue.   HENT:  Negative for drooling.    Eyes:  Negative for visual disturbance.   Respiratory:  Negative for cough and shortness of breath.    Cardiovascular:  Negative for chest pain, palpitations and leg swelling.   Gastrointestinal:  Negative for diarrhea, nausea and vomiting.   Endocrine: Negative for cold intolerance and heat intolerance.   Genitourinary:  Negative for difficulty urinating and frequency.   Musculoskeletal:  Negative for joint swelling.   Skin:  Negative for rash.   Allergic/Immunologic: Negative for immunocompromised state.   Neurological:  Negative for dizziness, seizures, syncope, speech difficulty, light-headedness,  "numbness and headaches.         Physical Exam:  Physical Exam    Vital Signs:   /85   Pulse 81   Ht 157.5 cm (62\")   Wt 82.3 kg (181 lb 6.4 oz)   BMI 33.18 kg/m²      Lab Results:   Office Visit on 10/18/2023   Component Date Value Ref Range Status    TSH 10/18/2023 3.020  0.270 - 4.200 uIU/mL Final    T Uptake 10/18/2023 1.03  0.80 - 1.30 TBI Final    T4, Total 10/18/2023 8.10  4.50 - 11.70 mcg/dL Final    T4 results may be falsely increased if patient taking Biotin.    25 Hydroxy, Vitamin D 10/18/2023 28.2 (L)  30.0 - 100.0 ng/ml Final   Orders Only on 08/24/2023   Component Date Value Ref Range Status    T3, Free 08/23/2023 2.76  2.00 - 4.40 pg/mL Final    Free T4 08/23/2023 0.81 (L)  0.93 - 1.70 ng/dL Final   Office Visit on 08/23/2023   Component Date Value Ref Range Status    Hepatitis C Ab 08/23/2023 Non-Reactive  Non-Reactive Final    Glucose 08/23/2023 91  65 - 99 mg/dL Final    BUN 08/23/2023 13  6 - 20 mg/dL Final    Creatinine 08/23/2023 0.80  0.57 - 1.00 mg/dL Final    Sodium 08/23/2023 139  136 - 145 mmol/L Final    Potassium 08/23/2023 4.2  3.5 - 5.2 mmol/L Final    Chloride 08/23/2023 103  98 - 107 mmol/L Final    CO2 08/23/2023 23.9  22.0 - 29.0 mmol/L Final    Calcium 08/23/2023 8.9  8.6 - 10.5 mg/dL Final    Total Protein 08/23/2023 6.9  6.0 - 8.5 g/dL Final    Albumin 08/23/2023 4.7  3.5 - 5.2 g/dL Final    ALT (SGPT) 08/23/2023 22  1 - 33 U/L Final    AST (SGOT) 08/23/2023 28  1 - 32 U/L Final    Alkaline Phosphatase 08/23/2023 51  39 - 117 U/L Final    Total Bilirubin 08/23/2023 0.2  0.0 - 1.2 mg/dL Final    Globulin 08/23/2023 2.2  gm/dL Final    A/G Ratio 08/23/2023 2.1  g/dL Final    BUN/Creatinine Ratio 08/23/2023 16.3  7.0 - 25.0 Final    Anion Gap 08/23/2023 12.1  5.0 - 15.0 mmol/L Final    eGFR 08/23/2023 102.4  >60.0 mL/min/1.73 Final    Total Cholesterol 08/23/2023 171  0 - 200 mg/dL Final    Triglycerides 08/23/2023 131  0 - 150 mg/dL Final    HDL Cholesterol 08/23/2023 54  " 40 - 60 mg/dL Final    LDL Cholesterol  08/23/2023 94  0 - 100 mg/dL Final    VLDL Cholesterol 08/23/2023 23  5 - 40 mg/dL Final    LDL/HDL Ratio 08/23/2023 1.68   Final    TSH 08/23/2023 5.820 (H)  0.270 - 4.200 uIU/mL Final    Magnesium 08/23/2023 2.3  1.6 - 2.6 mg/dL Final    Phosphorus 08/23/2023 2.9  2.5 - 4.5 mg/dL Final    WBC 08/23/2023 10.97 (H)  3.40 - 10.80 10*3/mm3 Final    RBC 08/23/2023 4.58  3.77 - 5.28 10*6/mm3 Final    Hemoglobin 08/23/2023 13.4  12.0 - 15.9 g/dL Final    Hematocrit 08/23/2023 40.2  34.0 - 46.6 % Final    MCV 08/23/2023 87.8  79.0 - 97.0 fL Final    MCH 08/23/2023 29.3  26.6 - 33.0 pg Final    MCHC 08/23/2023 33.3  31.5 - 35.7 g/dL Final    RDW 08/23/2023 13.5  12.3 - 15.4 % Final    RDW-SD 08/23/2023 42.7  37.0 - 54.0 fl Final    MPV 08/23/2023 9.4  6.0 - 12.0 fL Final    Platelets 08/23/2023 296  140 - 450 10*3/mm3 Final    Neutrophil % 08/23/2023 62.3  42.7 - 76.0 % Final    Lymphocyte % 08/23/2023 28.5  19.6 - 45.3 % Final    Monocyte % 08/23/2023 6.1  5.0 - 12.0 % Final    Eosinophil % 08/23/2023 1.1  0.3 - 6.2 % Final    Basophil % 08/23/2023 0.5  0.0 - 1.5 % Final    Immature Grans % 08/23/2023 1.5 (H)  0.0 - 0.5 % Final    Neutrophils, Absolute 08/23/2023 6.84  1.70 - 7.00 10*3/mm3 Final    Lymphocytes, Absolute 08/23/2023 3.13 (H)  0.70 - 3.10 10*3/mm3 Final    Monocytes, Absolute 08/23/2023 0.67  0.10 - 0.90 10*3/mm3 Final    Eosinophils, Absolute 08/23/2023 0.12  0.00 - 0.40 10*3/mm3 Final    Basophils, Absolute 08/23/2023 0.05  0.00 - 0.20 10*3/mm3 Final    Immature Grans, Absolute 08/23/2023 0.16 (H)  0.00 - 0.05 10*3/mm3 Final    nRBC 08/23/2023 0.0  0.0 - 0.2 /100 WBC Final       EKG Results:  No orders to display       Imaging Results:  No Images in the past 120 days found..      Assessment & Plan   Diagnoses and all orders for this visit:    1. Generalized anxiety disorder (Primary)    2. Mild episode of recurrent major depressive disorder    3. ADHD (attention  deficit hyperactivity disorder), inattentive type    4. Insomnia due to mental condition    5. Panic attacks        Presentation most consistent with major depressive disorder in partial remission, generalized anxiety disorder, and ADHD.  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  Also endorses a childhood history that is compelling.      12/15: stable, well, no changes.    Allowed patient to freely discuss and process issues, such as:  Recent improvement in mood.   Ongoing: Planned for pregnancy. Stay on cymbalta, adderall, stay off abilify for now. Close monitoring during pregnany q6wks. Watch PP depression.   ... using Saud (Kirk Kamara) psychotherapeutic techniques including unconditional positive regard, reflective listening, and demonstrating clear empathy.     Time (minutes) spent providing supportive psychotherapy: 16  Functional status: mild impairment  Treatment plan: Medication management and supportive psychotherapy  Prognosis: good  Progress: stable  3m    11/8: Increase abilify for anxiety. Consider a light box.    10/11: Re-emerging depression, not seasonal, likely related to dc'g abilify. Restart abilify and start lamictal for further mood stability at pt's request.    7/14: improved, no changes 3m    5/12: Much improved, no changes. Well. 16    3/17: House almost done. Continue therapy with Y[facility]. Start abilify. Increase gabapentin to 300 mg daily prn anxiety. Start hydroxyzine nightly for insomnia. Minimal effect on dep and anx (not at goal). 17     2/23: Stop THC gummies as they are now a schedule I substance. Start gabapentin for anx. Stop hydroxyzine. [facility] to [facility]. 17    12/16: Well, stable, no changes.  3 months    10/19: Doing well, no changes. Anx, dep, adhd under control. 17      9/9: Wants to be on less meds. Taper off bupropion. Continue other meds. Let's streamline the meds. 16    7/27: Stop prozac, 2 week washout, start cymbalta. Consider abilify on top of bupropion. 17     6/15: Residual anxiety, increase prozac. Goal is to increase until maxed out; then possible switch. Depression has resolved. 18     5/4: Therapy referral.  Increase Prozac.  May consider switching from bupropion to Abilify in the future.  Patient was not able to tolerate bupropion 450 mg daily.  17     3/16: No change to medications, no side effects.  Living with in-laws at their house has been a source of anxiety for the patient.  Also taking care of her daughter, who she is very protective of.  Some issues with keeping restraint during therapy sessions as well.  17    2/2: Anxious about the weather, and its consequences (closed daycares, etc.). 18     12/22: Increase Prozac to target anxiety, increase hydroxyzine to target insomnia. 17     11/17: Some ADHD symptoms re-emerging. Add 10 mg adderall at 11 am. Schedule will be 7:30 first dose, 11 second higher dose of 30 mg. Pt also interested in therapy. 17     10/15: Start prozac. Hydroxyzine at night calms her; consider adding am dose.     8/20: Patient is doing extremely well.  No change to medications.      6/18: Doing a little better, however patient was doing much better on Adderall 20 mg twice a day, which was her previous dose.  Discontinue Adderall XR and start Adderall immediate release 20 mg twice a day.  Some residual depressive and anxiety symptoms that are well controlled on bupropion.  Treating ADHD will also treat residual depressive and anxiety symptoms as well.  See back in 2 months.  Declines psychotherapy.    Visit Diagnoses:    ICD-10-CM ICD-9-CM   1. Generalized anxiety disorder  F41.1 300.02   2. Mild episode of recurrent major depressive disorder  F33.0 296.31   3. ADHD (attention deficit hyperactivity disorder), inattentive type  F90.0  314.00   4. Insomnia due to mental condition  F51.05 300.9     327.02   5. Panic attacks  F41.0 300.01       PLAN:  Risk Assessment: Risk of self-harm acutely is low. Risk factors include anxiety disorder, mood disorder, access to weapons, recent psychosocial stressors. Protective factors include no family history, no present SI, no history of suicide attempts or self-harm in the past, minimal AODA, healthcare seeking, future orientation, willingness to engage in care,  baby. Risk of self-harm chronically is also low, but could be further elevated in the event of treatment noncompliance and/or AODA.  Safety: No acute safety concerns.  Medications:   CONTINUE abilify 5 mg nightly. Risks, benefits, alternatives discussed with patient including increased energy, exacerbation of irritability, akathisia, GI upset, orthostatic hypotension, increased appetite, tardive dyskinesia.  After discussion of these risks and benefits, the patient voiced understanding and agreed to proceed.  NEVER STARTED lamictal 25 mg qhs x14 nights, then 50 mg qhs. Risks, benefits, alternatives discussed with patient including rash, rebound depressive or manic symptoms if prompt discontinuation, GI upset, agitation, sedation/falls risk.  Use care when operating vehicle, vessel, or machine. After discussion of these risks and benefits, patient voiced understanding and agreed to proceed.  CONTINUE cymbalta 30 mg daily. Risks, benefits, alternatives discussed with patient including GI upset, nausea vomiting diarrhea, theoretical decrease of seizure threshold predisposing the patient to a slightly higher seizure risk, headaches, sexual dysfunction, serotonin syndrome, bleeding risk, increased suicidality in patients 24 years and younger.  Also constipation and urinary retention.  After discussion of these risks and benefits, the patient voiced understanding and agreed to proceed.   CONTINUE hydroxyzine 100 mg PO QHS. Risks, benefits,  alternatives discussed with patient including sedation, dizziness, fall risk, GI upset, and risk of increased CNS depression and elevated heart rate if taken with other antihistamines.  After discussion of these risks and benefits, the patient voiced understanding and agreed to proceed.  CONTINUE Adderall 30 mg PO QAM, 20 mg PO QPM. Risks, benefits, side effects discussed with patient including elevated heart rate, elevated blood pressure, irritability, insomnia, sexual dysfunction, appetite suppressing properties, psychosis.  After discussion of these risks and benefits, the patient voiced understanding and agreed to proceed. Controlled substances consent verbally signed. UDS and Tom ordered.  S/P   gabapentin 300 mg qday prn anxiety. No difference, so stopped 5/23 prozac 60 mg ineffective.  Escitalopram: sexual dysfunction  Adderall XR 10 mg daily: not effective  Bupropion  made her nauseated. 300 mg made her sweat (irritable?)  Therapy: Consider in the future, presently not interested.  Labs/Studies: EKG reassuring.      TREATMENT PLAN/GOALS: Continue supportive psychotherapy efforts and medications as indicated. Treatment and medication options discussed during today's visit. Patient acknowledged and verbally consented to continue with current treatment plan and was educated on the importance of compliance with treatment and follow-up appointments.    MEDICATION ISSUES:  TOM reviewed as expected.  Discussed medication options and treatment plan of prescribed medication as well as the risks, benefits, and side effects including potential falls, possible impaired driving and metabolic adversities among others. Patient is agreeable to call the office with any worsening of symptoms or onset of side effects. Patient is agreeable to call 911 or go to the nearest ER should he/she begin having SI/HI. No medication side effects or related complaints today.     MEDS ORDERED DURING VISIT:  No orders of the  defined types were placed in this encounter.      Return in about 3 months (around 3/15/2024).         This document has been electronically signed by Eli Murray MD  December 15, 2023 15:35 EST

## 2023-12-15 NOTE — PATIENT INSTRUCTIONS
1.  Please return to clinic at your next scheduled visit.  Contact the clinic ([phone]) at least 24 hours prior in the event you need to cancel.  2.  Do no harm to yourself or others.    3.  Avoid alcohol and drugs.    4.  Take all medications as prescribed.  Please contact the clinic with any concerns. If you are in need of medication refills, please call the clinic at [phone].    5. Should you want to get in touch with your provider, Dr. Eli Murray, please utilize [employer] or contact the office ([phone]), and staff will be able to page Dr. Murray directly.  6.  In the event you have personal crisis, contact the following crisis numbers: Suicide Prevention Hotline 1-142.450.1963; JOSE Helpline 7-939-803-JOSE; [facility] [phone]; text HELLO to 566088; or 371.

## 2023-12-17 ENCOUNTER — HOSPITAL ENCOUNTER (EMERGENCY)
Facility: HOSPITAL | Age: 28
Discharge: HOME | End: 2023-12-17
Attending: EMERGENCY MEDICINE | Admitting: EMERGENCY MEDICINE
Payer: COMMERCIAL

## 2023-12-17 ENCOUNTER — APPOINTMENT (OUTPATIENT)
Facility: HOSPITAL | Age: 28
End: 2023-12-17
Payer: COMMERCIAL

## 2023-12-17 VITALS
DIASTOLIC BLOOD PRESSURE: 81 MMHG | HEIGHT: 62 IN | HEART RATE: 105 BPM | RESPIRATION RATE: 16 BRPM | SYSTOLIC BLOOD PRESSURE: 133 MMHG | OXYGEN SATURATION: 100 % | WEIGHT: 182.98 LBS | BODY MASS INDEX: 33.67 KG/M2 | TEMPERATURE: 98.1 F

## 2023-12-17 DIAGNOSIS — M79.661 RIGHT CALF PAIN: Primary | ICD-10-CM

## 2023-12-17 PROCEDURE — 25010000002 ORPHENADRINE CITRATE PER 60 MG

## 2023-12-17 PROCEDURE — 93971 EXTREMITY STUDY: CPT

## 2023-12-17 PROCEDURE — 25010000002 KETOROLAC TROMETHAMINE PER 15 MG

## 2023-12-17 PROCEDURE — 93971 EXTREMITY STUDY: CPT | Performed by: SURGERY

## 2023-12-17 PROCEDURE — 99284 EMERGENCY DEPT VISIT MOD MDM: CPT

## 2023-12-17 PROCEDURE — 96372 THER/PROPH/DIAG INJ SC/IM: CPT

## 2023-12-17 RX ORDER — KETOROLAC TROMETHAMINE 10 MG/1
10 TABLET, FILM COATED ORAL EVERY 6 HOURS PRN
Qty: 15 TABLET | Refills: 0 | Status: SHIPPED | OUTPATIENT
Start: 2023-12-17 | End: 2023-12-20

## 2023-12-17 RX ORDER — ORPHENADRINE CITRATE 30 MG/ML
60 INJECTION INTRAMUSCULAR; INTRAVENOUS ONCE
Status: COMPLETED | OUTPATIENT
Start: 2023-12-17 | End: 2023-12-17

## 2023-12-17 RX ORDER — KETOROLAC TROMETHAMINE 30 MG/ML
30 INJECTION, SOLUTION INTRAMUSCULAR; INTRAVENOUS ONCE
Status: COMPLETED | OUTPATIENT
Start: 2023-12-17 | End: 2023-12-17

## 2023-12-17 RX ORDER — CYCLOBENZAPRINE HCL 10 MG
10 TABLET ORAL 3 TIMES DAILY PRN
Qty: 15 TABLET | Refills: 0 | Status: SHIPPED | OUTPATIENT
Start: 2023-12-17 | End: 2023-12-20

## 2023-12-17 RX ADMIN — KETOROLAC TROMETHAMINE 30 MG: 30 INJECTION, SOLUTION INTRAMUSCULAR; INTRAVENOUS at 13:35

## 2023-12-17 RX ADMIN — ORPHENADRINE CITRATE 60 MG: 60 INJECTION INTRAMUSCULAR; INTRAVENOUS at 13:36

## 2023-12-17 NOTE — ED PROVIDER NOTES
Time: 1:31 PM EST  Date of encounter:  12/17/2023  Independent Historian/Clinical History and Information was obtained by:   Patient    History is limited by: N/A    Chief Complaint: Leg pain      History of Present Illness:  Patient is a 28 y.o. year old female who presents to the emergency department for evaluation of leg pain.  Patient states she began having right leg pain approximately 3 days ago.  Patient states she has tried multiple over-the-counter medications without any relief of her symptoms.  Patient denies any injury to the leg.  She does state the pain seems to be worse with ambulating.  She states that she feels that her toes are numb on that side.  Patient denies any history of DVT.  Patient denies any abnormal swelling or color change of the right leg.  Patient denies chest pain or shortness of breath.  Patient has no other complaints.    HPI    Patient Care Team  Primary Care Provider: Balbina Todd APRN    Past Medical History:     No Known Allergies  Past Medical History:   Diagnosis Date    Acne     ADHD     Anxiety     Depression     Generalized anxiety disorder 01/06/2021    Gestational hypertension     History of medical problems 02/28/2019    Miscarriage    Major depressive disorder in partial remission 01/06/2021    PCOS (polycystic ovarian syndrome)     PCOS (polycystic ovarian syndrome) 07/26/2021    Pre-eclampsia      Past Surgical History:   Procedure Laterality Date    ADENOIDECTOMY      DILATATION AND CURETTAGE      SKIN BIOPSY      TONSILLECTOMY       Family History   Problem Relation Age of Onset    Anxiety disorder Mother     Bipolar disorder Mother     Depression Mother     Depression Sister     ADD / ADHD Sister     Diabetes Maternal Grandfather     Breast cancer Neg Hx     Ovarian cancer Neg Hx     Uterine cancer Neg Hx     Colon cancer Neg Hx     Pulmonary embolism Neg Hx     Deep vein thrombosis Neg Hx        Home Medications:  Prior to Admission medications     Medication Sig Start Date End Date Taking? Authorizing Provider   amphetamine-dextroamphetamine (ADDERALL) 20 MG tablet Take 1 tablet by mouth 2 (Two) Times a Day.  Patient taking differently: Take 1.5 tablets by mouth 2 (Two) Times a Day. 12/15/23  Yes Eli Murray MD   ARIPiprazole (Abilify) 5 MG tablet Take 1 tablet by mouth Daily. 11/8/23  Yes Eli Murray MD   DULoxetine (CYMBALTA) 30 MG capsule Take 1 capsule by mouth Daily. 11/8/23  Yes Eli Murray MD   hydrOXYzine (ATARAX) 50 MG tablet TAKE 2 TABLETS EVERY NIGHT 9/6/23  Yes Eli Murray MD   levothyroxine (SYNTHROID, LEVOTHROID) 25 MCG tablet Take 1 tablet by mouth Every Morning. 8/24/23  Yes Balbina Todd APRN   metoprolol succinate XL (Toprol XL) 25 MG 24 hr tablet Take 1 tablet by mouth Daily. 8/23/23  Yes Balbina Todd APRN   Prenatal MV & Min w/FA-DHA (PRENATAL ADULT GUMMY/DHA/FA PO) Take  by mouth.   Yes ProviderRafita MD   Soolantra 1 % cream  8/25/23  Yes Rafita Greer MD   spironolactone (ALDACTONE) 100 MG tablet spironolactone 100 mg oral tablet take 1 tablet (100 mg) by oral route once daily   Active   Yes ProviderRafita MD   vitamin D (ERGOCALCIFEROL) 1.25 MG (45338 UT) capsule capsule Take 1 capsule by mouth 1 (One) Time Per Week. 10/20/23  Yes Balbina Todd APRN   Wegovy 2.4 MG/0.75ML solution auto-injector  11/29/23  Yes ProviderRafita MD   amphetamine-dextroamphetamine (ADDERALL) 10 MG tablet Take 1 tablet by mouth Daily. 12/15/23 12/17/23 Yes Eli Murray MD        Social History:   Social History     Tobacco Use    Smoking status: Never     Passive exposure: Never    Smokeless tobacco: Never    Tobacco comments:     2/10/2021- 1/6/2021   Vaping Use    Vaping Use: Never used   Substance Use Topics    Alcohol use: Yes     Comment: light; 2/10/2021- 1/6/2021    Drug use: Never     Comment: 2/10/2021- 1/6/2021         Review of Systems:  Review of Systems  "  Constitutional:  Negative for fever.   Respiratory:  Negative for shortness of breath.    Cardiovascular:  Negative for chest pain.   Musculoskeletal:  Positive for arthralgias. Negative for joint swelling.   Skin:  Negative for rash and wound.   All other systems reviewed and are negative.       Physical Exam:  /81 (BP Location: Right arm, Patient Position: Sitting)   Pulse 105   Temp 98.1 °F (36.7 °C) (Oral)   Resp 16   Ht 157.5 cm (62\")   Wt 83 kg (182 lb 15.7 oz)   SpO2 100%   BMI 33.47 kg/m²     Physical Exam  Vitals and nursing note reviewed.   Constitutional:       Appearance: Normal appearance.      Comments: Patient is tearful   HENT:      Head: Normocephalic and atraumatic.      Nose: Nose normal.   Eyes:      Extraocular Movements: Extraocular movements intact.      Conjunctiva/sclera: Conjunctivae normal.      Pupils: Pupils are equal, round, and reactive to light.   Cardiovascular:      Rate and Rhythm: Regular rhythm. Tachycardia present.      Pulses:           Dorsalis pedis pulses are 2+ on the right side.      Heart sounds: Normal heart sounds.   Pulmonary:      Effort: Pulmonary effort is normal.      Breath sounds: Normal breath sounds.   Abdominal:      General: Abdomen is flat. There is no distension.   Musculoskeletal:         General: Normal range of motion.      Cervical back: Normal range of motion and neck supple.      Right lower leg: Tenderness present. No swelling, deformity, lacerations or bony tenderness. No edema.      Left lower leg: Normal.      Comments: Right leg: positive Homans' sign  Bilateral Mcmillan test are normal   Skin:     General: Skin is warm and dry.   Neurological:      General: No focal deficit present.      Mental Status: She is alert and oriented to person, place, and time.   Psychiatric:         Mood and Affect: Mood normal. Mood is anxious. Affect is tearful.         Behavior: Behavior normal.         Thought Content: Thought content normal.       "   Judgment: Judgment normal.                Procedures:  Procedures      Medical Decision Making:    Comorbidities that affect care:    Anxiety    External Notes reviewed:    Previous Clinic Note: Patient last seen by family medicine on 10- for follow-up of thyroid disease      The following orders were placed and all results were independently analyzed by me:  Orders Placed This Encounter   Procedures    Ambulatory Referral to Physical Therapy Evaluate and treat       Medications Given in the Emergency Department:  Medications   ketorolac (TORADOL) injection 30 mg (30 mg Intramuscular Given 12/17/23 1335)   orphenadrine (NORFLEX) injection 60 mg (60 mg Intramuscular Given 12/17/23 1336)        ED Course:    ED Course as of 12/17/23 1443   Sun Dec 17, 2023   1432 Vascular ultrasound tech reports patient is negative for DVT. [MD]      ED Course User Index  [MD] Jordan Kong PA-C       Labs:    Lab Results (last 24 hours)       ** No results found for the last 24 hours. **             Imaging:    No Radiology Exams Resulted Within Past 24 Hours      Differential Diagnosis and Discussion:    Extremity Pain: Differential diagnosis includes but is not limited to soft tissue sprain, tendonitis, tendon injury, dislocation, fracture, deep vein thrombosis, arterial insufficiency, osteoarthritis, bursitis, and ligamentous damage.    Ultrasound impression was interpreted by me.     MDM  Number of Diagnoses or Management Options  Diagnosis management comments: Patient presented to the emergency department today for right leg pain.  Duplex ultrasound was completed and is negative for DVT.  I did give patient Toradol and Norflex in the emergency department with some improvement of her symptoms.  I will discharge patient home with anti-inflammatory and muscle relaxers.  Patient already has outpatient appointment with her PCP but is not until the end of January.  I will refer patient to physical therapy as well as she  has concerned the symptoms may be coming from her sciatica.       Amount and/or Complexity of Data Reviewed  Tests in the radiology section of CPT®: ordered and reviewed    Risk of Complications, Morbidity, and/or Mortality  Presenting problems: moderate  Diagnostic procedures: low  Management options: low    Patient Progress  Patient progress: stable       Patient Care Considerations:    I considered ordering tib-fib x-ray however patient had no injury      Consultants/Shared Management Plan:    None    Social Determinants of Health:    Patient is independent, reliable, and has access to care.       Disposition and Care Coordination:    Discharged: The patient is suitable and stable for discharge with no need for consideration of observation or admission.    I have explained the patient´s condition, diagnoses and treatment plan based on the information available to me at this time. I have answered questions and addressed any concerns. The patient has a good  understanding of the patient´s diagnosis, condition, and treatment plan as can be expected at this point. The vital signs have been stable. The patient´s condition is stable and appropriate for discharge from the emergency department.      The patient will pursue further outpatient evaluation with the primary care physician or other designated or consulting physician as outlined in the discharge instructions. They are agreeable to this plan of care and follow-up instructions have been explained in detail. The patient has received these instructions in written format and have expressed an understanding of the discharge instructions. The patient is aware that any significant change in condition or worsening of symptoms should prompt an immediate return to this or the closest emergency department or call to 911.  I have explained discharge medications and the need for follow up with the patient/caretakers. This was also printed in the discharge instructions.  Patient was discharged with the following medications and follow up:      Medication List        New Prescriptions      cyclobenzaprine 10 MG tablet  Commonly known as: FLEXERIL  Take 1 tablet by mouth 3 (Three) Times a Day As Needed for Muscle Spasms.     ketorolac 10 MG tablet  Commonly known as: TORADOL  Take 1 tablet by mouth Every 6 (Six) Hours As Needed for Moderate Pain.            Changed      amphetamine-dextroamphetamine 20 MG tablet  Commonly known as: ADDERALL  Take 1 tablet by mouth 2 (Two) Times a Day.  What changed: how much to take               Where to Get Your Medications        These medications were sent to [id number] - [place], [place] - [address] AT [facility] - [phone]  - [phone] FX  [address] [place] [address]      Phone: [phone]   cyclobenzaprine 10 MG tablet  ketorolac 10 MG tablet      Balbina Todd, APRN  [address] [facility] [address]  [place] [address] [phone]             Final diagnoses:   Right calf pain        ED Disposition       ED Disposition   Discharge    Condition   Stable    Comment   --               This medical record created using voice recognition software.             Jordan Kong PA-C  12/17/23 [id number]

## 2023-12-17 NOTE — DISCHARGE INSTRUCTIONS
Take Toradol and Flexeril as prescribed.  Continue follow-up with your PCP as you have scheduled in January.  I have sent referral to physical therapy so they should call you within the next 1 to 2 days to schedule an outpatient follow-up appointment with you.  Your ultrasound completed in the emergency department today is negative for any blood clots.  Return for new or worsening symptoms concerning to you.

## 2023-12-18 ENCOUNTER — TELEPHONE (OUTPATIENT)
Dept: FAMILY MEDICINE CLINIC | Facility: CLINIC | Age: 28
End: 2023-12-18
Payer: COMMERCIAL

## 2023-12-18 LAB
BH CV LOWER VASCULAR LEFT COMMON FEMORAL AUGMENT: NORMAL
BH CV LOWER VASCULAR LEFT COMMON FEMORAL COMPETENT: NORMAL
BH CV LOWER VASCULAR LEFT COMMON FEMORAL COMPRESS: NORMAL
BH CV LOWER VASCULAR LEFT COMMON FEMORAL PHASIC: NORMAL
BH CV LOWER VASCULAR LEFT COMMON FEMORAL SPONT: NORMAL
BH CV LOWER VASCULAR RIGHT COMMON FEMORAL AUGMENT: NORMAL
BH CV LOWER VASCULAR RIGHT COMMON FEMORAL COMPETENT: NORMAL
BH CV LOWER VASCULAR RIGHT COMMON FEMORAL COMPRESS: NORMAL
BH CV LOWER VASCULAR RIGHT COMMON FEMORAL PHASIC: NORMAL
BH CV LOWER VASCULAR RIGHT COMMON FEMORAL SPONT: NORMAL
BH CV LOWER VASCULAR RIGHT DISTAL FEMORAL COMPRESS: NORMAL
BH CV LOWER VASCULAR RIGHT GASTRONEMIUS COMPRESS: NORMAL
BH CV LOWER VASCULAR RIGHT GREATER SAPH AK COMPRESS: NORMAL
BH CV LOWER VASCULAR RIGHT GREATER SAPH BK COMPRESS: NORMAL
BH CV LOWER VASCULAR RIGHT LESSER SAPH COMPRESS: NORMAL
BH CV LOWER VASCULAR RIGHT MID FEMORAL AUGMENT: NORMAL
BH CV LOWER VASCULAR RIGHT MID FEMORAL COMPETENT: NORMAL
BH CV LOWER VASCULAR RIGHT MID FEMORAL COMPRESS: NORMAL
BH CV LOWER VASCULAR RIGHT MID FEMORAL PHASIC: NORMAL
BH CV LOWER VASCULAR RIGHT MID FEMORAL SPONT: NORMAL
BH CV LOWER VASCULAR RIGHT PERONEAL COMPRESS: NORMAL
BH CV LOWER VASCULAR RIGHT POPLITEAL AUGMENT: NORMAL
BH CV LOWER VASCULAR RIGHT POPLITEAL COMPETENT: NORMAL
BH CV LOWER VASCULAR RIGHT POPLITEAL COMPRESS: NORMAL
BH CV LOWER VASCULAR RIGHT POPLITEAL PHASIC: NORMAL
BH CV LOWER VASCULAR RIGHT POPLITEAL SPONT: NORMAL
BH CV LOWER VASCULAR RIGHT POSTERIOR TIBIAL COMPRESS: NORMAL
BH CV LOWER VASCULAR RIGHT PROXIMAL FEMORAL COMPRESS: NORMAL
BH CV LOWER VASCULAR RIGHT SAPHENOFEMORAL JUNCTION COMPRESS: NORMAL
BH CV VAS PRELIMINARY FINDINGS SCRIPTING: 1

## 2023-12-18 NOTE — TELEPHONE ENCOUNTER
Left patient a detailed voicemail informing her no provider in the office has anything open before Wednesday 12/20 and if her pain is that severe she can go to urgent care or call back to set up an appt for 12/20/23

## 2023-12-18 NOTE — TELEPHONE ENCOUNTER
Caller: Janice Padilla    Relationship to patient: Self    Best call back number:     [phone] (Mobile)       Patient is needing: PATIENT WOULD LIKE A CALL BACK REGARDING SCIATIC PAIN AND WOULD LIKE TO BE SEEN SOONER THAN WEDNESDAY IF POSSIBLE.

## 2023-12-20 ENCOUNTER — HOSPITAL ENCOUNTER (OUTPATIENT)
Dept: GENERAL RADIOLOGY | Facility: HOSPITAL | Age: 28
Discharge: HOME | End: 2023-12-20
Payer: COMMERCIAL

## 2023-12-20 ENCOUNTER — OFFICE VISIT (OUTPATIENT)
Dept: FAMILY MEDICINE CLINIC | Facility: CLINIC | Age: 28
End: 2023-12-20
Payer: COMMERCIAL

## 2023-12-20 VITALS
WEIGHT: 181 LBS | TEMPERATURE: 97.2 F | HEIGHT: 62 IN | DIASTOLIC BLOOD PRESSURE: 76 MMHG | BODY MASS INDEX: 33.31 KG/M2 | HEART RATE: 72 BPM | SYSTOLIC BLOOD PRESSURE: 106 MMHG | OXYGEN SATURATION: 99 %

## 2023-12-20 DIAGNOSIS — M54.6 ACUTE RIGHT-SIDED THORACIC BACK PAIN: ICD-10-CM

## 2023-12-20 DIAGNOSIS — M54.41 ACUTE RIGHT-SIDED LOW BACK PAIN WITH RIGHT-SIDED SCIATICA: ICD-10-CM

## 2023-12-20 DIAGNOSIS — M54.41 ACUTE RIGHT-SIDED LOW BACK PAIN WITH RIGHT-SIDED SCIATICA: Primary | ICD-10-CM

## 2023-12-20 PROCEDURE — 72110 X-RAY EXAM L-2 SPINE 4/>VWS: CPT

## 2023-12-20 PROCEDURE — 72072 X-RAY EXAM THORAC SPINE 3VWS: CPT

## 2023-12-20 NOTE — PROGRESS NOTES
Chief Complaint  ER Visit ([facility] ER 12/17/2023, Dx: Right calf pain) and Pain (Rt. Lower extremity 3/10 pain scale, pt reports flair and soreness)    Subjective        Medical History: has a past medical history of Acne, ADHD, Anxiety, Depression, Generalized anxiety disorder (01/06/2021), Gestational hypertension, History of medical problems (02/28/2019), Major depressive disorder in partial remission (01/06/2021), PCOS (polycystic ovarian syndrome), PCOS (polycystic ovarian syndrome) (07/26/2021), and Pre-eclampsia.     Surgical History: has a past surgical history that includes Tonsillectomy; Dilation and curettage of uterus; Adenoidectomy; and Skin biopsy.     Family History: family history includes ADD / ADHD in her sister; Anxiety disorder in her mother; Bipolar disorder in her mother; Depression in her mother and sister; Diabetes in her maternal grandfather.     Social History: reports that she has never smoked. She has never been exposed to tobacco smoke. She has never used smokeless tobacco. She reports current alcohol use. She reports that she does not use drugs.    Janice Padilla presents to [facility]  Back Pain    Comes in for follow-up on back pain.  Patient has had chronic ongoing back pain for several years, she is currently seeing chiropractor to help with the back pain.Patient is currently seeing Chiropractor, last seen 2 weeks. Has had x-rays done per chiropractor. Patient was seen in ER, for the back pain, PT was ordered patient will call and set up an appointment.  Patient states that the pain started about 1 week ago, acute in nature, radiated down her right leg and causes significant amount of pain.  Denies any fall or generalized leg weakness due to the back pain.  Denies any bowel or bladder incontinence.  Objective   Vital Signs:   /76 (BP Location: Left arm, Patient Position: Sitting, Cuff Size: Adult)   Pulse 72   Temp 97.2 °F (36.2 °C)  "(Temporal)   Ht 157.5 cm (62\")   Wt 82.1 kg (181 lb)   SpO2 99%   BMI 33.11 kg/m²       Wt Readings from Last 3 Encounters:   12/20/23 82.1 kg (181 lb)   12/17/23 83 kg (182 lb 15.7 oz)   12/15/23 82.3 kg (181 lb 6.4 oz)        BP Readings from Last 3 Encounters:   12/20/23 106/76   12/17/23 133/81   12/15/23 121/85      Physical Exam  Vitals reviewed.   Constitutional:       General: She is not in acute distress.     Appearance: Normal appearance. She is well-developed. She is not ill-appearing.   HENT:      Head: Normocephalic and atraumatic.      Right Ear: External ear normal.      Left Ear: External ear normal.   Eyes:      Conjunctiva/sclera: Conjunctivae normal.      Pupils: Pupils are equal, round, and reactive to light.   Cardiovascular:      Rate and Rhythm: Normal rate and regular rhythm.      Heart sounds: Normal heart sounds. No murmur heard.  Pulmonary:      Effort: Pulmonary effort is normal.      Breath sounds: Normal breath sounds. No wheezing or rhonchi.   Musculoskeletal:      Right lower leg: No edema.      Left lower leg: No edema.      Comments: No midline spine tenderness, tenderness over the right hip bursa, likely pain is caused from bursitis of right hip.   Skin:     General: Skin is warm and dry.   Neurological:      Mental Status: She is alert and oriented to person, place, and time.   Psychiatric:         Mood and Affect: Mood and affect normal.         Behavior: Behavior normal.         Thought Content: Thought content normal.         Judgment: Judgment normal.        Result Review :  {The following data was reviewed by JURGEN Faustin on 12/20/2023.  Common labs          8/23/2023    11:58   Common Labs   Glucose 91    BUN 13    Creatinine 0.80    Sodium 139    Potassium 4.2    Chloride 103    Calcium 8.9    Albumin 4.7    Total Bilirubin 0.2    Alkaline Phosphatase 51    AST (SGOT) 28    ALT (SGPT) 22    WBC 10.97    Hemoglobin 13.4    Hematocrit 40.2    Platelets 296  "   Total Cholesterol 171    Triglycerides 131    HDL Cholesterol 54    LDL Cholesterol  94      Data reviewed : ER notes         Right Common Femoral: No deep vein thrombosis noted.    Right Saphenofemoral Junction: No superficial thrombophlebitis noted.    Right Proximal Femoral: No deep vein thrombosis noted.    Right Mid Femoral: No deep vein thrombosis noted.    Right Distal Femoral: No deep vein thrombosis noted.    Right Popliteal: No deep vein thrombosis noted.    Right Posterior Tibial: No deep vein thrombosis noted.    Right Peroneal: No deep vein thrombosis noted.    Right Gastrocnemius: No deep vein thrombosis noted.    Right Great Saphenous Above Knee: No superficial thrombophlebitis noted.    Right Great Saphenous Below Knee: No superficial thrombophlebitis noted.      Left Common Femoral: No deep vein thrombosis noted.    Current Outpatient Medications on File Prior to Visit   Medication Sig Dispense Refill    amphetamine-dextroamphetamine (ADDERALL) 20 MG tablet Take 1 tablet by mouth 2 (Two) Times a Day. (Patient taking differently: Take 1.5 tablets by mouth 2 (Two) Times a Day.) 60 tablet 0    ARIPiprazole (Abilify) 5 MG tablet Take 1 tablet by mouth Daily. 90 tablet 1    DULoxetine (CYMBALTA) 30 MG capsule Take 1 capsule by mouth Daily. 90 capsule 1    hydrOXYzine (ATARAX) 50 MG tablet TAKE 2 TABLETS EVERY NIGHT 180 tablet 3    metoprolol succinate XL (Toprol XL) 25 MG 24 hr tablet Take 1 tablet by mouth Daily. 90 tablet 1    NP THYROID PO Take  by mouth Daily.      Prenatal MV & Min w/FA-DHA (PRENATAL ADULT GUMMY/DHA/FA PO) Take  by mouth.      Soolantra 1 % cream       spironolactone (ALDACTONE) 100 MG tablet spironolactone 100 mg oral tablet take 1 tablet (100 mg) by oral route once daily   Active      vitamin D (ERGOCALCIFEROL) 1.25 MG (98925 UT) capsule capsule Take 1 capsule by mouth 1 (One) Time Per Week. 13 capsule 1    Wegovy 2.4 MG/0.75ML solution auto-injector       [DISCONTINUED]  cyclobenzaprine (FLEXERIL) 10 MG tablet Take 1 tablet by mouth 3 (Three) Times a Day As Needed for Muscle Spasms. 15 tablet 0    [DISCONTINUED] ketorolac (TORADOL) 10 MG tablet Take 1 tablet by mouth Every 6 (Six) Hours As Needed for Moderate Pain. 15 tablet 0    [DISCONTINUED] levothyroxine (SYNTHROID, LEVOTHROID) 25 MCG tablet Take 1 tablet by mouth Every Morning. 90 tablet 1     No current facility-administered medications on file prior to visit.        Assessment and Plan  Diagnoses and all orders for this visit:    1. Acute right-sided low back pain with right-sided sciatica (Primary)  -     XR Spine Lumbar 4+ View; Future    2. Acute right-sided thoracic back pain  -     XR Spine Lumbar 4+ View; Future  -     XR Spine Thoracic 3 View; Future    Will get x-rays of the back to rule out any acute bony abnormality, discussed with patient to call physical therapy and get appointment patient given handouts of strengthening core and spinal pain along with sciatic stretches.  Also discussed KT taping to help with the pain.  Discussed strict return precautions, patient verbalized understanding agreeable treatment plan    Follow Up   Return in about 6 weeks (around 1/31/2024).  Patient was given instructions and counseling regarding her condition or for health maintenance advice. Please see specific information pulled into the AVS if appropriate.       Part of this note may be electronic transcription/translation of spoken language to printed text using the Dragon dictation system

## 2024-01-16 NOTE — PROGRESS NOTES
"Progress Note    Date: 01/24/2024  Time In: 1405  Time Out: 8540    Patient Legal Name: Janice Padilla  Patient Age: 28 y.o.    CHIEF COMPLAINT: Anxiety, Depression    Subjective   History of Present Illness     From previous progress note on 12/13/23:  Overall, patient appears to be managing her symptoms well.  She is likely overcompensating out of anticipation for an increase in anxiety, and she was encouraged to accept aspects of her life that she has no control over, as discussed in session.  We will continue to explore adaptive coping mechanisms and ways in which patient can challenge negative thinking patterns.    Assessment    Mental Status Exam     Appearance: good hygiene and dressed appropriately for the weather  Behavior: calm  Cooperation:  engaged, cooperative, attentive, and friendly  Eye Contact:  good  Affect:  sad  Mood: depressed and anxious  Speech: responsive  Thought Process:  linear and organized  Thought Content: appropriate and abstract; some intrusive thoughts  Suicidal: denies  Homicidal:  denies  Hallucinations:  denies  Memory:  intact  Orientation:  person, place, time, and situation  Reliability:  reliable  Insight:  good  Judgment:  good     Clinical Intervention       ICD-10-CM ICD-9-CM   1. Generalized anxiety disorder  F41.1 300.02   2. Moderate episode of recurrent major depressive disorder  F33.1 296.32        Janice is a 28 y.o. female who presents today as a follow-up for continued psychotherapy. Individual psychotherapy was provided utilizing DBT & ACT techniques to encourage expression of thoughts and feelings, increase acceptance, identify strengths, challenge negative thinking patterns, and provide support.  Patient reports that her daughter's  closed down abruptly today, and she is unsure when they will reopen.  Patient states that she feels \"really anxious\" about problem solving where her daughter will go in the meantime.  Therapist worked with patient on " identifying potential solutions, and a plan of action which may be beneficial to her.  Patient seems very anxious about making phone calls and coming up with a plan, and we tied this to history of self-doubt stemming from her mother causing her to question her decisions.  Patient had good insight into her symptoms and patterns of behavior, as well as her mother's negative impact on her life.    Plan   Plan & Goals     Therapist provided psychoeducation on distress and tolerance, and the need to bring awareness to emotions in order to learn to tolerate them better.  Patient was encouraged to look at the shared website with workbooks covering various topics including distress tolerance, anxiety, rumination, and others.  We will discuss further next session.    Patient acknowledged and verbally consented to continue working toward resolving current treatment plan goals and was educated on the importance of participation in the therapeutic process.  Patient will remain compliant with medication regimen as prescribed. Discuss any medication side effects, questions or concerns with prescribing provider.  Call 911 or present to the nearest emergency room in an emergency situation.  National Suicide Prevention Lifeline: Call 988. The Lifeline provides 24/7, free and confidential support for people in distress, prevention and crisis resources.  Crisis Text Line  Text HOME To 924074    Return in about 2 weeks (around 2/7/2024) for Next scheduled follow up.    ____________________  This document has been electronically signed by Yareli Yeung LCSW  January 24, 2024 15:59 EST    Part of this note may be an electronic transcription/translation of spoken language to printed text using the Dragon Dictation System.

## 2024-01-24 ENCOUNTER — TELEMEDICINE (OUTPATIENT)
Dept: PSYCHIATRY | Facility: CLINIC | Age: 29
End: 2024-01-24
Payer: COMMERCIAL

## 2024-01-24 DIAGNOSIS — F33.1 MODERATE EPISODE OF RECURRENT MAJOR DEPRESSIVE DISORDER: ICD-10-CM

## 2024-01-24 DIAGNOSIS — F41.1 GENERALIZED ANXIETY DISORDER: Primary | ICD-10-CM

## 2024-01-24 NOTE — PSYCHOTHERAPY NOTE
Psychotherapy Note - January 24, 2024    Daughter - Alexandra (age 3)   - Toño - started dating in 10th grade  Best friend - Annette - friends for years     Sister: Mary  Niece: Jenni (age 10)     I was diagnosed with hypothyroidism - on medicine      My cousin - Rebeca batista (undiagnosed autism, adhd undiagnosed)      TO PRACTICE  Grateful for  Forgive myself for  ____________    lAexandra's  shut down    Toño had his surgery and she helped me  I was disappointed

## 2024-02-08 DIAGNOSIS — F90.0 ADHD (ATTENTION DEFICIT HYPERACTIVITY DISORDER), INATTENTIVE TYPE: Primary | ICD-10-CM

## 2024-02-09 RX ORDER — ERGOCALCIFEROL 1.25 MG/1
50000 CAPSULE ORAL WEEKLY
Qty: 13 CAPSULE | Refills: 1 | Status: SHIPPED | OUTPATIENT
Start: 2024-02-09

## 2024-02-09 RX ORDER — DEXTROAMPHETAMINE SACCHARATE, AMPHETAMINE ASPARTATE, DEXTROAMPHETAMINE SULFATE AND AMPHETAMINE SULFATE 5; 5; 5; 5 MG/1; MG/1; MG/1; MG/1
1 TABLET ORAL 2 TIMES DAILY
Qty: 60 TABLET | Refills: 0 | Status: SHIPPED | OUTPATIENT
Start: 2024-02-09

## 2024-02-09 RX ORDER — DEXTROAMPHETAMINE SACCHARATE, AMPHETAMINE ASPARTATE, DEXTROAMPHETAMINE SULFATE AND AMPHETAMINE SULFATE 2.5; 2.5; 2.5; 2.5 MG/1; MG/1; MG/1; MG/1
10 TABLET ORAL DAILY
Qty: 30 TABLET | Refills: 0 | Status: SHIPPED | OUTPATIENT
Start: 2024-02-09

## 2024-02-09 NOTE — TELEPHONE ENCOUNTER
ATTEMPTED TO CONTACT PT(PATIENT)     PT(PATIENT) ADVISED TO COMPLETE UDS(URINE DRUG SCREEN) ORDER VIA OUTPATIENT LAB SERVICES AT EITHER     HOSPITAL OUTPATIENT LAB   [address]        [facility] OUTPATIENT LAB   [address] [place] [address]  OPEN MONDAY -FRIDAY 630AM - 5PM   CLOSED SATURDAY AND SUNDAY   PHONE      NO ANSWER      LEFT VOICEMAIL WITH INSTRUCTIONS TO RETURN CALL TO OFFICE AT [phone]

## 2024-02-09 NOTE — TELEPHONE ENCOUNTER
Patient called back would like to know if she can use [facility] through her work, would like a call back

## 2024-02-09 NOTE — TELEPHONE ENCOUNTER
CONTROLLED MEDICATION REFILL REQUEST    STATE REGULATION APPT EVERY 3 MONTHS     UDS(URINE DRUG SCREEN) EVERY 6 MONTHS OR UP TO PROVIDER PREFERENCE      NEW NARC CONSENT EVERY YEAR      MEDICATION: amphetamine-dextroamphetamine (ADDERALL) 20 MG tablet (12/15/2023)      NEXT OFFICE VISIT: Appointment with Eli Murray MD (03/13/2024)     LAST OFFICE VISIT: Office Visit with Eli Murray MD (12/15/2023)     NARC CONSENT: NONE IN [facility]     URINE DRUG SCREEN(STANDING ORDER):   Urine Drug Screen - Urine, Clean Catch (03/22/2023 15:29)   UDS(URINE DRUG SCREEN) PENDED FOR PROVIDER REVIEW     PROVIDER PLEASE ADVISE

## 2024-02-14 ENCOUNTER — OFFICE VISIT (OUTPATIENT)
Dept: PSYCHIATRY | Facility: CLINIC | Age: 29
End: 2024-02-14
Payer: COMMERCIAL

## 2024-02-14 DIAGNOSIS — F90.0 ADHD (ATTENTION DEFICIT HYPERACTIVITY DISORDER), INATTENTIVE TYPE: ICD-10-CM

## 2024-02-14 DIAGNOSIS — F41.1 GENERALIZED ANXIETY DISORDER: Primary | ICD-10-CM

## 2024-02-14 DIAGNOSIS — F33.41 MAJOR DEPRESSIVE DISORDER, RECURRENT EPISODE, IN PARTIAL REMISSION: ICD-10-CM

## 2024-03-05 NOTE — PROGRESS NOTES
Progress Note    Date: 03/06/2024  Time In: 0901  Time Out: 0951    Patient Legal Name: Janice Padilla  Patient Age: 28 y.o.    CHIEF COMPLAINT: Depression, Anxiety, ADHD    Subjective   History of Present Illness     From previous progress note on 2/14/24:  Therapist encouraged patient to use visual aids, as well as notetaking, in order to manage her rumination and over thinking in response to anxiety.  Patient was also encouraged to continue practicing mindfulness and to challenge her urge to over think, especially when she could be benefiting from spending quality time with her daughter.    Assessment    Mental Status Exam     Appearance: good hygiene and dressed appropriately for the weather  Behavior: calm  Cooperation:  engaged, cooperative, attentive, and friendly  Eye Contact:  good  Affect:  congruent  Mood: anxious  Speech: responsive  Thought Process:  linear, organized, and goal-oriented  Thought Content: appropriate and abstract  Suicidal: denies  Homicidal:  denies  Hallucinations:  denies  Memory:  intact  Orientation:  person, place, time, and situation  Reliability:  reliable  Insight:  good  Judgment:  good     Clinical Intervention       ICD-10-CM ICD-9-CM   1. Generalized anxiety disorder  F41.1 300.02   2. Mild episode of recurrent major depressive disorder  F33.0 296.31   3. ADHD (attention deficit hyperactivity disorder), inattentive type  F90.0 314.00        Janice is a 28 y.o. female who presents today as a follow-up for continued psychotherapy. Individual psychotherapy was provided utilizing CBT techniques to encourage expression of thoughts and feelings, manage stress, recognize patterns of behavior, acknowledge sources of feelings and behaviors, challenge negative thinking patterns, and provide support.  Patient reports that she has felt more anxious recently, and therapist offered suggestions and guidance to patient as she discussed recent stressors, including her daughter's birthday  and well-child visit coming up, and her expectations at work and home.  Patient states she is compliant with her medication, and feels that it is effective at managing her mood.  Although she reports not feeling depressed, she states that she has had a lack of motivation to complete chores and tasks that she usually has more difficulty with.  However, therapist offered insight into areas that patient is making efforts in finding rodrick and (i.e. spending more time with her daughter, planning her birthday party and holidays, etc.).    Plan   Plan & Goals     Patient is to continue challenging cognitive distortions (i.e. all-or-nothing thinking and disqualifying the positive), as discussed in session.  She could also continue to benefit from identifying circles of control vs influence vs external to determine how much effort is worth attempting to change an outcome while also practicing acceptance about things that are out of her control.  We will discuss this further next session.  Follow-up in 2 weeks.    Patient acknowledged and verbally consented to continue working toward resolving current treatment plan goals and was educated on the importance of participation in the therapeutic process.  Patient will remain compliant with medication regimen as prescribed. Discuss any medication side effects, questions or concerns with prescribing provider.  Call 911 or present to the nearest emergency room in an emergency situation.  National Suicide Prevention Lifeline: Call 988. The Lifeline provides 24/7, free and confidential support for people in distress, prevention and crisis resources.  Crisis Text Line  Text HOME To 507829    Return in about 2 weeks (around 3/20/2024).    ____________________  This document has been electronically signed by Yareli Yeung LCSW  March 6, 2024 10:11 EST    Part of this note may be an electronic transcription/translation of spoken language to printed text using the Dragon Dictation  System.

## 2024-03-06 ENCOUNTER — OFFICE VISIT (OUTPATIENT)
Dept: PSYCHIATRY | Facility: CLINIC | Age: 29
End: 2024-03-06
Payer: COMMERCIAL

## 2024-03-06 DIAGNOSIS — F90.0 ADHD (ATTENTION DEFICIT HYPERACTIVITY DISORDER), INATTENTIVE TYPE: ICD-10-CM

## 2024-03-06 DIAGNOSIS — F41.1 GENERALIZED ANXIETY DISORDER: Primary | ICD-10-CM

## 2024-03-06 DIAGNOSIS — F33.0 MILD EPISODE OF RECURRENT MAJOR DEPRESSIVE DISORDER: ICD-10-CM

## 2024-03-06 NOTE — PSYCHOTHERAPY NOTE
Psychotherapy Note - March 6, 2024    Daughter - Alexandra (age 3)   - Toño - started dating in 10th grade  Best friend - Annette - friends for years     Sister: Mary  Niece: Jenni (age 10)     I was diagnosed with hypothyroidism - on medicine      My cousin - Rebeca batista (undiagnosed autism, adhd undiagnosed)      TO PRACTICE  Grateful for  Forgive myself for  ____________    I chose not to enroll in Alexandra into the other school    I already got her birthday gift (a watch)  I have a lack of interest right now    Circles of control  (Influence vs external)

## 2024-03-12 NOTE — PATIENT INSTRUCTIONS
1.  Please return to clinic at your next scheduled visit.  Contact the clinic ([phone]) at least 24 hours prior in the event you need to cancel.  2.  Do no harm to yourself or others.    3.  Avoid alcohol and drugs.    4.  Take all medications as prescribed.  Please contact the clinic with any concerns. If you are in need of medication refills, please call the clinic at [phone].    5. Should you want to get in touch with your provider, Dr. Eli Murray, please utilize [employer] or contact the office ([phone]), and staff will be able to page Dr. Murray directly.  6.  In the event you have personal crisis, contact the following crisis numbers: Suicide Prevention Hotline 1-298.519.8014; JOSE Helpline 7-044-898-JOSE; Psychiatric Emergency Room [phone]; text HELLO to 734397; or 302.

## 2024-03-12 NOTE — PROGRESS NOTES
"Subjective   Janice Padilla is a 28 y.o. female who presents today for follow up    Referring Provider:  No referring provider defined for this encounter.    Chief Complaint:  adhd cornel mdd    History of Present Illness:     Janice Padilla is a 25 year old /White female referred by Balbina SEAMAN.     Chart review : Patient is on Wellbutrin, status post Zoloft. Also on Adderall. Had tachycardia at 130. Started on metoprolol 50 mg extended release daily. Labs in December: BMI 35, LDL is elevated at 121, ALT AST within normal limits, creatinine 0.99, hematocrit 45, electrolytes are essentially normal, TSH is 3.65 normal, iron is 53 low, vitamin D is 23 low, ferritin is normal at 96. No EKG or head imaging.     \"Janice\"  Father passed away from drunk driving at 5 yo. Last true memory of father was in .  Recently dx'd with hypothyroidism        3/13: In person interview:  Chart review:   3/13: ED, [facility] x3, fam med, cards.  [facility] x2.  Fam medx2, tsh normal, vit D low.  Fam med, therapy x4, reassuring lipids, cmp; abnl fT4, TSH.  seen by ob x2, psychotherapy x2. AUB, infertility.  Plannin/15: stable, well, no changes.  Consider strattera for ADHD. : Increase abilify for anxiety. Consider a light box.  \"I feel good.\"  P3, G4  On wegovy  No problems or issues.  Seasonal pattern.   Mood/Depression: stable mood  ADHD: under control  Anxiety: stable   Panic attacks: n  Energy: good  Concentration: at goal  Sleeping: stable insomnia  Eatin, 181 lbs  Refills: y  Substances: def  Therapy: [facility]  Medication compliant: y  SE: n  No SI HI AVH.      12/15: In person interview:  Chart review:   [facility] x2.  Fam medx2, tsh normal, vit D low.  Fam med, therapy x4, reassuring lipids, cmp; abnl fT4, TSH.  seen by ob x2, psychotherapy x2. AUB, infertility.  Planning:   Consider strattera for ADHD. : Increase abilify for anxiety. Consider a light box.  \"I've been ok.\"  No problems or " "issues.  No more depressive episodes.  Seasonal pattern.   Mood/Depression: stable mood  ADHD: under control  Anxiety: stable   Panic attacks: n  Energy: good  Concentration: at goal  Sleeping: stable  Eatin lbs  Refills: y  Substances: def  Therapy: [facility]  Medication compliant: y  SE: n  No SI HI AVH.    ...    IMPORTANT:  From previous note, patient was held back in the third grade. Struggled to complete high school and missed 60 days although she did graduate with good grades. Got in trouble for talking a lot in class. She was not in any special classes. She did not have an IEP.     21 H&P: Virtual visit via Zoom audio and video due to the COVID-19 pandemic. Patient is accepting of and agreeable to appointment. The appointment consisted of the patient and I only. Interview: Patient reports that she had her first child in March and suffered from postpartum depression. The pandemic did not help things. It was a \"hard time,\" and the patient reports she had already had anxiety before. Patient is presently bottlefeeding. She became pregnant through an infertility specialist. Presently not on any contraception.   Endorses muscle tension, fatigue, poor concentration, restlessness, irritability, and some broken sleep, although she is sleeping for about 8 hours a night. She wakes up frequently to check on her baby girl, Alexandra. Also endorses guilt at being a bad mom. Duration has been since March of last year. Patient feels her anxiety is worse than her depression.   Denies SI HI AVH. Has access to weapons that are locked in a gun safe. Patient does not know the combination. She also does not know how to work the guns that are in the safe. Psychiatric review of systems is positive for anxiety and depression, negative for psychosis and maribel.   Possible ADHD. Patient was diagnosed by Dr. Joe in 2017. 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 Possible family history as well as her sister may have ADHD.   Past Psychiatric History:  Began Psychiatric Treatment: Since    Dx: Anxiety   Psychiatrist: Has not seen a psychiatrist   Therapist: Saw therapist in 2018x1, unsure if it was helpful.   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     Feeling down, depressed, or hopeless?     Trouble falling or staying asleep, or sleeping too much?     Feeling tired or having little energy?     Poor appetite or overeating?     Feeling bad about yourself - or that you are a failure or have let yourself or your family down?     Trouble concentrating on things, such as reading the newspaper or watching television?     Moving or speaking so slowly that other people could have noticed? Or the opposite - being so fidgety or restless " that you have been moving around a lot more than usual?     Thoughts that you would be better off dead, or of hurting yourself in some way?     PHQ-9 Total Score       MATTHEW-7  Feeling nervous, anxious or on edge: Several days  Not being able to stop or control worrying: Several days  Worrying too much about different things: Not at all  Trouble Relaxing: Several days  Being so restless that it is hard to sit still: Not at all  Feeling afraid as if something awful might happen: Not at all  Becoming easily annoyed or irritable: Several days  MATTHEW 7 Total Score: 4  If you checked any problems, how difficult have these problems made it for you to do your work, take care of things at home, or get along with other people: Somewhat difficult    Past Surgical History:  Past Surgical History:   Procedure Laterality Date    ADENOIDECTOMY      DILATATION AND CURETTAGE      SKIN BIOPSY      TONSILLECTOMY         Problem List:  Patient Active Problem List   Diagnosis    Acne    Anxiety    Attention deficit hyperactivity disorder (ADHD)    Depression    Generalized anxiety disorder    Gestational hypertension    Major depressive disorder in partial remission    PCOS (polycystic ovarian syndrome)    Pre-eclampsia       Allergy:   No Known Allergies     Discontinued Medications:  Medications Discontinued During This Encounter   Medication Reason    NP THYROID PO *Therapy completed    amphetamine-dextroamphetamine (ADDERALL) 20 MG tablet Reorder    amphetamine-dextroamphetamine (Adderall) 10 MG tablet Reorder             Current Medications:   Current Outpatient Medications   Medication Sig Dispense Refill    [START ON 3/21/2024] amphetamine-dextroamphetamine (Adderall) 10 MG tablet Take 1 tablet by mouth Daily. 30 tablet 0    [START ON 3/21/2024] amphetamine-dextroamphetamine (ADDERALL) 20 MG tablet Take 1 tablet by mouth 2 (Two) Times a Day. 60 tablet 0    ARIPiprazole (Abilify) 5 MG tablet Take 1 tablet by mouth Daily. 90 tablet 1     DULoxetine (CYMBALTA) 30 MG capsule Take 1 capsule by mouth Daily. 90 capsule 1    hydrOXYzine (ATARAX) 50 MG tablet TAKE 2 TABLETS EVERY NIGHT 180 tablet 3    ketoconazole (NIZORAL) 2 % shampoo       levothyroxine (SYNTHROID, LEVOTHROID) 25 MCG tablet Take 1 tablet by mouth Every Morning.      metoprolol succinate XL (Toprol XL) 25 MG 24 hr tablet Take 1 tablet by mouth Daily. 90 tablet 1    ondansetron ODT (ZOFRAN-ODT) 4 MG disintegrating tablet       Prenatal MV & Min w/FA-DHA (PRENATAL ADULT GUMMY/DHA/FA PO) Take  by mouth.      Soolantra 1 % cream       spironolactone (ALDACTONE) 100 MG tablet spironolactone 100 mg oral tablet take 1 tablet (100 mg) by oral route once daily   Active      sulfamethoxazole-trimethoprim (BACTRIM DS,SEPTRA DS) 800-160 MG per tablet TAKE ONE TABLET BY MOUTH EVERY DAY FOR FOURTEEN DAYS      vitamin D (ERGOCALCIFEROL) 1.25 MG (04953 UT) capsule capsule Take 1 capsule by mouth 1 (One) Time Per Week. 13 capsule 1    Wegovy 2.4 MG/0.75ML solution auto-injector        No current facility-administered medications for this visit.       Past Medical History:  Past Medical History:   Diagnosis Date    Acne     ADHD     Anxiety     Depression     Generalized anxiety disorder 01/06/2021    Gestational hypertension     History of medical problems 02/28/2019    Miscarriage    Major depressive disorder in partial remission 01/06/2021    PCOS (polycystic ovarian syndrome)     PCOS (polycystic ovarian syndrome) 07/26/2021    Pre-eclampsia        Social History     Socioeconomic History    Marital status:    Tobacco Use    Smoking status: Never     Passive exposure: Never    Smokeless tobacco: Never    Tobacco comments:     2/10/2021- 1/6/2021   Vaping Use    Vaping status: Never Used   Substance and Sexual Activity    Alcohol use: Not Currently     Comment: light; 2/10/2021- 1/6/2021    Drug use: Never     Comment: 2/10/2021- 1/6/2021    Sexual activity: Yes     Partners: Male     Birth  "control/protection: None       Family History   Problem Relation Age of Onset    Anxiety disorder Mother     Bipolar disorder Mother     Depression Mother     Depression Sister     ADD / ADHD Sister     Diabetes Maternal Grandfather     Breast cancer Neg Hx     Ovarian cancer Neg Hx     Uterine cancer Neg Hx     Colon cancer Neg Hx     Pulmonary embolism Neg Hx     Deep vein thrombosis Neg Hx        Mental Status Exam:   Hygiene:   good, groomed  Cooperation:  Cooperative  Eye Contact:  Good  Psychomotor Behavior:  Appropriate  Affect:  euthymic, good variability, congruent  Mood: I'm doing ok  Hopelessness: Denies  Speech:  Normal  Thought Process:  Goal directed  Thought Content:  Normal  Suicidal:  None  Homicidal:  None  Hallucinations:  None  Delusion:  None  Memory:  Intact  Orientation:  Person, Place, Time and Situation  Reliability:  good  Insight:  Fair  Judgement:  Fair  Impulse Control:  Fair  Physical/Medical Issues:  No      Review of Systems:  Review of Systems   Constitutional:  Negative for diaphoresis and fatigue.   HENT:  Negative for drooling.    Eyes:  Negative for visual disturbance.   Respiratory:  Negative for cough and shortness of breath.    Cardiovascular:  Negative for chest pain, palpitations and leg swelling.   Gastrointestinal:  Negative for diarrhea, nausea and vomiting.   Endocrine: Negative for cold intolerance and heat intolerance.   Genitourinary:  Negative for difficulty urinating and frequency.   Musculoskeletal:  Negative for joint swelling.   Skin:  Negative for rash.   Allergic/Immunologic: Negative for immunocompromised state.   Neurological:  Negative for dizziness, seizures, syncope, speech difficulty, light-headedness, numbness and headaches.         Physical Exam:  Physical Exam    Vital Signs:   /73   Pulse 90   Ht 157.5 cm (62.01\")   Wt 78.3 kg (172 lb 11.2 oz)   BMI 31.58 kg/m²      Lab Results:   Admission on 12/17/2023, Discharged on 12/17/2023   Component " Date Value Ref Range Status    Right Common Femoral Spont 12/17/2023 Y   Final    Right Common Femoral Competent 12/17/2023 Y   Final    Right Common Femoral Phasic 12/17/2023 Y   Final    Right Common Femoral Compress 12/17/2023 C   Final    Right Common Femoral Augment 12/17/2023 Y   Final    Right Saphenofemoral Junction Comp* 12/17/2023 C   Final    Right Proximal Femoral Compress 12/17/2023 C   Final    Right Mid Femoral Spont 12/17/2023 Y   Final    Right Mid Femoral Competent 12/17/2023 Y   Final    Right Mid Femoral Phasic 12/17/2023 Y   Final    Right Mid Femoral Compress 12/17/2023 C   Final    Right Mid Femoral Augment 12/17/2023 Y   Final    Right Distal Femoral Compress 12/17/2023 C   Final    Right Popliteal Spont 12/17/2023 Y   Final    Right Popliteal Competent 12/17/2023 Y   Final    Right Popliteal Phasic 12/17/2023 Y   Final    Right Popliteal Compress 12/17/2023 C   Final    Right Popliteal Augment 12/17/2023 Y   Final    Right Posterior Tibial Compress 12/17/2023 C   Final    Right Peroneal Compress 12/17/2023 C   Final    Right Gastronemius Compress 12/17/2023 C   Final    Right Greater Saph AK Compress 12/17/2023 C   Final    Right Greater Saph BK Compress 12/17/2023 C   Final    Right Lesser Saph Compress 12/17/2023 C   Final    Left Common Femoral Spont 12/17/2023 Y   Final    Left Common Femoral Competent 12/17/2023 Y   Final    Left Common Femoral Phasic 12/17/2023 Y   Final    Left Common Femoral Compress 12/17/2023 C   Final    Left Common Femoral Augment 12/17/2023 Y   Final    BH CV VAS PRELIMINARY FINDINGS SCR* 12/17/2023 1.0   Final   Office Visit on 10/18/2023   Component Date Value Ref Range Status    TSH 10/18/2023 3.020  0.270 - 4.200 uIU/mL Final    T Uptake 10/18/2023 1.03  0.80 - 1.30 TBI Final    T4, Total 10/18/2023 8.10  4.50 - 11.70 mcg/dL Final    T4 results may be falsely increased if patient taking Biotin.    25 Hydroxy, Vitamin D 10/18/2023 28.2 (L)  30.0 - 100.0 ng/ml  Final       EKG Results:  No orders to display       Imaging Results:  No Images in the past 120 days found..      Assessment & Plan   Diagnoses and all orders for this visit:    1. Generalized anxiety disorder (Primary)    2. Mild episode of recurrent major depressive disorder    3. ADHD (attention deficit hyperactivity disorder), inattentive type  -     amphetamine-dextroamphetamine (Adderall) 10 MG tablet; Take 1 tablet by mouth Daily.  Dispense: 30 tablet; Refill: 0  -     amphetamine-dextroamphetamine (ADDERALL) 20 MG tablet; Take 1 tablet by mouth 2 (Two) Times a Day.  Dispense: 60 tablet; Refill: 0    4. Insomnia due to mental condition    5. Panic attacks        Presentation most consistent with major depressive disorder in partial remission, generalized anxiety disorder, and ADHD.  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  Also endorses a childhood history that is compelling.    3/13: Stable, well, no changes.    Allowed patient to freely discuss and process issues, such as:  Recent improvement in mood.   Ongoing: Planned for pregnancy. Stay on cymbalta, adderall, stay off abilify for now. Close monitoring during pregnany q6wks. Watch PP depression.   ... using Saud (Kirk Kamara) psychotherapeutic techniques including unconditional positive regard, reflective listening, and demonstrating clear empathy.     Time (minutes) spent providing supportive psychotherapy: 4  (This time is exclusive to the therapy session and separate from the time spent on activities used to meet the criteria for the E/M service (history, exam, medical decision-making).)  Functional status: mild impairment  Treatment plan: Medication management and supportive psychotherapy  Prognosis: good  Progress: stable  3m    12/15:  stable, well, no changes.    11/8: Increase abilify for anxiety. Consider a light box.    10/11: Re-emerging depression, not seasonal, likely related to dc'g abilify. Restart abilify and start lamictal for further mood stability at pt's request.    7/14: improved, no changes 3m    5/12: Much improved, no changes. Well. 16    3/17: House almost done. Continue therapy with Yareli. Start abilify. Increase gabapentin to 300 mg daily prn anxiety. Start hydroxyzine nightly for insomnia. Minimal effect on dep and anx (not at goal). 17     2/23: Stop THC gummies as they are now a schedule I substance. Start gabapentin for anx. Stop hydroxyzine. Refer to next step. 17    12/16: Well, stable, no changes.  3 months    10/19: Doing well, no changes. Anx, dep, adhd under control. 17     9/9: Wants to be on less meds. Taper off bupropion. Continue other meds. Let's streamline the meds. 16    7/27: Stop prozac, 2 week washout, start cymbalta. Consider abilify on top of bupropion. 17     6/15: Residual anxiety, increase prozac. Goal is to increase until maxed out; then possible switch. Depression has resolved. 18     5/4: Therapy referral.  Increase Prozac.  May consider switching from bupropion to Abilify in the future.  Patient was not able to tolerate bupropion 450 mg daily.  17     3/16: No change to medications, no side effects.  Living with in-laws at their house has been a source of anxiety for the patient.  Also taking care of her daughter, who she is very protective of.  Some issues with keeping restraint during therapy sessions as well.  17    2/2: Anxious about the weather, and its consequences (closed daycares, etc.). 18     12/22: Increase Prozac to target anxiety, increase hydroxyzine to target insomnia. 17     11/17: Some ADHD symptoms re-emerging. Add 10 mg adderall at 11 am. Schedule will be 7:30 first dose, 11 second higher dose of 30 mg. Pt also interested in therapy. 17     10/15: Start prozac. Hydroxyzine at  night calms her; consider adding am dose.     : Patient is doing extremely well.  No change to medications.      : Doing a little better, however patient was doing much better on Adderall 20 mg twice a day, which was her previous dose.  Discontinue Adderall XR and start Adderall immediate release 20 mg twice a day.  Some residual depressive and anxiety symptoms that are well controlled on bupropion.  Treating ADHD will also treat residual depressive and anxiety symptoms as well.  See back in 2 months.  Declines psychotherapy.    Visit Diagnoses:    ICD-10-CM ICD-9-CM   1. Generalized anxiety disorder  F41.1 300.02   2. Mild episode of recurrent major depressive disorder  F33.0 296.31   3. ADHD (attention deficit hyperactivity disorder), inattentive type  F90.0 314.00   4. Insomnia due to mental condition  F51.05 300.9     327.02   5. Panic attacks  F41.0 300.01       PLAN:  Risk Assessment: Risk of self-harm acutely is low. Risk factors include anxiety disorder, mood disorder, access to weapons, recent psychosocial stressors. Protective factors include no family history, no present SI, no history of suicide attempts or self-harm in the past, minimal AODA, healthcare seeking, future orientation, willingness to engage in care,  baby. Risk of self-harm chronically is also low, but could be further elevated in the event of treatment noncompliance and/or AODA.  Safety: No acute safety concerns.  Medications:   CONTINUE abilify 5 mg nightly. Risks, benefits, alternatives discussed with patient including increased energy, exacerbation of irritability, akathisia, GI upset, orthostatic hypotension, increased appetite, tardive dyskinesia.  After discussion of these risks and benefits, the patient voiced understanding and agreed to proceed.  CONTINUE cymbalta 30 mg daily. Risks, benefits, alternatives discussed with patient including GI upset, nausea vomiting diarrhea, theoretical decrease of seizure threshold  predisposing the patient to a slightly higher seizure risk, headaches, sexual dysfunction, serotonin syndrome, bleeding risk, increased suicidality in patients 24 years and younger.  Also constipation and urinary retention.  After discussion of these risks and benefits, the patient voiced understanding and agreed to proceed.   CONTINUE hydroxyzine 100 mg PO QHS. Risks, benefits, alternatives discussed with patient including sedation, dizziness, fall risk, GI upset, and risk of increased CNS depression and elevated heart rate if taken with other antihistamines.  After discussion of these risks and benefits, the patient voiced understanding and agreed to proceed.  CONTINUE Adderall 30 mg PO QAM, 20 mg PO QPM. Risks, benefits, side effects discussed with patient including elevated heart rate, elevated blood pressure, irritability, insomnia, sexual dysfunction, appetite suppressing properties, psychosis.  After discussion of these risks and benefits, the patient voiced understanding and agreed to proceed. Controlled substances consent verbally signed. UDS and Tom ordered.  S/P   NEVER STARTED lamictal 25 mg qhs x14 nights, then 50 mg qhs. 12/23  gabapentin 300 mg qday prn anxiety. No difference, so stopped 5/23 prozac 60 mg ineffective.  Escitalopram: sexual dysfunction  Adderall XR 10 mg daily: not effective  Bupropion  made her nauseated. 300 mg made her sweat (irritable?)  Therapy: Consider in the future, presently not interested.  Labs/Studies: EKG reassuring.      TREATMENT PLAN/GOALS: Continue supportive psychotherapy efforts and medications as indicated. Treatment and medication options discussed during today's visit. Patient acknowledged and verbally consented to continue with current treatment plan and was educated on the importance of compliance with treatment and follow-up appointments.    MEDICATION ISSUES:  TOM reviewed as expected.  Discussed medication options and treatment plan of prescribed  medication as well as the risks, benefits, and side effects including potential falls, possible impaired driving and metabolic adversities among others. Patient is agreeable to call the office with any worsening of symptoms or onset of side effects. Patient is agreeable to call 911 or go to the nearest ER should he/she begin having SI/HI. No medication side effects or related complaints today.     MEDS ORDERED DURING VISIT:  New Medications Ordered This Visit   Medications    amphetamine-dextroamphetamine (Adderall) 10 MG tablet     Sig: Take 1 tablet by mouth Daily.     Dispense:  30 tablet     Refill:  0     30 mg qam, 20 mg qpm    amphetamine-dextroamphetamine (ADDERALL) 20 MG tablet     Sig: Take 1 tablet by mouth 2 (Two) Times a Day.     Dispense:  60 tablet     Refill:  0       Return in about 3 months (around 6/13/2024).         This document has been electronically signed by Eli Murray MD  March 13, 2024 08:27 EDT

## 2024-03-13 ENCOUNTER — OFFICE VISIT (OUTPATIENT)
Dept: PSYCHIATRY | Facility: CLINIC | Age: 29
End: 2024-03-13
Payer: COMMERCIAL

## 2024-03-13 VITALS
SYSTOLIC BLOOD PRESSURE: 119 MMHG | HEART RATE: 90 BPM | BODY MASS INDEX: 31.78 KG/M2 | WEIGHT: 172.7 LBS | DIASTOLIC BLOOD PRESSURE: 73 MMHG | HEIGHT: 62 IN

## 2024-03-13 DIAGNOSIS — F33.0 MILD EPISODE OF RECURRENT MAJOR DEPRESSIVE DISORDER: ICD-10-CM

## 2024-03-13 DIAGNOSIS — F51.05 INSOMNIA DUE TO MENTAL CONDITION: ICD-10-CM

## 2024-03-13 DIAGNOSIS — F41.1 GENERALIZED ANXIETY DISORDER: Primary | ICD-10-CM

## 2024-03-13 DIAGNOSIS — F41.0 PANIC ATTACKS: ICD-10-CM

## 2024-03-13 DIAGNOSIS — F90.0 ADHD (ATTENTION DEFICIT HYPERACTIVITY DISORDER), INATTENTIVE TYPE: ICD-10-CM

## 2024-03-13 RX ORDER — DEXTROAMPHETAMINE SACCHARATE, AMPHETAMINE ASPARTATE, DEXTROAMPHETAMINE SULFATE AND AMPHETAMINE SULFATE 5; 5; 5; 5 MG/1; MG/1; MG/1; MG/1
1 TABLET ORAL 2 TIMES DAILY
Qty: 60 TABLET | Refills: 0 | Status: SHIPPED | OUTPATIENT
Start: 2024-03-21

## 2024-03-13 RX ORDER — DEXTROAMPHETAMINE SACCHARATE, AMPHETAMINE ASPARTATE, DEXTROAMPHETAMINE SULFATE AND AMPHETAMINE SULFATE 2.5; 2.5; 2.5; 2.5 MG/1; MG/1; MG/1; MG/1
10 TABLET ORAL DAILY
Qty: 30 TABLET | Refills: 0 | Status: SHIPPED | OUTPATIENT
Start: 2024-03-21

## 2024-03-13 RX ORDER — LEVOTHYROXINE SODIUM 0.03 MG/1
25 TABLET ORAL
COMMUNITY

## 2024-03-13 RX ORDER — KETOCONAZOLE 20 MG/ML
SHAMPOO TOPICAL
COMMUNITY
Start: 2024-01-10

## 2024-03-13 RX ORDER — SULFAMETHOXAZOLE AND TRIMETHOPRIM 800; 160 MG/1; MG/1
TABLET ORAL
COMMUNITY
Start: 2024-03-05

## 2024-03-13 RX ORDER — ONDANSETRON 4 MG/1
TABLET, ORALLY DISINTEGRATING ORAL
COMMUNITY
Start: 2024-03-06

## 2024-03-21 RX ORDER — METOPROLOL SUCCINATE 25 MG/1
25 TABLET, EXTENDED RELEASE ORAL DAILY
Qty: 90 TABLET | Refills: 1 | Status: SHIPPED | OUTPATIENT
Start: 2024-03-21

## 2024-03-21 RX ORDER — LEVOTHYROXINE SODIUM 0.03 MG/1
25 TABLET ORAL EVERY MORNING
Qty: 90 TABLET | Refills: 1 | Status: SHIPPED | OUTPATIENT
Start: 2024-03-21

## 2024-04-08 NOTE — PROGRESS NOTES
Progress Note    Date: 04/10/2024  Time In: 0900  Time Out: 0955    Patient Legal Name: Janice Padilla  Patient Age: 28 y.o.    CHIEF COMPLAINT: Anxiety, ADHD    Subjective   History of Present Illness     From previous progress note on 3/6/24:  Patient is to continue challenging cognitive distortions (i.e. all-or-nothing thinking and disqualifying the positive), as discussed in session.  She could also continue to benefit from identifying circles of control vs influence vs external to determine how much effort is worth attempting to change an outcome while also practicing acceptance about things that are out of her control.  We will discuss this further next session.  Follow-up in 2 weeks.    Assessment    Mental Status Exam     Appearance: good hygiene and dressed appropriately for the weather  Behavior: calm  Cooperation:  engaged, cooperative, attentive, and friendly  Eye Contact:  good  Affect:  congruent  Mood: euthymic  Speech: responsive  Thought Process:  linear and organized  Thought Content: appropriate and abstract  Suicidal: denies  Homicidal:  denies  Hallucinations:  denies  Memory:  intact  Orientation:  person, place, time, and situation  Reliability:  reliable  Insight:  good  Judgment:  good     Clinical Intervention       ICD-10-CM ICD-9-CM   1. Generalized anxiety disorder  F41.1 300.02   2. Depression, major, in remission  F32.5 296.25   3. ADHD (attention deficit hyperactivity disorder), inattentive type  F90.0 314.00        Janice is a 28 y.o. female who presents today as a follow-up for continued psychotherapy. Individual psychotherapy was provided utilizing CBT techniques to encourage expression of thoughts and feelings, establish new coping skills, manage stress, acknowledge sources of feelings and behaviors, challenge negative thinking patterns, recognize cognitive distortions, and provide support.  Patient reports that she has been managing her mood well and is complying with her  "prescribed medication.  She denies depressive symptoms, stating that she has been motivated and goal-oriented, as well as setting clear and consistent boundaries within her interpersonal relationships.  Patient does still struggle with anxiety in various areas of her life, and therapist encouraged patient to challenge cognitive distortions related to the anxiety throughout session.  Patient also has been doing a great job on her own of acknowledging and accepting positive thoughts and \"the silver lining\" of negative circumstances which are sometimes out of her control, and therapist praised patient for her efforts in this way.    Plan   Plan & Goals     Patient is to reference attached list of cognitive distortions as a reminder about types of thoughts which are worth challenging to reduce symptoms of anxiety and overall mood.  We will plan to meet one time next month to discuss treatment goals, as patient appears to be managing her symptoms well, and we may terminate therapeutic services at that time depending on presentation at that time.    Patient acknowledged and verbally consented to continue working toward resolving current treatment plan goals and was educated on the importance of participation in the therapeutic process.  Patient will remain compliant with medication regimen as prescribed. Discuss any medication side effects, questions or concerns with prescribing provider.  Call 911 or present to the nearest emergency room in an emergency situation.  National Suicide Prevention Lifeline: Call 988. The Lifeline provides 24/7, free and confidential support for people in distress, prevention and crisis resources.  Crisis Text Line  Text HOME To 593457    Return in about 3 weeks (around 5/1/2024) for Next scheduled follow up.    ____________________  This document has been electronically signed by Yareli Yeung LCSW  April 10, 2024 13:31 EDT    Part of this note may be an electronic " transcription/translation of spoken language to printed text using the Dragon Dictation System.

## 2024-04-10 ENCOUNTER — OFFICE VISIT (OUTPATIENT)
Dept: PSYCHIATRY | Facility: CLINIC | Age: 29
End: 2024-04-10
Payer: COMMERCIAL

## 2024-04-10 DIAGNOSIS — F90.0 ADHD (ATTENTION DEFICIT HYPERACTIVITY DISORDER), INATTENTIVE TYPE: ICD-10-CM

## 2024-04-10 DIAGNOSIS — F32.5 DEPRESSION, MAJOR, IN REMISSION: ICD-10-CM

## 2024-04-10 DIAGNOSIS — F41.1 GENERALIZED ANXIETY DISORDER: Primary | ICD-10-CM

## 2024-04-10 NOTE — PSYCHOTHERAPY NOTE
Psychotherapy Note - April 10, 2024    Daughter - Alexandra (age 4)   - Toño - started dating in 10th grade  Best friend - Annette - friends for years     Sister: Mary  Niece: Jenni (age 10)     I was diagnosed with hypothyroidism - on medicine      My cousin - Rebeca batista (undiagnosed autism, adhd undiagnosed)      TO PRACTICE  Grateful for  Forgive myself for  ____________    Alexandra's party went well, no issues, everyone showed up    Toño is back on day shift because there were things that only he knows how to do    I need the social interaction    Thinking about

## 2024-04-13 DIAGNOSIS — F51.05 INSOMNIA DUE TO MENTAL CONDITION: ICD-10-CM

## 2024-04-13 DIAGNOSIS — F41.1 GENERALIZED ANXIETY DISORDER: ICD-10-CM

## 2024-04-15 RX ORDER — ARIPIPRAZOLE 5 MG/1
TABLET ORAL
Qty: 90 TABLET | Refills: 3 | Status: SHIPPED | OUTPATIENT
Start: 2024-04-15

## 2024-04-15 RX ORDER — DULOXETIN HYDROCHLORIDE 30 MG/1
30 CAPSULE, DELAYED RELEASE ORAL DAILY
Qty: 90 CAPSULE | Refills: 3 | Status: SHIPPED | OUTPATIENT
Start: 2024-04-15

## 2024-04-15 NOTE — TELEPHONE ENCOUNTER
REFILL REQUEST     DULoxetine (CYMBALTA) 30 MG capsule (11/08/2023)     ARIPiprazole (Abilify) 5 MG tablet (11/08/2023)     LAST OFFICE VISIT-  Office Visit with Eli Murray MD (03/13/2024)     NEXT FOLLOW UP WITH PROVIDER-  Appointment with Eli Murray MD (06/19/2024)

## 2024-04-17 ENCOUNTER — OFFICE VISIT (OUTPATIENT)
Dept: FAMILY MEDICINE CLINIC | Facility: CLINIC | Age: 29
End: 2024-04-17
Payer: COMMERCIAL

## 2024-04-17 VITALS
SYSTOLIC BLOOD PRESSURE: 104 MMHG | WEIGHT: 166 LBS | OXYGEN SATURATION: 99 % | HEIGHT: 62 IN | TEMPERATURE: 97.6 F | HEART RATE: 85 BPM | BODY MASS INDEX: 30.55 KG/M2 | DIASTOLIC BLOOD PRESSURE: 72 MMHG

## 2024-04-17 DIAGNOSIS — E03.9 ACQUIRED HYPOTHYROIDISM: ICD-10-CM

## 2024-04-17 DIAGNOSIS — E55.9 VITAMIN D DEFICIENCY: ICD-10-CM

## 2024-04-17 DIAGNOSIS — D50.9 IRON DEFICIENCY ANEMIA, UNSPECIFIED IRON DEFICIENCY ANEMIA TYPE: ICD-10-CM

## 2024-04-17 DIAGNOSIS — E53.8 VITAMIN B12 DEFICIENCY: ICD-10-CM

## 2024-04-17 DIAGNOSIS — Z09 FOLLOW-UP EXAM: Primary | ICD-10-CM

## 2024-04-17 LAB
25(OH)D3 SERPL-MCNC: 43.5 NG/ML (ref 30–100)
ALBUMIN SERPL-MCNC: 4.6 G/DL (ref 3.5–5.2)
ALBUMIN/GLOB SERPL: 1.5 G/DL
ALP SERPL-CCNC: 53 U/L (ref 39–117)
ALT SERPL W P-5'-P-CCNC: 24 U/L (ref 1–33)
ANION GAP SERPL CALCULATED.3IONS-SCNC: 10 MMOL/L (ref 5–15)
AST SERPL-CCNC: 19 U/L (ref 1–32)
BASOPHILS # BLD AUTO: 0.03 10*3/MM3 (ref 0–0.2)
BASOPHILS NFR BLD AUTO: 0.3 % (ref 0–1.5)
BILIRUB SERPL-MCNC: 0.4 MG/DL (ref 0–1.2)
BUN SERPL-MCNC: 10 MG/DL (ref 6–20)
BUN/CREAT SERPL: 9.4 (ref 7–25)
CALCIUM SPEC-SCNC: 8.4 MG/DL (ref 8.6–10.5)
CHLORIDE SERPL-SCNC: 105 MMOL/L (ref 98–107)
CO2 SERPL-SCNC: 24 MMOL/L (ref 22–29)
CREAT SERPL-MCNC: 1.06 MG/DL (ref 0.57–1)
DEPRECATED RDW RBC AUTO: 39.3 FL (ref 37–54)
EGFRCR SERPLBLD CKD-EPI 2021: 73.1 ML/MIN/1.73
EOSINOPHIL # BLD AUTO: 0.09 10*3/MM3 (ref 0–0.4)
EOSINOPHIL NFR BLD AUTO: 0.9 % (ref 0.3–6.2)
ERYTHROCYTE [DISTWIDTH] IN BLOOD BY AUTOMATED COUNT: 12.5 % (ref 12.3–15.4)
FERRITIN SERPL-MCNC: 195 NG/ML (ref 13–150)
FOLATE SERPL-MCNC: >20 NG/ML (ref 4.78–24.2)
GLOBULIN UR ELPH-MCNC: 3.1 GM/DL
GLUCOSE SERPL-MCNC: 90 MG/DL (ref 65–99)
HCT VFR BLD AUTO: 43.4 % (ref 34–46.6)
HGB BLD-MCNC: 14.6 G/DL (ref 12–15.9)
IMM GRANULOCYTES # BLD AUTO: 0.04 10*3/MM3 (ref 0–0.05)
IMM GRANULOCYTES NFR BLD AUTO: 0.4 % (ref 0–0.5)
IRON 24H UR-MRATE: 95 MCG/DL (ref 37–145)
IRON SATN MFR SERPL: 25 % (ref 20–50)
LYMPHOCYTES # BLD AUTO: 2.71 10*3/MM3 (ref 0.7–3.1)
LYMPHOCYTES NFR BLD AUTO: 26.2 % (ref 19.6–45.3)
MCH RBC QN AUTO: 29.3 PG (ref 26.6–33)
MCHC RBC AUTO-ENTMCNC: 33.6 G/DL (ref 31.5–35.7)
MCV RBC AUTO: 87.1 FL (ref 79–97)
MONOCYTES # BLD AUTO: 0.52 10*3/MM3 (ref 0.1–0.9)
MONOCYTES NFR BLD AUTO: 5 % (ref 5–12)
NEUTROPHILS NFR BLD AUTO: 6.94 10*3/MM3 (ref 1.7–7)
NEUTROPHILS NFR BLD AUTO: 67.2 % (ref 42.7–76)
NRBC BLD AUTO-RTO: 0 /100 WBC (ref 0–0.2)
PLATELET # BLD AUTO: 352 10*3/MM3 (ref 140–450)
PMV BLD AUTO: 9.3 FL (ref 6–12)
POTASSIUM SERPL-SCNC: 4.6 MMOL/L (ref 3.5–5.2)
PROT SERPL-MCNC: 7.7 G/DL (ref 6–8.5)
RBC # BLD AUTO: 4.98 10*6/MM3 (ref 3.77–5.28)
SODIUM SERPL-SCNC: 139 MMOL/L (ref 136–145)
T-UPTAKE NFR SERPL: 1.04 TBI (ref 0.8–1.3)
T4 SERPL-MCNC: 7.89 MCG/DL (ref 4.5–11.7)
TIBC SERPL-MCNC: 378 MCG/DL (ref 298–536)
TRANSFERRIN SERPL-MCNC: 254 MG/DL (ref 200–360)
TSH SERPL DL<=0.05 MIU/L-ACNC: 1.49 UIU/ML (ref 0.27–4.2)
VIT B12 BLD-MCNC: >2000 PG/ML (ref 211–946)
WBC NRBC COR # BLD AUTO: 10.33 10*3/MM3 (ref 3.4–10.8)

## 2024-04-17 PROCEDURE — 84466 ASSAY OF TRANSFERRIN: CPT | Performed by: NURSE PRACTITIONER

## 2024-04-17 PROCEDURE — 82607 VITAMIN B-12: CPT | Performed by: NURSE PRACTITIONER

## 2024-04-17 PROCEDURE — 84436 ASSAY OF TOTAL THYROXINE: CPT | Performed by: NURSE PRACTITIONER

## 2024-04-17 PROCEDURE — 80050 GENERAL HEALTH PANEL: CPT | Performed by: NURSE PRACTITIONER

## 2024-04-17 PROCEDURE — 99214 OFFICE O/P EST MOD 30 MIN: CPT | Performed by: NURSE PRACTITIONER

## 2024-04-17 PROCEDURE — 82728 ASSAY OF FERRITIN: CPT | Performed by: NURSE PRACTITIONER

## 2024-04-17 PROCEDURE — 83540 ASSAY OF IRON: CPT | Performed by: NURSE PRACTITIONER

## 2024-04-17 PROCEDURE — 82306 VITAMIN D 25 HYDROXY: CPT | Performed by: NURSE PRACTITIONER

## 2024-04-17 PROCEDURE — 82746 ASSAY OF FOLIC ACID SERUM: CPT | Performed by: NURSE PRACTITIONER

## 2024-04-17 PROCEDURE — 84479 ASSAY OF THYROID (T3 OR T4): CPT | Performed by: NURSE PRACTITIONER

## 2024-04-17 NOTE — PROGRESS NOTES
Chief Complaint  Thyroid Follow Up (6M routine check, pt reports feeling tired and hair continues to fall out)    Subjective        Medical History: has a past medical history of Acne, ADHD, Anxiety, Depression, Generalized anxiety disorder (01/06/2021), Gestational hypertension, History of medical problems (02/28/2019), Major depressive disorder in partial remission (01/06/2021), PCOS (polycystic ovarian syndrome), PCOS (polycystic ovarian syndrome) (07/26/2021), and Pre-eclampsia.     Surgical History: has a past surgical history that includes Tonsillectomy; Dilation and curettage of uterus; Adenoidectomy; and Skin biopsy.     Family History: family history includes ADD / ADHD in her sister; Anxiety disorder in her mother; Bipolar disorder in her mother; Depression in her mother and sister; Diabetes in her maternal grandfather.     Social History: reports that she has never smoked. She has never been exposed to tobacco smoke. She has never used smokeless tobacco. She reports that she does not currently use alcohol. She reports that she does not use drugs.    Janice Padilla presents to [facility]  Fatigue  This is a recurrent problem. The current episode started more than 1 year ago. The problem occurs constantly. The problem has been gradually worsening. Associated symptoms include fatigue. Pertinent negatives include no anorexia, arthralgias, coughing, myalgias or vomiting. Nothing aggravates the symptoms. Treatments tried: treating thyroid disorder. The treatment provided mild relief.   Hypothyroidism  This is a new (Patient was seen 8/20/2023, thyroid was slightly elevated patient was having and symptoms of fatigue and weight gain, I went ahead and started patient on levothyroxine 25 mcg she comes in today for follow-up to see how she is feeling.) problem. The current episode started more than 1 month ago. The problem occurs constantly. Associated symptoms include fatigue.  "Associated symptoms comments: Weight gain. Treatments tried: Levothyroxine.   Vitamin D deficiency  This is a chronic problem.  Current episode has been for longer than 6 months.  Problem has gradually been improving since she has been on vitamin D supplements.  She denies any excessive fatigue, hair loss, skin changes due to the vitamin D deficiency.  She tries to eat a well-balanced diet.  She has been on the vitamin D weekly, no compliance problems.  Condition is stable.      Objective   Vital Signs:   /72 (BP Location: Right arm, Patient Position: Sitting, Cuff Size: Adult)   Pulse 85   Temp 97.6 °F (36.4 °C) (Infrared)   Ht 157.5 cm (62\")   Wt 75.3 kg (166 lb)   SpO2 99%   BMI 30.36 kg/m²       Wt Readings from Last 3 Encounters:   04/17/24 75.3 kg (166 lb)   03/13/24 78.3 kg (172 lb 11.2 oz)   12/20/23 82.1 kg (181 lb)        BP Readings from Last 3 Encounters:   04/17/24 104/72   03/13/24 119/73   12/20/23 106/76        Physical Exam  Vitals reviewed.   Constitutional:       General: She is not in acute distress.     Appearance: Normal appearance. She is well-developed. She is obese. She is not ill-appearing.   HENT:      Head: Normocephalic and atraumatic.   Eyes:      Conjunctiva/sclera: Conjunctivae normal.      Pupils: Pupils are equal, round, and reactive to light.   Neck:      Thyroid: Thyromegaly present. No thyroid tenderness.   Cardiovascular:      Rate and Rhythm: Normal rate and regular rhythm.      Heart sounds: No murmur heard.  Pulmonary:      Effort: Pulmonary effort is normal.      Breath sounds: Normal breath sounds. No wheezing.   Musculoskeletal:      Right lower leg: No edema.      Left lower leg: No edema.   Skin:     General: Skin is warm and dry.   Neurological:      Mental Status: She is alert and oriented to person, place, and time.   Psychiatric:         Mood and Affect: Mood and affect normal.         Behavior: Behavior normal.         Thought Content: Thought content " normal.         Judgment: Judgment normal.        Result Review :  {The following data was reviewed by JURGEN Faustin on 04/17/2024.  Common labs          8/23/2023    11:58   Common Labs   Glucose 91    BUN 13    Creatinine 0.80    Sodium 139    Potassium 4.2    Chloride 103    Calcium 8.9    Albumin 4.7    Total Bilirubin 0.2    Alkaline Phosphatase 51    AST (SGOT) 28    ALT (SGPT) 22    WBC 10.97    Hemoglobin 13.4    Hematocrit 40.2    Platelets 296    Total Cholesterol 171    Triglycerides 131    HDL Cholesterol 54    LDL Cholesterol  94      Data reviewed : Previous office note             Current Outpatient Medications on File Prior to Visit   Medication Sig Dispense Refill    amphetamine-dextroamphetamine (Adderall) 10 MG tablet Take 1 tablet by mouth Daily. 30 tablet 0    amphetamine-dextroamphetamine (ADDERALL) 20 MG tablet Take 1 tablet by mouth 2 (Two) Times a Day. 60 tablet 0    ARIPiprazole (ABILIFY) 5 MG tablet TAKE 1 TABLET DAILY (DISCONTINUE 2 MG DOSE) 90 tablet 3    DULoxetine (CYMBALTA) 30 MG capsule TAKE 1 CAPSULE DAILY 90 capsule 3    hydrOXYzine (ATARAX) 50 MG tablet TAKE 2 TABLETS EVERY NIGHT 180 tablet 3    ketoconazole (NIZORAL) 2 % shampoo       levothyroxine (SYNTHROID, LEVOTHROID) 25 MCG tablet TAKE 1 TABLET EVERY MORNING 90 tablet 1    metoprolol succinate XL (TOPROL-XL) 25 MG 24 hr tablet TAKE 1 TABLET DAILY 90 tablet 1    Prenatal MV & Min w/FA-DHA (PRENATAL ADULT GUMMY/DHA/FA PO) Take  by mouth.      Soolantra 1 % cream       spironolactone (ALDACTONE) 100 MG tablet spironolactone 100 mg oral tablet take 1 tablet (100 mg) by oral route once daily   Active      tretinoin (RETIN-A) 0.025 % cream       vitamin D (ERGOCALCIFEROL) 1.25 MG (66861 UT) capsule capsule Take 1 capsule by mouth 1 (One) Time Per Week. 13 capsule 1    Wegovy 2.4 MG/0.75ML solution auto-injector       [DISCONTINUED] ondansetron ODT (ZOFRAN-ODT) 4 MG disintegrating tablet       [DISCONTINUED]  sulfamethoxazole-trimethoprim (BACTRIM DS,SEPTRA DS) 800-160 MG per tablet TAKE ONE TABLET BY MOUTH EVERY DAY FOR FOURTEEN DAYS       No current facility-administered medications on file prior to visit.        Assessment and Plan  Diagnoses and all orders for this visit:    1. Follow-up exam (Primary)    2. Acquired hypothyroidism  -     CBC & Differential  -     Comprehensive Metabolic Panel  -     Thyroid Panel With TSH  -     US Thyroid; Future    3. Vitamin D deficiency  -     Vitamin D,25-Hydroxy    4. Iron deficiency anemia, unspecified iron deficiency anemia type  -     Ferritin  -     Iron Profile    5. Vitamin B12 deficiency  -     Vitamin B12 & Folate    Will check iron panel patient has had a history of iron deficiency anemia, vitamin B12, recheck thyroid levels, vitamin D levels.  Did discuss thinking about that this could be related to any type of sleep disorder, patient states that she will be more cautious and ask for spouse if she snores.  Will do thyroid ultrasound patient has had not had 1 since starting on thyroid medication.  Patient verbalized understanding and agreeable with treatment plan.    Follow Up   Return in about 6 months (around 10/17/2024) for Recheck.  Patient was given instructions and counseling regarding her condition or for health maintenance advice. Please see specific information pulled into the AVS if appropriate.       Part of this note may be electronic transcription/translation of spoken language to printed text using the Dragon dictation system

## 2024-04-18 DIAGNOSIS — E03.9 ACQUIRED HYPOTHYROIDISM: Primary | ICD-10-CM

## 2024-04-18 RX ORDER — LEVOTHYROXINE SODIUM 0.03 MG/1
37.5 TABLET ORAL EVERY MORNING
Qty: 135 TABLET | Refills: 0 | Status: SHIPPED | OUTPATIENT
Start: 2024-04-18 | End: 2024-07-17

## 2024-04-22 RX ORDER — ERGOCALCIFEROL 1.25 MG/1
50000 CAPSULE ORAL WEEKLY
Qty: 13 CAPSULE | Refills: 1 | Status: SHIPPED | OUTPATIENT
Start: 2024-04-22

## 2024-04-23 NOTE — PROGRESS NOTES
Progress Note    Date: 05/01/2024  Time In: 0800  Time Out: 0853    Patient Legal Name: Janice Padilla  Patient Age: 28 y.o.    CHIEF COMPLAINT: Anxiety, ADHD    Subjective   History of Present Illness     From previous progress note on 4/10/24:  Patient is to reference attached list of cognitive distortions as a reminder about types of thoughts which are worth challenging to reduce symptoms of anxiety and overall mood.  We will plan to meet one time next month to discuss treatment goals, as patient appears to be managing her symptoms well, and we may terminate therapeutic services at that time depending on presentation at that time.    Assessment    Mental Status Exam     Appearance: good hygiene and dressed appropriately for the weather  Behavior: calm  Cooperation:  engaged, cooperative, attentive, and friendly  Eye Contact:  good  Affect:  congruent  Mood: euthymic and expressive  Speech: responsive  Thought Process:  linear, organized, and goal-oriented  Thought Content: appropriate and abstract  Suicidal: denies  Homicidal:  denies  Hallucinations:  denies  Memory:  intact  Orientation:  person, place, time, and situation  Reliability:  reliable  Insight:  good  Judgment:  good     Clinical Intervention       ICD-10-CM ICD-9-CM   1. Generalized anxiety disorder  F41.1 300.02   2. Depression, major, in remission  F32.5 296.25   3. ADHD (attention deficit hyperactivity disorder), inattentive type  F90.0 314.00        Janice is a 28 y.o. female who presents today as a follow-up for continued psychotherapy. Individual psychotherapy was provided utilizing CBT techniques to encourage expression of thoughts and feelings, establish new coping skills, manage stress, recognize patterns of behavior, acknowledge sources of feelings and behaviors, identify strengths, challenge negative thinking patterns, recognize cognitive distortions, and provide support.  Patient reports that she has been managing her mood well, and  "has been using adaptive coping mechanisms that have been helping her enjoy many aspects of her life, including parenting, her marriage, and her job.  Patient did express some anxiety related to feeling stressed at work, stating that she often feels \"rushed and drained\" and therapist provided some ideas to reframe her thought processes to relieve stress and to save some of her energy for her personal life after work.    Plan   Plan & Goals     Therapist praised patient for progress that was made, and we reviewed her treatment goals in today's session.  Patient has good insight into how much better she is coping with interpersonal conflict, and she feels confident that she is able to recognize triggers to mood changes, while also using healthy coping skills to improve her mood when needed.  We have discussed terminating therapy services at this time due to patient meeting her treatment objectives.  Patient is aware that she can request an appointment if ever needed in the future, but no follow-up is scheduled at this time.  She will continue seeing psychiatrist for medication management.    Patient acknowledged and verbally consented to continue working toward resolving current treatment plan goals and was educated on the importance of participation in the therapeutic process.  Patient will remain compliant with medication regimen as prescribed. Discuss any medication side effects, questions or concerns with prescribing provider.  Call 911 or present to the nearest emergency room in an emergency situation.  National Suicide Prevention Lifeline: Call 988. The Lifeline provides 24/7, free and confidential support for people in distress, prevention and crisis resources.  Crisis Text Line  Text HOME To 295873    Return if symptoms worsen or fail to improve.    ____________________  This document has been electronically signed by Yareli Yeung LCSW  May 1, 2024 09:01 EDT    Part of this note may be an electronic " transcription/translation of spoken language to printed text using the Dragon Dictation System.

## 2024-05-01 ENCOUNTER — OFFICE VISIT (OUTPATIENT)
Dept: PSYCHIATRY | Facility: CLINIC | Age: 29
End: 2024-05-01
Payer: COMMERCIAL

## 2024-05-01 DIAGNOSIS — F90.0 ADHD (ATTENTION DEFICIT HYPERACTIVITY DISORDER), INATTENTIVE TYPE: ICD-10-CM

## 2024-05-01 DIAGNOSIS — F32.5 DEPRESSION, MAJOR, IN REMISSION: ICD-10-CM

## 2024-05-01 DIAGNOSIS — F41.1 GENERALIZED ANXIETY DISORDER: Primary | ICD-10-CM

## 2024-05-01 NOTE — PSYCHOTHERAPY NOTE
Psychotherapy Note - May 1, 2024    Daughter - Alexandra (age 4)   - Toño - started dating in 10th grade  Best friend - Annette - friends for years     Sister: Mary  Niece: Jenni (age 10)     I was diagnosed with hypothyroidism - on medicine      My cousin - Rebeca batista (undiagnosed autism, adhd undiagnosed)      TO PRACTICE  Grateful for  Forgive myself for  ____________    I feel like there are so many other things I need to be doing inside  My mind is elsewhere     It was nice to be outside with everyone  I told Toño that we need to get off our phones so much    Barriers to keeping this up would be my ADHD  I'm worried about my mind wandering    I'm going back to the medium today  I'm going with my cousin this time  My dad & my uncle were there last time  I knew my great grandfather on my dad's side

## 2024-05-15 DIAGNOSIS — F90.0 ADHD (ATTENTION DEFICIT HYPERACTIVITY DISORDER), INATTENTIVE TYPE: ICD-10-CM

## 2024-05-15 RX ORDER — DEXTROAMPHETAMINE SACCHARATE, AMPHETAMINE ASPARTATE, DEXTROAMPHETAMINE SULFATE AND AMPHETAMINE SULFATE 2.5; 2.5; 2.5; 2.5 MG/1; MG/1; MG/1; MG/1
10 TABLET ORAL EVERY MORNING
Qty: 30 TABLET | Refills: 0 | Status: SHIPPED | OUTPATIENT
Start: 2024-05-15

## 2024-05-15 RX ORDER — DEXTROAMPHETAMINE SACCHARATE, AMPHETAMINE ASPARTATE, DEXTROAMPHETAMINE SULFATE AND AMPHETAMINE SULFATE 5; 5; 5; 5 MG/1; MG/1; MG/1; MG/1
1 TABLET ORAL 2 TIMES DAILY
Qty: 60 TABLET | Refills: 0 | Status: SHIPPED | OUTPATIENT
Start: 2024-05-15

## 2024-05-17 ENCOUNTER — HOSPITAL ENCOUNTER (OUTPATIENT)
Dept: ULTRASOUND IMAGING | Facility: HOSPITAL | Age: 29
Discharge: HOME | End: 2024-05-17
Admitting: NURSE PRACTITIONER
Payer: COMMERCIAL

## 2024-05-17 DIAGNOSIS — E03.9 ACQUIRED HYPOTHYROIDISM: ICD-10-CM

## 2024-05-17 PROCEDURE — 76536 US EXAM OF HEAD AND NECK: CPT

## 2024-05-20 NOTE — PROGRESS NOTES
Called Janice Padilla at [phone] (M) to relay result, however was unable to reach patient. LVM asking to please return call.

## 2024-06-22 DIAGNOSIS — F90.0 ADHD (ATTENTION DEFICIT HYPERACTIVITY DISORDER), INATTENTIVE TYPE: ICD-10-CM

## 2024-06-24 RX ORDER — DEXTROAMPHETAMINE SACCHARATE, AMPHETAMINE ASPARTATE, DEXTROAMPHETAMINE SULFATE AND AMPHETAMINE SULFATE 2.5; 2.5; 2.5; 2.5 MG/1; MG/1; MG/1; MG/1
10 TABLET ORAL EVERY MORNING
Qty: 30 TABLET | Refills: 0 | Status: SHIPPED | OUTPATIENT
Start: 2024-06-24

## 2024-06-24 RX ORDER — DEXTROAMPHETAMINE SACCHARATE, AMPHETAMINE ASPARTATE, DEXTROAMPHETAMINE SULFATE AND AMPHETAMINE SULFATE 5; 5; 5; 5 MG/1; MG/1; MG/1; MG/1
1 TABLET ORAL 2 TIMES DAILY
Qty: 60 TABLET | Refills: 0 | Status: SHIPPED | OUTPATIENT
Start: 2024-06-24

## 2024-06-24 NOTE — TELEPHONE ENCOUNTER
PATIENT REQUESTING REFILL FOR HER ADDERALL 20MG & ADDERALL 10MG    amphetamine-dextroamphetamine (ADDERALL) 20 MG tablet (05/15/2024)     amphetamine-dextroamphetamine (Adderall) 10 MG tablet (05/15/2024)     LAST SEEN BY PROVIDER  Office Visit with Eli Murray MD (03/13/2024)     NEXT FOLLOW UP   Appointment with Eli Murray MD (07/12/2024)

## 2024-07-12 ENCOUNTER — OFFICE VISIT (OUTPATIENT)
Dept: PSYCHIATRY | Facility: CLINIC | Age: 29
End: 2024-07-12
Payer: COMMERCIAL

## 2024-07-12 VITALS
HEIGHT: 62 IN | HEART RATE: 102 BPM | WEIGHT: 164.6 LBS | DIASTOLIC BLOOD PRESSURE: 89 MMHG | SYSTOLIC BLOOD PRESSURE: 122 MMHG | BODY MASS INDEX: 30.29 KG/M2

## 2024-07-12 DIAGNOSIS — F33.40 RECURRENT MAJOR DEPRESSIVE DISORDER IN REMISSION: ICD-10-CM

## 2024-07-12 DIAGNOSIS — F51.05 INSOMNIA DUE TO MENTAL CONDITION: ICD-10-CM

## 2024-07-12 DIAGNOSIS — F41.1 GENERALIZED ANXIETY DISORDER: ICD-10-CM

## 2024-07-12 DIAGNOSIS — F90.0 ADHD (ATTENTION DEFICIT HYPERACTIVITY DISORDER), INATTENTIVE TYPE: Primary | ICD-10-CM

## 2024-07-12 DIAGNOSIS — F41.0 PANIC ATTACKS: ICD-10-CM

## 2024-07-12 RX ORDER — AZELAIC ACID 0.15 G/G
GEL TOPICAL
COMMUNITY
Start: 2024-04-25

## 2024-07-12 RX ORDER — TIRZEPATIDE 2.5 MG/.5ML
INJECTION, SOLUTION SUBCUTANEOUS
COMMUNITY
Start: 2024-05-08

## 2024-07-12 RX ORDER — TIRZEPATIDE 7.5 MG/.5ML
INJECTION, SOLUTION SUBCUTANEOUS
COMMUNITY
Start: 2024-06-05

## 2024-07-12 RX ORDER — SULFAMETHOXAZOLE AND TRIMETHOPRIM 800; 160 MG/1; MG/1
1 TABLET ORAL EVERY 12 HOURS SCHEDULED
COMMUNITY
Start: 2024-07-08

## 2024-07-12 NOTE — PROGRESS NOTES
"Subjective   Janice Padilla is a 28 y.o. female who presents today for follow up    Referring Provider:  No referring provider defined for this encounter.    Chief Complaint:  adhd cornel mdd    History of Present Illness:     Janice Padilla is a 25 year old /White female referred by Balbina SEAMAN.     Initial Chart review 21: Patient is on Wellbutrin, status post Zoloft. Also on Adderall. Had tachycardia at 130. Started on metoprolol 50 mg extended release daily. Labs in December: BMI 35, LDL is elevated at 121, ALT AST within normal limits, creatinine 0.99, hematocrit 45, electrolytes are essentially normal, TSH is 3.65 normal, iron is 53 low, vitamin D is 23 low, ferritin is normal at 96. No EKG or head imaging.     \"Janice\"  Father passed away from drunk driving at 6 yo. Last true memory of father was in .  Recently dx'd with hypothyroidism  Adderalls to [facility]        Chart review:   2024: UC, Yareli x2, fam med, reassuring cmp, cbc, b12, folate, vit D, iron profile, thyroid profile, elevated ferritin, cr 1.06.  3/13: ED, Yareli x3, fam med, cards.  Yareli x2.  Fam medx2, tsh normal, vit D low.  Fam med, therapy x4, reassuring lipids, cmp; abnl fT4, TSH.  seen by ob x2, psychotherapy x2. AUB, infertility.  Planning:   3/13: Stable, well, no changes.  12/15: stable, well, no changes.  Consider strattera for ADHD. : Increase abilify for anxiety. Consider a light box.      Visits (Below):    2024: In person interview:  \"I'm good.\"  No complaints, concerns.  Seasonal pattern.   Mood/Depression: stable mood  ADHD: stable  Anxiety: stable   Panic attacks: none  Energy: stable  Concentration: stable  Insomnia: stable   Eatin, 172, 181 lbs  Refills: y  Substances: def  Therapy: Yareli  Medication compliant: y  SE: n  No SI HI AVH.      3/13: In person interview:  \"I feel good.\"  P3, G4  On wegovy  No problems or issues.  Seasonal pattern.   Mood/Depression: stable " "mood  ADHD: under control  Anxiety: stable   Panic attacks: n  Energy: good  Concentration: at goal  Sleeping: stable insomnia  Eatin, 181 lbs  Refills: y  Substances: def  Therapy: Yareli  Medication compliant: y  SE: n  No SI HI AVH.      12/15: In person interview:  Chart review:   Yareli x2.  Fam medx2, tsh normal, vit D low.  Fam med, therapy x4, reassuring lipids, cmp; abnl fT4, TSH.  seen by ob x2, psychotherapy x2. AUB, infertility.  Planning:   Consider strattera for ADHD. : Increase abilify for anxiety. Consider a light box.  \"I've been ok.\"  No problems or issues.  No more depressive episodes.  Seasonal pattern.   Mood/Depression: stable mood  ADHD: under control  Anxiety: stable   Panic attacks: n  Energy: good  Concentration: at goal  Sleeping: stable  Eatin lbs  Refills: y  Substances: def  Therapy: Yareli  Medication compliant: y  SE: n  No SI HI AVH.    ...    IMPORTANT:  From previous note, patient was held back in the third grade. Struggled to complete high school and missed 60 days although she did graduate with good grades. Got in trouble for talking a lot in class. She was not in any special classes. She did not have an IEP.     21 H&P: Virtual visit via Zoom audio and video due to the COVID-19 pandemic. Patient is accepting of and agreeable to appointment. The appointment consisted of the patient and I only. Interview: Patient reports that she had her first child in March and suffered from postpartum depression. The pandemic did not help things. It was a \"hard time,\" and the patient reports she had already had anxiety before. Patient is presently bottlefeeding. She became pregnant through an infertility specialist. Presently not on any contraception.   Endorses muscle tension, fatigue, poor concentration, restlessness, irritability, and some broken sleep, although she is sleeping for about 8 hours a night. She wakes up frequently to check on her baby girl, Alexandra. Also " "endorses guilt at being a bad mom. Duration has been since March of last year. Patient feels her anxiety is worse than her depression.   Denies SI HI AVH. Has access to weapons that are locked in a gun safe. Patient does not know the combination. She also does not know how to work the guns that are in the safe. Psychiatric review of systems is positive for anxiety and depression, negative for psychosis and maribel.   Possible ADHD. Patient was diagnosed by Dr. Joe in 2017. 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 Possible family history as well as her sister may have ADHD.   Past Psychiatric History:  Began Psychiatric Treatment: Since    Dx: Anxiety   Psychiatrist: Has not seen a psychiatrist   Therapist: Saw therapist in 2018x1, unsure if it was helpful.   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     Feeling down, depressed, or hopeless?     Trouble falling or staying asleep, or sleeping too much?     Feeling tired or having little energy?     Poor appetite or overeating?     Feeling bad about yourself - or that you are a failure or have let yourself or your family down?     Trouble concentrating on things, such as reading the newspaper or watching television?     Moving or speaking so slowly that other people could have noticed? Or the opposite - being so fidgety or restless that you have been moving around a lot more than usual?     Thoughts that you would be better off dead, or of hurting yourself in some way?     PHQ-9 Total Score       MATTHEW-7       Past Surgical History:  Past Surgical History:   Procedure Laterality Date    ADENOIDECTOMY      DILATATION AND CURETTAGE      SKIN BIOPSY      TONSILLECTOMY         Problem List:  Patient Active Problem List   Diagnosis    Acne    Anxiety    Attention deficit hyperactivity disorder (ADHD)    Depression    Generalized anxiety disorder    Gestational hypertension    Major depressive disorder in partial remission    PCOS (polycystic ovarian syndrome)    Pre-eclampsia       Allergy:   No Known Allergies     Discontinued Medications:  Medications Discontinued During This Encounter   Medication Reason    Soolantra 1 % cream Historical Med - Therapy completed    Wegovy 2.4 MG/0.75ML solution auto-injector Historical Med - Therapy completed               Current Medications:   Current Outpatient Medications   Medication Sig Dispense Refill    azelaic acid (AZELEX) 15 % gel       silver sulfadiazine (SILVADENE, SSD) 1 % cream APPLY TO THE AFFECTED AREA(S) EVERY DAY      sulfamethoxazole-trimethoprim (BACTRIM DS,SEPTRA DS) 800-160 MG per tablet Take 1 tablet by mouth Every 12 (Twelve) Hours.      Zepbound 2.5 MG/0.5ML solution auto-injector       Zepbound 7.5 MG/0.5ML solution  auto-injector       amphetamine-dextroamphetamine (Adderall) 10 MG tablet Take 1 tablet by mouth Every Morning. 30 tablet 0    amphetamine-dextroamphetamine (ADDERALL) 20 MG tablet Take 1 tablet by mouth 2 (Two) Times a Day. 60 tablet 0    ARIPiprazole (ABILIFY) 5 MG tablet TAKE 1 TABLET DAILY (DISCONTINUE 2 MG DOSE) 90 tablet 3    DULoxetine (CYMBALTA) 30 MG capsule TAKE 1 CAPSULE DAILY 90 capsule 3    hydrOXYzine (ATARAX) 50 MG tablet TAKE 2 TABLETS EVERY NIGHT 180 tablet 3    ketoconazole (NIZORAL) 2 % shampoo       levothyroxine (SYNTHROID, LEVOTHROID) 25 MCG tablet Take 1.5 tablets by mouth Every Morning for 90 days. 135 tablet 0    metoprolol succinate XL (TOPROL-XL) 25 MG 24 hr tablet TAKE 1 TABLET DAILY 90 tablet 1    Prenatal MV & Min w/FA-DHA (PRENATAL ADULT GUMMY/DHA/FA PO) Take  by mouth.      spironolactone (ALDACTONE) 100 MG tablet spironolactone 100 mg oral tablet take 1 tablet (100 mg) by oral route once daily   Active      tretinoin (RETIN-A) 0.025 % cream       vitamin D (ERGOCALCIFEROL) 1.25 MG (08088 UT) capsule capsule TAKE 1 CAPSULE BY MOUTH ONCE A WEEK 13 capsule 1     No current facility-administered medications for this visit.       Past Medical History:  Past Medical History:   Diagnosis Date    Acne     ADHD     Anxiety     Depression     Generalized anxiety disorder 01/06/2021    Gestational hypertension     History of medical problems 02/28/2019    Miscarriage    Major depressive disorder in partial remission 01/06/2021    PCOS (polycystic ovarian syndrome)     PCOS (polycystic ovarian syndrome) 07/26/2021    Pre-eclampsia        Social History     Socioeconomic History    Marital status:    Tobacco Use    Smoking status: Never     Passive exposure: Never    Smokeless tobacco: Never    Tobacco comments:     2/10/2021- 1/6/2021   Vaping Use    Vaping status: Never Used   Substance and Sexual Activity    Alcohol use: Not Currently     Comment: light; 2/10/2021- 1/6/2021    Drug use:  "Never     Comment: 2/10/2021- 1/6/2021    Sexual activity: Yes     Partners: Male     Birth control/protection: None       Family History   Problem Relation Age of Onset    Anxiety disorder Mother     Bipolar disorder Mother     Depression Mother     Depression Sister     ADD / ADHD Sister     Diabetes Maternal Grandfather     Breast cancer Neg Hx     Ovarian cancer Neg Hx     Uterine cancer Neg Hx     Colon cancer Neg Hx     Pulmonary embolism Neg Hx     Deep vein thrombosis Neg Hx        Mental Status Exam:   Hygiene:   good, groomed  Cooperation:  Cooperative  Eye Contact:  Good  Psychomotor Behavior:  Appropriate  Affect:  euthymic, good variability, congruent  Mood: good  Hopelessness: Denies  Speech:  Normal  Thought Process:  Goal directed  Thought Content:  Normal  Suicidal:  None  Homicidal:  None  Hallucinations:  None  Delusion:  None  Memory:  Intact  Orientation:  Person, Place, Time and Situation  Reliability:  good  Insight:  Fair  Judgement:  Fair  Impulse Control:  Fair  Physical/Medical Issues:  No      Review of Systems:  Review of Systems   Constitutional:  Negative for diaphoresis and fatigue.   HENT:  Negative for drooling.    Eyes:  Negative for visual disturbance.   Respiratory:  Negative for cough and shortness of breath.    Cardiovascular:  Negative for chest pain, palpitations and leg swelling.   Gastrointestinal:  Negative for diarrhea, nausea and vomiting.   Endocrine: Negative for cold intolerance and heat intolerance.   Genitourinary:  Negative for difficulty urinating and frequency.   Musculoskeletal:  Negative for joint swelling.   Skin:  Negative for rash.   Allergic/Immunologic: Negative for immunocompromised state.   Neurological:  Negative for dizziness, seizures, syncope, speech difficulty, light-headedness, numbness and headaches.         Physical Exam:  Physical Exam    Vital Signs:   /89   Pulse 102   Ht 157.5 cm (62\")   Wt 74.7 kg (164 lb 9.6 oz)   BMI 30.11 kg/m²  "     Lab Results:   Office Visit on 04/17/2024   Component Date Value Ref Range Status    Glucose 04/17/2024 90  65 - 99 mg/dL Final    BUN 04/17/2024 10  6 - 20 mg/dL Final    Creatinine 04/17/2024 1.06 (H)  0.57 - 1.00 mg/dL Final    Sodium 04/17/2024 139  136 - 145 mmol/L Final    Potassium 04/17/2024 4.6  3.5 - 5.2 mmol/L Final    Chloride 04/17/2024 105  98 - 107 mmol/L Final    CO2 04/17/2024 24.0  22.0 - 29.0 mmol/L Final    Calcium 04/17/2024 8.4 (L)  8.6 - 10.5 mg/dL Final    Total Protein 04/17/2024 7.7  6.0 - 8.5 g/dL Final    Albumin 04/17/2024 4.6  3.5 - 5.2 g/dL Final    ALT (SGPT) 04/17/2024 24  1 - 33 U/L Final    AST (SGOT) 04/17/2024 19  1 - 32 U/L Final    Alkaline Phosphatase 04/17/2024 53  39 - 117 U/L Final    Total Bilirubin 04/17/2024 0.4  0.0 - 1.2 mg/dL Final    Globulin 04/17/2024 3.1  gm/dL Final    A/G Ratio 04/17/2024 1.5  g/dL Final    BUN/Creatinine Ratio 04/17/2024 9.4  7.0 - 25.0 Final    Anion Gap 04/17/2024 10.0  5.0 - 15.0 mmol/L Final    eGFR 04/17/2024 73.1  >60.0 mL/min/1.73 Final    TSH 04/17/2024 1.490  0.270 - 4.200 uIU/mL Final    T Uptake 04/17/2024 1.04  0.80 - 1.30 TBI Final    T4, Total 04/17/2024 7.89  4.50 - 11.70 mcg/dL Final    T4 results may be falsely increased if patient taking Biotin.    25 Hydroxy, Vitamin D 04/17/2024 43.5  30.0 - 100.0 ng/ml Final    Ferritin 04/17/2024 195.00 (H)  13.00 - 150.00 ng/mL Final    Iron 04/17/2024 95  37 - 145 mcg/dL Final    Iron Saturation (TSAT) 04/17/2024 25  20 - 50 % Final    Transferrin 04/17/2024 254  200 - 360 mg/dL Final    TIBC 04/17/2024 378  298 - 536 mcg/dL Final    Folate 04/17/2024 >20.00  4.78 - 24.20 ng/mL Final    Vitamin B-12 04/17/2024 >2,000 (H)  211 - 946 pg/mL Final    WBC 04/17/2024 10.33  3.40 - 10.80 10*3/mm3 Final    RBC 04/17/2024 4.98  3.77 - 5.28 10*6/mm3 Final    Hemoglobin 04/17/2024 14.6  12.0 - 15.9 g/dL Final    Hematocrit 04/17/2024 43.4  34.0 - 46.6 % Final    MCV 04/17/2024 87.1  79.0 - 97.0  fL Final    MCH 04/17/2024 29.3  26.6 - 33.0 pg Final    MCHC 04/17/2024 33.6  31.5 - 35.7 g/dL Final    RDW 04/17/2024 12.5  12.3 - 15.4 % Final    RDW-SD 04/17/2024 39.3  37.0 - 54.0 fl Final    MPV 04/17/2024 9.3  6.0 - 12.0 fL Final    Platelets 04/17/2024 352  140 - 450 10*3/mm3 Final    Neutrophil % 04/17/2024 67.2  42.7 - 76.0 % Final    Lymphocyte % 04/17/2024 26.2  19.6 - 45.3 % Final    Monocyte % 04/17/2024 5.0  5.0 - 12.0 % Final    Eosinophil % 04/17/2024 0.9  0.3 - 6.2 % Final    Basophil % 04/17/2024 0.3  0.0 - 1.5 % Final    Immature Grans % 04/17/2024 0.4  0.0 - 0.5 % Final    Neutrophils, Absolute 04/17/2024 6.94  1.70 - 7.00 10*3/mm3 Final    Lymphocytes, Absolute 04/17/2024 2.71  0.70 - 3.10 10*3/mm3 Final    Monocytes, Absolute 04/17/2024 0.52  0.10 - 0.90 10*3/mm3 Final    Eosinophils, Absolute 04/17/2024 0.09  0.00 - 0.40 10*3/mm3 Final    Basophils, Absolute 04/17/2024 0.03  0.00 - 0.20 10*3/mm3 Final    Immature Grans, Absolute 04/17/2024 0.04  0.00 - 0.05 10*3/mm3 Final    nRBC 04/17/2024 0.0  0.0 - 0.2 /100 WBC Final       EKG Results:  No orders to display       Imaging Results:  No Images in the past 120 days found..      Assessment & Plan   Diagnoses and all orders for this visit:    1. ADHD (attention deficit hyperactivity disorder), inattentive type (Primary)    2. Generalized anxiety disorder    3. Recurrent major depressive disorder in remission    4. Insomnia due to mental condition    5. Panic attacks        Presentation most consistent with major depressive disorder in partial remission, generalized anxiety disorder, and ADHD.  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  Also endorses a childhood history that is compelling.    07/12/2024: stable, well, no changes. 3m    3/13:  Stable, well, no changes.    12/15: stable, well, no changes.    11/8: Increase abilify for anxiety. Consider a light box.    10/11: Re-emerging depression, not seasonal, likely related to dc'g abilify. Restart abilify and start lamictal for further mood stability at pt's request.    7/14: improved, no changes 3m    5/12: Much improved, no changes. Well. 16    3/17: House almost done. Continue therapy with Yareli. Start abilify. Increase gabapentin to 300 mg daily prn anxiety. Start hydroxyzine nightly for insomnia. Minimal effect on dep and anx (not at goal). 17     2/23: Stop THC gummies as they are now a schedule I substance. Start gabapentin for anx. Stop hydroxyzine. Refer to next step. 17    12/16: Well, stable, no changes.  3 months    10/19: Doing well, no changes. Anx, dep, adhd under control. 17     9/9: Wants to be on less meds. Taper off bupropion. Continue other meds. Let's streamline the meds. 16    7/27: Stop prozac, 2 week washout, start cymbalta. Consider abilify on top of bupropion. 17     6/15: Residual anxiety, increase prozac. Goal is to increase until maxed out; then possible switch. Depression has resolved. 18     5/4: Therapy referral.  Increase Prozac.  May consider switching from bupropion to Abilify in the future.  Patient was not able to tolerate bupropion 450 mg daily.  17     3/16: No change to medications, no side effects.  Living with in-laws at their house has been a source of anxiety for the patient.  Also taking care of her daughter, who she is very protective of.  Some issues with keeping restraint during therapy sessions as well.  17    2/2: Anxious about the weather, and its consequences (closed daycares, etc.). 18     12/22: Increase Prozac to target anxiety, increase hydroxyzine to target insomnia. 17     11/17: Some ADHD symptoms re-emerging. Add 10 mg adderall at 11 am. Schedule will be 7:30 first dose, 11 second higher dose of 30 mg. Pt also interested in therapy. 17      10/15: Start prozac. Hydroxyzine at night calms her; consider adding am dose.     : Patient is doing extremely well.  No change to medications.      : Doing a little better, however patient was doing much better on Adderall 20 mg twice a day, which was her previous dose.  Discontinue Adderall XR and start Adderall immediate release 20 mg twice a day.  Some residual depressive and anxiety symptoms that are well controlled on bupropion.  Treating ADHD will also treat residual depressive and anxiety symptoms as well.  See back in 2 months.  Declines psychotherapy.    Visit Diagnoses:    ICD-10-CM ICD-9-CM   1. ADHD (attention deficit hyperactivity disorder), inattentive type  F90.0 314.00   2. Generalized anxiety disorder  F41.1 300.02   3. Recurrent major depressive disorder in remission  F33.40 296.35   4. Insomnia due to mental condition  F51.05 300.9     327.02   5. Panic attacks  F41.0 300.01       PLAN:  Risk Assessment: Risk of self-harm acutely is low. Risk factors include anxiety disorder, mood disorder, access to weapons, recent psychosocial stressors. Protective factors include no family history, no present SI, no history of suicide attempts or self-harm in the past, minimal AODA, healthcare seeking, future orientation, willingness to engage in care,  baby. Risk of self-harm chronically is also low, but could be further elevated in the event of treatment noncompliance and/or AODA.  Safety: No acute safety concerns.  Medications:   CONTINUE abilify 5 mg nightly. Risks, benefits, alternatives discussed with patient including increased energy, exacerbation of irritability, akathisia, GI upset, orthostatic hypotension, increased appetite, tardive dyskinesia.  After discussion of these risks and benefits, the patient voiced understanding and agreed to proceed.  CONTINUE cymbalta 30 mg daily. Risks, benefits, alternatives discussed with patient including GI upset, nausea vomiting diarrhea,  theoretical decrease of seizure threshold predisposing the patient to a slightly higher seizure risk, headaches, sexual dysfunction, serotonin syndrome, bleeding risk, increased suicidality in patients 24 years and younger.  Also constipation and urinary retention.  After discussion of these risks and benefits, the patient voiced understanding and agreed to proceed.   CONTINUE hydroxyzine 100 mg PO QHS. Risks, benefits, alternatives discussed with patient including sedation, dizziness, fall risk, GI upset, and risk of increased CNS depression and elevated heart rate if taken with other antihistamines.  After discussion of these risks and benefits, the patient voiced understanding and agreed to proceed.  CONTINUE Adderall 30 mg PO QAM, 20 mg PO QPM. Risks, benefits, side effects discussed with patient including elevated heart rate, elevated blood pressure, irritability, insomnia, sexual dysfunction, appetite suppressing properties, psychosis.  After discussion of these risks and benefits, the patient voiced understanding and agreed to proceed. Controlled substances consent verbally signed. UDS and Tom ordered.  S/P   NEVER STARTED lamictal 25 mg qhs x14 nights, then 50 mg qhs. 12/23  gabapentin 300 mg qday prn anxiety. No difference, so stopped 5/23 prozac 60 mg ineffective.  Escitalopram: sexual dysfunction  Adderall XR 10 mg daily: not effective  Bupropion  made her nauseated. 300 mg made her sweat (irritable?)  Therapy: Consider in the future, presently not interested.  Labs/Studies: EKG reassuring.      TREATMENT PLAN/GOALS: Continue supportive psychotherapy efforts and medications as indicated. Treatment and medication options discussed during today's visit. Patient acknowledged and verbally consented to continue with current treatment plan and was educated on the importance of compliance with treatment and follow-up appointments.    MEDICATION ISSUES:  TOM reviewed as expected.  Discussed medication  options and treatment plan of prescribed medication as well as the risks, benefits, and side effects including potential falls, possible impaired driving and metabolic adversities among others. Patient is agreeable to call the office with any worsening of symptoms or onset of side effects. Patient is agreeable to call 911 or go to the nearest ER should he/she begin having SI/HI. No medication side effects or related complaints today.     MEDS ORDERED DURING VISIT:  No orders of the defined types were placed in this encounter.      Return in about 3 months (around 10/12/2024).         This document has been electronically signed by Eli Murray MD  July 12, 2024 13:46 EDT

## 2024-07-12 NOTE — PATIENT INSTRUCTIONS
1.  Please return to clinic at your next scheduled visit.  Contact the clinic ([phone]) at least 24 hours prior in the event you need to cancel.  2.  Do no harm to yourself or others.    3.  Avoid alcohol and drugs.    4.  Take all medications as prescribed.  Please contact the clinic with any concerns. If you are in need of medication refills, please call the clinic at [phone].    5. Should you want to get in touch with your provider, Dr. Eli Murray, please utilize [email or web address] or contact the office ([phone]), and staff will be able to page Dr. Murray directly.  6.  In the event you have personal crisis, contact the following crisis numbers: Suicide Prevention Hotline 1-435.504.6075; JOSE Helpline 8-184-511-JOSE; [facility] Emergency Room [phone]; text HELLO to 963465; or 497.

## 2024-07-26 DIAGNOSIS — F90.0 ADHD (ATTENTION DEFICIT HYPERACTIVITY DISORDER), INATTENTIVE TYPE: ICD-10-CM

## 2024-07-26 RX ORDER — DEXTROAMPHETAMINE SACCHARATE, AMPHETAMINE ASPARTATE, DEXTROAMPHETAMINE SULFATE AND AMPHETAMINE SULFATE 5; 5; 5; 5 MG/1; MG/1; MG/1; MG/1
1 TABLET ORAL 2 TIMES DAILY
Qty: 60 TABLET | Refills: 0 | Status: SHIPPED | OUTPATIENT
Start: 2024-07-26

## 2024-07-26 RX ORDER — DEXTROAMPHETAMINE SACCHARATE, AMPHETAMINE ASPARTATE, DEXTROAMPHETAMINE SULFATE AND AMPHETAMINE SULFATE 2.5; 2.5; 2.5; 2.5 MG/1; MG/1; MG/1; MG/1
10 TABLET ORAL EVERY MORNING
Qty: 30 TABLET | Refills: 0 | Status: SHIPPED | OUTPATIENT
Start: 2024-07-26

## 2024-07-26 NOTE — TELEPHONE ENCOUNTER
PT REQUESTING REFILL ON ADDERALL 10 MG TABLETS AND ADDERALL 20 MG TABLETS.    amphetamine-dextroamphetamine (Adderall) 10 MG tablet (06/24/2024)     amphetamine-dextroamphetamine (ADDERALL) 20 MG tablet (06/24/2024)     FOLLOW UP APPT ON 10/11/2024.  PT LAST SEEN ON 07/12/2024.    Urine Drug Screen - Urine, Clean Catch (02/09/2024 09:00)     Urine Drug Screen - Urine, Clean Catch (03/22/2023 15:29)

## 2024-07-31 ENCOUNTER — LAB (OUTPATIENT)
Dept: LAB | Facility: HOSPITAL | Age: 29
End: 2024-07-31
Payer: COMMERCIAL

## 2024-07-31 DIAGNOSIS — E03.9 ACQUIRED HYPOTHYROIDISM: ICD-10-CM

## 2024-07-31 LAB
T-UPTAKE NFR SERPL: 1.06 TBI (ref 0.8–1.3)
T4 SERPL-MCNC: 6.32 MCG/DL (ref 4.5–11.7)
TSH SERPL DL<=0.05 MIU/L-ACNC: 2.61 UIU/ML (ref 0.27–4.2)

## 2024-07-31 PROCEDURE — 84443 ASSAY THYROID STIM HORMONE: CPT

## 2024-07-31 PROCEDURE — 84479 ASSAY OF THYROID (T3 OR T4): CPT

## 2024-07-31 PROCEDURE — 84436 ASSAY OF TOTAL THYROXINE: CPT

## 2024-09-25 ENCOUNTER — OFFICE VISIT (OUTPATIENT)
Dept: OBSTETRICS AND GYNECOLOGY | Facility: CLINIC | Age: 29
End: 2024-09-25
Payer: COMMERCIAL

## 2024-09-25 VITALS
HEIGHT: 62 IN | BODY MASS INDEX: 30.36 KG/M2 | HEART RATE: 82 BPM | DIASTOLIC BLOOD PRESSURE: 73 MMHG | WEIGHT: 165 LBS | SYSTOLIC BLOOD PRESSURE: 113 MMHG

## 2024-09-25 DIAGNOSIS — N92.6 IRREGULAR MENSES: Primary | ICD-10-CM

## 2024-09-25 DIAGNOSIS — E28.2 PCOS (POLYCYSTIC OVARIAN SYNDROME): ICD-10-CM

## 2024-09-25 DIAGNOSIS — Z30.011 ENCOUNTER FOR INITIAL PRESCRIPTION OF CONTRACEPTIVE PILLS: ICD-10-CM

## 2024-09-25 PROCEDURE — 99213 OFFICE O/P EST LOW 20 MIN: CPT | Performed by: OBSTETRICS & GYNECOLOGY

## 2024-09-25 RX ORDER — NORGESTIMATE AND ETHINYL ESTRADIOL 7DAYSX3 LO
1 KIT ORAL DAILY
Qty: 84 TABLET | Refills: 1 | Status: SHIPPED | OUTPATIENT
Start: 2024-09-25

## 2024-10-15 ENCOUNTER — HOSPITAL ENCOUNTER (EMERGENCY)
Facility: HOSPITAL | Age: 29
Discharge: LEFT BEFORE DISCHARGE | End: 2024-10-15
Payer: COMMERCIAL

## 2024-10-15 PROBLEM — R68.83 CHILLS: Status: ACTIVE | Noted: 2024-10-15

## 2024-10-15 PROCEDURE — 82948 REAGENT STRIP/BLOOD GLUCOSE: CPT

## 2024-10-15 PROCEDURE — 99211 OFF/OP EST MAY X REQ PHY/QHP: CPT

## 2024-10-15 RX ORDER — LEVOTHYROXINE SODIUM 25 UG/1
25 TABLET ORAL EVERY MORNING
Qty: 90 TABLET | Refills: 3 | OUTPATIENT
Start: 2024-10-15

## 2024-10-16 ENCOUNTER — OFFICE VISIT (OUTPATIENT)
Dept: FAMILY MEDICINE CLINIC | Facility: CLINIC | Age: 29
End: 2024-10-16
Payer: COMMERCIAL

## 2024-10-16 VITALS
SYSTOLIC BLOOD PRESSURE: 120 MMHG | WEIGHT: 157.8 LBS | DIASTOLIC BLOOD PRESSURE: 80 MMHG | HEART RATE: 95 BPM | TEMPERATURE: 97.8 F | BODY MASS INDEX: 29.04 KG/M2 | HEIGHT: 62 IN | OXYGEN SATURATION: 100 %

## 2024-10-16 DIAGNOSIS — E03.9 ACQUIRED HYPOTHYROIDISM: ICD-10-CM

## 2024-10-16 DIAGNOSIS — Z23 INFLUENZA VACCINE NEEDED: ICD-10-CM

## 2024-10-16 DIAGNOSIS — D50.9 IRON DEFICIENCY ANEMIA, UNSPECIFIED IRON DEFICIENCY ANEMIA TYPE: ICD-10-CM

## 2024-10-16 DIAGNOSIS — R11.0 NAUSEA: ICD-10-CM

## 2024-10-16 DIAGNOSIS — E55.9 VITAMIN D DEFICIENCY: ICD-10-CM

## 2024-10-16 DIAGNOSIS — Z00.00 ANNUAL PHYSICAL EXAM: Primary | ICD-10-CM

## 2024-10-16 DIAGNOSIS — Z13.220 LIPID SCREENING: ICD-10-CM

## 2024-10-16 DIAGNOSIS — E53.8 VITAMIN B12 DEFICIENCY: ICD-10-CM

## 2024-10-16 PROCEDURE — 99213 OFFICE O/P EST LOW 20 MIN: CPT | Performed by: NURSE PRACTITIONER

## 2024-10-16 PROCEDURE — 99395 PREV VISIT EST AGE 18-39: CPT | Performed by: NURSE PRACTITIONER

## 2024-10-16 PROCEDURE — 90656 IIV3 VACC NO PRSV 0.5 ML IM: CPT | Performed by: NURSE PRACTITIONER

## 2024-10-16 PROCEDURE — 90471 IMMUNIZATION ADMIN: CPT | Performed by: NURSE PRACTITIONER

## 2024-10-16 RX ORDER — PROMETHAZINE HYDROCHLORIDE 25 MG/1
25 SUPPOSITORY RECTAL EVERY 6 HOURS PRN
Qty: 6 SUPPOSITORY | Refills: 1 | Status: SHIPPED | OUTPATIENT
Start: 2024-10-16

## 2024-10-16 RX ORDER — LEVOTHYROXINE SODIUM 25 UG/1
37.5 TABLET ORAL EVERY MORNING
Qty: 135 TABLET | Refills: 1 | Status: SHIPPED | OUTPATIENT
Start: 2024-10-16 | End: 2024-10-16 | Stop reason: SDUPTHER

## 2024-10-16 RX ORDER — LEVOTHYROXINE SODIUM 25 UG/1
37.5 TABLET ORAL EVERY MORNING
Qty: 135 TABLET | Refills: 1 | Status: SHIPPED | OUTPATIENT
Start: 2024-10-16

## 2024-10-16 RX ORDER — ISOTRETINOIN 40 MG/1
CAPSULE ORAL
COMMUNITY
Start: 2024-10-11

## 2024-10-16 NOTE — PROGRESS NOTES
"  ENCOUNTER DATE:  10/16/2024    Janice Padilla / 29 y.o. / female      CHIEF COMPLAINT     Annual Exam and Follow-up (NAUSEA < STOMACH PAIN SINCE TAKING 10MG GLP-1 ZEPBOUND LAST WEEK)      VITALS     Visit Vitals  /80   Pulse 95   Temp 97.8 °F (36.6 °C)   Ht 157.5 cm (62\")   Wt 71.6 kg (157 lb 12.8 oz)   LMP 09/08/2024   SpO2 100%   BMI 28.86 kg/m²       BP Readings from Last 3 Encounters:   10/16/24 120/80   10/15/24 119/89   09/25/24 113/73     Wt Readings from Last 3 Encounters:   10/16/24 71.6 kg (157 lb 12.8 oz)   10/15/24 74.8 kg (165 lb)   09/25/24 74.8 kg (165 lb)      Body mass index is 28.86 kg/m².    MEDICATIONS     Current Outpatient Medications on File Prior to Visit   Medication Sig Dispense Refill    amphetamine-dextroamphetamine (Adderall) 10 MG tablet Take 1 tablet by mouth Every Morning. 30 tablet 0    amphetamine-dextroamphetamine (ADDERALL) 20 MG tablet Take 1 tablet by mouth 2 (Two) Times a Day. 60 tablet 0    Auvelity  MG tablet controlled-release       azelaic acid (AZELEX) 15 % gel       hydrOXYzine (ATARAX) 50 MG tablet TAKE 2 TABLETS EVERY NIGHT 180 tablet 3    ketoconazole (NIZORAL) 2 % shampoo       metoprolol succinate XL (TOPROL-XL) 25 MG 24 hr tablet TAKE 1 TABLET DAILY 90 tablet 1    norgestimate-ethinyl estradiol (Ortho Tri-Cyclen Lo) 0.18/0.215/0.25 MG-25 MCG per tablet Take 1 tablet by mouth Daily. 84 tablet 1    ondansetron ODT (ZOFRAN-ODT) 4 MG disintegrating tablet       Prenatal MV & Min w/FA-DHA (PRENATAL ADULT GUMMY/DHA/FA PO) Take  by mouth.      silver sulfadiazine (SILVADENE, SSD) 1 % cream APPLY TO THE AFFECTED AREA(S) EVERY DAY      spironolactone (ALDACTONE) 100 MG tablet spironolactone 100 mg oral tablet take 1 tablet (100 mg) by oral route once daily   Active      vitamin D (ERGOCALCIFEROL) 1.25 MG (14854 UT) capsule capsule TAKE 1 CAPSULE BY MOUTH ONCE A WEEK 13 capsule 1    Zepbound 10 MG/0.5ML solution auto-injector       [DISCONTINUED] " "levothyroxine (SYNTHROID, LEVOTHROID) 25 MCG tablet Take 1.5 tablets by mouth Every Morning for 90 days. 135 tablet 0    ARIPiprazole (ABILIFY) 5 MG tablet TAKE 1 TABLET DAILY (DISCONTINUE 2 MG DOSE) 90 tablet 3    ISOtretinoin (ACCUTANE) 40 MG capsule  (Patient not taking: Reported on 10/16/2024)      tretinoin (RETIN-A) 0.025 % cream        No current facility-administered medications on file prior to visit.         HISTORY OF PRESENT ILLNESS     Janice presents for annual health maintenance visit.  No results found for: \"HPVAPTIMA\"   Last health maintenance visit: approximately 1 year ago  General health: some medical problems  Lifestyle:  Attempting to lose weight?: Yes   Diet: eats moderately healthy  Exercise: generally stays active (but no regular exercise)  Tobacco: Never used   Alcohol: does not drink  Work: Full-time  Reproductive health:  Sexually active?: Yes   Sexual problems?: No problems  Concern for STD?: No    Sees Gynecologist?: No   Dayami/Postmenopausal?: No   Domestic abuse concerns: No   Depression Screening:          PHQ-9 Depression Screening  Little interest or pleasure in doing things?     Feeling down, depressed, or hopeless?     Trouble falling or staying asleep, or sleeping too much?     Feeling tired or having little energy?     Poor appetite or overeating?     Feeling bad about yourself - or that you are a failure or have let yourself or your family down?     Trouble concentrating on things, such as reading the newspaper or watching television?     Moving or speaking so slowly that other people could have noticed? Or the opposite - being so fidgety or restless that you have been moving around a lot more than usual?     Thoughts that you would be better off dead, or of hurting yourself in some way?     PHQ-9 Total Score     If you checked off any problems, how difficult have these problems made it for you to do your work, take care of things at home, or get along with other people?      "     MATTHEW-7           Patient Care Team:  Balbina Todd APRN as PCP - General (Nurse Practitioner)      ALLERGIES  No Known Allergies     PFSH:     The following portions of the patient's history were reviewed and updated as appropriate: Allergies / Current Medications / Past Medical History / Surgical History / Social History / Family History    PROBLEM LIST   Patient Active Problem List   Diagnosis    Acne    Anxiety    Attention deficit hyperactivity disorder (ADHD)    Depression    Generalized anxiety disorder    Gestational hypertension    Major depressive disorder in partial remission    PCOS (polycystic ovarian syndrome)    Pre-eclampsia    Chills       PAST MEDICAL HISTORY  Past Medical History:   Diagnosis Date    Acne     ADHD     Anxiety     Depression     Disease of thyroid gland     Generalized anxiety disorder 01/06/2021    Gestational hypertension     History of medical problems 02/28/2019    Miscarriage    Major depressive disorder in partial remission 01/06/2021    PCOS (polycystic ovarian syndrome)     PCOS (polycystic ovarian syndrome) 07/26/2021    PONV (postoperative nausea and vomiting)     Pre-eclampsia     Spinal headache        SURGICAL HISTORY  Past Surgical History:   Procedure Laterality Date    ADENOIDECTOMY      D & C WITH SUCTION      DILATATION AND CURETTAGE      SKIN BIOPSY      TONSILLECTOMY         SOCIAL HISTORY  Social History     Socioeconomic History    Marital status:    Tobacco Use    Smoking status: Never     Passive exposure: Never    Smokeless tobacco: Never    Tobacco comments:     2/10/2021- 1/6/2021   Vaping Use    Vaping status: Never Used   Substance and Sexual Activity    Alcohol use: Not Currently     Comment: light; 2/10/2021- 1/6/2021    Drug use: Never     Comment: 2/10/2021- 1/6/2021    Sexual activity: Yes     Partners: Male     Birth control/protection: None       FAMILY HISTORY  Family History   Problem Relation Age of Onset    Anxiety disorder  Mother     Bipolar disorder Mother     Depression Mother     Depression Sister     ADD / ADHD Sister     Diabetes Maternal Grandfather     Hypertension Maternal Grandfather     Breast cancer Neg Hx     Ovarian cancer Neg Hx     Uterine cancer Neg Hx     Colon cancer Neg Hx     Pulmonary embolism Neg Hx     Deep vein thrombosis Neg Hx        IMMUNIZATION HISTORY  Immunization History   Administered Date(s) Administered    COVID-19 (MODERNA) 1st,2nd,3rd Dose Monovalent 01/08/2021, 01/08/2021, 01/08/2021, 02/02/2021, 02/02/2021, 02/02/2021    Flu Vaccine Split Quad 08/09/2017, 10/01/2020, 10/05/2020    Fluzone  >6mos 10/16/2024    Influenza, Unspecified 10/18/2021, 10/17/2022         HPI    History of Present Illness  The patient is a 29-year-old female who presents for a follow-up appointment. She comes in today for a checkup.    She has a current history of hypothyroidism and continues to take 1-1/2 tablets of levothyroxine, which she believes has improved her energy levels. She is also seen by outside providers, including psychiatry and dermatology.    Recently, she increased her Zepbound medication dosage to 10 mg, which has led to severe nausea, unlike anything she has experienced before. Despite taking Zofran, a medication typically effective for her nausea, she has not found relief. She took her first dose of the increased medication on Sunday and has been experiencing these symptoms since then. She also reports excessive sweating and shaking. She sought medical attention at an urgent care facility yesterday due to the severity of her symptoms. She does not have any diarrhea. She took 8 mg of Zofran this morning.    Previously, she had been stable on a 7.5 mg dose for several months, which also contributed to some weight loss. However, the recent increase in dosage has caused her to feel unwell, with symptoms of an upset stomach.    SOCIAL HISTORY  She does not smoke or drink.         Physical Exam  Vitals  reviewed.   Constitutional:       General: She is not in acute distress.     Appearance: Normal appearance. She is well-developed. She is obese. She is not ill-appearing.   HENT:      Head: Normocephalic and atraumatic.   Eyes:      Conjunctiva/sclera: Conjunctivae normal.      Pupils: Pupils are equal, round, and reactive to light.   Cardiovascular:      Rate and Rhythm: Normal rate and regular rhythm.      Heart sounds: No murmur heard.  Pulmonary:      Effort: Pulmonary effort is normal.      Breath sounds: Normal breath sounds. No wheezing.   Musculoskeletal:      Right lower leg: No edema.      Left lower leg: No edema.   Skin:     General: Skin is warm and dry.   Neurological:      Mental Status: She is alert and oriented to person, place, and time.   Psychiatric:         Mood and Affect: Mood and affect normal.         Behavior: Behavior normal.         Thought Content: Thought content normal.         Judgment: Judgment normal.         REVIEWED DATA      Labs:    Lab Results   Component Value Date     04/17/2024    K 4.6 04/17/2024    CALCIUM 8.4 (L) 04/17/2024    AST 19 04/17/2024    ALT 24 04/17/2024    BUN 10 04/17/2024    CREATININE 1.06 (H) 04/17/2024    CREATININE 0.80 08/23/2023    CREATININE 0.92 02/16/2022    EGFRIFNONA 73 02/16/2022       Lab Results   Component Value Date    TSH 2.610 07/31/2024    FREET4 0.81 (L) 08/23/2023       Lab Results   Component Value Date    LDL 94 08/23/2023    HDL 54 08/23/2023    TRIG 131 08/23/2023    CHOLHDLRATIO 4.0 12/02/2020       Lab Results   Component Value Date    DLXA61MC 43.5 04/17/2024        Lab Results   Component Value Date    WBC 10.33 04/17/2024    HGB 14.6 04/17/2024    MCV 87.1 04/17/2024     04/17/2024       Lab Results   Component Value Date    LEUKOCYTESUR Negative 10/15/2024          Lab Results   Component Value Date    HEPCVIRUSABY Non-Reactive 08/23/2023           ASSESSMENT & PLAN     Diagnoses and all orders for this  visit:    1. Annual physical exam (Primary)    2. Acquired hypothyroidism  -     CBC & Differential; Future  -     Comprehensive Metabolic Panel; Future  -     Thyroid Panel With TSH; Future    3. Vitamin D deficiency  -     Vitamin D,25-Hydroxy; Future    4. Iron deficiency anemia, unspecified iron deficiency anemia type  -     Iron Profile; Future  -     Ferritin; Future    5. Vitamin B12 deficiency  -     Vitamin B12 & Folate; Future    6. Lipid screening  -     Lipid Panel; Future    7. Influenza vaccine needed  -     Fluzone >6mos ([id number])    8. Nausea    Other orders  -     levothyroxine (SYNTHROID, LEVOTHROID) 25 MCG tablet; Take 1.5 tablets by mouth Every Morning.  Dispense: 135 tablet; Refill: 1  -     promethazine (PHENERGAN) 25 MG suppository; Insert 1 suppository into the rectum Every 6 (Six) Hours As Needed for Nausea or Vomiting.  Dispense: 6 suppository; Refill: 1        Assessment & Plan  1. Hypothyroidism.  Her thyroid levels were checked 2 months ago and were within normal range. She reports improved energy levels with the current dose of levothyroxine. Continue the current dose of levothyroxine. A refill for levothyroxine will be provided.    2. Nausea.  She reports severe nausea after increasing her medication dose to 10 mg, which started on Sunday. She has been unable to tolerate the increased dose and has experienced significant side effects, including sweating, shaking, and feeling very unwell. She will discontinue the 10 mg dose and revert to the 7.5 mg dose, which she tolerated well previously. Zofran has not been effective in managing her nausea. A prescription for Phenergan suppository will be provided to help manage severe nausea.    3. Dehydration.  She likely has dehydration due to severe nausea. She is advised to drink Gatorade to replenish electrolytes and maintain adequate hydration. Labs will be conducted today to check for any abnormalities.    4. Health Maintenance.  An  influenza vaccine will be administered today.           HEALTHCARE MAINTENANCE ISSUES     Cancer Screening:  Colon: Initial/Next screening at age: 44  Repeat colon cancer screening: N/A at this time  Breast: Recommended monthly self exams; annual professional exam  Mammogram: N/A at this time  Cervical: pelvic exam as recommended by gyne  Skin: Monthly self skin examination, annual exam by health professional      Lifestyle Counseling:  Lifestyle Modifications: Continue good lifestyle choices/modifications  Safety Issues: Always wear seatbelt, Avoid texting while driving   Use sunscreen, regular skin examination  Recommended annual dental/vision examination.  Emotional/Stress/Sleep: Reviewed and  given when appropriate    Part of this note may be electronic transcription/translation of spoken language to printed text using the Dragon dictation system      Patient or patient representative verbalized consent for the use of Ambient Listening during the visit with  JURGEN Faustin for chart documentation. 10/16/2024  11:21 EDT

## 2024-11-03 DIAGNOSIS — F90.0 ADHD (ATTENTION DEFICIT HYPERACTIVITY DISORDER), INATTENTIVE TYPE: ICD-10-CM

## 2024-11-03 DIAGNOSIS — F41.1 GENERALIZED ANXIETY DISORDER: ICD-10-CM

## 2024-11-04 RX ORDER — ERGOCALCIFEROL 1.25 MG/1
50000 CAPSULE, LIQUID FILLED ORAL WEEKLY
Qty: 13 CAPSULE | Refills: 3 | Status: SHIPPED | OUTPATIENT
Start: 2024-11-04

## 2024-11-04 NOTE — TELEPHONE ENCOUNTER
ATTEMPTED TO CONTACT PT(PATIENT)      NO ANSWER      LEFT VOICEMAIL WITH INSTRUCTIONS TO RETURN CALL TO OFFICE AT [phone]

## 2024-11-04 NOTE — TELEPHONE ENCOUNTER
NEXT VISIT WITH PROVIDER   NONE IN [facility]     LAST SEEN BY PROVIDER   Office Visit with Eli Murray MD (07/12/2024)     LAST MED REFILL   hydrOXYzine (ATARAX) 50 MG tablet (09/06/2023)

## 2024-11-05 NOTE — TELEPHONE ENCOUNTER
ATTEMPTED TO CONTACT PT(PATIENT)      NO ANSWER      LEFT VOICEMAIL WITH INSTRUCTIONS TO RETURN CALL TO OFFICE AT [phone]

## 2024-11-06 RX ORDER — HYDROXYZINE HYDROCHLORIDE 50 MG/1
TABLET, FILM COATED ORAL
Qty: 180 TABLET | Refills: 3 | OUTPATIENT
Start: 2024-11-06

## 2024-11-06 NOTE — TELEPHONE ENCOUNTER
ATTEMPTED TO CONTACT PT(PATIENT)      NO ANSWER      LEFT VOICEMAIL WITH INSTRUCTIONS TO RETURN CALL TO OFFICE AT [phone]

## 2024-11-07 ENCOUNTER — TELEPHONE (OUTPATIENT)
Dept: PSYCHIATRY | Facility: CLINIC | Age: 29
End: 2024-11-07
Payer: COMMERCIAL

## 2024-11-07 NOTE — TELEPHONE ENCOUNTER
WE HAVE RECEIVED UDS RESULTS FROM [facility] FROM A COLLECTION DATE OF 02/09/2024. DATE REPORTED IT SAYS IS 11/06/2024.    BEHAVIORAL HEALTH - SCAN - UDS RESULTS; [facility]; 11/06/2024. (11/07/2024)     PLACING IN PTS CHART.

## 2024-11-13 ENCOUNTER — CLINICAL SUPPORT (OUTPATIENT)
Dept: FAMILY MEDICINE CLINIC | Facility: CLINIC | Age: 29
End: 2024-11-13
Payer: COMMERCIAL

## 2024-11-13 DIAGNOSIS — E03.9 ACQUIRED HYPOTHYROIDISM: ICD-10-CM

## 2024-11-13 DIAGNOSIS — E55.9 VITAMIN D DEFICIENCY: ICD-10-CM

## 2024-11-13 DIAGNOSIS — D50.9 IRON DEFICIENCY ANEMIA, UNSPECIFIED IRON DEFICIENCY ANEMIA TYPE: ICD-10-CM

## 2024-11-13 DIAGNOSIS — E53.8 VITAMIN B12 DEFICIENCY: ICD-10-CM

## 2024-11-13 DIAGNOSIS — Z13.220 LIPID SCREENING: ICD-10-CM

## 2024-11-13 LAB
25(OH)D3 SERPL-MCNC: 46.9 NG/ML (ref 30–100)
ALBUMIN SERPL-MCNC: 4.4 G/DL (ref 3.5–5.2)
ALBUMIN/GLOB SERPL: 1.6 G/DL
ALP SERPL-CCNC: 49 U/L (ref 39–117)
ALT SERPL W P-5'-P-CCNC: 27 U/L (ref 1–33)
ANION GAP SERPL CALCULATED.3IONS-SCNC: 14.1 MMOL/L (ref 5–15)
AST SERPL-CCNC: 27 U/L (ref 1–32)
BASOPHILS # BLD AUTO: 0.03 10*3/MM3 (ref 0–0.2)
BASOPHILS NFR BLD AUTO: 0.4 % (ref 0–1.5)
BILIRUB SERPL-MCNC: 0.5 MG/DL (ref 0–1.2)
BUN SERPL-MCNC: 10 MG/DL (ref 6–20)
BUN/CREAT SERPL: 11.6 (ref 7–25)
CALCIUM SPEC-SCNC: 9.3 MG/DL (ref 8.6–10.5)
CHLORIDE SERPL-SCNC: 103 MMOL/L (ref 98–107)
CHOLEST SERPL-MCNC: 222 MG/DL (ref 0–200)
CO2 SERPL-SCNC: 22.9 MMOL/L (ref 22–29)
CREAT SERPL-MCNC: 0.86 MG/DL (ref 0.57–1)
DEPRECATED RDW RBC AUTO: 40.3 FL (ref 37–54)
EGFRCR SERPLBLD CKD-EPI 2021: 93.3 ML/MIN/1.73
EOSINOPHIL # BLD AUTO: 0.13 10*3/MM3 (ref 0–0.4)
EOSINOPHIL NFR BLD AUTO: 1.5 % (ref 0.3–6.2)
ERYTHROCYTE [DISTWIDTH] IN BLOOD BY AUTOMATED COUNT: 12.6 % (ref 12.3–15.4)
FERRITIN SERPL-MCNC: 283 NG/ML (ref 13–150)
FOLATE SERPL-MCNC: >20 NG/ML (ref 4.78–24.2)
GLOBULIN UR ELPH-MCNC: 2.7 GM/DL
GLUCOSE SERPL-MCNC: 101 MG/DL (ref 65–99)
HCT VFR BLD AUTO: 43.2 % (ref 34–46.6)
HDLC SERPL-MCNC: 43 MG/DL (ref 40–60)
HGB BLD-MCNC: 13.8 G/DL (ref 12–15.9)
IMM GRANULOCYTES # BLD AUTO: 0.05 10*3/MM3 (ref 0–0.05)
IMM GRANULOCYTES NFR BLD AUTO: 0.6 % (ref 0–0.5)
IRON 24H UR-MRATE: 120 MCG/DL (ref 37–145)
IRON SATN MFR SERPL: 28 % (ref 20–50)
LDLC SERPL CALC-MCNC: 151 MG/DL (ref 0–100)
LDLC/HDLC SERPL: 3.45 {RATIO}
LYMPHOCYTES # BLD AUTO: 2.17 10*3/MM3 (ref 0.7–3.1)
LYMPHOCYTES NFR BLD AUTO: 25.9 % (ref 19.6–45.3)
MCH RBC QN AUTO: 28.3 PG (ref 26.6–33)
MCHC RBC AUTO-ENTMCNC: 31.9 G/DL (ref 31.5–35.7)
MCV RBC AUTO: 88.7 FL (ref 79–97)
MONOCYTES # BLD AUTO: 0.48 10*3/MM3 (ref 0.1–0.9)
MONOCYTES NFR BLD AUTO: 5.7 % (ref 5–12)
NEUTROPHILS NFR BLD AUTO: 5.53 10*3/MM3 (ref 1.7–7)
NEUTROPHILS NFR BLD AUTO: 65.9 % (ref 42.7–76)
NRBC BLD AUTO-RTO: 0 /100 WBC (ref 0–0.2)
PLATELET # BLD AUTO: 389 10*3/MM3 (ref 140–450)
PMV BLD AUTO: 9.4 FL (ref 6–12)
POTASSIUM SERPL-SCNC: 4.3 MMOL/L (ref 3.5–5.2)
PROT SERPL-MCNC: 7.1 G/DL (ref 6–8.5)
RBC # BLD AUTO: 4.87 10*6/MM3 (ref 3.77–5.28)
SODIUM SERPL-SCNC: 140 MMOL/L (ref 136–145)
T-UPTAKE NFR SERPL: 1.29 TBI (ref 0.8–1.3)
T4 SERPL-MCNC: 11.5 MCG/DL (ref 4.5–11.7)
TIBC SERPL-MCNC: 426 MCG/DL (ref 298–536)
TRANSFERRIN SERPL-MCNC: 286 MG/DL (ref 200–360)
TRIGL SERPL-MCNC: 154 MG/DL (ref 0–150)
TSH SERPL DL<=0.05 MIU/L-ACNC: 1.55 UIU/ML (ref 0.27–4.2)
VIT B12 BLD-MCNC: 716 PG/ML (ref 211–946)
VLDLC SERPL-MCNC: 28 MG/DL (ref 5–40)
WBC NRBC COR # BLD AUTO: 8.39 10*3/MM3 (ref 3.4–10.8)

## 2024-11-13 PROCEDURE — 82728 ASSAY OF FERRITIN: CPT | Performed by: NURSE PRACTITIONER

## 2024-11-13 PROCEDURE — 80061 LIPID PANEL: CPT | Performed by: NURSE PRACTITIONER

## 2024-11-13 PROCEDURE — 80050 GENERAL HEALTH PANEL: CPT | Performed by: NURSE PRACTITIONER

## 2024-11-13 PROCEDURE — 82306 VITAMIN D 25 HYDROXY: CPT | Performed by: NURSE PRACTITIONER

## 2024-11-13 PROCEDURE — 36415 COLL VENOUS BLD VENIPUNCTURE: CPT | Performed by: NURSE PRACTITIONER

## 2024-11-13 PROCEDURE — 84479 ASSAY OF THYROID (T3 OR T4): CPT | Performed by: NURSE PRACTITIONER

## 2024-11-13 PROCEDURE — 82746 ASSAY OF FOLIC ACID SERUM: CPT | Performed by: NURSE PRACTITIONER

## 2024-11-13 PROCEDURE — 84466 ASSAY OF TRANSFERRIN: CPT | Performed by: NURSE PRACTITIONER

## 2024-11-13 PROCEDURE — 83540 ASSAY OF IRON: CPT | Performed by: NURSE PRACTITIONER

## 2024-11-13 PROCEDURE — 82607 VITAMIN B-12: CPT | Performed by: NURSE PRACTITIONER

## 2024-11-13 PROCEDURE — 84436 ASSAY OF TOTAL THYROXINE: CPT | Performed by: NURSE PRACTITIONER

## 2024-11-14 ENCOUNTER — TELEPHONE (OUTPATIENT)
Dept: FAMILY MEDICINE CLINIC | Facility: CLINIC | Age: 29
End: 2024-11-14
Payer: COMMERCIAL

## 2024-11-14 NOTE — TELEPHONE ENCOUNTER
Pt said that she missed a call about her lab results. She is asking for a call back. She asked that you call her work number [phone]

## 2025-01-15 RX ORDER — METOPROLOL SUCCINATE 25 MG/1
25 TABLET, EXTENDED RELEASE ORAL DAILY
Qty: 90 TABLET | Refills: 3 | Status: SHIPPED | OUTPATIENT
Start: 2025-01-15

## 2025-02-28 ENCOUNTER — TELEPHONE (OUTPATIENT)
Dept: OBSTETRICS AND GYNECOLOGY | Age: 30
End: 2025-02-28

## 2025-02-28 NOTE — TELEPHONE ENCOUNTER
ELENA VALERA    [phone]    PT WOULD LIKE TO SCHEDULE AN AE & DISCUSS FERTILITY AFTERNOON APPT ONLY FOR 5/2/25    LAST AE 5/25/22,     PT WOULD LIKE TO SEE A DR BUT WOULD RATHER NOT SEE DO VIDAL     IF APRN COULD DISCUSS FERTILITY THEN THAT'S FINE TOO.

## 2025-04-08 RX ORDER — LEVOTHYROXINE SODIUM 25 UG/1
TABLET ORAL
Qty: 135 TABLET | Refills: 0 | Status: SHIPPED | OUTPATIENT
Start: 2025-04-08

## 2025-06-16 NOTE — PROGRESS NOTES
"GYN Visit    Chief Complaint   Patient presents with    Menorrhagia     Bled for 25 days last month from -, Moderate flow, changing products 2 times a day, trying to conceive        HPI:   29 y.o. with LMP 2025 here for abnormal uterine bleeding.  Patient states that this last menses lasted until 2025.  Previous menses 2025.  X 4 days.  Menses usually monthly x 4 days with 1 to 2 days heavy.  She discontinued birth control pills a few months ago after she discontinued the use of acting.  She and her  desire pregnancy.  She had a spontaneous pregnancy with  in .  They have been having unprotected intercourse since then with a desire for pregnancy.  The patient states only time that they have used birth control is when she was placed on Accutane for her acne.  The patient admits to noticing some brownish discharge from bilateral breasts most often in the shower 1-2 times a month.  She does not notice any discharge at all when her clothing.  She did breast-feed with her child.  She has not been breast-feeding times several years      History: PMHx, Meds, Allergies, PSHx, Social Hx, and POBHx all reviewed and updated.    PHYSICAL EXAM:  /83   Pulse 92   Ht 157.5 cm (62\")   Wt 76.7 kg (169 lb)   LMP 2025 (Exact Date)   Breastfeeding No   BMI 30.91 kg/m² chaperone present  General- NAD, alert and oriented, appropriate  Psych- Normal mood, good memory        Breast left-  Bilaterally symmetrical, no masses, non tender, no nipple discharge  Breast right- Bilaterally symmetrical, no masses, non tender, no nipple discharge    External genitalia- Normal female, no lesions  Urethra/meatus- Normal, no masses, non tender, no prolapse  Bladder- Normal, no masses, non tender, no prolapse  Vagina- Normal, no atrophy, no lesions, no discharge, no prolapse  Cvx- Normal, no lesions, no discharge, No cervical motion tenderness  Uterus- Normal size, shape & consistency.  Non " tender, mobile.  Adnexa- No mass, non tender  Anus/Rectum/Perineum- Not performed        ASSESSMENT AND PLAN:  Diagnoses and all orders for this visit:    1. Abnormal uterine bleeding (AUB) (Primary)  -     CBC (No Diff)  -     TSH  -     Prolactin  -     US Non-ob Transvaginal; Future    2. Pregnancy test negative  -     POC Pregnancy, Urine    3. Galactorrhea not associated with childbirth  -     Prolactin    4. Female infertility, secondary  -     Ambulatory Referral to Infertility    Patient reassured as she is only had 1 episode of some abnormal uterine bleeding.  She does have a history of thyroid disease.  We will check her thyroid studies to make sure that they are normal.  Patient notices breast discharge usually after manipulation of the breast in a warm shower and this may be the reason for the galactorrhea.  Breast exam is negative today with no evidence of galactorrhea.  We will obtain a prolactin level.        Follow Up:  Return in about 29 days (around 7/16/2025) for Follow-up for labs and ultrasound.          Jacquelyn Gaston,   06/17/2025    [facility] [address] [facility]  [address] [place] [address] [address] [place] [address]  Dept: [phone]  Dept Fax: [phone]  Loc: [phone]  Loc Fax: [phone]

## 2025-06-17 ENCOUNTER — OFFICE VISIT (OUTPATIENT)
Dept: OBSTETRICS AND GYNECOLOGY | Age: 30
End: 2025-06-17
Payer: COMMERCIAL

## 2025-06-17 ENCOUNTER — LAB (OUTPATIENT)
Facility: HOSPITAL | Age: 30
End: 2025-06-17
Payer: COMMERCIAL

## 2025-06-17 VITALS
WEIGHT: 169 LBS | DIASTOLIC BLOOD PRESSURE: 83 MMHG | HEART RATE: 92 BPM | SYSTOLIC BLOOD PRESSURE: 125 MMHG | BODY MASS INDEX: 31.1 KG/M2 | HEIGHT: 62 IN

## 2025-06-17 DIAGNOSIS — N64.3 GALACTORRHEA NOT ASSOCIATED WITH CHILDBIRTH: ICD-10-CM

## 2025-06-17 DIAGNOSIS — N97.9 FEMALE INFERTILITY, SECONDARY: ICD-10-CM

## 2025-06-17 DIAGNOSIS — Z32.02 PREGNANCY TEST NEGATIVE: ICD-10-CM

## 2025-06-17 DIAGNOSIS — N93.9 ABNORMAL UTERINE BLEEDING (AUB): Primary | ICD-10-CM

## 2025-06-17 LAB
B-HCG UR QL: NEGATIVE
DEPRECATED RDW RBC AUTO: 41.6 FL (ref 37–54)
ERYTHROCYTE [DISTWIDTH] IN BLOOD BY AUTOMATED COUNT: 12.6 % (ref 12.3–15.4)
EXPIRATION DATE: ABNORMAL
HCT VFR BLD AUTO: 43.7 % (ref 34–46.6)
HGB BLD-MCNC: 14.7 G/DL (ref 12–15.9)
INTERNAL NEGATIVE CONTROL: ABNORMAL
INTERNAL POSITIVE CONTROL: ABNORMAL
Lab: ABNORMAL
MCH RBC QN AUTO: 30.3 PG (ref 26.6–33)
MCHC RBC AUTO-ENTMCNC: 33.6 G/DL (ref 31.5–35.7)
MCV RBC AUTO: 90.1 FL (ref 79–97)
PLATELET # BLD AUTO: 383 10*3/MM3 (ref 140–450)
PMV BLD AUTO: 9.2 FL (ref 6–12)
PROLACTIN SERPL-MCNC: 24.5 NG/ML (ref 4.79–23.3)
RBC # BLD AUTO: 4.85 10*6/MM3 (ref 3.77–5.28)
TSH SERPL DL<=0.05 MIU/L-ACNC: 2.1 UIU/ML (ref 0.27–4.2)
WBC NRBC COR # BLD AUTO: 13.45 10*3/MM3 (ref 3.4–10.8)

## 2025-06-17 PROCEDURE — 85027 COMPLETE CBC AUTOMATED: CPT | Performed by: OBSTETRICS & GYNECOLOGY

## 2025-06-17 PROCEDURE — 36415 COLL VENOUS BLD VENIPUNCTURE: CPT | Performed by: OBSTETRICS & GYNECOLOGY

## 2025-06-17 PROCEDURE — 84443 ASSAY THYROID STIM HORMONE: CPT | Performed by: OBSTETRICS & GYNECOLOGY

## 2025-06-17 PROCEDURE — 84146 ASSAY OF PROLACTIN: CPT | Performed by: OBSTETRICS & GYNECOLOGY

## 2025-06-17 RX ORDER — ARIPIPRAZOLE 2 MG/1
2 TABLET ORAL DAILY
COMMUNITY

## 2025-07-07 ENCOUNTER — HOSPITAL ENCOUNTER (OUTPATIENT)
Dept: ULTRASOUND IMAGING | Facility: HOSPITAL | Age: 30
Discharge: HOME | End: 2025-07-07
Admitting: OBSTETRICS & GYNECOLOGY
Payer: COMMERCIAL

## 2025-07-07 DIAGNOSIS — N93.9 ABNORMAL UTERINE BLEEDING (AUB): ICD-10-CM

## 2025-07-07 PROCEDURE — 76830 TRANSVAGINAL US NON-OB: CPT

## 2025-08-13 RX ORDER — LEVOTHYROXINE SODIUM 25 UG/1
TABLET ORAL
Qty: 135 TABLET | Refills: 3 | OUTPATIENT
Start: 2025-08-13

## 2025-08-13 RX ORDER — LEVOTHYROXINE SODIUM 25 UG/1
25 TABLET ORAL
Qty: 135 TABLET | Refills: 1 | Status: SHIPPED | OUTPATIENT
Start: 2025-08-13 | End: 2025-08-18 | Stop reason: SDUPTHER

## 2025-08-14 RX ORDER — LEVOTHYROXINE SODIUM 25 UG/1
25 TABLET ORAL
Qty: 135 TABLET | Refills: 1 | Status: CANCELLED | OUTPATIENT
Start: 2025-08-14

## 2025-08-18 RX ORDER — LEVOTHYROXINE SODIUM 25 UG/1
37.5 TABLET ORAL
Qty: 135 TABLET | Refills: 1 | Status: SHIPPED | OUTPATIENT
Start: 2025-08-18